# Patient Record
Sex: FEMALE | Race: WHITE | Employment: OTHER | URBAN - METROPOLITAN AREA
[De-identification: names, ages, dates, MRNs, and addresses within clinical notes are randomized per-mention and may not be internally consistent; named-entity substitution may affect disease eponyms.]

---

## 2019-10-28 ENCOUNTER — OFFICE VISIT (OUTPATIENT)
Dept: FAMILY MEDICINE CLINIC | Age: 74
End: 2019-10-28
Payer: MEDICARE

## 2019-10-28 VITALS
BODY MASS INDEX: 34.11 KG/M2 | HEIGHT: 64 IN | WEIGHT: 199.8 LBS | OXYGEN SATURATION: 98 % | DIASTOLIC BLOOD PRESSURE: 100 MMHG | SYSTOLIC BLOOD PRESSURE: 134 MMHG | HEART RATE: 81 BPM

## 2019-10-28 DIAGNOSIS — G89.29 CHRONIC PAIN OF BOTH KNEES: ICD-10-CM

## 2019-10-28 DIAGNOSIS — I10 HYPERTENSION, UNSPECIFIED TYPE: Primary | ICD-10-CM

## 2019-10-28 DIAGNOSIS — N39.46 MIXED STRESS AND URGE URINARY INCONTINENCE: ICD-10-CM

## 2019-10-28 DIAGNOSIS — E78.5 HYPERLIPIDEMIA, UNSPECIFIED HYPERLIPIDEMIA TYPE: ICD-10-CM

## 2019-10-28 DIAGNOSIS — Z11.59 NEED FOR HEPATITIS C SCREENING TEST: ICD-10-CM

## 2019-10-28 DIAGNOSIS — M25.561 CHRONIC PAIN OF BOTH KNEES: ICD-10-CM

## 2019-10-28 DIAGNOSIS — M25.562 CHRONIC PAIN OF BOTH KNEES: ICD-10-CM

## 2019-10-28 DIAGNOSIS — E11.9 TYPE 2 DIABETES MELLITUS WITHOUT COMPLICATION, WITHOUT LONG-TERM CURRENT USE OF INSULIN (HCC): ICD-10-CM

## 2019-10-28 LAB
A/G RATIO: 1.4 (ref 1.1–2.2)
ALBUMIN SERPL-MCNC: 4.3 G/DL (ref 3.4–5)
ALP BLD-CCNC: 123 U/L (ref 40–129)
ALT SERPL-CCNC: 13 U/L (ref 10–40)
ANION GAP SERPL CALCULATED.3IONS-SCNC: 19 MMOL/L (ref 3–16)
AST SERPL-CCNC: 20 U/L (ref 15–37)
BILIRUB SERPL-MCNC: 0.6 MG/DL (ref 0–1)
BUN BLDV-MCNC: 13 MG/DL (ref 7–20)
CALCIUM SERPL-MCNC: 9.9 MG/DL (ref 8.3–10.6)
CHLORIDE BLD-SCNC: 99 MMOL/L (ref 99–110)
CHOLESTEROL, TOTAL: 243 MG/DL (ref 0–199)
CO2: 24 MMOL/L (ref 21–32)
CREAT SERPL-MCNC: 0.8 MG/DL (ref 0.6–1.2)
GFR AFRICAN AMERICAN: >60
GFR NON-AFRICAN AMERICAN: >60
GLOBULIN: 3 G/DL
GLUCOSE BLD-MCNC: 148 MG/DL (ref 70–99)
HCT VFR BLD CALC: 42.4 % (ref 36–48)
HDLC SERPL-MCNC: 81 MG/DL (ref 40–60)
HEMOGLOBIN: 13.9 G/DL (ref 12–16)
HEPATITIS C ANTIBODY INTERPRETATION: NORMAL
LDL CHOLESTEROL CALCULATED: 140 MG/DL
MCH RBC QN AUTO: 31.2 PG (ref 26–34)
MCHC RBC AUTO-ENTMCNC: 32.8 G/DL (ref 31–36)
MCV RBC AUTO: 95.1 FL (ref 80–100)
PDW BLD-RTO: 15.8 % (ref 12.4–15.4)
PLATELET # BLD: 312 K/UL (ref 135–450)
PMV BLD AUTO: 7.8 FL (ref 5–10.5)
POTASSIUM SERPL-SCNC: 4.1 MMOL/L (ref 3.5–5.1)
RBC # BLD: 4.46 M/UL (ref 4–5.2)
SODIUM BLD-SCNC: 142 MMOL/L (ref 136–145)
TOTAL PROTEIN: 7.3 G/DL (ref 6.4–8.2)
TRIGL SERPL-MCNC: 108 MG/DL (ref 0–150)
VLDLC SERPL CALC-MCNC: 22 MG/DL
WBC # BLD: 7.3 K/UL (ref 4–11)

## 2019-10-28 PROCEDURE — 99204 OFFICE O/P NEW MOD 45 MIN: CPT | Performed by: PHYSICIAN ASSISTANT

## 2019-10-28 PROCEDURE — 4040F PNEUMOC VAC/ADMIN/RCVD: CPT | Performed by: PHYSICIAN ASSISTANT

## 2019-10-28 PROCEDURE — 90653 IIV ADJUVANT VACCINE IM: CPT | Performed by: PHYSICIAN ASSISTANT

## 2019-10-28 PROCEDURE — 3017F COLORECTAL CA SCREEN DOC REV: CPT | Performed by: PHYSICIAN ASSISTANT

## 2019-10-28 PROCEDURE — 1036F TOBACCO NON-USER: CPT | Performed by: PHYSICIAN ASSISTANT

## 2019-10-28 PROCEDURE — G8482 FLU IMMUNIZE ORDER/ADMIN: HCPCS | Performed by: PHYSICIAN ASSISTANT

## 2019-10-28 PROCEDURE — G8400 PT W/DXA NO RESULTS DOC: HCPCS | Performed by: PHYSICIAN ASSISTANT

## 2019-10-28 PROCEDURE — G8427 DOCREV CUR MEDS BY ELIG CLIN: HCPCS | Performed by: PHYSICIAN ASSISTANT

## 2019-10-28 PROCEDURE — 1123F ACP DISCUSS/DSCN MKR DOCD: CPT | Performed by: PHYSICIAN ASSISTANT

## 2019-10-28 PROCEDURE — 1090F PRES/ABSN URINE INCON ASSESS: CPT | Performed by: PHYSICIAN ASSISTANT

## 2019-10-28 PROCEDURE — G0008 ADMIN INFLUENZA VIRUS VAC: HCPCS | Performed by: PHYSICIAN ASSISTANT

## 2019-10-28 PROCEDURE — 90471 IMMUNIZATION ADMIN: CPT | Performed by: PHYSICIAN ASSISTANT

## 2019-10-28 PROCEDURE — G0444 DEPRESSION SCREEN ANNUAL: HCPCS | Performed by: PHYSICIAN ASSISTANT

## 2019-10-28 PROCEDURE — G8417 CALC BMI ABV UP PARAM F/U: HCPCS | Performed by: PHYSICIAN ASSISTANT

## 2019-10-28 PROCEDURE — 2022F DILAT RTA XM EVC RTNOPTHY: CPT | Performed by: PHYSICIAN ASSISTANT

## 2019-10-28 PROCEDURE — 0509F URINE INCON PLAN DOCD: CPT | Performed by: PHYSICIAN ASSISTANT

## 2019-10-28 PROCEDURE — 3046F HEMOGLOBIN A1C LEVEL >9.0%: CPT | Performed by: PHYSICIAN ASSISTANT

## 2019-10-28 RX ORDER — ATORVASTATIN CALCIUM 40 MG/1
40 TABLET, FILM COATED ORAL DAILY
Qty: 90 TABLET | Refills: 1 | Status: SHIPPED | OUTPATIENT
Start: 2019-10-28 | End: 2021-08-24

## 2019-10-28 RX ORDER — ATORVASTATIN CALCIUM 40 MG/1
40 TABLET, FILM COATED ORAL
COMMUNITY
Start: 2018-11-20 | End: 2019-10-28 | Stop reason: SDUPTHER

## 2019-10-28 RX ORDER — LOSARTAN POTASSIUM 100 MG/1
100 TABLET ORAL DAILY
Qty: 30 TABLET | Refills: 1 | Status: SHIPPED | OUTPATIENT
Start: 2019-10-28 | End: 2019-11-21 | Stop reason: SDUPTHER

## 2019-10-28 RX ORDER — METOPROLOL TARTRATE 100 MG/1
100 TABLET ORAL
Status: ON HOLD | COMMUNITY
Start: 2014-04-03 | End: 2020-02-21 | Stop reason: HOSPADM

## 2019-10-28 RX ORDER — TOLTERODINE 2 MG/1
2 CAPSULE, EXTENDED RELEASE ORAL DAILY
Qty: 90 CAPSULE | Refills: 1 | Status: SHIPPED | OUTPATIENT
Start: 2019-10-28 | End: 2019-11-05

## 2019-10-28 RX ORDER — GLIPIZIDE 10 MG/1
10 TABLET ORAL
COMMUNITY
Start: 2014-04-17 | End: 2019-10-28 | Stop reason: SDUPTHER

## 2019-10-28 RX ORDER — GLIPIZIDE 10 MG/1
10 TABLET ORAL DAILY
Qty: 90 TABLET | Refills: 0 | Status: SHIPPED | OUTPATIENT
Start: 2019-10-28 | End: 2020-01-28

## 2019-10-28 RX ORDER — OXYBUTYNIN CHLORIDE 5 MG/1
5 TABLET ORAL
COMMUNITY
Start: 2019-03-29 | End: 2019-10-28 | Stop reason: CLARIF

## 2019-10-28 RX ORDER — LOSARTAN POTASSIUM 100 MG/1
100 TABLET ORAL
COMMUNITY
Start: 2018-08-24 | End: 2019-10-28 | Stop reason: SDUPTHER

## 2019-10-28 ASSESSMENT — PATIENT HEALTH QUESTIONNAIRE - PHQ9
SUM OF ALL RESPONSES TO PHQ9 QUESTIONS 1 & 2: 3
8. MOVING OR SPEAKING SO SLOWLY THAT OTHER PEOPLE COULD HAVE NOTICED. OR THE OPPOSITE, BEING SO FIGETY OR RESTLESS THAT YOU HAVE BEEN MOVING AROUND A LOT MORE THAN USUAL: 0
6. FEELING BAD ABOUT YOURSELF - OR THAT YOU ARE A FAILURE OR HAVE LET YOURSELF OR YOUR FAMILY DOWN: 1
1. LITTLE INTEREST OR PLEASURE IN DOING THINGS: 0
SUM OF ALL RESPONSES TO PHQ QUESTIONS 1-9: 7
5. POOR APPETITE OR OVEREATING: 1
9. THOUGHTS THAT YOU WOULD BE BETTER OFF DEAD, OR OF HURTING YOURSELF: 0
3. TROUBLE FALLING OR STAYING ASLEEP: 1
SUM OF ALL RESPONSES TO PHQ QUESTIONS 1-9: 7
7. TROUBLE CONCENTRATING ON THINGS, SUCH AS READING THE NEWSPAPER OR WATCHING TELEVISION: 0
10. IF YOU CHECKED OFF ANY PROBLEMS, HOW DIFFICULT HAVE THESE PROBLEMS MADE IT FOR YOU TO DO YOUR WORK, TAKE CARE OF THINGS AT HOME, OR GET ALONG WITH OTHER PEOPLE: 1
2. FEELING DOWN, DEPRESSED OR HOPELESS: 3
4. FEELING TIRED OR HAVING LITTLE ENERGY: 1

## 2019-10-28 ASSESSMENT — ENCOUNTER SYMPTOMS
RESPIRATORY NEGATIVE: 1
GASTROINTESTINAL NEGATIVE: 1

## 2019-10-29 ENCOUNTER — TELEPHONE (OUTPATIENT)
Dept: FAMILY MEDICINE CLINIC | Age: 74
End: 2019-10-29

## 2019-10-29 LAB
ESTIMATED AVERAGE GLUCOSE: 111.2 MG/DL
HBA1C MFR BLD: 5.5 %

## 2019-10-29 NOTE — TELEPHONE ENCOUNTER
Patient is requesting a disability placard prescription. She said this was discussed during her office visit of 10-28-19.

## 2019-10-29 NOTE — TELEPHONE ENCOUNTER
Let patient know that it is ready at the  if she would like to pick it up or we can mail if she would like

## 2019-11-04 ENCOUNTER — TELEPHONE (OUTPATIENT)
Dept: FAMILY MEDICINE CLINIC | Age: 74
End: 2019-11-04

## 2019-11-05 RX ORDER — OXYBUTYNIN CHLORIDE 5 MG/1
5 TABLET ORAL 2 TIMES DAILY
Qty: 60 TABLET | Refills: 3 | Status: SHIPPED | OUTPATIENT
Start: 2019-11-05 | End: 2021-08-09 | Stop reason: ALTCHOICE

## 2019-11-06 ENCOUNTER — OFFICE VISIT (OUTPATIENT)
Dept: ORTHOPEDIC SURGERY | Age: 74
End: 2019-11-06
Payer: MEDICARE

## 2019-11-06 VITALS — HEIGHT: 63 IN | WEIGHT: 199 LBS | BODY MASS INDEX: 35.26 KG/M2

## 2019-11-06 DIAGNOSIS — M25.561 RIGHT KNEE PAIN, UNSPECIFIED CHRONICITY: Primary | ICD-10-CM

## 2019-11-06 PROCEDURE — 4040F PNEUMOC VAC/ADMIN/RCVD: CPT | Performed by: ORTHOPAEDIC SURGERY

## 2019-11-06 PROCEDURE — G8428 CUR MEDS NOT DOCUMENT: HCPCS | Performed by: ORTHOPAEDIC SURGERY

## 2019-11-06 PROCEDURE — G8482 FLU IMMUNIZE ORDER/ADMIN: HCPCS | Performed by: ORTHOPAEDIC SURGERY

## 2019-11-06 PROCEDURE — G8400 PT W/DXA NO RESULTS DOC: HCPCS | Performed by: ORTHOPAEDIC SURGERY

## 2019-11-06 PROCEDURE — 1036F TOBACCO NON-USER: CPT | Performed by: ORTHOPAEDIC SURGERY

## 2019-11-06 PROCEDURE — 1090F PRES/ABSN URINE INCON ASSESS: CPT | Performed by: ORTHOPAEDIC SURGERY

## 2019-11-06 PROCEDURE — 99203 OFFICE O/P NEW LOW 30 MIN: CPT | Performed by: ORTHOPAEDIC SURGERY

## 2019-11-06 PROCEDURE — 1123F ACP DISCUSS/DSCN MKR DOCD: CPT | Performed by: ORTHOPAEDIC SURGERY

## 2019-11-06 PROCEDURE — 3017F COLORECTAL CA SCREEN DOC REV: CPT | Performed by: ORTHOPAEDIC SURGERY

## 2019-11-06 PROCEDURE — G8417 CALC BMI ABV UP PARAM F/U: HCPCS | Performed by: ORTHOPAEDIC SURGERY

## 2019-11-06 RX ORDER — MELOXICAM 15 MG/1
15 TABLET ORAL DAILY
Qty: 30 TABLET | Refills: 3 | Status: SHIPPED | OUTPATIENT
Start: 2019-11-06 | End: 2020-01-06 | Stop reason: SDUPTHER

## 2019-11-12 ENCOUNTER — HOSPITAL ENCOUNTER (OUTPATIENT)
Dept: PHYSICAL THERAPY | Age: 74
Setting detail: THERAPIES SERIES
Discharge: HOME | End: 2019-11-12
Payer: MEDICARE

## 2019-11-12 PROCEDURE — 97140 MANUAL THERAPY 1/> REGIONS: CPT

## 2019-11-12 PROCEDURE — 97110 THERAPEUTIC EXERCISES: CPT

## 2019-11-12 PROCEDURE — G0283 ELEC STIM OTHER THAN WOUND: HCPCS

## 2019-11-12 PROCEDURE — 97161 PT EVAL LOW COMPLEX 20 MIN: CPT

## 2019-11-15 ENCOUNTER — HOSPITAL ENCOUNTER (OUTPATIENT)
Dept: PHYSICAL THERAPY | Age: 74
Setting detail: THERAPIES SERIES
Discharge: HOME | End: 2019-11-15
Payer: MEDICARE

## 2019-11-19 ENCOUNTER — HOSPITAL ENCOUNTER (OUTPATIENT)
Dept: PHYSICAL THERAPY | Age: 74
Setting detail: THERAPIES SERIES
Discharge: HOME | End: 2019-11-19
Payer: MEDICARE

## 2019-11-19 PROCEDURE — G0283 ELEC STIM OTHER THAN WOUND: HCPCS

## 2019-11-19 PROCEDURE — 97110 THERAPEUTIC EXERCISES: CPT

## 2019-11-19 PROCEDURE — 97140 MANUAL THERAPY 1/> REGIONS: CPT

## 2019-11-19 PROCEDURE — 97112 NEUROMUSCULAR REEDUCATION: CPT

## 2019-11-21 ENCOUNTER — HOSPITAL ENCOUNTER (OUTPATIENT)
Dept: PHYSICAL THERAPY | Age: 74
Setting detail: THERAPIES SERIES
Discharge: HOME | End: 2019-11-21
Payer: MEDICARE

## 2019-11-21 PROCEDURE — 97112 NEUROMUSCULAR REEDUCATION: CPT

## 2019-11-21 PROCEDURE — G0283 ELEC STIM OTHER THAN WOUND: HCPCS

## 2019-11-21 PROCEDURE — 97110 THERAPEUTIC EXERCISES: CPT

## 2019-11-21 PROCEDURE — 97140 MANUAL THERAPY 1/> REGIONS: CPT

## 2019-11-22 ENCOUNTER — APPOINTMENT (OUTPATIENT)
Dept: PHYSICAL THERAPY | Age: 74
End: 2019-11-22
Payer: MEDICARE

## 2019-11-22 RX ORDER — LOSARTAN POTASSIUM 100 MG/1
TABLET ORAL
Qty: 30 TABLET | Refills: 1 | Status: SHIPPED | OUTPATIENT
Start: 2019-11-22 | End: 2019-12-26

## 2019-11-26 ENCOUNTER — HOSPITAL ENCOUNTER (OUTPATIENT)
Dept: PHYSICAL THERAPY | Age: 74
Setting detail: THERAPIES SERIES
Discharge: HOME | End: 2019-11-26
Payer: MEDICARE

## 2019-12-02 ENCOUNTER — OFFICE VISIT (OUTPATIENT)
Dept: FAMILY MEDICINE CLINIC | Age: 74
End: 2019-12-02
Payer: MEDICARE

## 2019-12-02 VITALS
WEIGHT: 208.4 LBS | BODY MASS INDEX: 36.93 KG/M2 | HEIGHT: 63 IN | SYSTOLIC BLOOD PRESSURE: 140 MMHG | DIASTOLIC BLOOD PRESSURE: 92 MMHG | OXYGEN SATURATION: 98 % | HEART RATE: 91 BPM

## 2019-12-02 DIAGNOSIS — Z78.0 POST-MENOPAUSAL: ICD-10-CM

## 2019-12-02 DIAGNOSIS — I10 HYPERTENSION, UNSPECIFIED TYPE: Primary | ICD-10-CM

## 2019-12-02 PROCEDURE — G8400 PT W/DXA NO RESULTS DOC: HCPCS | Performed by: PHYSICIAN ASSISTANT

## 2019-12-02 PROCEDURE — 1036F TOBACCO NON-USER: CPT | Performed by: PHYSICIAN ASSISTANT

## 2019-12-02 PROCEDURE — 99213 OFFICE O/P EST LOW 20 MIN: CPT | Performed by: PHYSICIAN ASSISTANT

## 2019-12-02 PROCEDURE — G8427 DOCREV CUR MEDS BY ELIG CLIN: HCPCS | Performed by: PHYSICIAN ASSISTANT

## 2019-12-02 PROCEDURE — 3017F COLORECTAL CA SCREEN DOC REV: CPT | Performed by: PHYSICIAN ASSISTANT

## 2019-12-02 PROCEDURE — G8482 FLU IMMUNIZE ORDER/ADMIN: HCPCS | Performed by: PHYSICIAN ASSISTANT

## 2019-12-02 PROCEDURE — 1090F PRES/ABSN URINE INCON ASSESS: CPT | Performed by: PHYSICIAN ASSISTANT

## 2019-12-02 PROCEDURE — G8417 CALC BMI ABV UP PARAM F/U: HCPCS | Performed by: PHYSICIAN ASSISTANT

## 2019-12-02 PROCEDURE — 1123F ACP DISCUSS/DSCN MKR DOCD: CPT | Performed by: PHYSICIAN ASSISTANT

## 2019-12-02 PROCEDURE — 4040F PNEUMOC VAC/ADMIN/RCVD: CPT | Performed by: PHYSICIAN ASSISTANT

## 2019-12-02 RX ORDER — AMLODIPINE BESYLATE 2.5 MG/1
2.5 TABLET ORAL DAILY
Qty: 30 TABLET | Refills: 1 | Status: SHIPPED | OUTPATIENT
Start: 2019-12-02 | End: 2020-01-06 | Stop reason: SDUPTHER

## 2019-12-04 ENCOUNTER — TELEPHONE (OUTPATIENT)
Dept: FAMILY MEDICINE CLINIC | Age: 74
End: 2019-12-04

## 2019-12-04 NOTE — TELEPHONE ENCOUNTER
Patient took her prescription for the Disability Placard to the [facility]. They will not accept it as it was prescribed. The script is printed and the date is handwritten. It all must be printed, or all handwritten. She will need a new script please. Call when ready for .

## 2019-12-06 ENCOUNTER — HOSPITAL ENCOUNTER (OUTPATIENT)
Dept: PHYSICAL THERAPY | Age: 74
Setting detail: THERAPIES SERIES
Discharge: HOME | End: 2019-12-06
Payer: MEDICARE

## 2019-12-06 PROCEDURE — G0283 ELEC STIM OTHER THAN WOUND: HCPCS

## 2019-12-06 PROCEDURE — 97110 THERAPEUTIC EXERCISES: CPT

## 2019-12-06 PROCEDURE — 97140 MANUAL THERAPY 1/> REGIONS: CPT

## 2019-12-06 PROCEDURE — 97112 NEUROMUSCULAR REEDUCATION: CPT

## 2019-12-10 ENCOUNTER — HOSPITAL ENCOUNTER (OUTPATIENT)
Dept: PHYSICAL THERAPY | Age: 74
Setting detail: THERAPIES SERIES
Discharge: HOME | End: 2019-12-10
Payer: MEDICARE

## 2019-12-10 PROCEDURE — 97140 MANUAL THERAPY 1/> REGIONS: CPT

## 2019-12-10 PROCEDURE — 97112 NEUROMUSCULAR REEDUCATION: CPT

## 2019-12-10 PROCEDURE — 97110 THERAPEUTIC EXERCISES: CPT

## 2019-12-10 PROCEDURE — G0283 ELEC STIM OTHER THAN WOUND: HCPCS

## 2019-12-13 ENCOUNTER — HOSPITAL ENCOUNTER (OUTPATIENT)
Dept: PHYSICAL THERAPY | Age: 74
Setting detail: THERAPIES SERIES
Discharge: HOME | End: 2019-12-13
Payer: MEDICARE

## 2019-12-13 PROCEDURE — 97110 THERAPEUTIC EXERCISES: CPT

## 2019-12-13 PROCEDURE — 97112 NEUROMUSCULAR REEDUCATION: CPT

## 2019-12-13 PROCEDURE — 97140 MANUAL THERAPY 1/> REGIONS: CPT

## 2019-12-13 PROCEDURE — G0283 ELEC STIM OTHER THAN WOUND: HCPCS

## 2019-12-18 ENCOUNTER — OFFICE VISIT (OUTPATIENT)
Dept: ORTHOPEDIC SURGERY | Age: 74
End: 2019-12-18
Payer: MEDICARE

## 2019-12-18 VITALS — BODY MASS INDEX: 35.26 KG/M2 | WEIGHT: 199 LBS | HEIGHT: 63 IN

## 2019-12-18 DIAGNOSIS — M17.11 ARTHRITIS OF RIGHT KNEE: Primary | ICD-10-CM

## 2019-12-18 PROCEDURE — 3017F COLORECTAL CA SCREEN DOC REV: CPT | Performed by: ORTHOPAEDIC SURGERY

## 2019-12-18 PROCEDURE — 99213 OFFICE O/P EST LOW 20 MIN: CPT | Performed by: ORTHOPAEDIC SURGERY

## 2019-12-18 PROCEDURE — G8417 CALC BMI ABV UP PARAM F/U: HCPCS | Performed by: ORTHOPAEDIC SURGERY

## 2019-12-18 PROCEDURE — 4040F PNEUMOC VAC/ADMIN/RCVD: CPT | Performed by: ORTHOPAEDIC SURGERY

## 2019-12-18 PROCEDURE — 1036F TOBACCO NON-USER: CPT | Performed by: ORTHOPAEDIC SURGERY

## 2019-12-18 PROCEDURE — G8400 PT W/DXA NO RESULTS DOC: HCPCS | Performed by: ORTHOPAEDIC SURGERY

## 2019-12-18 PROCEDURE — G8427 DOCREV CUR MEDS BY ELIG CLIN: HCPCS | Performed by: ORTHOPAEDIC SURGERY

## 2019-12-18 PROCEDURE — 1090F PRES/ABSN URINE INCON ASSESS: CPT | Performed by: ORTHOPAEDIC SURGERY

## 2019-12-18 PROCEDURE — 1123F ACP DISCUSS/DSCN MKR DOCD: CPT | Performed by: ORTHOPAEDIC SURGERY

## 2019-12-18 PROCEDURE — G8482 FLU IMMUNIZE ORDER/ADMIN: HCPCS | Performed by: ORTHOPAEDIC SURGERY

## 2020-01-06 ENCOUNTER — OFFICE VISIT (OUTPATIENT)
Dept: FAMILY MEDICINE CLINIC | Age: 75
End: 2020-01-06
Payer: MEDICARE

## 2020-01-06 VITALS
HEIGHT: 63 IN | OXYGEN SATURATION: 99 % | BODY MASS INDEX: 36.18 KG/M2 | HEART RATE: 91 BPM | WEIGHT: 204.2 LBS | DIASTOLIC BLOOD PRESSURE: 88 MMHG | SYSTOLIC BLOOD PRESSURE: 138 MMHG

## 2020-01-06 PROCEDURE — 1036F TOBACCO NON-USER: CPT | Performed by: PHYSICIAN ASSISTANT

## 2020-01-06 PROCEDURE — G8482 FLU IMMUNIZE ORDER/ADMIN: HCPCS | Performed by: PHYSICIAN ASSISTANT

## 2020-01-06 PROCEDURE — 1123F ACP DISCUSS/DSCN MKR DOCD: CPT | Performed by: PHYSICIAN ASSISTANT

## 2020-01-06 PROCEDURE — G8427 DOCREV CUR MEDS BY ELIG CLIN: HCPCS | Performed by: PHYSICIAN ASSISTANT

## 2020-01-06 PROCEDURE — 1090F PRES/ABSN URINE INCON ASSESS: CPT | Performed by: PHYSICIAN ASSISTANT

## 2020-01-06 PROCEDURE — 4040F PNEUMOC VAC/ADMIN/RCVD: CPT | Performed by: PHYSICIAN ASSISTANT

## 2020-01-06 PROCEDURE — 99213 OFFICE O/P EST LOW 20 MIN: CPT | Performed by: PHYSICIAN ASSISTANT

## 2020-01-06 PROCEDURE — G8400 PT W/DXA NO RESULTS DOC: HCPCS | Performed by: PHYSICIAN ASSISTANT

## 2020-01-06 PROCEDURE — 3017F COLORECTAL CA SCREEN DOC REV: CPT | Performed by: PHYSICIAN ASSISTANT

## 2020-01-06 PROCEDURE — G8417 CALC BMI ABV UP PARAM F/U: HCPCS | Performed by: PHYSICIAN ASSISTANT

## 2020-01-06 RX ORDER — LOSARTAN POTASSIUM 100 MG/1
TABLET ORAL
Qty: 90 TABLET | Refills: 0 | Status: ON HOLD | OUTPATIENT
Start: 2020-01-06 | End: 2020-03-08 | Stop reason: HOSPADM

## 2020-01-06 RX ORDER — MELOXICAM 15 MG/1
15 TABLET ORAL DAILY
Qty: 90 TABLET | Refills: 1 | Status: ON HOLD | OUTPATIENT
Start: 2020-01-06 | End: 2020-02-08 | Stop reason: HOSPADM

## 2020-01-06 RX ORDER — AMLODIPINE BESYLATE 2.5 MG/1
2.5 TABLET ORAL DAILY
Qty: 90 TABLET | Refills: 0 | Status: ON HOLD | OUTPATIENT
Start: 2020-01-06 | End: 2020-02-21 | Stop reason: HOSPADM

## 2020-01-06 ASSESSMENT — PATIENT HEALTH QUESTIONNAIRE - PHQ9
1. LITTLE INTEREST OR PLEASURE IN DOING THINGS: 0
SUM OF ALL RESPONSES TO PHQ9 QUESTIONS 1 & 2: 0
2. FEELING DOWN, DEPRESSED OR HOPELESS: 0
SUM OF ALL RESPONSES TO PHQ QUESTIONS 1-9: 0
SUM OF ALL RESPONSES TO PHQ QUESTIONS 1-9: 0

## 2020-01-06 NOTE — PROGRESS NOTES
Subjective: Mayo Li is a 69 y.o. female with hypertension. Current Outpatient Medications   Medication Sig Dispense Refill    losartan (COZAAR) 100 MG tablet TAKE 1 TABLET BY MOUTH EVERY DAY 90 tablet 0    meloxicam (MOBIC) 15 MG tablet Take 1 tablet by mouth daily 90 tablet 1    amLODIPine (NORVASC) 2.5 MG tablet Take 1 tablet by mouth daily 90 tablet 0    oxybutynin (DITROPAN) 5 MG tablet Take 1 tablet by mouth 2 times daily 60 tablet 3    Handicap Placard MISC by Does not apply route 1 each 0    metoprolol (LOPRESSOR) 100 MG tablet Take 100 mg by mouth       atorvastatin (LIPITOR) 40 MG tablet Take 1 tablet by mouth daily 90 tablet 1    glipiZIDE (GLUCOTROL) 10 MG tablet Take 1 tablet by mouth daily 90 tablet 0    Cholecalciferol 100 MCG (4000 UT) CAPS Take 1 capsule by mouth daily 90 capsule 1    Handicap Placard MISC Expiration 1/2025 (Patient not taking: Reported on 1/6/2020) 1 each 0     No current facility-administered medications for this visit. Hypertension ROS: taking medications as instructed, no medication side effects noted, no TIA's, no chest pain on exertion, no dyspnea on exertion, no swelling of ankles. New concerns: She is considering downsizing, eventually will end up in assisted living. House needs de-cluttered. Live in a ranch, washer and dryer downstairs may have it moved to the garage. Objective:   /88 (Site: Right Upper Arm, Position: Sitting)   Pulse 91   Ht 5' 3\" (1.6 m)   Wt 204 lb 3.2 oz (92.6 kg)   SpO2 99%   BMI 36.17 kg/m²    Appearance alert, well appearing, and in no distress, oriented to person, place, and time and overweight. General exam BP noted to be well controlled today in office, S1, S2 normal, no gallop, no murmur, chest clear, no JVD, no HSM, no edema. Assessment:     Diagnosis Orders   1. Hypertension, unspecified type     2.  Right knee pain, unspecified chronicity  meloxicam (MOBIC) 15 MG tablet         Plan:   Current treatment plan is effective, no change in therapy. Monica Craft

## 2020-01-28 RX ORDER — GLIPIZIDE 10 MG/1
TABLET ORAL
Qty: 90 TABLET | Refills: 0 | Status: SHIPPED | OUTPATIENT
Start: 2020-01-28

## 2020-02-03 ENCOUNTER — TELEPHONE (OUTPATIENT)
Dept: FAMILY MEDICINE CLINIC | Age: 75
End: 2020-02-03

## 2020-02-06 ENCOUNTER — ANESTHESIA EVENT (OUTPATIENT)
Dept: ENDOSCOPY | Age: 75
DRG: 378 | End: 2020-02-06
Payer: MEDICARE

## 2020-02-06 ENCOUNTER — APPOINTMENT (OUTPATIENT)
Dept: CT IMAGING | Age: 75
DRG: 378 | End: 2020-02-06
Payer: MEDICARE

## 2020-02-06 ENCOUNTER — APPOINTMENT (OUTPATIENT)
Dept: ULTRASOUND IMAGING | Age: 75
DRG: 378 | End: 2020-02-06
Payer: MEDICARE

## 2020-02-06 ENCOUNTER — APPOINTMENT (OUTPATIENT)
Dept: GENERAL RADIOLOGY | Age: 75
DRG: 378 | End: 2020-02-06
Payer: MEDICARE

## 2020-02-06 ENCOUNTER — HOSPITAL ENCOUNTER (INPATIENT)
Age: 75
LOS: 2 days | Discharge: HOME HEALTH | DRG: 378 | End: 2020-02-08
Attending: EMERGENCY MEDICINE | Admitting: INTERNAL MEDICINE
Payer: MEDICARE

## 2020-02-06 PROBLEM — K92.2 GI BLEED: Status: ACTIVE | Noted: 2020-02-06

## 2020-02-06 LAB
A/G RATIO: 1.3 (ref 1.1–2.2)
ABO/RH: NORMAL
ALBUMIN SERPL-MCNC: 3.7 G/DL (ref 3.4–5)
ALP BLD-CCNC: 110 U/L (ref 40–129)
ALT SERPL-CCNC: 6 U/L (ref 10–40)
AMMONIA: <10 UMOL/L (ref 11–51)
ANION GAP SERPL CALCULATED.3IONS-SCNC: 18 MMOL/L (ref 3–16)
ANTIBODY SCREEN: NORMAL
AST SERPL-CCNC: 17 U/L (ref 15–37)
BACTERIA: ABNORMAL /HPF
BASOPHILS ABSOLUTE: 0.1 K/UL (ref 0–0.2)
BASOPHILS RELATIVE PERCENT: 0.6 %
BILIRUB SERPL-MCNC: 0.3 MG/DL (ref 0–1)
BILIRUBIN URINE: NEGATIVE
BLOOD, URINE: ABNORMAL
BUN BLDV-MCNC: 42 MG/DL (ref 7–20)
CALCIUM SERPL-MCNC: 8.9 MG/DL (ref 8.3–10.6)
CHLORIDE BLD-SCNC: 104 MMOL/L (ref 99–110)
CLARITY: CLEAR
CO2: 18 MMOL/L (ref 21–32)
COLOR: ABNORMAL
CREAT SERPL-MCNC: 1 MG/DL (ref 0.6–1.2)
EKG ATRIAL RATE: 82 BPM
EKG DIAGNOSIS: NORMAL
EKG P AXIS: 55 DEGREES
EKG P-R INTERVAL: 114 MS
EKG Q-T INTERVAL: 390 MS
EKG QRS DURATION: 72 MS
EKG QTC CALCULATION (BAZETT): 455 MS
EKG R AXIS: -3 DEGREES
EKG T AXIS: 74 DEGREES
EKG VENTRICULAR RATE: 82 BPM
EOSINOPHILS ABSOLUTE: 0.1 K/UL (ref 0–0.6)
EOSINOPHILS RELATIVE PERCENT: 0.5 %
EPITHELIAL CELLS, UA: ABNORMAL /HPF
GFR AFRICAN AMERICAN: >60
GFR NON-AFRICAN AMERICAN: 54
GLOBULIN: 2.8 G/DL
GLUCOSE BLD-MCNC: 158 MG/DL (ref 70–99)
GLUCOSE BLD-MCNC: 183 MG/DL (ref 70–99)
GLUCOSE BLD-MCNC: 245 MG/DL (ref 70–99)
GLUCOSE BLD-MCNC: 250 MG/DL (ref 70–99)
GLUCOSE URINE: NEGATIVE MG/DL
HCT VFR BLD CALC: 22.7 % (ref 36–48)
HCT VFR BLD CALC: 24.9 % (ref 36–48)
HEMOGLOBIN: 7.6 G/DL (ref 12–16)
HEMOGLOBIN: 8.3 G/DL (ref 12–16)
INR BLD: 1.02 (ref 0.86–1.14)
KETONES, URINE: 40 MG/DL
LACTIC ACID: 1.8 MMOL/L (ref 0.4–2)
LACTIC ACID: 2.3 MMOL/L (ref 0.4–2)
LEUKOCYTE ESTERASE, URINE: ABNORMAL
LIPASE: 35 U/L (ref 13–60)
LYMPHOCYTES ABSOLUTE: 1.4 K/UL (ref 1–5.1)
LYMPHOCYTES RELATIVE PERCENT: 12.6 %
MCH RBC QN AUTO: 30.8 PG (ref 26–34)
MCHC RBC AUTO-ENTMCNC: 33.3 G/DL (ref 31–36)
MCV RBC AUTO: 92.5 FL (ref 80–100)
MICROSCOPIC EXAMINATION: YES
MONOCYTES ABSOLUTE: 0.3 K/UL (ref 0–1.3)
MONOCYTES RELATIVE PERCENT: 3 %
NEUTROPHILS ABSOLUTE: 9.2 K/UL (ref 1.7–7.7)
NEUTROPHILS RELATIVE PERCENT: 83.3 %
NITRITE, URINE: NEGATIVE
OCCULT BLOOD SCREENING: ABNORMAL
PDW BLD-RTO: 15.5 % (ref 12.4–15.4)
PERFORMED ON: ABNORMAL
PH UA: 5.5 (ref 5–8)
PLATELET # BLD: 345 K/UL (ref 135–450)
PMV BLD AUTO: 8 FL (ref 5–10.5)
POTASSIUM SERPL-SCNC: 4.4 MMOL/L (ref 3.5–5.1)
PROTEIN UA: NEGATIVE MG/DL
PROTHROMBIN TIME: 11.8 SEC (ref 10–13.2)
RBC # BLD: 2.69 M/UL (ref 4–5.2)
RBC UA: ABNORMAL /HPF (ref 0–2)
REASON FOR REJECTION: NORMAL
REJECTED TEST: NORMAL
SODIUM BLD-SCNC: 140 MMOL/L (ref 136–145)
SPECIFIC GRAVITY UA: 1.01 (ref 1–1.03)
TOTAL PROTEIN: 6.5 G/DL (ref 6.4–8.2)
TROPONIN: <0.01 NG/ML
URINE TYPE: ABNORMAL
UROBILINOGEN, URINE: 0.2 E.U./DL
WBC # BLD: 11.1 K/UL (ref 4–11)
WBC UA: ABNORMAL /HPF (ref 0–5)

## 2020-02-06 PROCEDURE — 83605 ASSAY OF LACTIC ACID: CPT

## 2020-02-06 PROCEDURE — 6360000002 HC RX W HCPCS: Performed by: EMERGENCY MEDICINE

## 2020-02-06 PROCEDURE — 80053 COMPREHEN METABOLIC PANEL: CPT

## 2020-02-06 PROCEDURE — 2580000003 HC RX 258: Performed by: INTERNAL MEDICINE

## 2020-02-06 PROCEDURE — 86900 BLOOD TYPING SEROLOGIC ABO: CPT

## 2020-02-06 PROCEDURE — 84484 ASSAY OF TROPONIN QUANT: CPT

## 2020-02-06 PROCEDURE — 71046 X-RAY EXAM CHEST 2 VIEWS: CPT

## 2020-02-06 PROCEDURE — 85014 HEMATOCRIT: CPT

## 2020-02-06 PROCEDURE — 96375 TX/PRO/DX INJ NEW DRUG ADDON: CPT

## 2020-02-06 PROCEDURE — 6370000000 HC RX 637 (ALT 250 FOR IP): Performed by: INTERNAL MEDICINE

## 2020-02-06 PROCEDURE — 81001 URINALYSIS AUTO W/SCOPE: CPT

## 2020-02-06 PROCEDURE — 86850 RBC ANTIBODY SCREEN: CPT

## 2020-02-06 PROCEDURE — 1200000000 HC SEMI PRIVATE

## 2020-02-06 PROCEDURE — G0328 FECAL BLOOD SCRN IMMUNOASSAY: HCPCS

## 2020-02-06 PROCEDURE — 83690 ASSAY OF LIPASE: CPT

## 2020-02-06 PROCEDURE — 85018 HEMOGLOBIN: CPT

## 2020-02-06 PROCEDURE — 87150 DNA/RNA AMPLIFIED PROBE: CPT

## 2020-02-06 PROCEDURE — 2580000003 HC RX 258: Performed by: EMERGENCY MEDICINE

## 2020-02-06 PROCEDURE — 85025 COMPLETE CBC W/AUTO DIFF WBC: CPT

## 2020-02-06 PROCEDURE — 93005 ELECTROCARDIOGRAM TRACING: CPT | Performed by: EMERGENCY MEDICINE

## 2020-02-06 PROCEDURE — 74177 CT ABD & PELVIS W/CONTRAST: CPT

## 2020-02-06 PROCEDURE — 86901 BLOOD TYPING SEROLOGIC RH(D): CPT

## 2020-02-06 PROCEDURE — 87040 BLOOD CULTURE FOR BACTERIA: CPT

## 2020-02-06 PROCEDURE — 36415 COLL VENOUS BLD VENIPUNCTURE: CPT

## 2020-02-06 PROCEDURE — 85610 PROTHROMBIN TIME: CPT

## 2020-02-06 PROCEDURE — C9113 INJ PANTOPRAZOLE SODIUM, VIA: HCPCS | Performed by: EMERGENCY MEDICINE

## 2020-02-06 PROCEDURE — 99291 CRITICAL CARE FIRST HOUR: CPT

## 2020-02-06 PROCEDURE — 96374 THER/PROPH/DIAG INJ IV PUSH: CPT

## 2020-02-06 PROCEDURE — 83036 HEMOGLOBIN GLYCOSYLATED A1C: CPT

## 2020-02-06 PROCEDURE — C9113 INJ PANTOPRAZOLE SODIUM, VIA: HCPCS | Performed by: INTERNAL MEDICINE

## 2020-02-06 PROCEDURE — 76705 ECHO EXAM OF ABDOMEN: CPT

## 2020-02-06 PROCEDURE — 6360000004 HC RX CONTRAST MEDICATION: Performed by: EMERGENCY MEDICINE

## 2020-02-06 PROCEDURE — 6360000002 HC RX W HCPCS: Performed by: INTERNAL MEDICINE

## 2020-02-06 PROCEDURE — 82140 ASSAY OF AMMONIA: CPT

## 2020-02-06 RX ORDER — ACETAMINOPHEN 325 MG/1
650 TABLET ORAL EVERY 4 HOURS PRN
Status: DISCONTINUED | OUTPATIENT
Start: 2020-02-06 | End: 2020-02-08 | Stop reason: HOSPADM

## 2020-02-06 RX ORDER — MORPHINE SULFATE 4 MG/ML
4 INJECTION, SOLUTION INTRAMUSCULAR; INTRAVENOUS ONCE
Status: COMPLETED | OUTPATIENT
Start: 2020-02-06 | End: 2020-02-06

## 2020-02-06 RX ORDER — OXYBUTYNIN CHLORIDE 5 MG/1
5 TABLET ORAL 2 TIMES DAILY
Status: DISCONTINUED | OUTPATIENT
Start: 2020-02-06 | End: 2020-02-08 | Stop reason: HOSPADM

## 2020-02-06 RX ORDER — SODIUM CHLORIDE 9 MG/ML
INJECTION, SOLUTION INTRAVENOUS CONTINUOUS
Status: DISCONTINUED | OUTPATIENT
Start: 2020-02-06 | End: 2020-02-08

## 2020-02-06 RX ORDER — ATORVASTATIN CALCIUM 40 MG/1
40 TABLET, FILM COATED ORAL DAILY
Status: DISCONTINUED | OUTPATIENT
Start: 2020-02-06 | End: 2020-02-08 | Stop reason: HOSPADM

## 2020-02-06 RX ORDER — ONDANSETRON 2 MG/ML
4 INJECTION INTRAMUSCULAR; INTRAVENOUS EVERY 6 HOURS PRN
Status: DISCONTINUED | OUTPATIENT
Start: 2020-02-06 | End: 2020-02-08 | Stop reason: HOSPADM

## 2020-02-06 RX ORDER — SODIUM CHLORIDE, SODIUM LACTATE, POTASSIUM CHLORIDE, AND CALCIUM CHLORIDE .6; .31; .03; .02 G/100ML; G/100ML; G/100ML; G/100ML
1000 INJECTION, SOLUTION INTRAVENOUS ONCE
Status: COMPLETED | OUTPATIENT
Start: 2020-02-06 | End: 2020-02-06

## 2020-02-06 RX ORDER — SODIUM CHLORIDE 0.9 % (FLUSH) 0.9 %
10 SYRINGE (ML) INJECTION EVERY 12 HOURS SCHEDULED
Status: DISCONTINUED | OUTPATIENT
Start: 2020-02-06 | End: 2020-02-08 | Stop reason: HOSPADM

## 2020-02-06 RX ORDER — DEXTROSE MONOHYDRATE 25 G/50ML
12.5 INJECTION, SOLUTION INTRAVENOUS PRN
Status: DISCONTINUED | OUTPATIENT
Start: 2020-02-06 | End: 2020-02-08 | Stop reason: HOSPADM

## 2020-02-06 RX ORDER — SODIUM CHLORIDE 0.9 % (FLUSH) 0.9 %
10 SYRINGE (ML) INJECTION PRN
Status: DISCONTINUED | OUTPATIENT
Start: 2020-02-06 | End: 2020-02-08 | Stop reason: HOSPADM

## 2020-02-06 RX ORDER — NICOTINE POLACRILEX 4 MG
15 LOZENGE BUCCAL PRN
Status: DISCONTINUED | OUTPATIENT
Start: 2020-02-06 | End: 2020-02-08 | Stop reason: HOSPADM

## 2020-02-06 RX ORDER — PANTOPRAZOLE SODIUM 40 MG/10ML
80 INJECTION, POWDER, LYOPHILIZED, FOR SOLUTION INTRAVENOUS ONCE
Status: COMPLETED | OUTPATIENT
Start: 2020-02-06 | End: 2020-02-06

## 2020-02-06 RX ORDER — DEXTROSE MONOHYDRATE 50 MG/ML
100 INJECTION, SOLUTION INTRAVENOUS PRN
Status: DISCONTINUED | OUTPATIENT
Start: 2020-02-06 | End: 2020-02-08 | Stop reason: HOSPADM

## 2020-02-06 RX ORDER — ONDANSETRON 2 MG/ML
4 INJECTION INTRAMUSCULAR; INTRAVENOUS ONCE
Status: COMPLETED | OUTPATIENT
Start: 2020-02-06 | End: 2020-02-06

## 2020-02-06 RX ADMIN — OXYBUTYNIN CHLORIDE 5 MG: 5 TABLET ORAL at 21:57

## 2020-02-06 RX ADMIN — HYDROMORPHONE HYDROCHLORIDE 0.5 MG: 1 INJECTION, SOLUTION INTRAMUSCULAR; INTRAVENOUS; SUBCUTANEOUS at 09:11

## 2020-02-06 RX ADMIN — Medication 10 ML: at 21:57

## 2020-02-06 RX ADMIN — INSULIN LISPRO 1 UNITS: 100 INJECTION, SOLUTION INTRAVENOUS; SUBCUTANEOUS at 21:54

## 2020-02-06 RX ADMIN — IOPAMIDOL 75 ML: 755 INJECTION, SOLUTION INTRAVENOUS at 09:41

## 2020-02-06 RX ADMIN — PANTOPRAZOLE SODIUM 80 MG: 40 INJECTION, POWDER, FOR SOLUTION INTRAVENOUS at 10:35

## 2020-02-06 RX ADMIN — MORPHINE SULFATE 4 MG: 4 INJECTION, SOLUTION INTRAMUSCULAR; INTRAVENOUS at 07:51

## 2020-02-06 RX ADMIN — SODIUM CHLORIDE: 9 INJECTION, SOLUTION INTRAVENOUS at 14:29

## 2020-02-06 RX ADMIN — SODIUM CHLORIDE 8 MG/HR: 9 INJECTION, SOLUTION INTRAVENOUS at 10:35

## 2020-02-06 RX ADMIN — ATORVASTATIN CALCIUM 40 MG: 40 TABLET, FILM COATED ORAL at 21:54

## 2020-02-06 RX ADMIN — CEFTRIAXONE SODIUM 1 G: 1 INJECTION, POWDER, FOR SOLUTION INTRAMUSCULAR; INTRAVENOUS at 10:35

## 2020-02-06 RX ADMIN — OXYBUTYNIN CHLORIDE 5 MG: 5 TABLET ORAL at 14:31

## 2020-02-06 RX ADMIN — SODIUM CHLORIDE 8 MG/HR: 9 INJECTION, SOLUTION INTRAVENOUS at 23:24

## 2020-02-06 RX ADMIN — SODIUM CHLORIDE, POTASSIUM CHLORIDE, SODIUM LACTATE AND CALCIUM CHLORIDE 1000 ML: 600; 310; 30; 20 INJECTION, SOLUTION INTRAVENOUS at 07:53

## 2020-02-06 RX ADMIN — ONDANSETRON 4 MG: 2 INJECTION INTRAMUSCULAR; INTRAVENOUS at 07:51

## 2020-02-06 ASSESSMENT — ENCOUNTER SYMPTOMS
VOMITING: 0
COLOR CHANGE: 0
BACK PAIN: 0
ABDOMINAL PAIN: 1
BLOOD IN STOOL: 0
CONSTIPATION: 1
COUGH: 0
NAUSEA: 1
DIARRHEA: 0
SORE THROAT: 0
ANAL BLEEDING: 0
CHEST TIGHTNESS: 0
SHORTNESS OF BREATH: 1

## 2020-02-06 ASSESSMENT — PAIN SCALES - GENERAL
PAINLEVEL_OUTOF10: 7
PAINLEVEL_OUTOF10: 0
PAINLEVEL_OUTOF10: 7
PAINLEVEL_OUTOF10: 7
PAINLEVEL_OUTOF10: 0
PAINLEVEL_OUTOF10: 0
PAINLEVEL_OUTOF10: 7
PAINLEVEL_OUTOF10: 0

## 2020-02-06 ASSESSMENT — PAIN DESCRIPTION - LOCATION
LOCATION: GENERALIZED
LOCATION: HEAD

## 2020-02-06 ASSESSMENT — PAIN DESCRIPTION - ONSET: ONSET: ON-GOING

## 2020-02-06 ASSESSMENT — PAIN DESCRIPTION - FREQUENCY: FREQUENCY: CONTINUOUS

## 2020-02-06 ASSESSMENT — PAIN DESCRIPTION - DESCRIPTORS: DESCRIPTORS: HEADACHE

## 2020-02-06 ASSESSMENT — PAIN DESCRIPTION - PAIN TYPE: TYPE: ACUTE PAIN

## 2020-02-06 NOTE — H&P
Full range of motion without deformity. Skin: Skin color, texture, turgor normal.  No rashes or lesions. Neurologic:  Neurovascularly intact without any focal sensory/motor deficits. Cranial nerves: II-XII intact, grossly non-focal.  Psychiatric:  Alert and oriented, thought content appropriate, normal insight  Capillary Refill: Brisk,< 3 seconds   Peripheral Pulses: +2 palpable, equal bilaterally       Labs:     Recent Labs     02/06/20  0723   WBC 11.1*   HGB 8.3*   HCT 24.9*        Recent Labs     02/06/20  0723      K 4.4      CO2 18*   BUN 42*   CREATININE 1.0   CALCIUM 8.9     Recent Labs     02/06/20  0723   AST 17   ALT 6*   BILITOT 0.3   ALKPHOS 110     Recent Labs     02/06/20  0723   INR 1.02     Recent Labs     02/06/20  0723   TROPONINI <0.01       Urinalysis:      Lab Results   Component Value Date    NITRU Negative 02/06/2020    WBCUA 10-20 02/06/2020    BACTERIA 1+ 02/06/2020    RBCUA 3-5 02/06/2020    BLOODU MODERATE 02/06/2020    SPECGRAV 1.015 02/06/2020    GLUCOSEU Negative 02/06/2020       Radiology:     CXR: I have reviewed the CXR with the following interpretation: no acute disease  EKG:  I have reviewed the EKG with the following interpretation: NSR    CT ABDOMEN PELVIS W IV CONTRAST Additional Contrast? None   Final Result   2 mm calculus within the bladder near the orifice of the left UVJ. Additional staghorn calculus within the left renal pelvis. Mild left   hydronephrosis. Mild nonspecific induration of right upper quadrant fat adjacent to the   proximal duodenum and gallbladder. Localized inflammation related to   duodenitis or potentially cholecystitis can not be excluded. No evidence of appendicitis. Popcorn calcification along the left lateral margin of uterus likely reflects   either pedunculated fibroid or partially calcified left adnexal lesion. In   the nonacute setting, pelvic ultrasound could be performed for clarification.       Focal

## 2020-02-06 NOTE — ED PROVIDER NOTES
[address] [facility]  ED  EMERGENCY DEPARTMENT ENCOUNTER        Pt Name: Bob Tucker  MRN: [id number]  [place] 1945  Date of evaluation: 2/6/2020  Provider: Kaylene Shell MD  PCP: Swati Hunter Dr       Chief Complaint   Patient presents with    Diarrhea     started on Saturday, similar episode around [place]. Pt states she became confused and had generlized weakness       HISTORY OFPRESENT ILLNESS   (Location/Symptom, Timing/Onset, Context/Setting, Quality, Duration, Modifying Factors,Severity)  Note limiting factors. Bob Tucker is a 69 y.o. female presenting today due to concern for diarrhea that she experienced 5 days ago the last 2 days but has been constipated for the last 3 days now but still having bowel movements although states it is very dark in color. However, she also feels very confused at this time. She feels lightheaded and states that due to the confusion she has a slight headache. She is nauseated but no vomiting. No chest pain but she does have mild shortness of breath for the last few days. She denies any falls or trauma. She states there is significant clutter in her home that she is embarrassed about. She feels depressed but denies any suicidal thoughts. No fever or chills. She does urinate frequently. Due to concern for generalized weakness along with confusion and generalized abdominal discomfort, she came to the emergency department by EMS for further evaluation. REVIEW OF SYSTEMS    (2-9 systems for level 4, 10 or more for level 5)     Review of Systems   Constitutional: Positive for appetite change (decreased) and fatigue. Negative for activity change, chills, diaphoresis and fever. HENT: Negative for congestion and sore throat. Respiratory: Positive for shortness of breath. Negative for cough and chest tightness. Cardiovascular: Negative for chest pain.    Gastrointestinal: Positive for abdominal pain, constipation and nausea. Negative for anal bleeding, blood in stool, diarrhea (not currently) and vomiting. Genitourinary: Positive for frequency. Negative for dysuria and flank pain. Musculoskeletal: Positive for gait problem (due to lightheadedness). Negative for back pain and neck pain. Skin: Positive for pallor. Negative for color change. Neurological: Positive for weakness (generalized), light-headedness and headaches (mild). Negative for dizziness (no vertigo), syncope and numbness. Hematological: Does not bruise/bleed easily. Psychiatric/Behavioral: Positive for confusion. Positives and Pertinent negatives as per HPI. PASTMEDICAL HISTORY     Past Medical History:   Diagnosis Date    Hyperlipidemia     Hypertension     Obesity     Type 2 diabetes mellitus without complication ([facility] [address]          SURGICAL HISTORY     History reviewed. No pertinent surgical history.       CURRENT MEDICATIONS       Previous Medications    AMLODIPINE (NORVASC) 2.5 MG TABLET    Take 1 tablet by mouth daily    ATORVASTATIN (LIPITOR) 40 MG TABLET    Take 1 tablet by mouth daily    CHOLECALCIFEROL 100 MCG (4000 UT) CAPS    Take 1 capsule by mouth daily    GLIPIZIDE (GLUCOTROL) 10 MG TABLET    TAKE 1 TABLET BY MOUTH EVERY DAY    HANDICAP PLACARD MISC    by Does not apply route    HANDICAP PLACARD MISC    Expiration 1/2025    LOSARTAN (COZAAR) 100 MG TABLET    TAKE 1 TABLET BY MOUTH EVERY DAY    MELOXICAM (MOBIC) 15 MG TABLET    Take 1 tablet by mouth daily    METOPROLOL (LOPRESSOR) 100 MG TABLET    Take 100 mg by mouth     OXYBUTYNIN (DITROPAN) 5 MG TABLET    Take 1 tablet by mouth 2 times daily       ALLERGIES     Metformin and Ramipril    FAMILY HISTORY       Family History   Problem Relation Age of Onset    Diabetes Brother     Obesity Brother     Other Brother         TIA    Diabetes Mother     Other Mother         COPD    Heart Attack Father     Coronary Art Dis Father     Other Father         aortic aneurysm    Diabetes Brother     Cancer Brother           SOCIAL HISTORY       Social History     Socioeconomic History    Marital status: Single     Spouse name: None    Number of children: None    Years of education: None    Highest education level: None   Occupational History    None   Social Needs    Financial resource strain: None    Food insecurity:     Worry: None     Inability: None    Transportation needs:     Medical: None     Non-medical: None   Tobacco Use    Smoking status: Passive Smoke Exposure - Never Smoker    Smokeless tobacco: Never Used   Substance and Sexual Activity    Alcohol use: Yes     Comment: every 2 months     Drug use: Never    Sexual activity: None   Lifestyle    Physical activity:     Days per week: None     Minutes per session: None    Stress: None   Relationships    Social connections:     Talks on phone: None     Gets together: None     Attends Jainism service: None     Active member of club or organization: None     Attends meetings of clubs or organizations: None     Relationship status: None    Intimate partner violence:     Fear of current or ex partner: None     Emotionally abused: None     Physically abused: None     Forced sexual activity: None   Other Topics Concern    None   Social History Narrative    None       SCREENINGS                PHYSICAL EXAM    (up to 7 for level 4, 8 or more for level 5)     ED Triage Vitals [02/06/20 0706]   BP Temp Temp src Pulse Resp SpO2 Height Weight   122/66 -- -- 93 16 100 % 5' 4\" (1.626 m) 204 lb (92.5 kg)       Physical Exam  Vitals signs and nursing note reviewed. Exam conducted with a chaperone present Yusuf Zabala RN student = chaperone for rectal ). Constitutional:       General: She is awake. She is in acute distress (mild). Appearance: Normal appearance. She is well-developed and well-groomed. She is obese. She is not ill-appearing, toxic-appearing or diaphoretic.    HENT:      Head: Normocephalic and HOSPITALIST  IP CONSULT TO GI    EMERGENCY DEPARTMENT COURSE and DIFFERENTIAL DIAGNOSIS/MDM:   Vitals:    Vitals:    02/06/20 0815 02/06/20 0842 02/06/20 0915 02/06/20 1015   BP: 135/61 135/61 116/86 126/63   Pulse: 85 99 85 107   Resp: 24 24 19 19   Temp:  97.6 °F (36.4 °C)     TempSrc:  Oral     SpO2: 99% 100% 100% 100%   Weight:       Height:           Patient was given the following medications:  Medications   pantoprazole (PROTONIX) 80 mg in sodium chloride 0.9 % 100 mL infusion (8 mg/hr Intravenous New Bag 2/6/20 1035)   cefTRIAXone (ROCEPHIN) 1 g IVPB in 50 mL D5W minibag (1 g Intravenous New Bag 2/6/20 1035)   lactated ringers bolus (0 mLs Intravenous Stopped 2/6/20 0949)   ondansetron (ZOFRAN) injection 4 mg (4 mg Intravenous Given 2/6/20 0751)   morphine (PF) injection 4 mg (4 mg Intravenous Given 2/6/20 0751)   iopamidol (ISOVUE-370) 76 % injection 75 mL (75 mLs Intravenous Given 2/6/20 0941)   HYDROmorphone (DILAUDID) injection 0.5 mg (0.5 mg Intravenous Given 2/6/20 0911)   pantoprazole (PROTONIX) injection 80 mg (80 mg Intravenous Given 2/6/20 1035)       Patient was evaluated due to concern for initially having diarrhea a few days ago but now feeling constipated associated with generalized abdominal discomfort and confusion. She does state her stools appear black although denies any actual blood in it. No vomiting or chest pain. She does feel slightly short of breath but is anxious on exam.  Story is not suggestive of acute coronary syndrome or pulmonary embolism. Due to generalized abdominal discomfort and trouble with bowel movements, we will obtain a CT scan to assess for any signs of bowel obstruction or intra-abdominal pathology. US ordered, but can be followed up in hospital.  Hemoccult was positive and stool did appear black concerning for melena. She was started on Protonix drip for this.   She received IV fluids along with ceftriaxone due to concern for staghorn calculus with possible

## 2020-02-07 ENCOUNTER — ANESTHESIA (OUTPATIENT)
Dept: ENDOSCOPY | Age: 75
DRG: 378 | End: 2020-02-07
Payer: MEDICARE

## 2020-02-07 VITALS — DIASTOLIC BLOOD PRESSURE: 59 MMHG | SYSTOLIC BLOOD PRESSURE: 161 MMHG | OXYGEN SATURATION: 99 %

## 2020-02-07 LAB
ANION GAP SERPL CALCULATED.3IONS-SCNC: 9 MMOL/L (ref 3–16)
BUN BLDV-MCNC: 28 MG/DL (ref 7–20)
CALCIUM SERPL-MCNC: 8.7 MG/DL (ref 8.3–10.6)
CHLORIDE BLD-SCNC: 108 MMOL/L (ref 99–110)
CO2: 24 MMOL/L (ref 21–32)
CREAT SERPL-MCNC: 0.8 MG/DL (ref 0.6–1.2)
ESTIMATED AVERAGE GLUCOSE: 114 MG/DL
GFR AFRICAN AMERICAN: >60
GFR NON-AFRICAN AMERICAN: >60
GLUCOSE BLD-MCNC: 128 MG/DL (ref 70–99)
GLUCOSE BLD-MCNC: 133 MG/DL (ref 70–99)
GLUCOSE BLD-MCNC: 138 MG/DL (ref 70–99)
GLUCOSE BLD-MCNC: 176 MG/DL (ref 70–99)
GLUCOSE BLD-MCNC: 189 MG/DL (ref 70–99)
GLUCOSE BLD-MCNC: 190 MG/DL (ref 70–99)
GLUCOSE BLD-MCNC: 194 MG/DL (ref 70–99)
HBA1C MFR BLD: 5.6 %
HCT VFR BLD CALC: 22.1 % (ref 36–48)
HEMOGLOBIN: 7.4 G/DL (ref 12–16)
MCH RBC QN AUTO: 31.2 PG (ref 26–34)
MCHC RBC AUTO-ENTMCNC: 33.2 G/DL (ref 31–36)
MCV RBC AUTO: 93.8 FL (ref 80–100)
PDW BLD-RTO: 15.6 % (ref 12.4–15.4)
PERFORMED ON: ABNORMAL
PLATELET # BLD: 316 K/UL (ref 135–450)
PMV BLD AUTO: 7.4 FL (ref 5–10.5)
POTASSIUM REFLEX MAGNESIUM: 4.3 MMOL/L (ref 3.5–5.1)
RBC # BLD: 2.36 M/UL (ref 4–5.2)
REPORT: NORMAL
SODIUM BLD-SCNC: 141 MMOL/L (ref 136–145)
WBC # BLD: 8.3 K/UL (ref 4–11)

## 2020-02-07 PROCEDURE — 6360000002 HC RX W HCPCS: Performed by: INTERNAL MEDICINE

## 2020-02-07 PROCEDURE — 6360000002 HC RX W HCPCS: Performed by: NURSE ANESTHETIST, CERTIFIED REGISTERED

## 2020-02-07 PROCEDURE — 1200000000 HC SEMI PRIVATE

## 2020-02-07 PROCEDURE — 80048 BASIC METABOLIC PNL TOTAL CA: CPT

## 2020-02-07 PROCEDURE — 7100000011 HC PHASE II RECOVERY - ADDTL 15 MIN: Performed by: INTERNAL MEDICINE

## 2020-02-07 PROCEDURE — 85027 COMPLETE CBC AUTOMATED: CPT

## 2020-02-07 PROCEDURE — 3700000001 HC ADD 15 MINUTES (ANESTHESIA): Performed by: INTERNAL MEDICINE

## 2020-02-07 PROCEDURE — 97110 THERAPEUTIC EXERCISES: CPT

## 2020-02-07 PROCEDURE — 2580000003 HC RX 258: Performed by: INTERNAL MEDICINE

## 2020-02-07 PROCEDURE — 0DB98ZX EXCISION OF DUODENUM, VIA NATURAL OR ARTIFICIAL OPENING ENDOSCOPIC, DIAGNOSTIC: ICD-10-PCS | Performed by: INTERNAL MEDICINE

## 2020-02-07 PROCEDURE — C9113 INJ PANTOPRAZOLE SODIUM, VIA: HCPCS | Performed by: INTERNAL MEDICINE

## 2020-02-07 PROCEDURE — 2709999900 HC NON-CHARGEABLE SUPPLY: Performed by: INTERNAL MEDICINE

## 2020-02-07 PROCEDURE — 97116 GAIT TRAINING THERAPY: CPT

## 2020-02-07 PROCEDURE — 3609012400 HC EGD TRANSORAL BIOPSY SINGLE/MULTIPLE: Performed by: INTERNAL MEDICINE

## 2020-02-07 PROCEDURE — 36415 COLL VENOUS BLD VENIPUNCTURE: CPT

## 2020-02-07 PROCEDURE — 97166 OT EVAL MOD COMPLEX 45 MIN: CPT

## 2020-02-07 PROCEDURE — 7100000010 HC PHASE II RECOVERY - FIRST 15 MIN: Performed by: INTERNAL MEDICINE

## 2020-02-07 PROCEDURE — 97530 THERAPEUTIC ACTIVITIES: CPT

## 2020-02-07 PROCEDURE — 97535 SELF CARE MNGMENT TRAINING: CPT

## 2020-02-07 PROCEDURE — 88305 TISSUE EXAM BY PATHOLOGIST: CPT

## 2020-02-07 PROCEDURE — 6370000000 HC RX 637 (ALT 250 FOR IP): Performed by: INTERNAL MEDICINE

## 2020-02-07 PROCEDURE — 97162 PT EVAL MOD COMPLEX 30 MIN: CPT

## 2020-02-07 PROCEDURE — 2580000003 HC RX 258: Performed by: ANESTHESIOLOGY

## 2020-02-07 PROCEDURE — 3700000000 HC ANESTHESIA ATTENDED CARE: Performed by: INTERNAL MEDICINE

## 2020-02-07 RX ORDER — PROPOFOL 10 MG/ML
INJECTION, EMULSION INTRAVENOUS PRN
Status: DISCONTINUED | OUTPATIENT
Start: 2020-02-07 | End: 2020-02-07 | Stop reason: SDUPTHER

## 2020-02-07 RX ORDER — SODIUM CHLORIDE, SODIUM LACTATE, POTASSIUM CHLORIDE, CALCIUM CHLORIDE 600; 310; 30; 20 MG/100ML; MG/100ML; MG/100ML; MG/100ML
INJECTION, SOLUTION INTRAVENOUS CONTINUOUS
Status: DISCONTINUED | OUTPATIENT
Start: 2020-02-07 | End: 2020-02-07

## 2020-02-07 RX ORDER — SODIUM CHLORIDE, SODIUM LACTATE, POTASSIUM CHLORIDE, CALCIUM CHLORIDE 600; 310; 30; 20 MG/100ML; MG/100ML; MG/100ML; MG/100ML
INJECTION, SOLUTION INTRAVENOUS CONTINUOUS
Status: DISCONTINUED | OUTPATIENT
Start: 2020-02-07 | End: 2020-02-08

## 2020-02-07 RX ORDER — SODIUM CHLORIDE 0.9 % (FLUSH) 0.9 %
10 SYRINGE (ML) INJECTION EVERY 12 HOURS SCHEDULED
Status: DISCONTINUED | OUTPATIENT
Start: 2020-02-07 | End: 2020-02-07 | Stop reason: HOSPADM

## 2020-02-07 RX ORDER — SODIUM CHLORIDE 0.9 % (FLUSH) 0.9 %
10 SYRINGE (ML) INJECTION PRN
Status: DISCONTINUED | OUTPATIENT
Start: 2020-02-07 | End: 2020-02-07 | Stop reason: HOSPADM

## 2020-02-07 RX ADMIN — SODIUM CHLORIDE, POTASSIUM CHLORIDE, SODIUM LACTATE AND CALCIUM CHLORIDE: 600; 310; 30; 20 INJECTION, SOLUTION INTRAVENOUS at 11:20

## 2020-02-07 RX ADMIN — INSULIN LISPRO 1 UNITS: 100 INJECTION, SOLUTION INTRAVENOUS; SUBCUTANEOUS at 18:07

## 2020-02-07 RX ADMIN — OXYBUTYNIN CHLORIDE 5 MG: 5 TABLET ORAL at 20:25

## 2020-02-07 RX ADMIN — SODIUM CHLORIDE 8 MG/HR: 9 INJECTION, SOLUTION INTRAVENOUS at 16:27

## 2020-02-07 RX ADMIN — PROPOFOL 110 MG: 10 INJECTION, EMULSION INTRAVENOUS at 11:33

## 2020-02-07 RX ADMIN — ATORVASTATIN CALCIUM 40 MG: 40 TABLET, FILM COATED ORAL at 20:25

## 2020-02-07 RX ADMIN — INSULIN LISPRO 1 UNITS: 100 INJECTION, SOLUTION INTRAVENOUS; SUBCUTANEOUS at 21:33

## 2020-02-07 RX ADMIN — SODIUM CHLORIDE: 9 INJECTION, SOLUTION INTRAVENOUS at 05:53

## 2020-02-07 RX ADMIN — CEFTRIAXONE SODIUM 1 G: 1 INJECTION, POWDER, FOR SOLUTION INTRAMUSCULAR; INTRAVENOUS at 13:16

## 2020-02-07 RX ADMIN — SODIUM CHLORIDE: 9 INJECTION, SOLUTION INTRAVENOUS at 20:25

## 2020-02-07 ASSESSMENT — PAIN SCALES - GENERAL
PAINLEVEL_OUTOF10: 0

## 2020-02-07 NOTE — OP NOTE
Arjun Carranza MD    D: 02/07/2020 11:59:59       T: 02/07/2020 13:51:21     [email or web address]  Job#: [id number]     Doc#: [id number]    CC:  CHRYSTAL Gallegos MD

## 2020-02-07 NOTE — ANESTHESIA POSTPROCEDURE EVALUATION
02/07/2020 06:12 AM        CL                       108                 02/07/2020 06:12 AM        CO2                      24                  02/07/2020 06:12 AM        BUN                      28 (H)              02/07/2020 06:12 AM        CREATININE               0.8                 02/07/2020 06:12 AM        GLUCOSE                  133 (H)             02/07/2020 06:12 AM   COAGS  Lab Results       Component                Value               Date/Time                  PROTIME                  11.8                02/06/2020 07:23 AM        INR                      1.02                02/06/2020 07:23 AM     Intake & Output:  @32OWOR@    Nausea & Vomiting:  No    Level of Consciousness:  Awake    Pain Assessment:  Adequate analgesia    Anesthesia Complications:  No apparent anesthetic complications    SUMMARY      Vital signs stable  OK to discharge from Stage I post anesthesia care.   Care transferred from Anesthesiology department on discharge from perioperative area

## 2020-02-07 NOTE — PROGRESS NOTES
POCT      Blood Glucose: 190      (Normal Range 70-99)    PT:      (Normal Range 9.8 - 13)    INR:      (Normal Range 0.86-1.14)

## 2020-02-07 NOTE — H&P
History and Physical / Pre-Sedation Assessment    Patient: Mitch Hollis   :   1945     Intended Procedure: EGD     HPI: melena anemia    Nurses notes reviewed and agreed.   Current Facility-Administered Medications   Medication Dose Route Frequency Provider Last Rate Last Dose    lactated ringers infusion   Intravenous Continuous Yolanda Arreaga MD        sodium chloride flush 0.9 % injection 10 mL  10 mL Intravenous 2 times per day Yolanda Arreaga MD        sodium chloride flush 0.9 % injection 10 mL  10 mL Intravenous PRN Yolanda Arreaga MD        lactated ringers infusion   Intravenous Continuous Unique Chery MD        pantoprazole (PROTONIX) 80 mg in sodium chloride 0.9 % 100 mL infusion  8 mg/hr Intravenous Continuous Angel Guo MD 10 mL/hr at 20 2324 8 mg/hr at 20 232    atorvastatin (LIPITOR) tablet 40 mg  40 mg Oral Daily Angel Guo MD   40 mg at 20    oxybutynin (DITROPAN) tablet 5 mg  5 mg Oral BID Angel Guo MD   5 mg at 20    sodium chloride flush 0.9 % injection 10 mL  10 mL Intravenous 2 times per day Angel Guo MD   10 mL at 20 215    sodium chloride flush 0.9 % injection 10 mL  10 mL Intravenous PRN Angel Guo MD        acetaminophen (TYLENOL) tablet 650 mg  650 mg Oral Q4H PRN Angel Guo MD        ondansetron [facility]) injection 4 mg  4 mg Intravenous Q6H PRN Angel uGo MD        0.9 % sodium chloride infusion   Intravenous Continuous Angel Guo MD 75 mL/hr at 20 0553      cefTRIAXone (ROCEPHIN) 1 g IVPB in 50 mL D5W minibag  1 g Intravenous Q24H Angel Guo MD        glucose (GLUTOSE) 40 % oral gel 15 g  15 g Oral PRN Angel Guo MD        dextrose 50 % IV solution  12.5 g Intravenous PRN Angel Guo MD        glucagon (rDNA) injection 1 mg  1 mg Intramuscular PRN Angel Guo MD        dextrose 5 % solution  100 mL/hr Intravenous PRN Angel Guo MD        insulin lispro (HUMALOG) injection vial 0-6 Units  0-6 Units Subcutaneous TID WC Chasity Gallardo MD   Stopped at 02/06/20 1637    insulin lispro (HUMALOG) injection vial 0-3 Units  0-3 Units Subcutaneous Nightly Chasity Gallardo MD   1 Units at 02/06/20 7374     No current facility-administered medications on file prior to encounter. Current Outpatient Medications on File Prior to Encounter   Medication Sig Dispense Refill    glipiZIDE (GLUCOTROL) 10 MG tablet TAKE 1 TABLET BY MOUTH EVERY DAY 90 tablet 0    losartan (COZAAR) 100 MG tablet TAKE 1 TABLET BY MOUTH EVERY DAY 90 tablet 0    meloxicam (MOBIC) 15 MG tablet Take 1 tablet by mouth daily 90 tablet 1    amLODIPine (NORVASC) 2.5 MG tablet Take 1 tablet by mouth daily 90 tablet 0    Handicap Placard MISC Expiration 1/2025 (Patient not taking: Reported on 1/6/2020) 1 each 0    oxybutynin (DITROPAN) 5 MG tablet Take 1 tablet by mouth 2 times daily 60 tablet 3    Handicap Placard MISC by Does not apply route 1 each 0    metoprolol (LOPRESSOR) 100 MG tablet Take 100 mg by mouth       atorvastatin (LIPITOR) 40 MG tablet Take 1 tablet by mouth daily 90 tablet 1    Cholecalciferol 100 MCG (4000 UT) CAPS Take 1 capsule by mouth daily 90 capsule 1     Past Medical History:   Diagnosis Date    Hyperlipidemia     Hypertension     Obesity     Type 2 diabetes mellitus without complication (HCC)      Past Surgical History:   Procedure Laterality Date    COLONOSCOPY         Allergies: Allergies   Allergen Reactions    Metformin      diarrhea    Ramipril      cough  cough         Physical Exam:  Vital Signs: /85   Pulse 88   Temp 98.4 °F (36.9 °C) (Oral)   Resp 16   Ht 5' 4\" (1.626 m)   Wt 204 lb (92.5 kg)   SpO2 95%   BMI 35.02 kg/m²  Body mass index is 35.02 kg/m².   Airway:Normal  Pulmonary:Normal  Cardiac:Normal  Abdomen:Normal    Pre-Procedure Assessment / Plan:  ASA 2 - Patient with mild systemic disease with no functional limitations   Mallampati 2  Level of Sedation Plan: Moderate  sedation  Post Procedure plan: Return to same level of care    I assessed the patient and find that the patient is in satisfactory condition to proceed with the planned procedure and sedation plan. I have explained the risk, benefits, and alternatives to the procedure. The patient understands and agrees to proceed.   Yes    Kenya Abel  11:33 AM 2/7/2020

## 2020-02-07 NOTE — PROGRESS NOTES
Occupational Therapy   Occupational Therapy Initial Assessment/Treatment   Date: 2020   Patient Name: Tomeka Robles  MRN: [id number]     : 1945    Date of Service: 2020    Discharge Recommendations:  Home with assist PRN       Assessment   Performance deficits / Impairments: Decreased functional mobility ; Decreased balance;Decreased ADL status; Decreased endurance;Decreased high-level IADLs    Assessment: Pt 70 yo female functioning with deficits in the areas listed above following weakness and dark stool at home. Pt reports IND PLOF and uses SPC. Pt is currently limited due to decreased endurance and fatigue requiring CGA. Pt would benefit from skilled OT services prior to d/c. Prognosis: Good  Decision Making: Low Complexity  OT Education: OT Role;Plan of Care;Transfer Training;ADL Adaptive Strategies; Energy Conservation  Activity Tolerance  Activity Tolerance: Patient Tolerated treatment well  Safety Devices  Safety Devices in place: Yes  Type of devices: Call light within reach; Chair alarm in place; Left in chair;Gait belt;Nurse notified           Patient Diagnosis(es): The primary encounter diagnosis was Severe sepsis ([facility] [address] Diagnoses of Acute upper GI bleed, Melena, Generalized abdominal pain, Anemia, unspecified type, Altered mental status, unspecified altered mental status type, Dehydration, Lactic acidosis, Staghorn calculus, and Gastritis and duodenitis were also pertinent to this visit. has a past medical history of Hyperlipidemia, Hypertension, Obesity, and Type 2 diabetes mellitus without complication ([facility] [address] has a past surgical history that includes Colonoscopy and Upper gastrointestinal endoscopy (N/A, 2020). Restrictions  Restrictions/Precautions  Restrictions/Precautions: Up as Tolerated, General Precautions, Fall Risk  Position Activity Restriction  Other position/activity restrictions: Up as tolerated.      Subjective   General  Chart Reviewed: Yes  Patient

## 2020-02-07 NOTE — PROGRESS NOTES
Patient Diagnosis(es): The primary encounter diagnosis was Severe sepsis ([facility] [address] Diagnoses of Acute upper GI bleed, Melena, Generalized abdominal pain, Anemia, unspecified type, Altered mental status, unspecified altered mental status type, Dehydration, Lactic acidosis, Staghorn calculus, and Gastritis and duodenitis were also pertinent to this visit. has a past medical history of Hyperlipidemia, Hypertension, Obesity, and Type 2 diabetes mellitus without complication ([facility] [address] has a past surgical history that includes Colonoscopy. Restrictions  Restrictions/Precautions  Restrictions/Precautions: Up as Tolerated, General Precautions, Fall Risk  Position Activity Restriction  Other position/activity restrictions: Up as tolerated. Vision/Hearing  Vision: Impaired  Hearing: Exceptions to [facility]  Hearing Exceptions: Hard of hearing/hearing concerns     Subjective  General  Chart Reviewed: Yes  Patient assessed for rehabilitation services?: Yes  Response To Previous Treatment: Not applicable  Family / Caregiver Present: No  Referring Practitioner: Tania Bangura MD  Referral Date : 02/06/20  Follows Commands: Within Functional Limits  General Comment  Comments: Patient in supine position in the bed upon entry. Subjective  Subjective: Patient agreed to participate.    Pain Screening  Patient Currently in Pain: Denies  Vital Signs  Patient Currently in Pain: Denies  Pre Treatment Pain Screening  Intervention List: Patient able to continue with treatment    Orientation  Orientation  Overall Orientation Status: Within Normal Limits  Social/Functional History  Social/Functional History  Lives With: Alone  Type of Home: House  Home Layout: One level, Laundry in basement  Home Access: Level entry  Bathroom Shower/Tub: Walk-in shower  Bathroom Toilet: Standard  Home Equipment: Cane, Rolling walker  ADL Assistance: Independent  Homemaking Responsibilities: Yes  Meal Prep Responsibility: Primary  Laundry Responsibility: Primary  Shopping Responsibility: Primary  Ambulation Assistance: Independent(quad cane. Patient said that at baseline she only ambulates short household distances. <50 feet)  Transfer Assistance: Independent  Active : Yes  Occupation: Retired  Type of occupation: director of [employer]  Leisure & Hobbies: reading  [address] [facility]  Overall Cognitive Status: WFL    Objective     Observation/Palpation  Posture: Good    PROM RLE (degrees)  RLE PROM: WFL  AROM RLE (degrees)  RLE AROM: WFL  PROM LLE (degrees)  LLE PROM: WFL  AROM LLE (degrees)  LLE AROM : WFL  Strength RLE  Strength RLE: Exception  R Hip Flexion: 4/5  R Hip ABduction: 4/5  R Hip ADduction: 4/5  R Knee Flexion: 4/5  R Knee Extension: 4/5  R Ankle Dorsiflexion: 4/5  R Ankle Plantar flexion: 4/5  Strength LLE  Strength LLE: Exception  L Hip Flexion: 4/5  L Hip ABduction: 4/5  L Hip ADduction: 4/5  L Knee Flexion: 4/5  L Knee Extension: 4/5  L Ankle Dorsiflexion: 4/5  L Ankle Plantar Flexion: 4/5  Motor Control  Gross Motor?: WFL  Coordination  Finger to Nose: Normal  Heel to Shin: Normal  Sensation  Overall Sensation Status: Impaired(occasional numbness in bilateral feet)  Bed mobility  Rolling to Left: Supervision  Supine to Sit: Supervision  Sit to Supine: (patient in chair at end of session)  Scooting: Supervision  Transfers  Sit to Stand: Contact guard assistance  Stand to sit: Contact guard assistance  Bed to Chair: Contact guard assistance(with RW)  Ambulation  Ambulation?: Yes  More Ambulation?: Yes  Ambulation 1  Surface: level tile  Device: Small Zach Xiomy  Assistance: Minimal assistance  Quality of Gait: unsteady gait pattern. narrow base of support. 1 loss of balance. min assist to correct. forward flexed posture (cueing to correct)  Gait Deviations: Slow Susy;Decreased step length;Decreased step height  Distance: 5 feet. (bed to chair)  Comments: No complaints of shortness of breath, chest pain or dizziness.  No loss for Short term goals: 1 week 2/14/20  Short term goal 1: Supine <> sit with modified independence. Short term goal 2: Sit <> stand with modified independence. Short term goal 3: Bed <> chair with modified independence. Short term goal 4: Ambulate 50 feet with LRAD and modified independence. Patient Goals   Patient goals : To feel better.         Therapy Time   Individual Concurrent Group Co-treatment   Time In 0911         Time Out 0950         Minutes 39         Timed Code Treatment Minutes: 29 Minutes(10 minute evaluation)       Rufus Lesch, PT

## 2020-02-07 NOTE — PROGRESS NOTES
Hospitalist Progress Note      PCP: CHRYSTAL Scott    Date of Admission: 2/6/2020    Chief Complaint: weakness    Hospital Course:    69 y.o. female who presented to [facility] with weakness. For the past week, patient has been having increased weakness and some mild mental fogginess. Patient is vague about specifics of the these symptoms. Patient is also complaining of alternating constipation and diarrhea. She does report darker than usual stools but no tarry stools. She has never had symptoms like this before. Subjective: No BMs overnight. Plan for EGD today. No new complaints. Medications:  Reviewed    Infusion Medications    lactated ringers Stopped (02/07/20 1143)    lactated ringers      pantoprozole (PROTONIX) infusion 8 mg/hr (02/06/20 9554)    sodium chloride 75 mL/hr at 02/07/20 0553    dextrose       Scheduled Medications    sodium chloride flush  10 mL Intravenous 2 times per day    atorvastatin  40 mg Oral Daily    oxybutynin  5 mg Oral BID    sodium chloride flush  10 mL Intravenous 2 times per day    cefTRIAXone (ROCEPHIN) IV  1 g Intravenous Q24H    insulin lispro  0-6 Units Subcutaneous TID WC    insulin lispro  0-3 Units Subcutaneous Nightly     PRN Meds: sodium chloride flush, sodium chloride flush, acetaminophen, ondansetron, glucose, dextrose, glucagon (rDNA), dextrose      Intake/Output Summary (Last 24 hours) at 2/7/2020 1149  Last data filed at 2/7/2020 1143  Gross per 24 hour   Intake 560 ml   Output 450 ml   Net 110 ml       Physical Exam Performed:    /85   Pulse 88   Temp 98.4 °F (36.9 °C) (Oral)   Resp 16   Ht 5' 4\" (1.626 m)   Wt 204 lb (92.5 kg)   SpO2 95%   BMI 35.02 kg/m²     General appearance:  No apparent distress, appears stated age and cooperative. HEENT:  Normal cephalic, atraumatic without obvious deformity. Pupils equal, round, and reactive to light. Extra ocular muscles intact. Conjunctivae/corneas clear.   Neck: Supple, with pelvis. Mild left   hydronephrosis. Mild nonspecific induration of right upper quadrant fat adjacent to the   proximal duodenum and gallbladder. Localized inflammation related to   duodenitis or potentially cholecystitis can not be excluded. No evidence of appendicitis. Popcorn calcification along the left lateral margin of uterus likely reflects   either pedunculated fibroid or partially calcified left adnexal lesion. In   the nonacute setting, pelvic ultrasound could be performed for clarification. Focal sclerosis within L1 vertebral body, typically bone island in an   otherwise healthy patient. If patient has risk factors for metastatic   disease, bone scan could be considered for further assessment. XR CHEST STANDARD (2 VW)   Final Result   No acute cardiopulmonary process. Assessment/Plan:    Active Hospital Problems    Diagnosis    GI bleed [K92.2]     Acute blood loss anemia due to GI bleed  - occult stool positive  - Hgb 8.3 on admission. Had been normal in 10/19  - reports NSAID use  - GI consulted  - plan for EGD today  - possible duodenitis seen on CT abd/pel  - RUQ US without evidence of cholecystitis  - started on PPI gtt     UTI with associated staghorn calculus  - staghorn calculus noted on CT abd/pel  - UA consistent with UTI.  Urine culture pending  - started on ceftriaxone     DMII  - well controlled  - holding home oral agents  - started on SSI     HTN  - holding home BP meds due to above  - will restart once able     HLD  - continue statin    DVT Prophylaxis: SCDs  Diet: Diet NPO, After Midnight Exceptions are: Ice Chips  Code Status: Full Code    PT/OT Eval Status: ordered    Dispo - at least 1-2 days    Girish Austin MD

## 2020-02-07 NOTE — ANESTHESIA PRE PROCEDURE
Department of Anesthesiology  Preprocedure Note       Name:  Ramona Hinojosa   Age:  69 y.o.  :  1945                                          MRN:  [id number]         Date:  2020      Surgeon: Belinda Shaver):  Maged Arciniega MD    Procedure: EGD DIAGNOSTIC ONLY (N/A )    Medications prior to admission:   Prior to Admission medications    Medication Sig Start Date End Date Taking?  Authorizing Provider   glipiZIDE (GLUCOTROL) 10 MG tablet TAKE 1 TABLET BY MOUTH EVERY DAY 20   CHRYSTAL Bowman   losartan (COZAAR) 100 MG tablet TAKE 1 TABLET BY MOUTH EVERY DAY 20   CHRYSTAL Bowman   meloxicam GERARDO CHOI [facility]) 15 MG tablet Take 1 tablet by mouth daily 20   CHRYSTAL Bowman   amLODIPine (NORVASC) 2.5 MG tablet Take 1 tablet by mouth daily 20   CHRYSTAL Bowman   Handicap Placard MISC Expiration 2025  Patient not taking: Reported on 2020   CHRYSTAL Bowman   oxybutynin (DITROPAN) 5 MG tablet Take 1 tablet by mouth 2 times daily 19   CHRYSTAL Bowman   Handicap Placard MISC by Does not apply route 10/29/19   CHRYSTAL Bowman   metoprolol (LOPRESSOR) 100 MG tablet Take 100 mg by mouth  4/3/14   Historical Provider, MD   atorvastatin (LIPITOR) 40 MG tablet Take 1 tablet by mouth daily 10/28/19   CHRYSTAL Bowman   Cholecalciferol 100 MCG (4000 UT) CAPS Take 1 capsule by mouth daily 10/28/19   CHRYSTAL Bowman       Current medications:    Current Facility-Administered Medications   Medication Dose Route Frequency Provider Last Rate Last Dose    pantoprazole (PROTONIX) 80 mg in sodium chloride 0.9 % 100 mL infusion  8 mg/hr Intravenous Continuous Girish Austin MD 10 mL/hr at 204 8 mg/hr at 20    atorvastatin (LIPITOR) tablet 40 mg  40 mg Oral Daily Girish Austin MD   40 mg at 20    oxybutynin (DITROPAN) tablet 5 mg  5 mg Oral BID Girish Austin MD   5 mg at 20 1285    sodium chloride flush 0.9 % injection 10 mL  10 mL Intravenous 2 140/60   Pulse: 100 97 76 77   Resp: 20 20 18 18   Temp: 97.4 °F (36.3 °C) 98.2 °F (36.8 °C) 97.7 °F (36.5 °C) 98 °F (36.7 °C)   TempSrc: Oral Oral Oral Oral   SpO2: 100% 100% 97% 98%   Weight:       Height:                                                  BP Readings from Last 3 Encounters:   02/07/20 (!) 140/60   01/06/20 138/88   12/02/19 (!) 140/92       NPO Status:                                                                                 BMI:   Wt Readings from Last 3 Encounters:   02/06/20 204 lb (92.5 kg)   01/06/20 204 lb 3.2 oz (92.6 kg)   12/18/19 199 lb (90.3 kg)     Body mass index is 35.02 kg/m².     CBC:   Lab Results   Component Value Date    WBC 8.3 02/07/2020    RBC 2.36 02/07/2020    HGB 7.4 02/07/2020    HCT 22.1 02/07/2020    MCV 93.8 02/07/2020    RDW 15.6 02/07/2020     02/07/2020       CMP:   Lab Results   Component Value Date     02/07/2020    K 4.3 02/07/2020     02/07/2020    CO2 24 02/07/2020    BUN 28 02/07/2020    CREATININE 0.8 02/07/2020    GFRAA >60 02/07/2020    AGRATIO 1.3 02/06/2020    LABGLOM >60 02/07/2020    GLUCOSE 133 02/07/2020    PROT 6.5 02/06/2020    CALCIUM 8.7 02/07/2020    BILITOT 0.3 02/06/2020    ALKPHOS 110 02/06/2020    AST 17 02/06/2020    ALT 6 02/06/2020       POC Tests:   Recent Labs     02/07/20  0158   POCGLU 128*       Coags:   Lab Results   Component Value Date    PROTIME 11.8 02/06/2020    INR 1.02 02/06/2020       HCG (If Applicable): No results found for: PREGTESTUR, PREGSERUM, HCG, HCGQUANT     ABGs: No results found for: PHART, PO2ART, BNW2GND, HKP1CHA, BEART, Y1ZMIPIN     Type & Screen (If Applicable):  No results found for: LABABO, [address]    Anesthesia Evaluation  Patient summary reviewed and Nursing notes reviewed no history of anesthetic complications:   Airway: Mallampati: I     Neck ROM: full   Dental:          Pulmonary:Negative Pulmonary ROS and normal exam                               Cardiovascular:Negative CV ROS    (+) hypertension:, hyperlipidemia                  Neuro/Psych:   Negative Neuro/Psych ROS              GI/Hepatic/Renal: Neg GI/Hepatic/Renal ROS       (-) hiatal hernia and GERD       Endo/Other: Negative Endo/Other ROS   (+) Diabetes, . Abdominal:           Vascular:                                      Anesthesia Plan      general     ASA 2     (I discussed with the patient the risks and benefits of PIV, general anesthesia, IV Narcotics, PACU. All questions were answered the patient agrees with the plan and wishes to proceed.  )  Induction: intravenous. Pre-Operative Diagnosis: GI bleed [K92.2];GI bleed [K92.2]    69 y.o.   BMI:  Body mass index is 35.02 kg/m².      Vitals:    02/06/20 2330 02/07/20 0330 02/07/20 0730 02/07/20 1048   BP: 100/63 (!) 140/60 122/74 136/85   Pulse: 76 77 78 88   Resp: 18 18 19 16   Temp: 97.7 °F (36.5 °C) 98 °F (36.7 °C) 98.4 °F (36.9 °C)    TempSrc: Oral Oral Oral    SpO2: 97% 98% 97% 95%   Weight:    204 lb (92.5 kg)   Height:    5' 4\" (1.626 m)       Allergies   Allergen Reactions    Metformin      diarrhea    Ramipril      cough  cough         Social History     Tobacco Use    Smoking status: Passive Smoke Exposure - Never Smoker    Smokeless tobacco: Never Used   Substance Use Topics    Alcohol use: Yes     Comment: every 2 months        LABS:    CBC  Lab Results   Component Value Date/Time    WBC 8.3 02/07/2020 06:12 AM    HGB 7.4 (L) 02/07/2020 06:12 AM    HCT 22.1 (L) 02/07/2020 06:12 AM     02/07/2020 06:12 AM     RENAL  Lab Results   Component Value Date/Time     02/07/2020 06:12 AM    K 4.3 02/07/2020 06:12 AM     02/07/2020 06:12 AM    CO2 24 02/07/2020 06:12 AM    BUN 28 (H) 02/07/2020 06:12 AM    CREATININE 0.8 02/07/2020 06:12 AM    GLUCOSE 133 (H) 02/07/2020 06:12 AM     COAGS  Lab Results   Component Value Date/Time    PROTIME 11.8 02/06/2020 07:23 AM    INR 1.02 02/06/2020 07:23 AM       Ramsey Hendricks

## 2020-02-08 VITALS
HEIGHT: 64 IN | BODY MASS INDEX: 34.83 KG/M2 | OXYGEN SATURATION: 97 % | HEART RATE: 65 BPM | RESPIRATION RATE: 16 BRPM | DIASTOLIC BLOOD PRESSURE: 75 MMHG | SYSTOLIC BLOOD PRESSURE: 110 MMHG | WEIGHT: 204 LBS | TEMPERATURE: 98.2 F

## 2020-02-08 LAB
GLUCOSE BLD-MCNC: 130 MG/DL (ref 70–99)
GLUCOSE BLD-MCNC: 201 MG/DL (ref 70–99)
HCT VFR BLD CALC: 22.3 % (ref 36–48)
HEMOGLOBIN: 7.4 G/DL (ref 12–16)
MCH RBC QN AUTO: 31.2 PG (ref 26–34)
MCHC RBC AUTO-ENTMCNC: 33.1 G/DL (ref 31–36)
MCV RBC AUTO: 94.2 FL (ref 80–100)
PDW BLD-RTO: 15.6 % (ref 12.4–15.4)
PERFORMED ON: ABNORMAL
PERFORMED ON: ABNORMAL
PLATELET # BLD: 346 K/UL (ref 135–450)
PMV BLD AUTO: 7.2 FL (ref 5–10.5)
RBC # BLD: 2.37 M/UL (ref 4–5.2)
WBC # BLD: 9.3 K/UL (ref 4–11)

## 2020-02-08 PROCEDURE — 85027 COMPLETE CBC AUTOMATED: CPT

## 2020-02-08 PROCEDURE — 6370000000 HC RX 637 (ALT 250 FOR IP): Performed by: INTERNAL MEDICINE

## 2020-02-08 PROCEDURE — 2580000003 HC RX 258: Performed by: INTERNAL MEDICINE

## 2020-02-08 PROCEDURE — 6360000002 HC RX W HCPCS: Performed by: INTERNAL MEDICINE

## 2020-02-08 PROCEDURE — 36415 COLL VENOUS BLD VENIPUNCTURE: CPT

## 2020-02-08 PROCEDURE — C9113 INJ PANTOPRAZOLE SODIUM, VIA: HCPCS | Performed by: INTERNAL MEDICINE

## 2020-02-08 RX ORDER — FERROUS SULFATE 325(65) MG
325 TABLET ORAL
Status: DISCONTINUED | OUTPATIENT
Start: 2020-02-08 | End: 2020-02-08 | Stop reason: HOSPADM

## 2020-02-08 RX ORDER — PANTOPRAZOLE SODIUM 40 MG/1
40 TABLET, DELAYED RELEASE ORAL
Status: DISCONTINUED | OUTPATIENT
Start: 2020-02-08 | End: 2020-02-08 | Stop reason: HOSPADM

## 2020-02-08 RX ORDER — POLYETHYLENE GLYCOL 3350 17 G/17G
17 POWDER, FOR SOLUTION ORAL DAILY
Status: DISCONTINUED | OUTPATIENT
Start: 2020-02-08 | End: 2020-02-08 | Stop reason: HOSPADM

## 2020-02-08 RX ORDER — PANTOPRAZOLE SODIUM 40 MG/1
40 TABLET, DELAYED RELEASE ORAL
Qty: 60 TABLET | Refills: 1 | Status: SHIPPED | OUTPATIENT
Start: 2020-02-09

## 2020-02-08 RX ORDER — CEFDINIR 300 MG/1
300 CAPSULE ORAL 2 TIMES DAILY
Qty: 10 CAPSULE | Refills: 0 | Status: ON HOLD | OUTPATIENT
Start: 2020-02-08 | End: 2020-02-21 | Stop reason: HOSPADM

## 2020-02-08 RX ORDER — FERROUS SULFATE 325(65) MG
325 TABLET ORAL
Qty: 30 TABLET | Refills: 3 | Status: SHIPPED | OUTPATIENT
Start: 2020-02-09 | End: 2021-08-24

## 2020-02-08 RX ORDER — METOPROLOL TARTRATE 50 MG/1
100 TABLET, FILM COATED ORAL 2 TIMES DAILY
Status: DISCONTINUED | OUTPATIENT
Start: 2020-02-08 | End: 2020-02-08 | Stop reason: HOSPADM

## 2020-02-08 RX ORDER — POLYETHYLENE GLYCOL 3350 17 G/17G
17 POWDER, FOR SOLUTION ORAL DAILY
Qty: 527 G | Refills: 1 | Status: ON HOLD | OUTPATIENT
Start: 2020-02-09 | End: 2020-03-08 | Stop reason: HOSPADM

## 2020-02-08 RX ADMIN — CEFTRIAXONE SODIUM 1 G: 1 INJECTION, POWDER, FOR SOLUTION INTRAMUSCULAR; INTRAVENOUS at 08:40

## 2020-02-08 RX ADMIN — SODIUM CHLORIDE 8 MG/HR: 9 INJECTION, SOLUTION INTRAVENOUS at 04:26

## 2020-02-08 RX ADMIN — METOPROLOL TARTRATE 100 MG: 50 TABLET, FILM COATED ORAL at 09:52

## 2020-02-08 RX ADMIN — INSULIN LISPRO 2 UNITS: 100 INJECTION, SOLUTION INTRAVENOUS; SUBCUTANEOUS at 12:43

## 2020-02-08 RX ADMIN — PANTOPRAZOLE SODIUM 40 MG: 40 TABLET, DELAYED RELEASE ORAL at 15:43

## 2020-02-08 RX ADMIN — POLYETHYLENE GLYCOL 3350 17 G: 17 POWDER, FOR SOLUTION ORAL at 09:51

## 2020-02-08 RX ADMIN — OXYBUTYNIN CHLORIDE 5 MG: 5 TABLET ORAL at 08:40

## 2020-02-08 RX ADMIN — FERROUS SULFATE TAB 325 MG (65 MG ELEMENTAL FE) 325 MG: 325 (65 FE) TAB at 09:52

## 2020-02-08 RX ADMIN — Medication 10 ML: at 08:40

## 2020-02-08 ASSESSMENT — PAIN SCALES - GENERAL
PAINLEVEL_OUTOF10: 0
PAINLEVEL_OUTOF10: 0

## 2020-02-08 NOTE — PROGRESS NOTES
PROGRESS NOTE  S:74 yrs Patient  admitted on 2/6/2020 with GI bleed [K92.2]  GI bleed [K92.2] . Today she feels ok. No bleeding or BMs since endoscopy. hgb is stable      Exam:   Vitals:    02/08/20 1205   BP: 116/74   Pulse: 76   Resp:    Temp: 97.3 °F (36.3 °C)   SpO2:       General appearance: alert, appears stated age and cooperative  HEENT: Oropharynx clear, no lesions  Neck: no adenopathy and supple, symmetrical, trachea midline  Lungs: clear to auscultation bilaterally  Heart: regular rate and rhythm, S1, S2 normal, no murmur, click, rub or gallop  Abdomen: soft, non-tender; bowel sounds normal; no masses,  no organomegaly  Extremities: extremities normal, atraumatic, no cyanosis or edema     Medications: Reviewed    Labs:  CBC:   Recent Labs     02/06/20  0723 02/06/20  1818 02/07/20  0612 02/08/20  1041   WBC 11.1*  --  8.3 9.3   HGB 8.3* 7.6* 7.4* 7.4*   HCT 24.9* 22.7* 22.1* 22.3*   MCV 92.5  --  93.8 94.2     --  316 346     BMP:   Recent Labs     02/06/20  0723 02/07/20  0612    141   K 4.4 4.3    108   CO2 18* 24   BUN 42* 28*   CREATININE 1.0 0.8     LIVER PROFILE:   Recent Labs     02/06/20  0723   AST 17   ALT 6*   LIPASE 35.0   PROT 6.5   BILITOT 0.3   ALKPHOS 110     PT/INR:   Recent Labs     02/06/20  0723   INR 1.02     Impression:69year old female with HTN, HLD, DM and obesity admitted with GI bleed. EGD showed duodenal ulcer    Recommendation:  1. Continue supportive care  2. PPI BID X 8wks  3. Advance diet  4. Repeat CBC in 4wks  5. OK to d/c from GI standpoint  6.  Follow up with Dr. Beverley Silver upon discharge      Se Urrutia MD  1:47 PM 2/8/2020

## 2020-02-08 NOTE — PROGRESS NOTES
auscultation, bilaterally without Rales/Wheezes/Rhonchi. Cardiovascular:  Regular rate and rhythm with normal S1/S2 without murmurs, rubs or gallops. Abdomen: Soft, non-tender, non-distended with normal bowel sounds. Musculoskeletal:  No clubbing, cyanosis or edema bilaterally. Full range of motion without deformity. Skin: Skin color, texture, turgor normal.  No rashes or lesions. Neurologic:  Neurovascularly intact without any focal sensory/motor deficits. Cranial nerves: II-XII intact, grossly non-focal.  Psychiatric:  Alert and oriented, thought content appropriate, normal insight  Capillary Refill: Brisk,< 3 seconds   Peripheral Pulses: +2 palpable, equal bilaterally     Labs:   Recent Labs     02/06/20  0723 02/06/20  1818 02/07/20  0612   WBC 11.1*  --  8.3   HGB 8.3* 7.6* 7.4*   HCT 24.9* 22.7* 22.1*     --  316     Recent Labs     02/06/20  0723 02/07/20  0612    141   K 4.4 4.3    108   CO2 18* 24   BUN 42* 28*   CREATININE 1.0 0.8   CALCIUM 8.9 8.7     Recent Labs     02/06/20  0723   AST 17   ALT 6*   BILITOT 0.3   ALKPHOS 110     Recent Labs     02/06/20  0723   INR 1.02     Recent Labs     02/06/20  0723   TROPONINI <0.01       Urinalysis:      Lab Results   Component Value Date    NITRU Negative 02/06/2020    WBCUA 10-20 02/06/2020    BACTERIA 1+ 02/06/2020    RBCUA 3-5 02/06/2020    BLOODU MODERATE 02/06/2020    SPECGRAV 1.015 02/06/2020    GLUCOSEU Negative 02/06/2020       Radiology:  US GALLBLADDER RUQ   Final Result   Nodular focus is seen in the gallbladder which appears non dependent raising   the question of small polyps. No evidence of cholecystitis         CT ABDOMEN PELVIS W IV CONTRAST Additional Contrast? None   Final Result   2 mm calculus within the bladder near the orifice of the left UVJ. Additional staghorn calculus within the left renal pelvis. Mild left   hydronephrosis.       Mild nonspecific induration of right upper quadrant fat adjacent to the proximal duodenum and gallbladder. Localized inflammation related to   duodenitis or potentially cholecystitis can not be excluded. No evidence of appendicitis. Popcorn calcification along the left lateral margin of uterus likely reflects   either pedunculated fibroid or partially calcified left adnexal lesion. In   the nonacute setting, pelvic ultrasound could be performed for clarification. Focal sclerosis within L1 vertebral body, typically bone island in an   otherwise healthy patient. If patient has risk factors for metastatic   disease, bone scan could be considered for further assessment. XR CHEST STANDARD (2 VW)   Final Result   No acute cardiopulmonary process. Assessment/Plan:    Active Hospital Problems    Diagnosis    GI bleed [K92.2]     Acute blood loss anemia due to GI bleed  - occult stool positive  - Hgb 8.3 on admission. Had been normal in 10/19  - reports NSAID use. Advised to discontinue on discharge  - GI consulted  - possible duodenitis seen on CT abd/pel  - RUQ US without evidence of cholecystitis  - s/p EGD with large necrotic duodenal ulcer. Biopsies pending  - transitioned to PO PPI BID     UTI with associated staghorn calculus  - staghorn calculus noted on CT abd/pel  - UA consistent with UTI  - started on ceftriaxone  - sepsis ruled out.  Tachycardia and mild Hgb elevation related to GI bleed     DMII  - well controlled  - holding home oral agents  - started on SSI     HTN  - holding home BP meds due to above  - will restart once able     HLD  - continue statin    DVT Prophylaxis: SCDs  Diet: DIET CARB CONTROL;  Code Status: Full Code    PT/OT Eval Status: ordered    Dispo - today vs tomorrow pending Trini Griggs MD

## 2020-02-08 NOTE — PROGRESS NOTES
Lab called and informed RN that One of the blood culture bottle is growing Coag. negative staph. Paged NP, No new orders received.

## 2020-02-08 NOTE — PLAN OF CARE
Problem: Falls - Risk of:  Goal: Will remain free from falls  Description  Pt remained free of falls. Non skid socks in place. Bed in lowest position and wheels locked. Call light and bedside table within reach. Calls out appropriately.    Outcome: Met This Shift

## 2020-02-08 NOTE — DISCHARGE INSTR - COC
Continuity of Care Form    Patient Name: Mark Courtney   :    MRN:  [id number]    Admit date:  2020  Discharge date:  20    Code Status Order: Full Code   Advance Directives:   [address] [facility] Documentation     Date/Time Healthcare Directive Type of Healthcare Directive Copy in [address] [facility] [address] Agent's Name Healthcare Agent's Phone Number    20 1856  No, patient does not have an advance directive for healthcare treatment -- -- -- -- --          Admitting Physician:  Junior Pascal MD  PCP: CHRYSTAL Burkett    Discharging Nurse: [facility] Unit/Room#: 9706/3178-62  Discharging Unit Phone Number: [phone]    Emergency Contact:   Extended Emergency Contact Information  Primary Emergency Contact: Sander Austinhan Phone: [phone]  Relation: Brother/Sister    Past Surgical History:  Past Surgical History:   Procedure Laterality Date    COLONOSCOPY      UPPER GASTROINTESTINAL ENDOSCOPY N/A 2020    EGD BIOPSY performed by Destiny Lees MD at [address] [place] [address] ENDOSCOPY       Immunization History:   Immunization History   Administered Date(s) Administered    Influenza, Triv, inactivated, subunit, adjuvanted, IM (Fluad 65 yrs and older) 10/28/2019    Pneumococcal Polysaccharide (Ldzxetgio80) 2012, 2015    Td (Adult), 2 Lf Tetanus Toxoid, Pf (Td, Absorbed) 2000, 2012    Zoster Live (Zostavax) 2012       Active Problems:  Patient Active Problem List   Diagnosis Code    GI bleed K92.2       Isolation/Infection:   Isolation          No Isolation        Patient Infection Status     None to display          Nurse Assessment:  Last Vital Signs: /75   Pulse 65   Temp 98.2 °F (36.8 °C) (Oral)   Resp 16   Ht 5' 4\" (1.626 m)   Wt 204 lb (92.5 kg)   SpO2 97%   BMI 35.02 kg/m²     Last documented pain score (0-10 scale): Pain Level: 0  Last Weight:   Wt Readings from Last 1 Encounters:

## 2020-02-08 NOTE — PROGRESS NOTES
Discharge instructions given pt aware RX sent to her pharmacy. Pt verbalized understanding. IV removed no complications. Tele box removed returned. Pt belongings gathered. Lockbox emptied. Cab voucher provided.  ETA [id number]

## 2020-02-08 NOTE — PLAN OF CARE
Problem: Falls - Risk of:  Goal: Will remain free from falls  Description  Will remain free from falls  Outcome: Ongoing   Fall precautions in place, bed alarm on, nonskid foot wear applied, bed in lowest position, and call light within reach. Will continue to monitor. Problem: Bowel Function - Altered:  Goal: Bowel elimination is within specified parameters  Description  Bowel elimination is within specified parameters  Outcome: Ongoing   Protonix drip running, Pt had EGD yesterday, No S/S of bleeding. Call light in reach.

## 2020-02-09 LAB
CULTURE, BLOOD 2: ABNORMAL
CULTURE, BLOOD 2: ABNORMAL
ORGANISM: ABNORMAL
ORGANISM: ABNORMAL

## 2020-02-10 ENCOUNTER — TELEPHONE (OUTPATIENT)
Dept: FAMILY MEDICINE CLINIC | Age: 75
End: 2020-02-10

## 2020-02-10 LAB — BLOOD CULTURE, ROUTINE: NORMAL

## 2020-02-10 NOTE — TELEPHONE ENCOUNTER
Spoke with Elizabeth Tomlinson will go out tomorrow to try and get placement. She told patient to go back to ER if not feeling safe at home. Dog is being kenneled. Patient was given the number of biohazard trash removal to assist with home by the nurse. They will up date tomorrow after seen by KIEL.

## 2020-02-10 NOTE — TELEPHONE ENCOUNTER
[facility]    Outreach made within 2 business days of discharge: Yes    Patient: Paola Huber Patient : 1945   MRN: <[id number]>  Reason for Admission: There are no discharge diagnoses documented for the most recent discharge. Discharge Date: 20       Spoke with: patient has had contact with Kenyon Juanjo from [employer] today.       Discharge department/facility: [facility]    Scheduled appointment with PCP within 7-14 days    Follow Up  Future Appointments   Date Time Provider Osmin Handy   3/18/2020  3:10 PM [place] Chryl Stager, MD [facility]   2020  9:45 AM CHRYSTAL Gomez [place]

## 2020-02-11 ENCOUNTER — HOSPITAL ENCOUNTER (EMERGENCY)
Age: 75
Discharge: ANOTHER HOSPITAL | DRG: 948 | End: 2020-02-11
Payer: MEDICARE

## 2020-02-11 ENCOUNTER — HOSPITAL ENCOUNTER (INPATIENT)
Age: 75
LOS: 12 days | Discharge: HOME | DRG: 948 | End: 2020-02-23
Attending: PHYSICAL MEDICINE & REHABILITATION | Admitting: PHYSICAL MEDICINE & REHABILITATION
Payer: MEDICARE

## 2020-02-11 VITALS
SYSTOLIC BLOOD PRESSURE: 129 MMHG | BODY MASS INDEX: 34.15 KG/M2 | RESPIRATION RATE: 18 BRPM | TEMPERATURE: 98.7 F | DIASTOLIC BLOOD PRESSURE: 70 MMHG | HEIGHT: 64 IN | OXYGEN SATURATION: 100 % | HEART RATE: 71 BPM | WEIGHT: 200 LBS

## 2020-02-11 PROBLEM — R53.81 DEBILITY: Status: ACTIVE | Noted: 2020-02-11

## 2020-02-11 LAB
A/G RATIO: 1.3 (ref 1.1–2.2)
ALBUMIN SERPL-MCNC: 3.6 G/DL (ref 3.4–5)
ALP BLD-CCNC: 114 U/L (ref 40–129)
ALT SERPL-CCNC: 8 U/L (ref 10–40)
AMORPHOUS: ABNORMAL /HPF
ANION GAP SERPL CALCULATED.3IONS-SCNC: 15 MMOL/L (ref 3–16)
AST SERPL-CCNC: 19 U/L (ref 15–37)
BACTERIA: ABNORMAL /HPF
BASOPHILS ABSOLUTE: 0.1 K/UL (ref 0–0.2)
BASOPHILS RELATIVE PERCENT: 0.8 %
BILIRUB SERPL-MCNC: 0.4 MG/DL (ref 0–1)
BILIRUBIN URINE: NEGATIVE
BLOOD, URINE: ABNORMAL
BUN BLDV-MCNC: 12 MG/DL (ref 7–20)
CALCIUM SERPL-MCNC: 8.9 MG/DL (ref 8.3–10.6)
CHLORIDE BLD-SCNC: 102 MMOL/L (ref 99–110)
CLARITY: CLEAR
CO2: 22 MMOL/L (ref 21–32)
COLOR: YELLOW
CREAT SERPL-MCNC: 1 MG/DL (ref 0.6–1.2)
EOSINOPHILS ABSOLUTE: 0.2 K/UL (ref 0–0.6)
EOSINOPHILS RELATIVE PERCENT: 1.9 %
GFR AFRICAN AMERICAN: >60
GFR NON-AFRICAN AMERICAN: 54
GLOBULIN: 2.8 G/DL
GLUCOSE BLD-MCNC: 39 MG/DL (ref 70–99)
GLUCOSE BLD-MCNC: 47 MG/DL (ref 70–99)
GLUCOSE BLD-MCNC: 49 MG/DL (ref 70–99)
GLUCOSE BLD-MCNC: 53 MG/DL (ref 70–99)
GLUCOSE BLD-MCNC: 58 MG/DL (ref 70–99)
GLUCOSE BLD-MCNC: 78 MG/DL (ref 70–99)
GLUCOSE BLD-MCNC: 84 MG/DL (ref 70–99)
GLUCOSE URINE: NEGATIVE MG/DL
HCT VFR BLD CALC: 25.5 % (ref 36–48)
HEMOGLOBIN: 8.5 G/DL (ref 12–16)
KETONES, URINE: 15 MG/DL
LACTIC ACID: 2.4 MMOL/L (ref 0.4–2)
LEUKOCYTE ESTERASE, URINE: ABNORMAL
LIPASE: 35 U/L (ref 13–60)
LYMPHOCYTES ABSOLUTE: 0.8 K/UL (ref 1–5.1)
LYMPHOCYTES RELATIVE PERCENT: 10.1 %
MCH RBC QN AUTO: 31.2 PG (ref 26–34)
MCHC RBC AUTO-ENTMCNC: 33.4 G/DL (ref 31–36)
MCV RBC AUTO: 93.5 FL (ref 80–100)
MICROSCOPIC EXAMINATION: YES
MONOCYTES ABSOLUTE: 0.3 K/UL (ref 0–1.3)
MONOCYTES RELATIVE PERCENT: 3.7 %
MUCUS: ABNORMAL /LPF
NEUTROPHILS ABSOLUTE: 7 K/UL (ref 1.7–7.7)
NEUTROPHILS RELATIVE PERCENT: 83.5 %
NITRITE, URINE: NEGATIVE
PDW BLD-RTO: 16 % (ref 12.4–15.4)
PERFORMED ON: ABNORMAL
PERFORMED ON: NORMAL
PH UA: 5.5 (ref 5–8)
PLATELET # BLD: 463 K/UL (ref 135–450)
PMV BLD AUTO: 6.8 FL (ref 5–10.5)
POTASSIUM SERPL-SCNC: 3.7 MMOL/L (ref 3.5–5.1)
PROTEIN UA: NEGATIVE MG/DL
RBC # BLD: 2.73 M/UL (ref 4–5.2)
RBC UA: ABNORMAL /HPF (ref 0–2)
SODIUM BLD-SCNC: 139 MMOL/L (ref 136–145)
SPECIFIC GRAVITY UA: 1.02 (ref 1–1.03)
TOTAL PROTEIN: 6.4 G/DL (ref 6.4–8.2)
URINE TYPE: ABNORMAL
UROBILINOGEN, URINE: 0.2 E.U./DL
WBC # BLD: 8.4 K/UL (ref 4–11)
WBC UA: ABNORMAL /HPF (ref 0–5)

## 2020-02-11 PROCEDURE — 80053 COMPREHEN METABOLIC PANEL: CPT

## 2020-02-11 PROCEDURE — 96365 THER/PROPH/DIAG IV INF INIT: CPT

## 2020-02-11 PROCEDURE — 1280000000 HC REHAB R&B

## 2020-02-11 PROCEDURE — 97530 THERAPEUTIC ACTIVITIES: CPT

## 2020-02-11 PROCEDURE — 96361 HYDRATE IV INFUSION ADD-ON: CPT

## 2020-02-11 PROCEDURE — 6370000000 HC RX 637 (ALT 250 FOR IP): Performed by: PHYSICAL MEDICINE & REHABILITATION

## 2020-02-11 PROCEDURE — 87086 URINE CULTURE/COLONY COUNT: CPT

## 2020-02-11 PROCEDURE — 97165 OT EVAL LOW COMPLEX 30 MIN: CPT

## 2020-02-11 PROCEDURE — 97110 THERAPEUTIC EXERCISES: CPT

## 2020-02-11 PROCEDURE — 99284 EMERGENCY DEPT VISIT MOD MDM: CPT

## 2020-02-11 PROCEDURE — 83690 ASSAY OF LIPASE: CPT

## 2020-02-11 PROCEDURE — 97116 GAIT TRAINING THERAPY: CPT

## 2020-02-11 PROCEDURE — 6360000002 HC RX W HCPCS: Performed by: NURSE PRACTITIONER

## 2020-02-11 PROCEDURE — 97162 PT EVAL MOD COMPLEX 30 MIN: CPT

## 2020-02-11 PROCEDURE — 2580000003 HC RX 258: Performed by: NURSE PRACTITIONER

## 2020-02-11 PROCEDURE — 83605 ASSAY OF LACTIC ACID: CPT

## 2020-02-11 PROCEDURE — 85025 COMPLETE CBC W/AUTO DIFF WBC: CPT

## 2020-02-11 PROCEDURE — 81001 URINALYSIS AUTO W/SCOPE: CPT

## 2020-02-11 RX ORDER — GLIPIZIDE 5 MG/1
10 TABLET ORAL
Status: DISCONTINUED | OUTPATIENT
Start: 2020-02-12 | End: 2020-02-12

## 2020-02-11 RX ORDER — TRAZODONE HYDROCHLORIDE 50 MG/1
50 TABLET ORAL NIGHTLY PRN
Status: DISCONTINUED | OUTPATIENT
Start: 2020-02-11 | End: 2020-02-23 | Stop reason: HOSPADM

## 2020-02-11 RX ORDER — POLYETHYLENE GLYCOL 3350 17 G/17G
17 POWDER, FOR SOLUTION ORAL DAILY
Status: DISCONTINUED | OUTPATIENT
Start: 2020-02-11 | End: 2020-02-23 | Stop reason: HOSPADM

## 2020-02-11 RX ORDER — VITAMIN B COMPLEX
1000 TABLET ORAL DAILY
Status: DISCONTINUED | OUTPATIENT
Start: 2020-02-11 | End: 2020-02-23 | Stop reason: HOSPADM

## 2020-02-11 RX ORDER — NICOTINE POLACRILEX 4 MG
15 LOZENGE BUCCAL PRN
Status: DISCONTINUED | OUTPATIENT
Start: 2020-02-11 | End: 2020-02-23 | Stop reason: HOSPADM

## 2020-02-11 RX ORDER — ACETAMINOPHEN 325 MG/1
650 TABLET ORAL EVERY 4 HOURS PRN
Status: DISCONTINUED | OUTPATIENT
Start: 2020-02-11 | End: 2020-02-23 | Stop reason: HOSPADM

## 2020-02-11 RX ORDER — ONDANSETRON 8 MG/1
8 TABLET, ORALLY DISINTEGRATING ORAL EVERY 8 HOURS PRN
Status: DISCONTINUED | OUTPATIENT
Start: 2020-02-11 | End: 2020-02-23 | Stop reason: HOSPADM

## 2020-02-11 RX ORDER — CIPROFLOXACIN 500 MG/1
500 TABLET, FILM COATED ORAL EVERY 12 HOURS SCHEDULED
Status: DISCONTINUED | OUTPATIENT
Start: 2020-02-11 | End: 2020-02-14

## 2020-02-11 RX ORDER — PANTOPRAZOLE SODIUM 40 MG/1
40 TABLET, DELAYED RELEASE ORAL
Status: DISCONTINUED | OUTPATIENT
Start: 2020-02-12 | End: 2020-02-23 | Stop reason: HOSPADM

## 2020-02-11 RX ORDER — FERROUS SULFATE 325(65) MG
325 TABLET ORAL 2 TIMES DAILY WITH MEALS
Status: DISCONTINUED | OUTPATIENT
Start: 2020-02-11 | End: 2020-02-23 | Stop reason: HOSPADM

## 2020-02-11 RX ORDER — LOSARTAN POTASSIUM 100 MG/1
100 TABLET ORAL DAILY
Status: DISCONTINUED | OUTPATIENT
Start: 2020-02-11 | End: 2020-02-23 | Stop reason: HOSPADM

## 2020-02-11 RX ORDER — AMLODIPINE BESYLATE 2.5 MG/1
2.5 TABLET ORAL DAILY
Status: DISCONTINUED | OUTPATIENT
Start: 2020-02-11 | End: 2020-02-23 | Stop reason: HOSPADM

## 2020-02-11 RX ORDER — OXYBUTYNIN CHLORIDE 5 MG/1
5 TABLET ORAL 3 TIMES DAILY
Status: DISCONTINUED | OUTPATIENT
Start: 2020-02-11 | End: 2020-02-23 | Stop reason: HOSPADM

## 2020-02-11 RX ORDER — ATORVASTATIN CALCIUM 40 MG/1
40 TABLET, FILM COATED ORAL NIGHTLY
Status: DISCONTINUED | OUTPATIENT
Start: 2020-02-11 | End: 2020-02-23 | Stop reason: HOSPADM

## 2020-02-11 RX ORDER — DEXTROSE MONOHYDRATE 25 G/50ML
12.5 INJECTION, SOLUTION INTRAVENOUS PRN
Status: DISCONTINUED | OUTPATIENT
Start: 2020-02-11 | End: 2020-02-23 | Stop reason: HOSPADM

## 2020-02-11 RX ORDER — 0.9 % SODIUM CHLORIDE 0.9 %
1000 INTRAVENOUS SOLUTION INTRAVENOUS ONCE
Status: COMPLETED | OUTPATIENT
Start: 2020-02-11 | End: 2020-02-11

## 2020-02-11 RX ORDER — DEXTROSE MONOHYDRATE 50 MG/ML
100 INJECTION, SOLUTION INTRAVENOUS PRN
Status: DISCONTINUED | OUTPATIENT
Start: 2020-02-11 | End: 2020-02-23 | Stop reason: HOSPADM

## 2020-02-11 RX ORDER — HYDRALAZINE HYDROCHLORIDE 25 MG/1
50 TABLET, FILM COATED ORAL EVERY 6 HOURS PRN
Status: DISCONTINUED | OUTPATIENT
Start: 2020-02-11 | End: 2020-02-23 | Stop reason: HOSPADM

## 2020-02-11 RX ADMIN — CIPROFLOXACIN HYDROCHLORIDE 500 MG: 500 TABLET, FILM COATED ORAL at 21:13

## 2020-02-11 RX ADMIN — ATORVASTATIN CALCIUM 40 MG: 40 TABLET, FILM COATED ORAL at 21:14

## 2020-02-11 RX ADMIN — CEFTRIAXONE SODIUM 1 G: 1 INJECTION, POWDER, FOR SOLUTION INTRAMUSCULAR; INTRAVENOUS at 15:05

## 2020-02-11 RX ADMIN — SODIUM CHLORIDE 1000 ML: 9 INJECTION, SOLUTION INTRAVENOUS at 11:00

## 2020-02-11 RX ADMIN — OXYBUTYNIN CHLORIDE 5 MG: 5 TABLET ORAL at 21:13

## 2020-02-11 ASSESSMENT — PAIN DESCRIPTION - LOCATION: LOCATION: ABDOMEN

## 2020-02-11 ASSESSMENT — PAIN SCALES - GENERAL
PAINLEVEL_OUTOF10: 4
PAINLEVEL_OUTOF10: 5
PAINLEVEL_OUTOF10: 0

## 2020-02-11 ASSESSMENT — PAIN - FUNCTIONAL ASSESSMENT: PAIN_FUNCTIONAL_ASSESSMENT: PREVENTS OR INTERFERES SOME ACTIVE ACTIVITIES AND ADLS

## 2020-02-11 NOTE — ED PROVIDER NOTES
[facility] Emergency Department    CHIEF COMPLAINT  No chief complaint on file. HISTORY OF PRESENT ILLNESS  Jackelyn Barrera is a 69 y.o. female who presents to the ED complaining of dizziness, lightheadedness, onset yesterday with lower abdominal pain on Sunday. Patient was admitted Thursday through Saturday where she was diagnosed with a ulcer. Patient reports that she was feeling well upon discharge until yesterday. Patient denies any head injury or loss conscious. Patient reports feeling well prior to discharge. No chest pain or shortness of breath. Patient reports lower abdominal pain denies any nausea, vomiting, or diarrhea. Patient reports that she has not had a bowel movement since having EGD performed. Patient reports that she did call GI and spoke with them and reports \"they were not concerned because I have not been eating and drinking other than clear liquids while I was hospitalized. \"  Patient was given MiraLAX upon discharge as well as other medications reports that she has not taken them. Patient reports that she \"was worried that the MiraLAX would work and she was too weak to get up. \"  Patient reports that she is not safe in her home environment and when the home health nurse came to visit her she recommended having social work come out and see her. Patient reports that social work was scheduled for today. No other complaints, modifying factors or associated symptoms. Nursing notes reviewed.    Past Medical History:   Diagnosis Date    Hyperlipidemia     Hypertension     Obesity     Type 2 diabetes mellitus without complication [facility])      Past Surgical History:   Procedure Laterality Date    COLONOSCOPY      UPPER GASTROINTESTINAL ENDOSCOPY N/A 2/7/2020    EGD BIOPSY performed by Rebecca Virk MD at [address] [place] [address]     Family History   Problem Relation Age of Onset    Diabetes Brother     Obesity Brother     Other Brother         TIA    Diabetes Mother     Other Mother         COPD    Heart Attack Father     Coronary Art Dis Father     Other Father         aortic aneurysm    Diabetes Brother     Cancer Brother      Social History     Socioeconomic History    Marital status: Single     Spouse name: Not on file    Number of children: Not on file    Years of education: Not on file    Highest education level: Not on file   Occupational History    Not on file   Social Needs    Financial resource strain: Not on file    Food insecurity:     Worry: Not on file     Inability: Not on file    Transportation needs:     Medical: Not on file     Non-medical: Not on file   Tobacco Use    Smoking status: Passive Smoke Exposure - Never Smoker    Smokeless tobacco: Never Used   Substance and Sexual Activity    Alcohol use: Yes     Comment: every 2 months     Drug use: Never    Sexual activity: Not on file   Lifestyle    Physical activity:     Days per week: Not on file     Minutes per session: Not on file    Stress: Not on file   Relationships    Social connections:     Talks on phone: Not on file     Gets together: Not on file     Attends Judaism service: Not on file     Active member of club or organization: Not on file     Attends meetings of clubs or organizations: Not on file     Relationship status: Not on file    Intimate partner violence:     Fear of current or ex partner: Not on file     Emotionally abused: Not on file     Physically abused: Not on file     Forced sexual activity: Not on file   Other Topics Concern    Not on file   Social History Narrative    Not on file     No current facility-administered medications for this encounter.       Current Outpatient Medications   Medication Sig Dispense Refill    ferrous sulfate 325 (65 Fe) MG tablet Take 1 tablet by mouth daily (with breakfast) 30 tablet 3    polyethylene glycol (GLYCOLAX) packet Take 17 g by mouth daily 527 g 1    cefdinir (OMNICEF) 300 MG capsule Take 1 capsule by mouth 2 times daily for 5 days 10 capsule 0    pantoprazole (PROTONIX) 40 MG tablet Take 1 tablet by mouth 2 times daily (before meals) 60 tablet 1    glipiZIDE (GLUCOTROL) 10 MG tablet TAKE 1 TABLET BY MOUTH EVERY DAY 90 tablet 0    losartan (COZAAR) 100 MG tablet TAKE 1 TABLET BY MOUTH EVERY DAY 90 tablet 0    amLODIPine (NORVASC) 2.5 MG tablet Take 1 tablet by mouth daily 90 tablet 0    Handicap Placard MISC Expiration 1/2025 (Patient not taking: Reported on 1/6/2020) 1 each 0    oxybutynin (DITROPAN) 5 MG tablet Take 1 tablet by mouth 2 times daily 60 tablet 3    Handicap Placard MISC by Does not apply route 1 each 0    metoprolol (LOPRESSOR) 100 MG tablet Take 100 mg by mouth       atorvastatin (LIPITOR) 40 MG tablet Take 1 tablet by mouth daily 90 tablet 1    Cholecalciferol 100 MCG (4000 UT) CAPS Take 1 capsule by mouth daily 90 capsule 1     Allergies   Allergen Reactions    Metformin      diarrhea    Ramipril      cough  cough         REVIEW OF SYSTEMS  10 systems reviewed, pertinent positives per HPI otherwise noted to be negative    PHYSICAL EXAM  There were no vitals taken for this visit. GENERAL APPEARANCE: Awake and alert. Cooperative. No  Acute distress. HEAD: Normocephalic. Atraumatic. EYES: PERRL.   ENT: Mucous membranes are pink and moist.  Nares unobstructed. NECK: Supple. HEART: No chest wall tenderness. LUNGS: Respirations unlabored. CTAB. Good air exchange. Speaking comfortably in full sentences. No wheezes, rhonchi, rales or crackles. ABDOMEN: Soft. Non-distended. Non-tender. No guarding or rebound. Negative rigidity. EXTREMITIES: No peripheral edema. Moves all extremities equally. All extremities neurovascularly intact. Brisk Cap refill. No unilateral weakness. Equal grasp bilaterally. SKIN: Warm and dry. No acute rashes. NEUROLOGICAL: Alert and oriented. No focal/motor deficits. No gross facial drooping. Strength 5/5, sensation intact.    PSYCHIATRIC: Normal - 5.20 M/uL    Hemoglobin 8.5 (L) 12.0 - 16.0 g/dL    Hematocrit 25.5 (L) 36.0 - 48.0 %    MCV 93.5 80.0 - 100.0 fL    MCH 31.2 26.0 - 34.0 pg    MCHC 33.4 31.0 - 36.0 g/dL    RDW 16.0 (H) 12.4 - 15.4 %    Platelets 934 (H) 031 - 450 K/uL    MPV 6.8 5.0 - 10.5 fL    Neutrophils % 83.5 %    Lymphocytes % 10.1 %    Monocytes % 3.7 %    Eosinophils % 1.9 %    Basophils % 0.8 %    Neutrophils Absolute 7.0 1.7 - 7.7 K/uL    Lymphocytes Absolute 0.8 (L) 1.0 - 5.1 K/uL    Monocytes Absolute 0.3 0.0 - 1.3 K/uL    Eosinophils Absolute 0.2 0.0 - 0.6 K/uL    Basophils Absolute 0.1 0.0 - 0.2 K/uL   Comprehensive metabolic panel   Result Value Ref Range    Sodium 139 136 - 145 mmol/L    Potassium 3.7 3.5 - 5.1 mmol/L    Chloride 102 99 - 110 mmol/L    CO2 22 21 - 32 mmol/L    Anion Gap 15 3 - 16    Glucose 78 70 - 99 mg/dL    BUN 12 7 - 20 mg/dL    CREATININE 1.0 0.6 - 1.2 mg/dL    GFR Non-African American 54 (A) >60    GFR African American >60 >60    Calcium 8.9 8.3 - 10.6 mg/dL    Total Protein 6.4 6.4 - 8.2 g/dL    Alb 3.6 3.4 - 5.0 g/dL    Albumin/Globulin Ratio 1.3 1.1 - 2.2    Total Bilirubin 0.4 0.0 - 1.0 mg/dL    Alkaline Phosphatase 114 40 - 129 U/L    ALT 8 (L) 10 - 40 U/L    AST 19 15 - 37 U/L    Globulin 2.8 g/dL   Lipase   Result Value Ref Range    Lipase 35.0 13.0 - 60.0 U/L   Urinalysis, reflex to microscopic   Result Value Ref Range    Color, UA Yellow Straw/Yellow    Clarity, UA Clear Clear    Glucose, Ur Negative Negative mg/dL    Bilirubin Urine Negative Negative    Ketones, Urine 15 (A) Negative mg/dL    Specific Gravity, UA 1.020 1.005 - 1.030    Blood, Urine MODERATE (A) Negative    pH, UA 5.5 5.0 - 8.0    Protein, UA Negative Negative mg/dL    Urobilinogen, Urine 0.2 <2.0 E.U./dL    Nitrite, Urine Negative Negative    Leukocyte Esterase, Urine SMALL (A) Negative    Microscopic Examination YES     Urine Type NotGiven    Lactic Acid, Plasma   Result Value Ref Range    Lactic Acid 2.4 (H) 0.4 - 2.0 mmol/L Microscopic Urinalysis   Result Value Ref Range    Mucus, UA Rare (A) /LPF    WBC, UA 10-20 (A) 0 - 5 /HPF    RBC, UA 10-20 (A) 0 - 2 /HPF    Bacteria, UA Rare (A) /HPF    Amorphous, UA Rare /HPF         I estimate there is LOW risk for ACUTE APPENDICITIS, BOWEL OBSTRUCTION, CHOLECYSTITIS, DIVERTICULITIS, INCARCERATED HERNIA, PANCREATITIS, or PERFORATED BOWEL or ULCER, thus I consider the discharge disposition reasonable. Also, there is no evidence or peritonitis, sepsis, or toxicity. Torie Montero and I have discussed the diagnosis and risks, and we agree with discharging home to follow-up with their primary doctor. We also discussed returning to the Emergency Department immediately if new or worsening symptoms occur. We have discussed the symptoms which are most concerning (e.g., bloody stool, fever, changing or worsening pain, vomiting) that necessitate immediate return. FINAL Impression    1. Urinary tract infection with hematuria, site unspecified    2. Orthostatic hypotension    3. Dehydration    4. General weakness        Blood pressure 129/70, pulse 71, temperature 98.7 °F (37.1 °C), temperature source Oral, resp. rate 18, height 5' 4\" (1.626 m), weight 200 lb (90.7 kg), SpO2 100 %. DISPOSITION  Patient was discharged to home in good condition to acute rehab unit.           ANGIE Saleh - CNP  02/11/20 1921

## 2020-02-11 NOTE — CARE COORDINATION
[facility]    Active w [address] BSN CTN  [facility] [phone]

## 2020-02-11 NOTE — ED NOTES
Pt complained that the room was spinning while standing during orthostatics.       Candy Queen  02/11/20 1050

## 2020-02-11 NOTE — ED NOTES
Bed: 21  Expected date: 2/11/20  Expected time: 9:58 AM  Means of arrival: [facility]  Comments:  69yo female abdominal pain   Hx ulcer.       Dena Begum RN  02/11/20 1007

## 2020-02-11 NOTE — CARE COORDINATION
Care Coordination, Acute Rehab     After review, this patient is felt to be:      [x]  Appropriate for Acute Inpatient Rehab    []  Appropriate for Acute Inpatient Rehab Pending Insurance Authorization    []  Not appropriate for Acute Inpatient Rehab    []  Not appropriate at this time, however evaluation ongoing        Will plan to admit at 1800.  Thank you for the referral. Electronically signed by Mary Koehler RN on 2/11/2020 at 5:20 PM

## 2020-02-11 NOTE — CARE COORDINATION
Writer received call early this morning (0830 am)  from [facility] regarding patient. Their population health nurse sent patient to ED due to no water, heat, food, or medications. Writer called about 1050am patient had arrived to ED. Practitioner had seen patient and stated that patient had stated that she had not been upfront in voicing her concerns during previous admission with returning home. Writer went to bedside to speak to patient. CASE MANAGEMENT INITIAL ASSESSMENT      Reviewed chart and met with patient today, re: 69year old female. Explained Case Management role/services. Family present: none  Primary contact information: Lynn Wen [phone]    Admit date/status: 2/11/20 ED  Diagnosis:   Is this a Readmission?:  Yes, pt did not have support needed to return home. Insurance: Medicare primary and anthem secondary   Precert required for SNF - no  3 night stay required -yes    Living arrangements, Adls, care needs, prior to admission: patient lives alone in a single story house with a basement. Normally is independent in ADL's however is increasingly weaker. Transportation: private    [facility] at home: Walker__Cane_X_RTS__ BSC__Shower Chair__  02__ HHN__ CPAP__  BiPap__  Hospital Bed__ W/C___ Other__________    Services in the home and/or outpatient, prior to admission: active with [facility]     PT/OT recs: pending    Hospital Exemption Notification (HEN): not initiated    Barriers to discharge:  none    Plan/comments:   Met with patient regarding the above concerns. Patient states that her furnace is broken and she can not get it repaired due to the current state of the home with hoarding and dog feces in the home. She has since had to turn off the water to her home as she has a fear of her pipes freezing and busting.   Writer asked about food and she does have the ability to get food but has been feeling so weak she has not. Events reported to writer as such:  Patient states that she had planned to clean up before the [place] [address] RN came but was feeling so bad she could not. States on Saturday when RN came they discussed if she could stay in the living situation and decided she could but a SW would see her Sunday or Monday. Calls were made to the patient that made her feel attacked with why aren't you asking for help from family or friends. Ultimately SW was unable to see her so it was suggested that she come here. Patient told writer she has 3 brothers all with various health concerns. Her brother Ramez Otoole that she is closest with has lung CA and just finished chemo/radiation and is not able to physically help her but is great emotional support. Her next brother lives in [address] and has some cognitive impairment and memory issues. Her youngest brother recently had a fall but stated that Fatou Abel is the last person I would want to know how bad the house is. \" she went on to voice concern as her sister in law would post it all over facebook and she doesn't want that. She also has 2 cousins here in [address] [facility]. One lives in a two story house but that would not be conducive to her situation. Her other cousin Amanda Galindo would likely be willing to help her out for a short time. She also stated that she has a good friend that recently moved to [place] that is coming to help get her house in order. Patient then informed writer that she has recently taken out a new home equity loan to do needed repairs on her home. Writer discussed rehab for strengthening and then where she would go while repairs are being done. Plan at this time is for friend to oversee cleaning and repairs while patient in rehab. Patient would either discharge to cousins home, or to an apartment here with friend or alone while repairs continued.   Patient plainly stated that she could pay for alternate living arrangements while these things

## 2020-02-11 NOTE — CARE COORDINATION
Several calls made checking on status of referral.  Still awaiting Dr Bri Baires. States he is reviewing now and needs a few more minutes.   Sandeep Gao RN

## 2020-02-11 NOTE — ED NOTES
Obtained urine sample via sterile straight cath. Pt tolerated well. Urine sent to darrin.       Racquel Moment  02/11/20 1692

## 2020-02-11 NOTE — CARE COORDINATION
Deirdre Ashley with ARU reviewing information. Awaiting determination from Dr Fabiola Lindsey. Tucker Jaramillo RN

## 2020-02-11 NOTE — PROGRESS NOTES
Physical Therapy    Facility/Department: [facility]  ED  Initial Assessment and Treatment    NAME: Muna Owusu  : [id number]  MRN: [id number]    Date of Service: 2020    Discharge Recommendations:  Subacute/Skilled Nursing Facility   PT Equipment Recommendations  Equipment Needed: No  Other: defer to patient's facility. Barriers to home discharge:   [] Steps to access home entry or bed/bath:   [] No rail with steps to access home entry or bed/bath   [x] Reported available assist at home upon discharge limited   [x] Patient or family requests DC to other than home    [x] Patient reports expectation of post acute Discharge disposition to other than home   [] Other:    Assessment   Body structures, Functions, Activity limitations: Decreased functional mobility ; Decreased safe awareness;Decreased endurance;Decreased balance;Decreased high-level IADLs; Increased pain;Decreased ADL status; Decreased strength;Decreased posture  Assessment: Patient is a 69year old female who presented to [facility] ED with generalized weakness and inability to safely complete her ADLs at home. Patient's mobility was limited by the therapy deficits listed above. In particular patient's mobility was limited by patient's dizziness with standing (RN made aware), poor standing balance and decreased strength. PT recommends that this patient receive skilled PT in the SNF setting, when medically stable, in order to address these deficits and to help her maximize her safety and independence with all functioanl mobility. PT to continue to follow. Treatment Diagnosis: decreased independence with functional mobility.    Specific instructions for Next Treatment: progress mobility as tolerated  Prognosis: Good  Decision Making: Medium Complexity  PT Education: Goals;PT Role;Plan of Care;Precautions;Transfer Training  Barriers to Learning: none  REQUIRES PT FOLLOW UP: Yes  Activity Tolerance  Activity Tolerance: Treatment limited secondary to medical complications (free text); Patient limited by fatigue;Patient limited by endurance; Patient limited by pain; Other  Activity Tolerance: Vitals: Supine: 152/88 97 BPM 94%  Seated: 129/81 Standing 106/82 Post activity: 122/73 Patient's RN aware of patient's orthostatic vitals and symptoms. Patient Diagnosis(es): There were no encounter diagnoses. has a past medical history of Hyperlipidemia, Hypertension, Obesity, and Type 2 diabetes mellitus without complication ([facility] [address] has a past surgical history that includes Colonoscopy and Upper gastrointestinal endoscopy (N/A, 2/7/2020). Restrictions  Restrictions/Precautions  Restrictions/Precautions: General Precautions, Fall Risk  Vision/Hearing  Vision: Impaired  Vision Exceptions: Wears glasses for reading  Hearing: Exceptions to [facility] Exceptions: Hard of hearing/hearing concerns     Subjective  General  Chart Reviewed: Yes  Patient assessed for rehabilitation services?: Yes  Response To Previous Treatment: Not applicable  Family / Caregiver Present: No  Referring Practitioner: ANGIE Mendoza CNP  Referral Date : 02/11/20  Diagnosis: lightheadedness, weakness  Follows Commands: Within Functional Limits  General Comment  Comments: Patient in supine position in stretcher upon entry of therapy. Subjective  Subjective: Patient agreed to participate. Pain Screening  Patient Currently in Pain: Yes  Pain Assessment  Pain Assessment: 0-10  Pain Level: 4  Pain Location: Abdomen  Functional Pain Assessment: Prevents or interferes some active activities and ADLs  Non-Pharmaceutical Pain Intervention(s): Repositioned;Rest;Ambulation/Increased Activity; Therapeutic presence  Vital Signs  Patient Currently in Pain: Yes       Orientation  Orientation  Overall Orientation Status: Within Normal Limits  Social/Functional History  Social/Functional History  Lives With: Alone  Type of Home: House  Home Layout: One level, Laundry in

## 2020-02-11 NOTE — ED NOTES
Nurse to room, patient repositioned in bed, explained to patient about process of being admitted to ARU and approximate time patient will be admitted to the unit, patient verbalizes understanding. No distress noted, respirations even and unlabored.      Qi Bell RN  02/11/20 2698

## 2020-02-12 ENCOUNTER — APPOINTMENT (OUTPATIENT)
Dept: GENERAL RADIOLOGY | Age: 75
DRG: 948 | End: 2020-02-12
Attending: PHYSICAL MEDICINE & REHABILITATION
Payer: MEDICARE

## 2020-02-12 LAB
A/G RATIO: 1.3 (ref 1.1–2.2)
ALBUMIN SERPL-MCNC: 3.1 G/DL (ref 3.4–5)
ALP BLD-CCNC: 96 U/L (ref 40–129)
ALT SERPL-CCNC: 8 U/L (ref 10–40)
ANION GAP SERPL CALCULATED.3IONS-SCNC: 13 MMOL/L (ref 3–16)
AST SERPL-CCNC: 15 U/L (ref 15–37)
BILIRUB SERPL-MCNC: <0.2 MG/DL (ref 0–1)
BUN BLDV-MCNC: 13 MG/DL (ref 7–20)
CALCIUM SERPL-MCNC: 8.3 MG/DL (ref 8.3–10.6)
CHLORIDE BLD-SCNC: 106 MMOL/L (ref 99–110)
CO2: 23 MMOL/L (ref 21–32)
CREAT SERPL-MCNC: 1.3 MG/DL (ref 0.6–1.2)
GFR AFRICAN AMERICAN: 49
GFR NON-AFRICAN AMERICAN: 40
GLOBULIN: 2.4 G/DL
GLUCOSE BLD-MCNC: 106 MG/DL (ref 70–99)
GLUCOSE BLD-MCNC: 107 MG/DL (ref 70–99)
GLUCOSE BLD-MCNC: 67 MG/DL (ref 70–99)
GLUCOSE BLD-MCNC: 78 MG/DL (ref 70–99)
GLUCOSE BLD-MCNC: 89 MG/DL (ref 70–99)
GLUCOSE BLD-MCNC: 92 MG/DL (ref 70–99)
HCT VFR BLD CALC: 22.1 % (ref 36–48)
HEMOGLOBIN: 7.4 G/DL (ref 12–16)
MCH RBC QN AUTO: 31.3 PG (ref 26–34)
MCHC RBC AUTO-ENTMCNC: 33.6 G/DL (ref 31–36)
MCV RBC AUTO: 93.1 FL (ref 80–100)
PDW BLD-RTO: 16.1 % (ref 12.4–15.4)
PERFORMED ON: ABNORMAL
PERFORMED ON: ABNORMAL
PERFORMED ON: NORMAL
PLATELET # BLD: 397 K/UL (ref 135–450)
PMV BLD AUTO: 7 FL (ref 5–10.5)
POTASSIUM REFLEX MAGNESIUM: 3.7 MMOL/L (ref 3.5–5.1)
RBC # BLD: 2.38 M/UL (ref 4–5.2)
SODIUM BLD-SCNC: 142 MMOL/L (ref 136–145)
TOTAL PROTEIN: 5.5 G/DL (ref 6.4–8.2)
URINE CULTURE, ROUTINE: NORMAL
WBC # BLD: 11 K/UL (ref 4–11)

## 2020-02-12 PROCEDURE — 85027 COMPLETE CBC AUTOMATED: CPT

## 2020-02-12 PROCEDURE — 80053 COMPREHEN METABOLIC PANEL: CPT

## 2020-02-12 PROCEDURE — 36415 COLL VENOUS BLD VENIPUNCTURE: CPT

## 2020-02-12 PROCEDURE — 97530 THERAPEUTIC ACTIVITIES: CPT

## 2020-02-12 PROCEDURE — 97116 GAIT TRAINING THERAPY: CPT

## 2020-02-12 PROCEDURE — 97166 OT EVAL MOD COMPLEX 45 MIN: CPT

## 2020-02-12 PROCEDURE — 6370000000 HC RX 637 (ALT 250 FOR IP)

## 2020-02-12 PROCEDURE — 97162 PT EVAL MOD COMPLEX 30 MIN: CPT

## 2020-02-12 PROCEDURE — 74018 RADEX ABDOMEN 1 VIEW: CPT

## 2020-02-12 PROCEDURE — 1280000000 HC REHAB R&B

## 2020-02-12 PROCEDURE — 6370000000 HC RX 637 (ALT 250 FOR IP): Performed by: PHYSICAL MEDICINE & REHABILITATION

## 2020-02-12 PROCEDURE — 97535 SELF CARE MNGMENT TRAINING: CPT

## 2020-02-12 PROCEDURE — 97110 THERAPEUTIC EXERCISES: CPT

## 2020-02-12 RX ORDER — LACTULOSE 10 G/15ML
20 SOLUTION ORAL 3 TIMES DAILY
Status: DISCONTINUED | OUTPATIENT
Start: 2020-02-12 | End: 2020-02-13

## 2020-02-12 RX ADMIN — LOSARTAN POTASSIUM 100 MG: 100 TABLET, FILM COATED ORAL at 00:19

## 2020-02-12 RX ADMIN — ACETAMINOPHEN 650 MG: 325 TABLET ORAL at 04:43

## 2020-02-12 RX ADMIN — OXYBUTYNIN CHLORIDE 5 MG: 5 TABLET ORAL at 20:37

## 2020-02-12 RX ADMIN — PANTOPRAZOLE SODIUM 40 MG: 40 TABLET, DELAYED RELEASE ORAL at 06:32

## 2020-02-12 RX ADMIN — ACETAMINOPHEN 650 MG: 325 TABLET ORAL at 20:37

## 2020-02-12 RX ADMIN — LACTULOSE 20 G: 20 SOLUTION ORAL at 20:36

## 2020-02-12 RX ADMIN — TRAZODONE HYDROCHLORIDE 50 MG: 50 TABLET ORAL at 20:37

## 2020-02-12 RX ADMIN — FERROUS SULFATE TAB 325 MG (65 MG ELEMENTAL FE) 325 MG: 325 (65 FE) TAB at 16:22

## 2020-02-12 RX ADMIN — POLYETHYLENE GLYCOL 3350 17 G: 17 POWDER, FOR SOLUTION ORAL at 08:29

## 2020-02-12 RX ADMIN — CIPROFLOXACIN HYDROCHLORIDE 500 MG: 500 TABLET, FILM COATED ORAL at 08:29

## 2020-02-12 RX ADMIN — PANTOPRAZOLE SODIUM 40 MG: 40 TABLET, DELAYED RELEASE ORAL at 16:22

## 2020-02-12 RX ADMIN — OXYBUTYNIN CHLORIDE 5 MG: 5 TABLET ORAL at 13:33

## 2020-02-12 RX ADMIN — FERROUS SULFATE TAB 325 MG (65 MG ELEMENTAL FE) 325 MG: 325 (65 FE) TAB at 08:34

## 2020-02-12 RX ADMIN — MAGNESIUM HYDROXIDE 30 ML: 2400 SUSPENSION ORAL at 08:46

## 2020-02-12 RX ADMIN — CIPROFLOXACIN HYDROCHLORIDE 500 MG: 500 TABLET, FILM COATED ORAL at 20:37

## 2020-02-12 RX ADMIN — VITAMIN D, TAB 1000IU (100/BT) 1000 UNITS: 25 TAB at 08:29

## 2020-02-12 RX ADMIN — ATORVASTATIN CALCIUM 40 MG: 40 TABLET, FILM COATED ORAL at 20:36

## 2020-02-12 RX ADMIN — LACTULOSE 20 G: 20 SOLUTION ORAL at 13:33

## 2020-02-12 RX ADMIN — Medication: at 13:33

## 2020-02-12 RX ADMIN — OXYBUTYNIN CHLORIDE 5 MG: 5 TABLET ORAL at 08:29

## 2020-02-12 ASSESSMENT — PAIN SCALES - GENERAL
PAINLEVEL_OUTOF10: 8
PAINLEVEL_OUTOF10: 7
PAINLEVEL_OUTOF10: 4
PAINLEVEL_OUTOF10: 8

## 2020-02-12 ASSESSMENT — PAIN DESCRIPTION - ORIENTATION: ORIENTATION: LOWER;MID

## 2020-02-12 ASSESSMENT — PAIN DESCRIPTION - LOCATION
LOCATION: ABDOMEN

## 2020-02-12 ASSESSMENT — PAIN DESCRIPTION - PAIN TYPE: TYPE: ACUTE PAIN

## 2020-02-12 ASSESSMENT — PAIN DESCRIPTION - ONSET: ONSET: ON-GOING

## 2020-02-12 ASSESSMENT — PAIN - FUNCTIONAL ASSESSMENT
PAIN_FUNCTIONAL_ASSESSMENT: PREVENTS OR INTERFERES SOME ACTIVE ACTIVITIES AND ADLS
PAIN_FUNCTIONAL_ASSESSMENT: ACTIVITIES ARE NOT PREVENTED

## 2020-02-12 ASSESSMENT — PAIN DESCRIPTION - DESCRIPTORS
DESCRIPTORS: ACHING
DESCRIPTORS: ACHING
DESCRIPTORS: ACHING;THROBBING

## 2020-02-12 ASSESSMENT — PAIN DESCRIPTION - FREQUENCY: FREQUENCY: CONTINUOUS

## 2020-02-12 NOTE — PROGRESS NOTES
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 Pt does not want to return home or to a family member's house at d/c. Pt would prefer respite care in a facility. Pt considering transferring to independent living. Pt. eats out and sends out laundry, used to cook, but went through life changes and depression and stopped cooking. Friend  is in town assisting and has located an [address] living site in [facility].    Cognition   Cognition  Overall Cognitive Status: WFL    Objective     Observation/Palpation  Posture: Fair  Observation: increased kyphosis    PROM RLE (degrees)  RLE PROM: WFL  AROM RLE (degrees)  RLE AROM: WFL  PROM LLE (degrees)  LLE PROM: WFL  AROM LLE (degrees)  LLE AROM : WFL  Strength RLE  Strength RLE: Exception  Comment: grossly 4/5 hip flexion/abduction/adduction, knee flexion/extension, ankle dorsiflexion/plantarflexion  Strength LLE  Strength LLE: Exception  Comment: grossly 4/5 hip flexion/abduction/adduction, knee flexion/extension, ankle dorsiflexion/plantarflexion  Tone RLE  RLE Tone: Normotonic  Tone LLE  LLE Tone: Normotonic  Sensation  Overall Sensation Status: Impaired(Minimal N/T in fingers within the past few weeks.)  Bed mobility  Rolling to Left: Stand by assistance(with bed rails)  Rolling to Right: Stand by assistance(with bed rails)  Supine to Sit: Minimal assistance(for trunk control)  Sit to Supine: Minimal assistance(Decreased eccentric control)  Scooting: Supervision(to the EOB or chair)  Transfers  Sit to Stand: Contact guard assistance  Stand to sit: Contact guard

## 2020-02-12 NOTE — PATIENT CARE CONFERENCE
[facility]  Inpatient Rehabilitation  Weekly Team Conference Note    Date: 2020  Patient Name: Maryanna Gaucher        MRN: [id number]    : 1945  (69 y.o.)  Gender: female   Referring Practitioner: Maximus Lux MD  Diagnosis: lightheadedness, weakness      Interventions to be utilized toward barriers to discharge, per discipline:  [address] observed barriers to dc: Upper extremity weakness and Lower extremity weakness  Nursing interventions: monitor blood glucose  Family Education: No  Fall Risk:  Yes      Physical therapy observed barriers to dc:    Baseline: IND with mobility with and without a SBQC   Current level: CGA<>Min A for all mobility with RW or SBQC   Barriers to DC: Lives alone, home environment, endurance, activity tolerance, dizziness   Needs in order to achieve dc home/next level of care: Independent with all mobility, home evaluation for safety      Physical therapy interventions:   Current Treatment Recommendations: Strengthening, ADL/Self-care Training, Gait Training, Patient/Caregiver Education & Training, Stair training, Balance Training, Pain Management, Endurance Training, Safety Education & Training, Transfer Training, Functional Mobility Training, Equipment Evaluation, Education, & procurement, Home Exercise Program      PHYSICAL THERAPY    PT Equipment Recommendations  Equipment Needed: Yes  Mobility Devices: Ava Casi: Rolling    Assessment: Pt is a 70 y/o female presenting with debility and generalized weakness. Pt states she lives alone, but has not been able to care for herself or her home. Pt has multiple problems/concerns with her home environment and does not want to return at d/c. Pt currently requires Min A for transfers and bed mobility, and CGA for ambulation with the RW or SBQC 150 feet. Pt only able to complete 8 steps d/t symtpoms of dizziness.  Pt requires multiple rest breaks d/t fatigue, dizziness, functional weakness, and decondiditioning. Pt will benefit from skilled PT to address deficits noted above. Recommend d/c home (possibly family's house) with 24hr assist and OP PT. Anticipate pt will need RW at d/c. Occupational therapy observed barriers to dc:    Baseline: [place] w/ ADls, used services for IADls for food and laundry   Current level: Dimitrios w/o a device for functional transfers and Dimitrios w/ dressing tasks, fear of falling throughout session, decreased activity tolerance   Barriers to DC: D/C location, APS   Needs in order to achieve dc home/next level of care: Kevon    Occupational Therapy interventions:  Current Treatment Recommendations: ROM, Balance Training, Functional Mobility Training, Endurance Training, Safety Education & Training, Self-Care / ADL      OCCUPATIONAL THERAPY      Assessment: OT orders were recieved, chart was reviewed and pt. was evaluated. Pt. was agreeable to therapy this date. Pt. required increased time for tasks and demo'd decreased ability to perform LB dressing tasks and requiring chair for bathing activities. Pt. limited d/t occasional dizziness and decreased strength and ROM this date. Pt. verbalized fear of falling throughout session and  was educated on safety awareness and use of a/e to maintain safety w/ functional mobility. Pt. would cont. to benefit from OT. Recommend pt. d/c home w/ assist for IADLS and home health therapy services. Cont. POC. SPEECH THERAPY  Speech therapy not on caseload. NUTRITION  Weight: 205 lb (93 kg) / Body mass index is 35.19 kg/m². Diet Order: DIET GENERAL;  Dietary Nutrition Supplements: Standard High Calorie Oral Supplement  PO Meals Eaten (%): 51 - 75%  Education: Pt not yet seen      CASE MANAGEMENT  Assessment: pt is newly admitted to ARU Pending SW assessment. CR indicates that the Pt came to [facility] [place] due to lack of: food, water, heat, and Medications. Pt has a HX of hoarding.  Pt friend is helping to assist with Cleaning and repairs of the pt home. Pt Plan is to live with friends or get a place until repairs and cleaning can be completed for a safe environment for the pt. Possible APS referral.      Interdisciplinary Goals:   1.) Pt will remain free from falls  2.) Pt will ambulate to the restroom with [facility] and supervision. 3.)Pt. will perform toileting w/ S.  4.)  5.)    Discharge Plan   Estimated discharge date: TBD pending evaluation  Destination: TBD pending evaluation   Pass:No  Services at Discharge: TBD pending evaluation  Equipment at Discharge: TBD pending evaluation    Team Members Present at Conference:  : Clau Rosales LSW    Occupational Therapist: Aleksey Beltran OTR/L  Physical Therapist: Brown Narvaez DPT  Speech Therapist: N/A  Nurse: Chidi Zimmer RN  Dietician: Jimmie Matson RDN, LD  Psychiatry: N/A    Family members present at conference: No      I led this team conference and I approve the established interdisciplinary plan of care as documented within the medical record of [facility]. MD: Danny Gomez.  Jackie Lutz MD 2/12/2020, 3:46 PM

## 2020-02-12 NOTE — PROGRESS NOTES
Patient presents with hypoglycemia  Glucose of 39 , states she has not eaten in over 24 hours, fed juice ,2 sodas, 2 puddings, peanut butter and reji crackers 1/2 turkey sandwich , see labs for  Increased glucose results.

## 2020-02-12 NOTE — PLAN OF CARE
[address] [facility] [place]  [address]   [phone]    Estephania Markochaz    : 1945  Acct #: [de-identified]  MRN: [id number]   PHYSICIAN:  Ming Otero MD  Primary Problem    Patient Active Problem List   Diagnosis    GI bleed    Debility       Rehabilitation Diagnosis:     Debility [R53.81]       ADMIT DATE:2020    Patient Goals: to return home independantly    Admitting Impairments:Pt is a 70 y/o female presenting with debility and generalized weakness and  metabolic issues resulting in Decreased functional mobility ; Decreased safe awareness;Decreased endurance;Decreased balance;Decreased high-level IADLs; Increased pain;Decreased ADL status; Decreased strength;Decreased posture  Barriers: weak,fall risk  Participation: Good limited 2* fatigue     CARE PLAN     NURSING:  Jeni Dancer while on this unit will:     [x] Be continent of bowel and bladder     [x] Have an adequate number of bowel movements  [] Urinate with no urinary retention >300ml in bladder  [] Complete bladder protocol with dennis removal  [] Maintain O2 SATs at ___%  [] Have pain managed while on ARU       [x] Be pain free by discharge   [x] Have no skin breakdown while on ARU  [] Have improved skin integrity via wound measurements  [] Have no signs/symptoms of infection at the wound site  [x] Be free from injury during hospitalization   [x] Complete education with patient/family with understanding demonstrated for:  [] Adjustment   [] Other:   Nursing interventions may include bowel/bladder training, education for medical assistive devices, medication education, O2 saturation management, energy conservation, stress management techniques, fall prevention, alarms protocol, seating and positioning, skin/wound care, pressure relief instruction,dressing changes,  infection protection, DVT prophylaxis, and/or assistance with in room safety with transfers to bed, toilet, wheelchair, shower as well as bathroom activities and hygiene. Patient/caregiver education for:   [x] Disease/sustained injury/management      [x] Medication Use   [] Surgical intervention   [x] Safety   [] Body mechanics and or joint protection   [x] Health maintenance         PHYSICAL THERAPY:  Goals:                  Short term goals  Time Frame for Short term goals: 5 days, 2/17/20  Short term goal 1: Pt will perform supine<>Sit with independence. Short term goal 2: Pt will perform sit<>Stand with supervision with LRAD. Short term goal 3: Pt will ambulate with the SBQC and  feet. Short term goal 4: Pt will participate in 12-15 reps of BLE ther ex for strength and balance. Short term goal 5: Pt will ascend/descend 12 steps with SBA. Long term goals  Time Frame for Long term goals : 9 days, 2/20/20  Long term goal 1: Pt will perform sit<>Stand without an AD with independence. Long term goal 2: Pt will ambulate with no AD and independence 150 feet. Long term goal 3: Pt will ascend/descend 12 steps with one handrail and independence. Long term goal 4: Pt will perform all transfers with independence. These goals were reviewed with this patient at the time of assessment and Bob Tucker is in agreement. Plan of Care: Pt to be seen 5 out of 7 days per week, 90   mins (exact) per day for 9 days (exact)                   Current Treatment Recommendations: Strengthening, ADL/Self-care Training, Gait Training, Patient/Caregiver Education & Training, Stair training, Balance Training, Pain Management, Endurance Training, Safety Education & Training, Transfer Training, Functional Mobility Training, Equipment Evaluation, Education, & procurement, Home Exercise Program      OCCUPATIONAL THERAPY:  Goals:             Short term goals  Time Frame for Short term goals: 5 days (2/16)  Short term goal 1: Pt. will perform functional transfers w/ a/e and SBA.   Short term goal 2: Pt. will perform

## 2020-02-13 LAB
ANION GAP SERPL CALCULATED.3IONS-SCNC: 13 MMOL/L (ref 3–16)
BASOPHILS ABSOLUTE: 0.1 K/UL (ref 0–0.2)
BASOPHILS RELATIVE PERCENT: 0.7 %
BUN BLDV-MCNC: 14 MG/DL (ref 7–20)
CALCIUM SERPL-MCNC: 8.7 MG/DL (ref 8.3–10.6)
CHLORIDE BLD-SCNC: 105 MMOL/L (ref 99–110)
CO2: 22 MMOL/L (ref 21–32)
CREAT SERPL-MCNC: 1.3 MG/DL (ref 0.6–1.2)
EOSINOPHILS ABSOLUTE: 0.1 K/UL (ref 0–0.6)
EOSINOPHILS RELATIVE PERCENT: 1.2 %
GFR AFRICAN AMERICAN: 49
GFR NON-AFRICAN AMERICAN: 40
GLUCOSE BLD-MCNC: 103 MG/DL (ref 70–99)
GLUCOSE BLD-MCNC: 112 MG/DL (ref 70–99)
GLUCOSE BLD-MCNC: 152 MG/DL (ref 70–99)
GLUCOSE BLD-MCNC: 164 MG/DL (ref 70–99)
GLUCOSE BLD-MCNC: 97 MG/DL (ref 70–99)
HCT VFR BLD CALC: 22.6 % (ref 36–48)
HEMOGLOBIN: 7.7 G/DL (ref 12–16)
LYMPHOCYTES ABSOLUTE: 1.2 K/UL (ref 1–5.1)
LYMPHOCYTES RELATIVE PERCENT: 13.4 %
MCH RBC QN AUTO: 31.7 PG (ref 26–34)
MCHC RBC AUTO-ENTMCNC: 33.9 G/DL (ref 31–36)
MCV RBC AUTO: 93.4 FL (ref 80–100)
MONOCYTES ABSOLUTE: 0.6 K/UL (ref 0–1.3)
MONOCYTES RELATIVE PERCENT: 6.1 %
NEUTROPHILS ABSOLUTE: 7.3 K/UL (ref 1.7–7.7)
NEUTROPHILS RELATIVE PERCENT: 78.6 %
PDW BLD-RTO: 16.2 % (ref 12.4–15.4)
PERFORMED ON: ABNORMAL
PLATELET # BLD: 389 K/UL (ref 135–450)
PMV BLD AUTO: 7.1 FL (ref 5–10.5)
POTASSIUM REFLEX MAGNESIUM: 4.3 MMOL/L (ref 3.5–5.1)
RBC # BLD: 2.42 M/UL (ref 4–5.2)
SODIUM BLD-SCNC: 140 MMOL/L (ref 136–145)
WBC # BLD: 9.3 K/UL (ref 4–11)

## 2020-02-13 PROCEDURE — 97116 GAIT TRAINING THERAPY: CPT

## 2020-02-13 PROCEDURE — 80048 BASIC METABOLIC PNL TOTAL CA: CPT

## 2020-02-13 PROCEDURE — 97530 THERAPEUTIC ACTIVITIES: CPT

## 2020-02-13 PROCEDURE — 1280000000 HC REHAB R&B

## 2020-02-13 PROCEDURE — 97535 SELF CARE MNGMENT TRAINING: CPT

## 2020-02-13 PROCEDURE — 85025 COMPLETE CBC W/AUTO DIFF WBC: CPT

## 2020-02-13 PROCEDURE — 97110 THERAPEUTIC EXERCISES: CPT

## 2020-02-13 PROCEDURE — 36415 COLL VENOUS BLD VENIPUNCTURE: CPT

## 2020-02-13 PROCEDURE — 6370000000 HC RX 637 (ALT 250 FOR IP): Performed by: PHYSICAL MEDICINE & REHABILITATION

## 2020-02-13 RX ADMIN — FERROUS SULFATE TAB 325 MG (65 MG ELEMENTAL FE) 325 MG: 325 (65 FE) TAB at 17:13

## 2020-02-13 RX ADMIN — OXYBUTYNIN CHLORIDE 5 MG: 5 TABLET ORAL at 09:23

## 2020-02-13 RX ADMIN — PANTOPRAZOLE SODIUM 40 MG: 40 TABLET, DELAYED RELEASE ORAL at 06:16

## 2020-02-13 RX ADMIN — CIPROFLOXACIN HYDROCHLORIDE 500 MG: 500 TABLET, FILM COATED ORAL at 21:40

## 2020-02-13 RX ADMIN — OXYBUTYNIN CHLORIDE 5 MG: 5 TABLET ORAL at 21:40

## 2020-02-13 RX ADMIN — POLYETHYLENE GLYCOL 3350 17 G: 17 POWDER, FOR SOLUTION ORAL at 09:25

## 2020-02-13 RX ADMIN — LACTULOSE 20 G: 20 SOLUTION ORAL at 09:21

## 2020-02-13 RX ADMIN — PANTOPRAZOLE SODIUM 40 MG: 40 TABLET, DELAYED RELEASE ORAL at 17:13

## 2020-02-13 RX ADMIN — AMLODIPINE BESYLATE 2.5 MG: 2.5 TABLET ORAL at 09:22

## 2020-02-13 RX ADMIN — ATORVASTATIN CALCIUM 40 MG: 40 TABLET, FILM COATED ORAL at 21:40

## 2020-02-13 RX ADMIN — CIPROFLOXACIN HYDROCHLORIDE 500 MG: 500 TABLET, FILM COATED ORAL at 09:23

## 2020-02-13 RX ADMIN — FERROUS SULFATE TAB 325 MG (65 MG ELEMENTAL FE) 325 MG: 325 (65 FE) TAB at 09:22

## 2020-02-13 RX ADMIN — OXYBUTYNIN CHLORIDE 5 MG: 5 TABLET ORAL at 14:31

## 2020-02-13 RX ADMIN — VITAMIN D, TAB 1000IU (100/BT) 1000 UNITS: 25 TAB at 09:22

## 2020-02-13 RX ADMIN — LOSARTAN POTASSIUM 100 MG: 100 TABLET, FILM COATED ORAL at 09:21

## 2020-02-13 ASSESSMENT — PAIN DESCRIPTION - ORIENTATION
ORIENTATION: MID
ORIENTATION: MID;LOWER
ORIENTATION: MID

## 2020-02-13 ASSESSMENT — PAIN DESCRIPTION - PAIN TYPE
TYPE: ACUTE PAIN

## 2020-02-13 ASSESSMENT — PAIN SCALES - GENERAL
PAINLEVEL_OUTOF10: 2
PAINLEVEL_OUTOF10: 2
PAINLEVEL_OUTOF10: 0
PAINLEVEL_OUTOF10: 2
PAINLEVEL_OUTOF10: 3

## 2020-02-13 ASSESSMENT — PAIN DESCRIPTION - DESCRIPTORS
DESCRIPTORS: CRAMPING;DISCOMFORT
DESCRIPTORS: DULL

## 2020-02-13 ASSESSMENT — PAIN DESCRIPTION - FREQUENCY
FREQUENCY: INTERMITTENT
FREQUENCY: INTERMITTENT

## 2020-02-13 ASSESSMENT — PAIN DESCRIPTION - LOCATION
LOCATION: ABDOMEN

## 2020-02-13 ASSESSMENT — PAIN DESCRIPTION - ONSET: ONSET: ON-GOING

## 2020-02-13 ASSESSMENT — PAIN DESCRIPTION - PROGRESSION: CLINICAL_PROGRESSION: RAPIDLY IMPROVING

## 2020-02-13 NOTE — H&P
Patient: Asif Bermudez  [id number]  Date: 2/13/2020      Chief Complaint: weakness    History of Present Illness/Hospital Course:  Ms Eyad Chinchilla is a 69year old female who was admitted via the ED after presenting with dizziness and abdominal pain in the context of recent diagnosis of an ulcer with recent hospital discharge. She endorses a feeling of lightheadedness but not vertigo. She also notes abdominal pain associated with constipation and a feeling of bloating in her abdomen. On presentation she was noted to have a urinary tract infection. Prior Level of Function:  Independent    Current Level of Function:  Mamta     has a past medical history of Hyperlipidemia, Hypertension, Obesity, and Type 2 diabetes mellitus without complication ([facility] [address] has a past surgical history that includes Colonoscopy and Upper gastrointestinal endoscopy (N/A, 2/7/2020). reports that she is a non-smoker but has been exposed to tobacco smoke. She has been exposed to 0.25 packs per day. She has never used smokeless tobacco. She reports current alcohol use. She reports that she does not use drugs. family history includes Cancer in her brother; Coronary Art Dis in her father; Diabetes in her brother, brother, and mother; Heart Attack in her father; Obesity in her brother; Other in her brother, father, and mother.     Current Facility-Administered Medications   Medication Dose Route Frequency Provider Last Rate Last Dose    lactulose (CHRONULAC) 10 GM/15ML solution 20 g  20 g Oral TID Perry Quintanilla MD   20 g at 02/12/20 2036    acetaminophen (TYLENOL) tablet 650 mg  650 mg Oral Q4H PRN Perry Quintanilla MD   650 mg at 02/12/20 2037    magnesium hydroxide (MILK OF MAGNESIA) 400 MG/5ML suspension 30 mL  30 mL Oral Daily PRN Perry Quintanilla MD   30 mL at 02/12/20 0846    amLODIPine (NORVASC) tablet 2.5 mg  2.5 mg Oral Daily Perry Quintanilla MD   Stopped at 02/12/20 0831    atorvastatin (LIPITOR) tablet 40 mg  40 mg Daily PRN Leann Chew MD   30 mL at 02/12/20 0846    amLODIPine (NORVASC) tablet 2.5 mg  2.5 mg Oral Daily Leann Chew MD   Stopped at 02/12/20 0831    atorvastatin (LIPITOR) tablet 40 mg  40 mg Oral Nightly Leann Chew MD   40 mg at 02/12/20 2036    ciprofloxacin (CIPRO) tablet 500 mg  500 mg Oral 2 times per day Leann Chew MD   500 mg at 02/12/20 2037    dextrose 5 % solution  100 mL/hr Intravenous PRN Leann Chew MD        dextrose 50 % IV solution  12.5 g Intravenous PRN Leann Chew MD        ferrous sulfate tablet 325 mg  325 mg Oral BID WC Leann Chew MD   325 mg at 02/12/20 1622    glucagon (rDNA) injection 1 mg  1 mg Intramuscular PRN Leann Chew MD        glucose (GLUTOSE) 40 % oral gel 15 g  15 g Oral PRN Leann Chew MD        hydrALAZINE (APRESOLINE) tablet 50 mg  50 mg Oral Q6H PRN Leann Chew MD        losartan (COZAAR) tablet 100 mg  100 mg Oral Daily Leann Chew MD   Stopped at 02/12/20 0830    ondansetron (ZOFRAN-ODT) disintegrating tablet 8 mg  8 mg Oral Q8H PRN Leann Chew MD        oxybutynin (DITROPAN) tablet 5 mg  5 mg Oral TID Leann Chew MD   5 mg at 02/12/20 2037    pantoprazole (PROTONIX) tablet 40 mg  40 mg Oral BID AC Leann Chew MD   40 mg at 02/12/20 1622    polyethylene glycol (GLYCOLAX) packet 17 g  17 g Oral Daily Leann Chew MD   17 g at 02/12/20 0829    traZODone (DESYREL) tablet 50 mg  50 mg Oral Nightly PRN Leann Chew MD   50 mg at 02/12/20 2037    Vitamin D (CHOLECALCIFEROL) tablet 1,000 Units  1,000 Units Oral Daily Leann Chew MD   1,000 Units at 02/12/20 7852         REVIEW OF SYSTEMS:   CONSTITUTIONAL: negative for fevers, chills, diaphoresis, activity change, appetite change, fatigue, night sweats and unexpected weight change. EYES: negative for blurred vision, eye discharge, visual disturbance and icterus.    HEENT: negative for hearing loss, tinnitus, ear drainage, sinus pressure, nasal congestion, epistaxis and snoring. RESPIRATORY: Negative for hemoptysis, cough, sputum production. CARDIOVASCULAR: negative for chest pain, palpitations, exertional chest pressure/discomfort, edema, syncope. GASTROINTESTINAL: negative for nausea, vomiting, diarrhea, constipation, blood in stool and abdominal pain. GENITOURINARY: negative for frequency, dysuria, urinary incontinence, decreased urine volume, and hematuria. HEMATOLOGIC/LYMPHATIC: negative for easy bruising, bleeding and lymphadenopathy. ALLERGIC/IMMUNOLOGIC: negative for recurrent infections, angioedema, anaphylaxis and drug reactions. ENDOCRINE: negative for weight changes and diabetic symptoms including polyuria, polydipsia and polyphagia. MUSCULOSKELETAL: negative for pain, joint swelling, decreased range of motion and muscle weakness. NEUROLOGICAL: negative for headaches, slurred speech, unilateral weakness. PSYCHIATRIC/BEHAVIORAL: negative for hallucinations, behavioral problems, confusion and agitation. All pertinent positives are noted in the HPI. Physical Examination:  Vitals:   Patient Vitals for the past 24 hrs:   BP Temp Temp src Pulse Resp SpO2 Weight   02/13/20 0045 -- -- -- -- -- -- 198 lb 6.4 oz (90 kg)   02/12/20 2037 127/67 99 °F (37.2 °C) Oral 82 18 99 % --   02/12/20 0838 101/70 -- -- -- -- -- --   02/12/20 0745 121/78 98.1 °F (36.7 °C) Oral 78 18 97 % --     Psych: Stable mood, normal judgement, normal affect   Const: No distress  Eyes: Conjunctiva noninjected, no icterus noted; pupils equal, round, and reactive to light. HENT: Atraumatic, normocephalic; Oral mucosa moist  Neck: Trachea midline, neck supple. No thyromegaly noted. CV: Regular rate and rhythm, no murmur rub or gallop noted  Resp: Lungs clear to auscultation bilaterally, no rales wheezes or ronchi, no retractions. Respirations unlabored. GI: Soft, nontender,mildly distended. Decreased bowel sounds.  No patients medical and functional status at the time of the  preadmission screening and the same on this date, and there are no significant changes. By signing this document, I acknowledge that I have personally performed a  full physical examination on this patient within 24 hours of admission to  this inpatient rehabilitation facility and have determined the patient to be  able to tolerate the above course of treatment at an intensive level for a  reasonable period of time. I will be completing a detailed individualized  Plan of Care for this patient by day four of the patients stay based upon the  Preadmission Screen, this Post-Admission Evaluation, and the therapy  evaluations. Rehabilitation Diagnosis:  16.0, Debility (non-cardiac, non-pulmonary    Assessment and Plan:  Abdominal pain. Nonobstructive bowel gas pattern; advance bowel regimen. UTI. Awaiting culture data; continue cipro. Dizziness. PT/OT. Suspect poor intake contributing. HTN. Norvasc, cozaar    Bowels: Schedule colace + senna. Follow bowel movements. Enema or suppository if needed. Bladder: Check PVR x 3. [address] if PVR > 200ml or if any volume is > 500 ml. Sleep: Trazodone provided prn. Note: The patient was seen and examined at 0830 on the morning of 2/12/2020. Documentation of the history and physical examination that I performed was placed in pended status awaiting the results of imaging that I ordered after seeing the patient and noting on examination that her abdomen was distended. Once imaging results were obtained the H&P was re-opened and the imaging results were entered. At that time, the H&P was inadvertently deleted rather than signed. This was not realized until today. This note serves as repeat documentation of the history and physical that was performed 2/12/2020 at 0830 but was inadvertently deleted. Jabari Laguna MD 2/13/2020, 5:25 AM

## 2020-02-13 NOTE — PROGRESS NOTES
(N/A, 2/7/2020). Restrictions  Restrictions/Precautions  Restrictions/Precautions: General Precautions, Fall Risk  Subjective   General  Chart Reviewed: Yes  Response To Previous Treatment: Patient with no complaints from previous session. Family / Caregiver Present: No  Referring Practitioner: Tate Gomez MD  Subjective  Subjective: Pt agreeable to therapy. General Comment  Comments: Pt states she feels much better today. Pain Screening  Patient Currently in Pain: Yes  Pain Assessment  Pain Assessment: 0-10  Pain Level: 2  Pain Type: Acute pain  Pain Location: Abdomen  Pain Orientation: Mid;Lower  Non-Pharmaceutical Pain Intervention(s): Emotional support; Ambulation/Increased Activity;Repositioned  Vital Signs  Patient Currently in Pain: Yes       Orientation  Orientation  Overall Orientation Status: Within Normal Limits  Cognition   Cognition  Overall Cognitive Status: WFL  Objective   Bed mobility  Scooting: Supervision  Comment: Bed mobility not tested, pt up in chair at beginning and end of session each time. Transfers  Sit to Stand: Stand by assistance;Contact guard assistance(without AD)  Stand to sit: Stand by assistance;Contact guard assistance(without AD)  Bed to Chair: Contact guard assistance(without AD)  Ambulation  Ambulation?: Yes  More Ambulation?: Yes  Ambulation 1  Surface: level tile;carpet  Device: Rolling Walker  Assistance: Stand by assistance;Minimal assistance  Quality of Gait: Decraesed step length, decreased louis, forward trunk flexion, wide MARVIN, dependence on BUE for balance. Pt tripped over carpet and unable to recover without Min A. Pt fatigued after each bout of ambulation. Minimal reports of dizziness today.   Gait Deviations: Slow Louis;Decreased step length;Decreased step height;Decreased arm swing;Decreased head and trunk rotation  Distance: 92 feet + 150 feet (10 feet of carpet) + 92 feet  Ambulation 2  Surface - 2: level tile  Device 2: [facility] Plan  Times per week: 5 out of 7  Times per day: Daily  Plan weeks: 9 days, 2/20/20  Current Treatment Recommendations: Strengthening, ADL/Self-care Training, Gait Training, Patient/Caregiver Education & Training, Stair training, Balance Training, Pain Management, Endurance Training, Safety Education & Training, Transfer Training, Functional Mobility Training, Equipment Evaluation, Education, & procurement, Home Exercise Program  Safety Devices  Type of devices:  All fall risk precautions in place, Nurse notified, Patient at risk for falls, Call light within reach, Gait belt, Chair alarm in place, Left in chair     Therapy Time   Individual Concurrent Group Co-treatment   Time In 0800         Time Out 0900         Minutes 60         Timed Code Treatment Minutes: Rudy 61 Time:   [facility]   Time In 0930         Time Out 1000         Minutes 30         Timed Code Treatment Minutes:  30 Minutes    Total Treatment Minutes:  [address] [facility],[address], PT

## 2020-02-13 NOTE — PLAN OF CARE
Fall precautions in place, bed alarm on, non-skid footwear applied, bed in lowest position  And call light in reach. Will continue  to monitor.

## 2020-02-13 NOTE — PROGRESS NOTES
text)  Activity Tolerance: Pt mildly dizzy after standing. BP checked in stance with BP 76/47 in stance and 100/66 seated. RN made aware. Safety Devices  Safety Devices in place: Yes  Type of devices: Call light within reach;Nurse notified; Chair alarm in place; Left in chair;Gait belt         Patient Diagnosis(es): There were no encounter diagnoses. has a past medical history of Hyperlipidemia, Hypertension, Obesity, and Type 2 diabetes mellitus without complication ([facility] [address] has a past surgical history that includes Colonoscopy and Upper gastrointestinal endoscopy (N/A, 2/7/2020). Restrictions  Restrictions/Precautions  Restrictions/Precautions: General Precautions, Fall Risk  Subjective   General  Chart Reviewed: Yes  Patient assessed for rehabilitation services?: Yes  Family / Caregiver Present: No  Referring Practitioner: Carmen Martinez MD  Diagnosis: debility  Subjective  Subjective: Pt in recliner, pleasant, agreeable to OT session. Pain Assessment  Pain Assessment: 0-10  Pain Level: 3  Pain Type: Acute pain  Pain Location: Abdomen  Pain Orientation: Mid  Pain Descriptors: Cramping;Discomfort  Non-Pharmaceutical Pain Intervention(s): Ambulation/Increased Activity;Repositioned; Emotional support  Response to Pain Intervention: Patient Satisfied  Vital Signs  Pulse: 97  BP: 100/66  BP Location: Left upper arm  Patient Position: Sitting  Patient Currently in Pain: Yes   Orientation  Orientation  Overall Orientation Status: Within Functional Limits  Objective    ADL  Toileting: Supervision        Balance  Sitting Balance: Independent  Standing Balance: Supervision  Standing Balance  Time: 1-2 minutes, 4 minutes, 3 minutes  Activity: functional mobility to/from gym, collection of cones around gym  Comment: with RW  Functional Mobility  Functional - Mobility Device: Rolling Walker  Activity: Retrieve items; To/From therapy gym  Assist Level: Supervision  Toilet Transfers  Toilet - Technique: Ambulating  Equipment Used: Standard toilet  Toilet Transfer: Supervision  Bed mobility  Supine to Sit: Supervision  Transfers  Stand Step Transfers: Supervision(with RW)  Sit to stand: Supervision  Stand to sit: Supervision        Coordination  Gross Motor: good coordination for grasping cones              Cognition  Overall Cognitive Status: Exceptions  Arousal/Alertness: Appropriate responses to stimuli  Following Commands: Follows all commands without difficulty  Attention Span: Appears intact  Memory: Decreased recall of recent events;Decreased short term memory  Safety Judgement: Good awareness of safety precautions  Problem Solving: Assistance required to generate solutions  Insights: Fully aware of deficits  Initiation: Does not require cues  Sequencing: Requires cues for some  Cognition Comment: 26/30 on MOCA with deficits in visuospatial/executive and memory                    Type of ROM/Therapeutic Exercise  Type of ROM/Therapeutic Exercise: Free weights  Comment: 2#  Exercises  Shoulder Flexion: x15  Shoulder Extension: x15  Elbow Flexion: x15  Elbow Extension: x15  Supination: x15  Pronation: x15  Wrist Flexion: x15  Wrist Extension: x15                    Plan   Plan  Times per week: 5-7 days per week  Plan weeks: 9 days  Current Treatment Recommendations: ROM, Balance Training, Functional Mobility Training, Endurance Training, Safety Education & Training, Self-Care / ADL, Strengthening, Equipment Evaluation, Education, & procurement, Patient/Caregiver Education & Training, Home Management Training    Goals  Short term goals  Time Frame for Short term goals: 5 days (2/16)  Short term goal 1: Pt. will perform functional transfers w/ a/e and SBA. Short term goal 2: Pt. will perform toileting tasks w/ SBA. GOAL MET 2/13/20 Pt performed toileting with supervision. Short term goal 3: Pt. will perform LB dressing tasks w/ SBA and a/e as needed.   Long term goals  Time Frame for Long term goals : 9 days 2/20  Long term goal 1: Pt. will perform functional transfers as Radha  Long term goal 2: Pt. will perform  toileting tasks as Radha. Long term goal 3: Pt. will perform dressing tasks as Radha w/ a/e as needed. Long term goal 4: Pt. will perform dynamic standing task for 5 min as radha. Patient Goals   Patient goals :  To return to a safer home environment       Therapy Time   Individual Concurrent Group Co-treatment   Time In 1100         Time Out 1130         Minutes 30         Timed Code Treatment Minutes: 30 Minutes    Second Session Therapy Time:   Individual Concurrent Group Co-treatment   Time In 1330         Time Out 1430         Minutes 60           Timed Code Treatment Minutes:  60 Minutes    Total Treatment Minutes:  90 minutes      Felicita Aguillon OT

## 2020-02-14 LAB
A/G RATIO: 1.5 (ref 1.1–2.2)
ALBUMIN SERPL-MCNC: 3.7 G/DL (ref 3.4–5)
ALP BLD-CCNC: 105 U/L (ref 40–129)
ALT SERPL-CCNC: 9 U/L (ref 10–40)
ANION GAP SERPL CALCULATED.3IONS-SCNC: 12 MMOL/L (ref 3–16)
AST SERPL-CCNC: 15 U/L (ref 15–37)
BASOPHILS ABSOLUTE: 0.1 K/UL (ref 0–0.2)
BASOPHILS RELATIVE PERCENT: 0.8 %
BILIRUB SERPL-MCNC: 0.4 MG/DL (ref 0–1)
BUN BLDV-MCNC: 19 MG/DL (ref 7–20)
CALCIUM SERPL-MCNC: 9 MG/DL (ref 8.3–10.6)
CHLORIDE BLD-SCNC: 102 MMOL/L (ref 99–110)
CO2: 25 MMOL/L (ref 21–32)
CREAT SERPL-MCNC: 1.4 MG/DL (ref 0.6–1.2)
EOSINOPHILS ABSOLUTE: 0.2 K/UL (ref 0–0.6)
EOSINOPHILS RELATIVE PERCENT: 2.2 %
GFR AFRICAN AMERICAN: 45
GFR NON-AFRICAN AMERICAN: 37
GLOBULIN: 2.4 G/DL
GLUCOSE BLD-MCNC: 127 MG/DL (ref 70–99)
GLUCOSE BLD-MCNC: 131 MG/DL (ref 70–99)
GLUCOSE BLD-MCNC: 156 MG/DL (ref 70–99)
GLUCOSE BLD-MCNC: 182 MG/DL (ref 70–99)
GLUCOSE BLD-MCNC: 97 MG/DL (ref 70–99)
HCT VFR BLD CALC: 22.7 % (ref 36–48)
HEMOGLOBIN: 7.6 G/DL (ref 12–16)
LYMPHOCYTES ABSOLUTE: 1.2 K/UL (ref 1–5.1)
LYMPHOCYTES RELATIVE PERCENT: 14.2 %
MCH RBC QN AUTO: 31.1 PG (ref 26–34)
MCHC RBC AUTO-ENTMCNC: 33.3 G/DL (ref 31–36)
MCV RBC AUTO: 93.4 FL (ref 80–100)
MONOCYTES ABSOLUTE: 0.5 K/UL (ref 0–1.3)
MONOCYTES RELATIVE PERCENT: 5.7 %
NEUTROPHILS ABSOLUTE: 6.7 K/UL (ref 1.7–7.7)
NEUTROPHILS RELATIVE PERCENT: 77.1 %
PDW BLD-RTO: 16.6 % (ref 12.4–15.4)
PERFORMED ON: ABNORMAL
PERFORMED ON: NORMAL
PLATELET # BLD: 371 K/UL (ref 135–450)
PMV BLD AUTO: 7.1 FL (ref 5–10.5)
POTASSIUM REFLEX MAGNESIUM: 4.5 MMOL/L (ref 3.5–5.1)
RBC # BLD: 2.43 M/UL (ref 4–5.2)
SODIUM BLD-SCNC: 139 MMOL/L (ref 136–145)
TOTAL PROTEIN: 6.1 G/DL (ref 6.4–8.2)
WBC # BLD: 8.7 K/UL (ref 4–11)

## 2020-02-14 PROCEDURE — 97116 GAIT TRAINING THERAPY: CPT

## 2020-02-14 PROCEDURE — 97110 THERAPEUTIC EXERCISES: CPT

## 2020-02-14 PROCEDURE — 97530 THERAPEUTIC ACTIVITIES: CPT

## 2020-02-14 PROCEDURE — 1280000000 HC REHAB R&B

## 2020-02-14 PROCEDURE — 6370000000 HC RX 637 (ALT 250 FOR IP): Performed by: PHYSICAL MEDICINE & REHABILITATION

## 2020-02-14 PROCEDURE — 80053 COMPREHEN METABOLIC PANEL: CPT

## 2020-02-14 PROCEDURE — 97535 SELF CARE MNGMENT TRAINING: CPT

## 2020-02-14 PROCEDURE — 85025 COMPLETE CBC W/AUTO DIFF WBC: CPT

## 2020-02-14 PROCEDURE — 36415 COLL VENOUS BLD VENIPUNCTURE: CPT

## 2020-02-14 RX ADMIN — OXYBUTYNIN CHLORIDE 5 MG: 5 TABLET ORAL at 13:35

## 2020-02-14 RX ADMIN — OXYBUTYNIN CHLORIDE 5 MG: 5 TABLET ORAL at 10:25

## 2020-02-14 RX ADMIN — VITAMIN D, TAB 1000IU (100/BT) 1000 UNITS: 25 TAB at 10:26

## 2020-02-14 RX ADMIN — PANTOPRAZOLE SODIUM 40 MG: 40 TABLET, DELAYED RELEASE ORAL at 06:11

## 2020-02-14 RX ADMIN — PANTOPRAZOLE SODIUM 40 MG: 40 TABLET, DELAYED RELEASE ORAL at 15:59

## 2020-02-14 RX ADMIN — ATORVASTATIN CALCIUM 40 MG: 40 TABLET, FILM COATED ORAL at 20:10

## 2020-02-14 RX ADMIN — FERROUS SULFATE TAB 325 MG (65 MG ELEMENTAL FE) 325 MG: 325 (65 FE) TAB at 15:59

## 2020-02-14 RX ADMIN — FERROUS SULFATE TAB 325 MG (65 MG ELEMENTAL FE) 325 MG: 325 (65 FE) TAB at 10:26

## 2020-02-14 RX ADMIN — CIPROFLOXACIN HYDROCHLORIDE 500 MG: 500 TABLET, FILM COATED ORAL at 10:25

## 2020-02-14 RX ADMIN — OXYBUTYNIN CHLORIDE 5 MG: 5 TABLET ORAL at 20:10

## 2020-02-14 ASSESSMENT — PAIN DESCRIPTION - PAIN TYPE
TYPE: ACUTE PAIN
TYPE: ACUTE PAIN

## 2020-02-14 ASSESSMENT — PAIN DESCRIPTION - LOCATION
LOCATION: ABDOMEN
LOCATION: ABDOMEN

## 2020-02-14 ASSESSMENT — PAIN SCALES - GENERAL
PAINLEVEL_OUTOF10: 0
PAINLEVEL_OUTOF10: 4
PAINLEVEL_OUTOF10: 3

## 2020-02-14 ASSESSMENT — PAIN DESCRIPTION - PROGRESSION
CLINICAL_PROGRESSION: RAPIDLY IMPROVING

## 2020-02-14 ASSESSMENT — PAIN - FUNCTIONAL ASSESSMENT: PAIN_FUNCTIONAL_ASSESSMENT: PREVENTS OR INTERFERES SOME ACTIVE ACTIVITIES AND ADLS

## 2020-02-14 ASSESSMENT — PAIN DESCRIPTION - ORIENTATION: ORIENTATION: MID

## 2020-02-14 ASSESSMENT — PAIN DESCRIPTION - FREQUENCY: FREQUENCY: CONTINUOUS

## 2020-02-14 NOTE — PROGRESS NOTES
she feels much better today. Pain Screening  Patient Currently in Pain: Yes  Pain Assessment  Pain Assessment: 0-10  Pain Level: 3  Pain Type: Acute pain  Pain Location: Abdomen  Pain Frequency: Continuous  Functional Pain Assessment: Prevents or interferes some active activities and ADLs(patient notes abdominal discomfort )  Multiple Pain Sites: No  Vital Signs  Pulse: 79  Heart Rate Source: Monitor  BP: 112/68  BP Location: Right upper arm  BP Upper/Lower: Upper  Patient Position: Semi fowlers  Blood Pressure Lyin/68(no dizziness)  Blood Pressure Sittin/70(manual RUE  patient notes she starts to feel dizzy when standing )  Blood Pressure Standin/50(pt c/o dizziness mild to moderate and requesteed to sit to supine )  Patient Currently in Pain: Yes  Oxygen Therapy  SpO2: 99 %  O2 Device: None (Room air)   RUE BP manual   Supine /68 no dizziness  Sitting /70   Standing 85/50 with mod to mild dizziness with patient reporting  Need to sit/supine with sx relieved in supine. Sitting BP /95  HR 96bpm        Orientation WFL      Cognition follow 2 step instruction well      Objective    Pt needed brief 30 second to 1 minute rest break due to c/o dizziness with sx not relieved by knee high TY hose but relieved with brief sitting or supine rest.    Moderate deficit both toes for proprioception   Noted trunk weakness with sup to sit and rolling (indep with  Max effort noted)   LE  and core therex   Needed assist to position appropriately for sidelying and cues for exercises angles for all   sidelying leg lift 2x15 BLE   Bridges 2x15 partial range  SLR 1x15 BLE   Unsupported seated rows orange x30  Unsupported Seated shoulder extension with elbows extended BUE orange band x30  Unsupported Seated shoulder horizontal abduction with elbows extended BUE orange band x30  Standing with hand hold assist side step to left and right 15x each 3 sets.   Walking around bed x30 feet with min HHa and weeks: 9 days, 2/20/20  Current Treatment Recommendations: Strengthening, ADL/Self-care Training, Gait Training, Patient/Caregiver Education & Training, Stair training, Balance Training, Pain Management, Endurance Training, Safety Education & Training, Transfer Training, Functional Mobility Training, Equipment Evaluation, Education, & procurement, Home Exercise Program  Safety Devices  Type of devices:  All fall risk precautions in place, Nurse notified, Patient at risk for falls, Call light within reach, Gait belt, Chair alarm in place, Left in chair     Therapy Time   Individual Concurrent Group Co-treatment   Time In 0800         Time Out 0930         Minutes 90         Timed Code Treatment Minutes: 90 Minutes       Mynor Yap, PT

## 2020-02-14 NOTE — PROGRESS NOTES
[facility]  [facility]  [id number]    Chief Complaint: Debility    Subjective:   Feeling poorly this afternoon; no localizing symptoms. ROS: No n/v, cp, sob, f/c  Objective:  Patient Vitals for the past 24 hrs:   BP Temp Temp src Pulse Resp SpO2   02/14/20 1015 100/63 98.2 °F (36.8 °C) Oral 82 18 97 %   02/14/20 0811 112/68 -- -- 79 -- 99 %   02/13/20 2130 118/60 97.4 °F (36.3 °C) Oral 72 20 99 %     Gen: No distress, pleasant. HEENT: Normocephalic, atraumatic. CV: Regular rate and rhythm. Resp: No respiratory distress. Abd: Soft, nontender   Ext: No edema. Neuro: Alert, oriented, appropriately interactive. Wt Readings from Last 3 Encounters:   02/13/20 198 lb 6.4 oz (90 kg)   02/11/20 200 lb (90.7 kg)   02/07/20 204 lb (92.5 kg)       Laboratory data:   Lab Results   Component Value Date    WBC 9.3 02/13/2020    HGB 7.7 (L) 02/13/2020    HCT 22.6 (L) 02/13/2020    MCV 93.4 02/13/2020     02/13/2020       Lab Results   Component Value Date     02/13/2020    K 4.3 02/13/2020     02/13/2020    CO2 22 02/13/2020    BUN 14 02/13/2020    CREATININE 1.3 02/13/2020    GLUCOSE 97 02/13/2020    CALCIUM 8.7 02/13/2020        Therapy progress:  PT     Objective     Sit to Stand: Stand by assistance, Contact guard assistance(without AD)  Stand to sit: Stand by assistance, Contact guard assistance(without AD)  Bed to Chair: Contact guard assistance(without AD)  Device: Rolling Walker  Assistance: Stand by assistance, Minimal assistance  Distance: 92 feet + 150 feet (10 feet of carpet) + 92 feet  OT  PT Equipment Recommendations  Equipment Needed: Yes  Mobility Devices: Anders Saini: Rolling  Toilet - Technique: Ambulating  Equipment Used: Standard toilet  Assessment        SLP                Body mass index is 34.06 kg/m². Rehabilitation Diagnosis:  16.0, Debility (non-cardiac, non-pulmonary     Assessment and Plan:  Abdominal pain.  Nonobstructive bowel gas pattern; advance bowel regimen.      UTI. Culture ngtd; d/c'd cipro.      Dizziness. PT/OT. Suspect poor intake contributing.        HTN. Norvasc, cozaar     Bowels: now moving; kub neg    Malaise. Check labs     Bladder: Check PVR x 3. [address] if PVR > 200ml or if any volume is > 500 ml.      Sleep: Trazodone provided prn. Jabari Hernandez MD 2/14/2020, 2:44 PM

## 2020-02-14 NOTE — PLAN OF CARE
Problem: Falls - Risk of:  Goal: Will remain free from falls  Description  Will remain free from falls  Outcome: Ongoing  Goal: Absence of physical injury  Description  Absence of physical injury  Outcome: Ongoing     Problem: Falls - Risk of:  Goal: Absence of physical injury  Description  Absence of physical injury  Outcome: Ongoing     Problem: Nutrition  Goal: Optimal nutrition therapy  Outcome: Ongoing

## 2020-02-15 LAB
GLUCOSE BLD-MCNC: 119 MG/DL (ref 70–99)
GLUCOSE BLD-MCNC: 141 MG/DL (ref 70–99)
GLUCOSE BLD-MCNC: 145 MG/DL (ref 70–99)
GLUCOSE BLD-MCNC: 164 MG/DL (ref 70–99)
PERFORMED ON: ABNORMAL

## 2020-02-15 PROCEDURE — 92523 SPEECH SOUND LANG COMPREHEN: CPT

## 2020-02-15 PROCEDURE — 1280000000 HC REHAB R&B

## 2020-02-15 PROCEDURE — 97535 SELF CARE MNGMENT TRAINING: CPT

## 2020-02-15 PROCEDURE — 6370000000 HC RX 637 (ALT 250 FOR IP): Performed by: PHYSICAL MEDICINE & REHABILITATION

## 2020-02-15 PROCEDURE — 97116 GAIT TRAINING THERAPY: CPT

## 2020-02-15 PROCEDURE — 97110 THERAPEUTIC EXERCISES: CPT

## 2020-02-15 RX ADMIN — VITAMIN D, TAB 1000IU (100/BT) 1000 UNITS: 25 TAB at 08:28

## 2020-02-15 RX ADMIN — FERROUS SULFATE TAB 325 MG (65 MG ELEMENTAL FE) 325 MG: 325 (65 FE) TAB at 17:42

## 2020-02-15 RX ADMIN — OXYBUTYNIN CHLORIDE 5 MG: 5 TABLET ORAL at 08:28

## 2020-02-15 RX ADMIN — ATORVASTATIN CALCIUM 40 MG: 40 TABLET, FILM COATED ORAL at 21:53

## 2020-02-15 RX ADMIN — OXYBUTYNIN CHLORIDE 5 MG: 5 TABLET ORAL at 21:53

## 2020-02-15 RX ADMIN — POLYETHYLENE GLYCOL 3350 17 G: 17 POWDER, FOR SOLUTION ORAL at 08:28

## 2020-02-15 RX ADMIN — OXYBUTYNIN CHLORIDE 5 MG: 5 TABLET ORAL at 15:21

## 2020-02-15 RX ADMIN — FERROUS SULFATE TAB 325 MG (65 MG ELEMENTAL FE) 325 MG: 325 (65 FE) TAB at 08:28

## 2020-02-15 RX ADMIN — PANTOPRAZOLE SODIUM 40 MG: 40 TABLET, DELAYED RELEASE ORAL at 15:21

## 2020-02-15 RX ADMIN — PANTOPRAZOLE SODIUM 40 MG: 40 TABLET, DELAYED RELEASE ORAL at 06:44

## 2020-02-15 ASSESSMENT — PAIN DESCRIPTION - LOCATION
LOCATION: ABDOMEN

## 2020-02-15 ASSESSMENT — PAIN DESCRIPTION - PAIN TYPE
TYPE: ACUTE PAIN

## 2020-02-15 ASSESSMENT — PAIN DESCRIPTION - PROGRESSION: CLINICAL_PROGRESSION: NOT CHANGED

## 2020-02-15 ASSESSMENT — PAIN DESCRIPTION - ORIENTATION
ORIENTATION: MID

## 2020-02-15 ASSESSMENT — PAIN DESCRIPTION - ONSET: ONSET: ON-GOING

## 2020-02-15 ASSESSMENT — PAIN - FUNCTIONAL ASSESSMENT: PAIN_FUNCTIONAL_ASSESSMENT: PREVENTS OR INTERFERES SOME ACTIVE ACTIVITIES AND ADLS

## 2020-02-15 ASSESSMENT — PAIN DESCRIPTION - DESCRIPTORS
DESCRIPTORS: DISCOMFORT
DESCRIPTORS: DULL

## 2020-02-15 ASSESSMENT — PAIN DESCRIPTION - FREQUENCY: FREQUENCY: CONTINUOUS

## 2020-02-15 ASSESSMENT — PAIN SCALES - WONG BAKER: WONGBAKER_NUMERICALRESPONSE: 2

## 2020-02-15 ASSESSMENT — PAIN SCALES - GENERAL
PAINLEVEL_OUTOF10: 0
PAINLEVEL_OUTOF10: 7
PAINLEVEL_OUTOF10: 4

## 2020-02-15 NOTE — PROGRESS NOTES
Speech Language Pathology  Facility/Department: [facility] ARU  Initial Speech/Language/Cognitive Assessment    NAME: Allyson Pantoja  :    MRN: [id number]  ADMISSION DATE: 2020  ADMITTING DIAGNOSIS: has GI bleed and Debility on their problem list.  DATE ONSET: 2020    Date of Eval: 2/15/2020   Evaluating Therapist: CELENA Mayo    RECENT RESULTS  CT OF HEAD/MRI: Per chart review, no recent CT of head/MRI. Primary Complaint: Per MD H&P: \"Ms Osmani Castano is a 69year old female who was admitted via the ED after presenting with dizziness and abdominal pain in the context of recent diagnosis of an ulcer with recent hospital discharge. She endorses a feeling of lightheadedness but not vertigo. She also notes abdominal pain associated with constipation and a feeling of bloating in her abdomen. On presentation she was noted to have a urinary tract infection. \"    Pt reported that during her recent hospital course, she had experienced confusion and was disoriented. Pt's primary complaint is that she is unable to return home to live independently. Pt goals: \"working on where I will be next\" and \"to be able to function without fear\"     Pain: None reported    Assessment:  Cognitive Diagnosis: Mild cognitive-linguistic deficits   Diagnosis: Pt seen for evaluation of cognitive-linguistic abilities. Pt had previously lived at home alone, and she does not have family that live close. Pt would eat out often, but was independent with medication management and managing finances. Pt reported difficulty cleaning her home. Pt did report recent confusion and disorientation, but did report that \"it has been doing better. \" Pt is a retired  and is an active . OT reported that pt had decreased attention and needed increased cues for sequencing during session. SLP administered the [facility] Cognitive Assessment (MoCA) for cognitive-linguistic assessment this date.  Pt received  possible points ([facility] >26), which indicates mild cognitive-linguistic deficits when considering prior level of independence and high level of function. Pt demonstrated strengths in the following areas: orientation, abstraction: language. Pt demonstrated difficulty in the following areas: problem solving, executive function, attention, and recall. Results as follows: [place] Cognitive Assessment (MoCA)    Section Subtest Score Comments   Visuospatial/ Executive [place] making 0/1 Mild difficulty    Copy Cube 0/1 Mild difficulty    Clock 3/3 intact   Naming Picture naming 2/3  Hippo for rhino   Attention Number repetition 2/2     Selective attention 0/1 Difficulty with attention    Serial 7s 2/3  Mild difficulty   Language Repetition 2/2     Fluency 1/1 Named 11 words in 1 minute    Abstraction Comparisons 2/2 intact   Delayed Recall Recall 5 novel words 4/5 +1 with category cue   Orientation Spatial and Temporal 6/6  Intact     Total:  24/30     Pt will benefit from continued speech therapy to promote improvement in these areas and achieve safe and independent d/c at [facility] if safe and able. Pt d/c plans are TBD due to patient reported that it is not safe for her to return home independently. SLP discussed areas of deficit, goals, POC for ST with pt who is in agreement for participation in therapy. Dysphagia Screen: SLP discussed swallowing with pt. Pt reported no hx of dysphagia, but did report she has a dry mouth. Pt reported tolerating regular solids and thin liquids. Per chart review, pt does appear to be tolerating current diet, meds whole. Pt with no hx of reflux or recurring PNA. Full dysphagia evaluation is not warranted. Please alert should difficulty swallowing arise. SLP to follow for cognitive-linguistic tx only. Recommendations:  Requires SLP Intervention: Yes  Duration/Frequency of Treatment: 5x/week for 30 minutes  D/C Recommendations:  To be determined       Plan: limits    Cognition:      Orientation  Overall Orientation Status: Impaired  Orientation Level: Oriented X4  Attention  Attention: Exceptions to [facility]  Alternating Attention: Mild  Divided Attention: Mild  Selective Attention: Mild  Sustained Attention: Mild  Memory  Memory: Exceptions to [facility]  Short-term Memory: Mild  Working Memory: Mild  Problem Solving  Problem Solving: Exceptions to [facility]  Managing Finances: To be assessed in therapy  Managing Medications: To be assessed in therapy  Simple Functional Tasks: Mild  Verbal Reasoning Skills: To be assessed in therapy  Numeric Reasoning  Numeric Reasoning: Exceptions to [facility]   Abstract Reasoning  Abstract Reasoning: Exceptions to [facility]    Prognosis:  Speech Therapy Prognosis  Prognosis: Excellent  Individuals consulted  Consulted and agree with results and recommendations: Patient    Education:  Patient Education: Pt edu re: role of SLP, rationale of cognitive assessment, results of cognitive assessment, POC, goals. Patient Education Response: Verbalizes understanding  Safety Devices in place: Yes  Type of devices: All fall risk precautions in place; Left in chair    Therapy Time:   Individual   Time In 1230   Time Out 1300   Minutes Nicholas Vasquez [address] [place] [address]  Speech Language Pathologist

## 2020-02-15 NOTE — PROGRESS NOTES
Physical Therapy  Facility/Department: [facility]  Daily Treatment Note  NAME: Oz Steiner  : 7600  MRN: [id number]    Date of Service: 2/15/2020    Discharge Recommendations:  24 hour supervision or assist, Outpatient PT   PT Equipment Recommendations  Equipment Needed: Yes  Mobility Devices: Martene Bunting: Rolling    Assessment   Body structures, Functions, Activity limitations: Decreased functional mobility ; Decreased safe awareness;Decreased endurance;Decreased balance;Decreased high-level IADLs; Increased pain;Decreased ADL status; Decreased strength;Decreased posture  Assessment: Pt seen this AM for gait training and LE strengthening. Pt up in chair, agreeable to therapy. Pt's BP and symptoms monitored throughout session d/t previously low BP with dizziness. Pt reports feeling lightheaded this session following activity that improves with rest, BP stable throughout session. Pt ambulated up to 140 ft with RW and SBA and shorter distances without AD with HHA without LOB and cues for upright posture. Pt reports ambulation distance is limited by symptoms of lightheadedness and LE fatigue. Pt tolerated short bouts of exercises with frequent rest breaks. Pt will continue to benefit from skilled PT in ARU. Treatment Diagnosis: decreased independence with functional mobility. Specific instructions for Next Treatment: progress mobility as tolerated  Prognosis: Good  Decision Making: Medium Complexity  PT Education: Goals;PT Role;Plan of Care;Transfer Training;Equipment;Gait Training;Functional Mobility Training;General Safety;Precautions  Patient Education: Pt verbalized understanding  Barriers to Learning: none  Activity Tolerance  Activity Tolerance: Patient limited by fatigue;Patient limited by endurance; Other(lightheadedness)  Activity Tolerance: Pt reports lightheadedness following activities, BP stable     Patient Diagnosis(es): There were no encounter diagnoses.      has a past medical history of limited by dizziness and fatigue        Exercises  Hip Flexion: seated marches 1x 30 BLE  Knee Long Arc Quad: 1x 20 BLE with cues for technique  Other exercises  Other exercises?: Yes  Other exercises 1: Sci[employer] Level 1.5, 3 minutes  Other exercises 2: 1x 60 ft walking marches with BUE support hovering at // bars with SBA for safety  Other exercises 3: 1x 20 standing heel raises with BUE support at // bars and SBA for safety  Other exercises 4: 1x 10 forward step ups onto 6\" step with cues for pattern, BUE support at // bars, and CGA for safety  Other exercises 5: 2x 10 sit<>stands no AD SBA for safety                         Addendum Second Session:  Pt up in chair at beginning and end of session. Pt reports a little shoulder and knee pain with some exercises this session. Pt reported improved lightheadedness this session. Transfers: sit<>stand SBA without AD    Gait: Pt ambulated 75 ft without AD and intermittent HHA LUE with mild unsteadiness. Pt reported she feels she will need to ambulate with an AD after d/c and requests to practice with quad cane in future. Exercises:  -sharlene lateral trunk twists in sitting with 6# med ball with cues for upright posture and speed  -diagonal trunk rotations with red weighted ball with cues for speed  -sit ups in sitting at [facility] with bolster bolster and pillow behind pt 2x 10 with cues to keep feet planted on ground  -lateral leans onto bolster x10 toward R with cues for elbow placement, x3 toward L but discontinued d/t report of shoulder pain    Goals  Short term goals  Time Frame for Short term goals: 5 days, 2/17/20  Short term goal 1: Pt will perform supine<>Sit with independence. 2/14/2020 GOAL MEt pt demo indep in bed mobility. Short term goal 2: Pt will perform sit<>Stand with supervision with LRAD. Short term goal 3: Pt will ambulate with the SBQC and  feet. Short term goal 4: Pt will participate in 12-15 reps of BLE ther ex for strength and balance. 2/14/2020 GOAL MEt patient performed 15 reps or greater BLE therex in supine/sidelying this date. Short term goal 5: Pt will ascend/descend 12 steps with SBA. Long term goals  Time Frame for Long term goals : 9 days, 2/20/20  Long term goal 1: Pt will perform sit<>Stand without an AD with independence. Long term goal 2: Pt will ambulate with no AD and independence 150 feet. Long term goal 3: Pt will ascend/descend 12 steps with one handrail and independence. Long term goal 4: Pt will perform all transfers with independence. Patient Goals   Patient goals : To feel better. Plan    Plan  Times per week: 5 out of 7  Times per day: Daily  Plan weeks: 9 days, 2/20/20  Specific instructions for Next Treatment: progress mobility as tolerated  Current Treatment Recommendations: Strengthening, ADL/Self-care Training, Gait Training, Patient/Caregiver Education & Training, Stair training, Balance Training, Pain Management, Endurance Training, Safety Education & Training, Transfer Training, Functional Mobility Training, Equipment Evaluation, Education, & procurement, Home Exercise Program  Safety Devices  Type of devices:  All fall risk precautions in place, Nurse notified, Patient at risk for falls, Call light within reach, Gait belt, Chair alarm in place, Left in chair  Restraints  Initially in place: No     Therapy Time   Individual Concurrent Group Co-treatment   Time In 1000         Time Out 1100         Minutes 60         Timed Code Treatment Minutes: [address]   Time In 1300         Time Out 1330         Minutes 30           Total Treatment Time: 90 minutes    Shane Lew, PT

## 2020-02-15 NOTE — PROGRESS NOTES
VSS - afebrile. Pt is alert and oriented x 4 with no history of falls. Assessment completed as charted. Bed is in lowest position with 2/4 bed rails raised, wheels locked and call light within reach - patient wearing non-skid socks and verbalizes understanding to call out for assistance. No further requests at this time. Will continue to monitor.    Vitals:    02/14/20 2000   BP: 137/73   Pulse: 72   Resp: 20   Temp: 98.2 °F (36.8 °C)   SpO2: 99%

## 2020-02-15 NOTE — PROGRESS NOTES
Occupational Therapy  Facility/Department: [address] [place] [address]  Daily Treatment Note  NAME: Mark Courtney  : [birth year]  MRN: [id number]    Date of Service: 2/15/2020    Discharge Recommendations:  Home with assist PRN, Home with Home health OT  OT Equipment Recommendations  ADL Assistive Devices: Shower Chair with back  Other: shower chair    Assessment   Performance deficits / Impairments: Decreased endurance;Decreased balance;Decreased functional mobility ; Decreased high-level IADLs;Decreased coordination;Decreased safe awareness;Decreased ADL status; Decreased strength  Assessment: Pt with c/o feeling \"woozy\" during AM session but declined feeling \"woozy\" during PM session. Pt participated in BADL tasks and in room functional mobility during both sessions. Cont to require SBA/Supv for functional mobility with RW as well as cues for hand placement with sit<>stand. Pt required cues for initiating and sequencing grooming tasks. Cont per OT poc. Prognosis: Good  REQUIRES OT FOLLOW UP: Yes  Activity Tolerance  Activity Tolerance: Patient Tolerated treatment well  Activity Tolerance: Pt cont to c/o feeling \"woozy\" during AM session. BP Supine before OOB: 114/69, HR 73. BP after functional mobility to bathroom: 130/62, HR 96. Safety Devices  Type of devices: Left in chair;Chair alarm in place;Call light within reach         Patient Diagnosis(es): There were no encounter diagnoses. has a past medical history of Hyperlipidemia, Hypertension, Obesity, and Type 2 diabetes mellitus without complication ([facility] [address] has a past surgical history that includes Colonoscopy and Upper gastrointestinal endoscopy (N/A, 2020).     Restrictions  Restrictions/Precautions  Restrictions/Precautions: General Precautions, Fall Risk  Subjective   General  Chart Reviewed: Yes  Patient assessed for rehabilitation services?: Yes  Response to previous treatment: Patient with no complaints from previous session  Family / Caregiver Present: No  Referring Practitioner: Billy Gimenez MD  Diagnosis: debility  Subjective  Subjective: Pt supine in bed, pleasant and agreeable to OT. Pt agreeable to BADL for shower during our second session later this date. Vital Signs  Patient Currently in Pain: Denies        Objective    ADL  Grooming: Supervision;Verbal cueing(Pt drying and combing hair while seated at sink. )  UE Bathing: Supervision(seated in shower)  LE Bathing: Contact guard assistance(sitting/standing in shower)  UE Dressing: Minimal assistance(Assist to fasten bra, otherwise, set-up)  LE Dressing: Stand by assistance(pull up brief, pants and gripper socks)  Toileting: Supervision(cues to perform jany hygiene)  Additional Comments: AM session: Pt performed toileting tasks with SBA and cues. Pt the performed hand hygiene and oral care in stance at sink with SBA and cues for task organization. PM session: pt required 1-2 cues for initiating drying hair. Standing Balance  Time: 3 min  Activity: in room functional mobility and grooming tasks in stance at sink. Comment: with RW  Functional Mobility  Functional - Mobility Device: Rolling Walker  Activity: To/from bathroom  Assist Level: Supervision  Functional Mobility Comments: cues to keep walker closer to her during mobility  Toilet Transfers  Toilet - Technique: Ambulating  Equipment Used: Standard toilet  Toilet Transfer: Supervision  Toilet Transfers Comments: cues to line up with toilet and use grab bar before sitting. Shower Transfers  Shower - Transfer Type: To and From  Shower - Transfer To: Transfer tub bench  Shower Transfers: Stand by assistance  Bed mobility  Supine to Sit: Supervision(HOB elevated and use of bedrails)  Transfers  Sit to stand: Supervision  Stand to sit: Supervision  Transfer Comments: cues for hand placement with sit<>stand     Cognition  Arousal/Alertness: Appropriate responses to stimuli  Following Commands:  Follows all commands without difficulty  Memory:

## 2020-02-15 NOTE — PLAN OF CARE
Problem: Neurological  Intervention: Speech Evaluation/treatment  Note:   SLP completed evaluation. Please refer to notes in EMR.      Madie Hinojosa MA [address] [facility]  Speech Language Pathologist

## 2020-02-16 LAB
GLUCOSE BLD-MCNC: 124 MG/DL (ref 70–99)
GLUCOSE BLD-MCNC: 134 MG/DL (ref 70–99)
GLUCOSE BLD-MCNC: 139 MG/DL (ref 70–99)
GLUCOSE BLD-MCNC: 179 MG/DL (ref 70–99)
PERFORMED ON: ABNORMAL

## 2020-02-16 PROCEDURE — 1280000000 HC REHAB R&B

## 2020-02-16 PROCEDURE — 6370000000 HC RX 637 (ALT 250 FOR IP): Performed by: PHYSICAL MEDICINE & REHABILITATION

## 2020-02-16 RX ADMIN — PANTOPRAZOLE SODIUM 40 MG: 40 TABLET, DELAYED RELEASE ORAL at 06:10

## 2020-02-16 RX ADMIN — MAGNESIUM HYDROXIDE 30 ML: 2400 SUSPENSION ORAL at 23:40

## 2020-02-16 RX ADMIN — OXYBUTYNIN CHLORIDE 5 MG: 5 TABLET ORAL at 08:02

## 2020-02-16 RX ADMIN — PANTOPRAZOLE SODIUM 40 MG: 40 TABLET, DELAYED RELEASE ORAL at 14:59

## 2020-02-16 RX ADMIN — VITAMIN D, TAB 1000IU (100/BT) 1000 UNITS: 25 TAB at 08:02

## 2020-02-16 RX ADMIN — ACETAMINOPHEN 650 MG: 325 TABLET ORAL at 23:40

## 2020-02-16 RX ADMIN — OXYBUTYNIN CHLORIDE 5 MG: 5 TABLET ORAL at 20:03

## 2020-02-16 RX ADMIN — ATORVASTATIN CALCIUM 40 MG: 40 TABLET, FILM COATED ORAL at 20:03

## 2020-02-16 RX ADMIN — FERROUS SULFATE TAB 325 MG (65 MG ELEMENTAL FE) 325 MG: 325 (65 FE) TAB at 08:02

## 2020-02-16 RX ADMIN — POLYETHYLENE GLYCOL 3350 17 G: 17 POWDER, FOR SOLUTION ORAL at 08:02

## 2020-02-16 RX ADMIN — FERROUS SULFATE TAB 325 MG (65 MG ELEMENTAL FE) 325 MG: 325 (65 FE) TAB at 16:58

## 2020-02-16 RX ADMIN — OXYBUTYNIN CHLORIDE 5 MG: 5 TABLET ORAL at 14:59

## 2020-02-16 ASSESSMENT — PAIN DESCRIPTION - DESCRIPTORS: DESCRIPTORS: DISCOMFORT

## 2020-02-16 ASSESSMENT — PAIN SCALES - GENERAL
PAINLEVEL_OUTOF10: 0
PAINLEVEL_OUTOF10: 0
PAINLEVEL_OUTOF10: 9

## 2020-02-16 ASSESSMENT — PAIN DESCRIPTION - LOCATION: LOCATION: ABDOMEN

## 2020-02-16 ASSESSMENT — PAIN DESCRIPTION - PAIN TYPE: TYPE: ACUTE PAIN

## 2020-02-16 ASSESSMENT — PAIN DESCRIPTION - ORIENTATION: ORIENTATION: MID

## 2020-02-17 LAB
ANION GAP SERPL CALCULATED.3IONS-SCNC: 11 MMOL/L (ref 3–16)
BASOPHILS ABSOLUTE: 0.1 K/UL (ref 0–0.2)
BASOPHILS RELATIVE PERCENT: 1.2 %
BUN BLDV-MCNC: 17 MG/DL (ref 7–20)
CALCIUM SERPL-MCNC: 8.5 MG/DL (ref 8.3–10.6)
CHLORIDE BLD-SCNC: 104 MMOL/L (ref 99–110)
CO2: 23 MMOL/L (ref 21–32)
CREAT SERPL-MCNC: 1.2 MG/DL (ref 0.6–1.2)
EOSINOPHILS ABSOLUTE: 0.4 K/UL (ref 0–0.6)
EOSINOPHILS RELATIVE PERCENT: 5 %
GFR AFRICAN AMERICAN: 53
GFR NON-AFRICAN AMERICAN: 44
GLUCOSE BLD-MCNC: 120 MG/DL (ref 70–99)
GLUCOSE BLD-MCNC: 130 MG/DL (ref 70–99)
GLUCOSE BLD-MCNC: 151 MG/DL (ref 70–99)
HCT VFR BLD CALC: 23.5 % (ref 36–48)
HEMOGLOBIN: 7.9 G/DL (ref 12–16)
LYMPHOCYTES ABSOLUTE: 1.4 K/UL (ref 1–5.1)
LYMPHOCYTES RELATIVE PERCENT: 18.4 %
MCH RBC QN AUTO: 31.4 PG (ref 26–34)
MCHC RBC AUTO-ENTMCNC: 33.5 G/DL (ref 31–36)
MCV RBC AUTO: 93.7 FL (ref 80–100)
MONOCYTES ABSOLUTE: 0.4 K/UL (ref 0–1.3)
MONOCYTES RELATIVE PERCENT: 5.5 %
NEUTROPHILS ABSOLUTE: 5.2 K/UL (ref 1.7–7.7)
NEUTROPHILS RELATIVE PERCENT: 69.9 %
PDW BLD-RTO: 16.4 % (ref 12.4–15.4)
PERFORMED ON: ABNORMAL
PERFORMED ON: ABNORMAL
PLATELET # BLD: 367 K/UL (ref 135–450)
PMV BLD AUTO: 7.9 FL (ref 5–10.5)
POTASSIUM REFLEX MAGNESIUM: 4.6 MMOL/L (ref 3.5–5.1)
RBC # BLD: 2.51 M/UL (ref 4–5.2)
SODIUM BLD-SCNC: 138 MMOL/L (ref 136–145)
WBC # BLD: 7.4 K/UL (ref 4–11)

## 2020-02-17 PROCEDURE — 80048 BASIC METABOLIC PNL TOTAL CA: CPT

## 2020-02-17 PROCEDURE — 97535 SELF CARE MNGMENT TRAINING: CPT

## 2020-02-17 PROCEDURE — 1280000000 HC REHAB R&B

## 2020-02-17 PROCEDURE — 97130 THER IVNTJ EA ADDL 15 MIN: CPT

## 2020-02-17 PROCEDURE — 85025 COMPLETE CBC W/AUTO DIFF WBC: CPT

## 2020-02-17 PROCEDURE — 97129 THER IVNTJ 1ST 15 MIN: CPT

## 2020-02-17 PROCEDURE — 36415 COLL VENOUS BLD VENIPUNCTURE: CPT

## 2020-02-17 PROCEDURE — 97530 THERAPEUTIC ACTIVITIES: CPT

## 2020-02-17 PROCEDURE — 6370000000 HC RX 637 (ALT 250 FOR IP): Performed by: PHYSICAL MEDICINE & REHABILITATION

## 2020-02-17 PROCEDURE — 97110 THERAPEUTIC EXERCISES: CPT

## 2020-02-17 PROCEDURE — 97116 GAIT TRAINING THERAPY: CPT

## 2020-02-17 RX ADMIN — ATORVASTATIN CALCIUM 40 MG: 40 TABLET, FILM COATED ORAL at 22:04

## 2020-02-17 RX ADMIN — FERROUS SULFATE TAB 325 MG (65 MG ELEMENTAL FE) 325 MG: 325 (65 FE) TAB at 08:11

## 2020-02-17 RX ADMIN — VITAMIN D, TAB 1000IU (100/BT) 1000 UNITS: 25 TAB at 08:11

## 2020-02-17 RX ADMIN — PANTOPRAZOLE SODIUM 40 MG: 40 TABLET, DELAYED RELEASE ORAL at 06:27

## 2020-02-17 RX ADMIN — PANTOPRAZOLE SODIUM 40 MG: 40 TABLET, DELAYED RELEASE ORAL at 14:58

## 2020-02-17 RX ADMIN — FERROUS SULFATE TAB 325 MG (65 MG ELEMENTAL FE) 325 MG: 325 (65 FE) TAB at 16:38

## 2020-02-17 RX ADMIN — OXYBUTYNIN CHLORIDE 5 MG: 5 TABLET ORAL at 14:58

## 2020-02-17 RX ADMIN — POLYETHYLENE GLYCOL 3350 17 G: 17 POWDER, FOR SOLUTION ORAL at 08:11

## 2020-02-17 RX ADMIN — OXYBUTYNIN CHLORIDE 5 MG: 5 TABLET ORAL at 22:04

## 2020-02-17 RX ADMIN — OXYBUTYNIN CHLORIDE 5 MG: 5 TABLET ORAL at 08:11

## 2020-02-17 ASSESSMENT — PAIN DESCRIPTION - ONSET: ONSET: ON-GOING

## 2020-02-17 ASSESSMENT — PAIN SCALES - GENERAL
PAINLEVEL_OUTOF10: 6

## 2020-02-17 ASSESSMENT — PAIN DESCRIPTION - PROGRESSION: CLINICAL_PROGRESSION: NOT CHANGED

## 2020-02-17 ASSESSMENT — PAIN DESCRIPTION - ORIENTATION: ORIENTATION: MID

## 2020-02-17 ASSESSMENT — PAIN - FUNCTIONAL ASSESSMENT: PAIN_FUNCTIONAL_ASSESSMENT: PREVENTS OR INTERFERES SOME ACTIVE ACTIVITIES AND ADLS

## 2020-02-17 ASSESSMENT — PAIN DESCRIPTION - PAIN TYPE
TYPE: ACUTE PAIN
TYPE: ACUTE PAIN

## 2020-02-17 ASSESSMENT — PAIN DESCRIPTION - LOCATION
LOCATION: ABDOMEN

## 2020-02-17 ASSESSMENT — PAIN DESCRIPTION - DESCRIPTORS: DESCRIPTORS: DISCOMFORT

## 2020-02-17 ASSESSMENT — PAIN DESCRIPTION - FREQUENCY: FREQUENCY: CONTINUOUS

## 2020-02-17 NOTE — PROGRESS NOTES
Occupational Therapy  Facility/Department: [facility]  Daily Treatment Note  NAME: Alejandra Mclaughlin  :   MRN: [id number]    Date of Service: 2020    Discharge Recommendations:  Home with assist PRN, Home with Home health OT  OT Equipment Recommendations  Equipment Needed: Yes  Mobility Devices: ADL Assistive Devices  ADL Assistive Devices: Shower Chair with back    Assessment   Performance deficits / Impairments: Decreased endurance;Decreased balance;Decreased functional mobility ; Decreased high-level IADLs;Decreased coordination;Decreased safe awareness;Decreased ADL status; Decreased strength  Assessment:    Session 1: Pt agreeable to therapy. Pt completed toileting with SBA and standing ADL with SBA. Pt fatigued quickly this session requiring seated rest breaks throughout. Pt completed standing dynamic balance activity x3 minutes at a time before requiring a rest break. Pt required CGA/SBA for all functional mobility using RW this date. Session 2: Pt requesting shower on OT arrival. Pt required Min A for UB and LB bathing, and Min A for UB and LB dressing. Pt required setup for all tasks and increased time. Pt completed additional standing ADLs at sink x3 min. Pt completed BUE AROM ther ex with 2# weights and rest breaks between reps. Cont OT POC. Prognosis: Good  Decision Making: Medium Complexity  OT Education: OT Role;Plan of Care;IADL Safety; Energy Conservation;Orientation;Precautions; ADL Adaptive Strategies;Transfer Training;Equipment;Home Exercise Program  Patient Education: Pt verbalized understanding. REQUIRES OT FOLLOW UP: Yes  Activity Tolerance  Activity Tolerance: Patient Tolerated treatment well  Activity Tolerance: Pt c/o feeling \"woozy\" during AM session, but no in PM session. Vitals stable. Safety Devices  Safety Devices in place: Yes  Type of devices: Left in chair;Call light within reach; Chair alarm in place;Nurse notified;Gait belt       Patient Diagnosis(es): There were no encounter diagnoses. has a past medical history of Hyperlipidemia, Hypertension, Obesity, and Type 2 diabetes mellitus without complication ([facility] [address] has a past surgical history that includes Colonoscopy and Upper gastrointestinal endoscopy (N/A, 2/7/2020). Restrictions  Restrictions/Precautions  Restrictions/Precautions: General Precautions, Fall Risk  Subjective   General  Chart Reviewed: Yes  Patient assessed for rehabilitation services?: Yes  Response to previous treatment: Patient with no complaints from previous session  Family / Caregiver Present: No  Referring Practitioner: Chon Rocha MD  Diagnosis: debility  Subjective  Subjective: Pt supine in bed, pleasant and agreeable to OT. Pt agreeable to BADL for shower during our second session later this date. General Comment  Comments: Per RN ok for therapy  Pain Assessment  Pain Assessment: 0-10  Pain Level: 6  Pain Location: Abdomen  Non-Pharmaceutical Pain Intervention(s): Therapeutic presence;Relaxation techniques; Ambulation/Increased Activity;Repositioned; Emotional support  Response to Pain Intervention: Patient Satisfied  Vital Signs  Patient Currently in Pain: Yes   Orientation  Orientation  Overall Orientation Status: Within Functional Limits  Objective    ADL  Grooming: Supervision;Verbal cueing(stance at sink for oral care)  UE Bathing: Supervision(seated in shower chair)  LE Bathing: Contact guard assistance(seated/standing)  UE Dressing: Minimal assistance(min A to fasten bra; setup for rest of task)  LE Dressing: Minimal assistance(assist for threading RLE through brief and donning L sock)  Toileting: Supervision(cues for jany hygiene)     Balance  Sitting Balance: Independent  Standing Balance: Supervision  Standing Balance  Time: first session: 3 min x3  Activity: in room functional mobility and grooming tasks in stance at sink; standing balance activity  Comment: with RW  Functional Mobility  Functional - Mobility Device: Rolling Walker  Activity: To/from bathroom; To/From therapy gym  Assist Level: Supervision  Functional Mobility Comments: cues to keep walker closer to her during mobility  Toilet Transfers  Toilet - Technique: Ambulating  Equipment Used: Standard toilet  Toilet Transfer: Supervision  Shower Transfers  Shower - Transfer From: Vel Ward - Transfer Type: To and From  Shower - Transfer To: Transfer tub bench  Shower Transfers: Stand by assistance  Bed mobility  Supine to Sit: Supervision(HOB slightly elevated)  Sit to Supine: Supervision  Scooting: Supervision  Transfers  Sit to stand: Supervision  Stand to sit: Supervision  Transfer Comments: cues for hand placement with sit<>stand      Cognition  Overall Cognitive Status: Exceptions  Arousal/Alertness: Appropriate responses to stimuli  Following Commands:  Follows all commands without difficulty  Attention Span: Appears intact  Memory: Decreased recall of recent events;Decreased short term memory  Safety Judgement: Good awareness of safety precautions  Problem Solving: Assistance required to generate solutions  Insights: Fully aware of deficits  Initiation: Requires cues for some  Sequencing: Requires cues for some  Cognition Comment: pt appears to have poor insight and poor STM, makes excuses for decreased judgement at times      Type of ROM/Therapeutic Exercise  Type of ROM/Therapeutic Exercise: AROM  Comment: BUE AROM ther ex 2#  Exercises  Shoulder Flexion: x15  Shoulder Extension: x15  Elbow Flexion: x15  Elbow Extension: x15  Supination: x15  Pronation: x15  Wrist Flexion: x15  Wrist Extension: x15  Grasp/Release: x15      Plan   Plan  Times per week: 5-7 days per week  Plan weeks: 9 days  Current Treatment Recommendations: ROM, Balance Training, Functional Mobility Training, Endurance Training, Safety Education & Training, Self-Care / ADL, Strengthening, Equipment Evaluation, Education, & procurement, Patient/Caregiver Education & Training, Home

## 2020-02-17 NOTE — PROGRESS NOTES
Speech Language Pathology  [facility]: ACUTE REHAB UNIT  SPEECH-LANGUAGE PATHOLOGY      [x] Daily  [] Weekly Care Conference Note  [] Discharge    Patient: Neris Adams      :1945  XVR:[id number]  Rehab Dx/Hx: Dino Mooney [R53.81]    Precautions: Falls  Home situation: Lives Independently  ST Dx: [] Aphasia  [] Dysarthria  [] Apraxia   [] Oropharyngeal dysphagia [x] Cognitive Impairment  [] Other:   Date of Admit: 2020  Room #: 0941/9957-61    Current functional status (updated daily):         Pt being seen for : [] Speech/Language Treatment  [] Dysphagia Treatment [x] Cognitive Treatment  [] Other:  Communication: [x]WFL  [] Aphasia  [] Dysarthria  [] Apraxia  [] Pragmatic Impairment [] Non-verbal  [] Hearing Loss  [] Other:   Cognition: [] WFL  [x] Mild  [] Moderate  [] Severe [] Unable to Assess  [] Other:  Memory: [] WFL  [x] Mild  [] Moderate  [] Severe [] Unable to Assess  [] Other:  Behavior: [x] Alert  [x] Cooperative  [x]  Pleasant  [] Confused  [] Agitated  [] Uncooperative  [] Distractible [] Motivated  [] Self-Limiting [] Anxious  [] Other:  Endurance:  [x] Adequate for participation in SLP sessions  [] Reduced overall  [] Lethargic  [] Other:  Safety: [x] No concerns at this time  [] Reduced insight into deficits  []  Reduced safety awareness [] Not following call light procedures  [] Unable to Assess  [] Other:    Current Diet Order:DIET GENERAL;  Dietary Nutrition Supplements: Standard High Calorie Oral Supplement  Swallowing Precautions: None        Date: 2020      Tx session 1  Ronald,  Clinician  Tx session 2  All needs met during tx session 1   Total Timed Code Min 30    Total Treatment Minutes 30    Individual Treatment Minutes 30    Group Treatment Minutes 0 0   Co-Treat Minutes 0 0   Variance/Reason:      Pain Pt reported \"uncomfortable\"    Pain Intervention [] RN notified  [] Repositioned  [x] Intervention offered and patient declined  [] N/A  [] Other: [] RN notified  [] Repositioned  [] Intervention offered and patient declined  [] N/A  [] Other:   Subjective     Pt alert and agreeable for tx, seen bedside. Objective:  Goals     Short-term Goals  Timeframe for Short-term Goals: 5 days (2/20/2020)  Goal 1: Pt will complete memory/recall tasks with 90% accuracy given cueing for compensatory strategy use. Indirectly targeted during money calculation and deduction tasks. - Pt instructed to cross out the clues already used and list the items within a category as a strategy to organize thoughts and assist in recall. Goal 2: Pt will complete functional and complex problem solving tasks (meds, money, math, time) with 90% accuracy given min cueing. Money Calculations  - Pt instructed to listen and solve money calculations. - Independently 100%  - Pt independently self corrected 1x. Goal 3: Pt will complete graded attention tasks with 90% accuracy given min cueing. Deduction Task  - Pt instructed to use given clues to solve deductive reasoning task with information provided. - Pt required min-mod cues to assist with completing the task. Timeframe for Short-term Goals: 5 days (2/20/2020)         Other areas targeted: NA    Education:   Pt educated on role of SLP, rationale of task, and strategies for organization and recall. Safety Devices: [x] Call light within reach  [] Chair alarm activated  [x] Bed alarm activated  [] Other: [] Call light within reach  [] Chair alarm activated  [] Bed alarm activated  [] Other:    Assessment: Session 1: Pt alert and agreeable for tx, seen upright bedside. Pt engaged in money calculation task, independently with 100% accuracy and a deduction task. Pt required min-mod cues to solve for information utilizing the clues in the deduction task to solve the problem. Pt observed to have some deficits in thought organization during conversation. Continue stated goals and POC.        Plan:

## 2020-02-17 NOTE — PLAN OF CARE
Problem: Falls - Risk of:  Goal: Will remain free from falls  Description  Will remain free from falls  2/17/2020 1019 by Cornelia Webster RN  Outcome: Ongoing  2/16/2020 2247 by Madhuri Juan RN  Outcome: Ongoing  Goal: Absence of physical injury  Description  Absence of physical injury  Outcome: Ongoing     Problem: Pain:  Goal: Pain level will decrease  Description  Pain level will decrease  Outcome: Ongoing  Goal: Control of acute pain  Description  Control of acute pain  Outcome: Ongoing     Problem: Nutrition  Goal: Optimal nutrition therapy  Outcome: Ongoing

## 2020-02-17 NOTE — PROGRESS NOTES
Conservation  Patient Education: Pt verbalized understanding  Barriers to Learning: none  REQUIRES PT FOLLOW UP: Yes  Activity Tolerance  Activity Tolerance: Patient Tolerated treatment well;Patient limited by fatigue;Patient limited by endurance  Activity Tolerance: Supine /63 HR 63; sitting EOB /64 HR 65; sitting following mobility /68 . Pt c/o dizziness with gait activities this date     Patient Diagnosis(es): There were no encounter diagnoses. has a past medical history of Hyperlipidemia, Hypertension, Obesity, and Type 2 diabetes mellitus without complication ([facility] [address] has a past surgical history that includes Colonoscopy and Upper gastrointestinal endoscopy (N/A, 2/7/2020). Restrictions  Restrictions/Precautions  Restrictions/Precautions: General Precautions, Fall Risk  Subjective   General  Chart Reviewed: Yes  Response To Previous Treatment: Patient with no complaints from previous session. Family / Caregiver Present: No  Referring Practitioner: Sid Pichardo MD  Subjective  Subjective: Pt supine in bed on approach, pleasant and agreeable to PT tx but report abdominal pain/discomfort secondary to not having BM  Pain Screening  Patient Currently in Pain: Yes  Pain Assessment  Pain Assessment: 0-10  Pain Level: 6  Pain Type: Acute pain  Pain Location: Abdomen  Non-Pharmaceutical Pain Intervention(s): Ambulation/Increased Activity;Repositioned; Therapeutic presence  Vital Signs  Patient Currently in Pain: Yes       Orientation  Orientation  Overall Orientation Status: Within Normal Limits  Cognition      Objective   Bed mobility  Supine to Sit: Supervision(HOB slightly elevated)  Sit to Supine: Unable to assess(Pt seated in chair at end of session)     Transfers  Sit to Stand: Stand by assistance  Stand to sit: Stand by assistance  Comment: EOB with RW SBA, EOB to SBQC SBA, Standard chair to RW SBA completed x 6 trials, intermittent cues for positioning prior to sitting. Mobility Training, Equipment Evaluation, Education, & procurement, Home Exercise Program  Safety Devices  Type of devices:  All fall risk precautions in place, Nurse notified, Patient at risk for falls, Call light within reach, Gait belt, Chair alarm in place, Left in chair  Restraints  Initially in place: No     Therapy Time   Individual Concurrent Group Co-treatment   Time In 0800         Time Out 0930         Minutes 90         Timed Code Treatment Minutes: Bart Dooley 173, PT, DPT
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
## 2020-02-17 NOTE — PROGRESS NOTES
Jason Norton  2/17/2020  [id number]    Chief Complaint: Debility    Subjective:   Feeling better today than Friday. No issues overnight. ROS: No n/v, cp, sob, f/c  Objective:  Patient Vitals for the past 24 hrs:   BP Temp Temp src Pulse Resp SpO2 Weight   02/17/20 0817 114/67 97.9 °F (36.6 °C) Oral 66 18 94 % --   02/17/20 0630 -- -- -- -- -- -- 200 lb (90.7 kg)   02/16/20 2330 127/84 97.2 °F (36.2 °C) Oral 68 17 94 % --   02/16/20 2000 (!) 142/90 97.8 °F (36.6 °C) Oral 70 17 98 % --     Gen: No distress, pleasant. HEENT: Normocephalic, atraumatic. CV: Regular rate and rhythm. Resp: No respiratory distress. Abd: Soft, nontender   Ext: No edema. Neuro: Alert, oriented, appropriately interactive. Wt Readings from Last 3 Encounters:   02/17/20 200 lb (90.7 kg)   02/11/20 200 lb (90.7 kg)   02/07/20 204 lb (92.5 kg)       Laboratory data:   Lab Results   Component Value Date    WBC 7.4 02/17/2020    HGB 7.9 (L) 02/17/2020    HCT 23.5 (L) 02/17/2020    MCV 93.7 02/17/2020     02/17/2020       Lab Results   Component Value Date     02/17/2020    K 4.6 02/17/2020     02/17/2020    CO2 23 02/17/2020    BUN 17 02/17/2020    CREATININE 1.2 02/17/2020    GLUCOSE 130 02/17/2020    CALCIUM 8.5 02/17/2020        Therapy progress:  PT     Objective     Sit to Stand: Stand by assistance  Stand to sit: Stand by assistance  Bed to Chair: Contact guard assistance(without AD)  Device: Rolling Walker, No Device  Assistance: Stand by assistance, Contact guard assistance(SBA with RW, CGA no AD)  Distance: 140 ft with RW, 30 ft no AD, 100 ft RW  OT  PT Equipment Recommendations  Equipment Needed: Yes  Mobility Devices: Delphia Denny: Rolling  Toilet - Technique: Ambulating  Equipment Used: Standard toilet  Toilet Transfers Comments: cues to line up with toilet and use grab bar before sitting. Assessment        SLP                Body mass index is 34.33 kg/m².     Rehabilitation Diagnosis:  16.0,

## 2020-02-18 LAB
GLUCOSE BLD-MCNC: 118 MG/DL (ref 70–99)
GLUCOSE BLD-MCNC: 152 MG/DL (ref 70–99)
GLUCOSE BLD-MCNC: 153 MG/DL (ref 70–99)
GLUCOSE BLD-MCNC: 189 MG/DL (ref 70–99)
PERFORMED ON: ABNORMAL

## 2020-02-18 PROCEDURE — 6370000000 HC RX 637 (ALT 250 FOR IP): Performed by: PHYSICAL MEDICINE & REHABILITATION

## 2020-02-18 PROCEDURE — 97129 THER IVNTJ 1ST 15 MIN: CPT

## 2020-02-18 PROCEDURE — 97130 THER IVNTJ EA ADDL 15 MIN: CPT

## 2020-02-18 PROCEDURE — 97535 SELF CARE MNGMENT TRAINING: CPT

## 2020-02-18 PROCEDURE — 97110 THERAPEUTIC EXERCISES: CPT

## 2020-02-18 PROCEDURE — 1280000000 HC REHAB R&B

## 2020-02-18 PROCEDURE — 97116 GAIT TRAINING THERAPY: CPT

## 2020-02-18 PROCEDURE — 97530 THERAPEUTIC ACTIVITIES: CPT

## 2020-02-18 RX ORDER — LACTULOSE 10 G/15ML
20 SOLUTION ORAL PRN
Status: DISCONTINUED | OUTPATIENT
Start: 2020-02-18 | End: 2020-02-23 | Stop reason: HOSPADM

## 2020-02-18 RX ADMIN — ATORVASTATIN CALCIUM 40 MG: 40 TABLET, FILM COATED ORAL at 21:05

## 2020-02-18 RX ADMIN — PANTOPRAZOLE SODIUM 40 MG: 40 TABLET, DELAYED RELEASE ORAL at 14:19

## 2020-02-18 RX ADMIN — OXYBUTYNIN CHLORIDE 5 MG: 5 TABLET ORAL at 21:05

## 2020-02-18 RX ADMIN — FERROUS SULFATE TAB 325 MG (65 MG ELEMENTAL FE) 325 MG: 325 (65 FE) TAB at 07:52

## 2020-02-18 RX ADMIN — POLYETHYLENE GLYCOL 3350 17 G: 17 POWDER, FOR SOLUTION ORAL at 07:52

## 2020-02-18 RX ADMIN — OXYBUTYNIN CHLORIDE 5 MG: 5 TABLET ORAL at 07:51

## 2020-02-18 RX ADMIN — VITAMIN D, TAB 1000IU (100/BT) 1000 UNITS: 25 TAB at 07:52

## 2020-02-18 RX ADMIN — OXYBUTYNIN CHLORIDE 5 MG: 5 TABLET ORAL at 14:17

## 2020-02-18 RX ADMIN — FERROUS SULFATE TAB 325 MG (65 MG ELEMENTAL FE) 325 MG: 325 (65 FE) TAB at 18:11

## 2020-02-18 RX ADMIN — PANTOPRAZOLE SODIUM 40 MG: 40 TABLET, DELAYED RELEASE ORAL at 06:09

## 2020-02-18 ASSESSMENT — PAIN DESCRIPTION - PROGRESSION
CLINICAL_PROGRESSION: NOT CHANGED

## 2020-02-18 ASSESSMENT — PAIN - FUNCTIONAL ASSESSMENT: PAIN_FUNCTIONAL_ASSESSMENT: PREVENTS OR INTERFERES SOME ACTIVE ACTIVITIES AND ADLS

## 2020-02-18 ASSESSMENT — PAIN DESCRIPTION - FREQUENCY: FREQUENCY: CONTINUOUS

## 2020-02-18 ASSESSMENT — PAIN DESCRIPTION - PAIN TYPE
TYPE: ACUTE PAIN

## 2020-02-18 ASSESSMENT — PAIN DESCRIPTION - ONSET: ONSET: ON-GOING

## 2020-02-18 ASSESSMENT — PAIN DESCRIPTION - LOCATION
LOCATION: ABDOMEN

## 2020-02-18 ASSESSMENT — PAIN DESCRIPTION - ORIENTATION
ORIENTATION: MID
ORIENTATION: MID

## 2020-02-18 ASSESSMENT — PAIN DESCRIPTION - DESCRIPTORS: DESCRIPTORS: DISCOMFORT

## 2020-02-18 ASSESSMENT — PAIN SCALES - GENERAL
PAINLEVEL_OUTOF10: 5
PAINLEVEL_OUTOF10: 5
PAINLEVEL_OUTOF10: 6

## 2020-02-18 NOTE — PROGRESS NOTES
2  Surface - 2: level tile  Device 2: Small Base Quad Care  Assistance 2: Contact guard assistance;Stand by assistance  Quality of Gait 2: Decreased louis, wide MARVIN, B decreased step length/height, B decreased toe clearance, forwward flexed trunk, minimal arm swing. Pt states she feels unsteady with SBA<>CGA overall with no overt LOB. Pt concerned about sequencing, but able to complete appropriately. Gait Deviations: Slow Louis;Decreased step length; Increased MARVIN; Decreased step height;Shuffles;Decreased arm swing  Distance: 45 feet  Comments: Limited by fatigue and dizziness. Stairs/Curb  Stairs?: Yes  Stairs  # Steps : 4  Stairs Height: 6\"  Rails: Bilateral  Device: No Device  Assistance: Contact guard assistance  Comment: Pt very slow to complete. Step to pattern with dependence on BUE. Given cues for sequencing d/t increased right knee pain. Continues to show increased difficulty. Balance  Posture: Fair  Sitting - Static: Good  Sitting - Dynamic: Good;-  Standing - Static: Fair;+  Standing - Dynamic: Fair  Comments: Pt tolerated 5 minutes of unsupported standing with a card game, then only able to complete 1:30 minutes, 2 minutes, and 1 minute repetitions after each seated rest break. Pt is limited by fatigue. Pt complains of \"wobbliness\" though not observed. Able to tolerate only 20-30 seconds 2x on the air-ex blue foam pad with one UE support. Pt had seated rest breaks inbetween. Pt appears somewhat self-limiting. Exercises  Gluteal Sets: x10 BLE  Hip Flexion: x20 seated 2# BLE  Hip Abduction: x20 seated 2# BLE  Knee Long Arc Quad: x20 seated 2# BLE  Ankle Pumps: x20 seated 2# BLE  Other exercises  Other exercises 1: SciFit Level 2.0, 5 minutes  Other exercises 2: 1x 45 ft walking with 2# on BLE and RW  Other exercises 7: Hip ADD x20 seated 2# BLE      Goals  Short term goals  Time Frame for Short term goals: 5 days, 2/17/20  Short term goal 1: Pt will perform supine<>Sit with independence.  2/14/2020 GOAL MEt pt demo indep in bed mobility. Short term goal 2: Pt will perform sit<>Stand with supervision with LRAD. - GOAL NOT MET, pt progressing, completes with SBA with and without AD  Short term goal 3: Pt will ambulate with the SBQC and  feet. - GOAL NOT MET, pt progressing, completes 150' wiht SBQC and CGA  Short term goal 4: Pt will participate in 12-15 reps of BLE ther ex for strength and balance. 2/14/2020 GOAL MEt patient performed 15 reps or greater BLE therex in supine/sidelying this date. Short term goal 5: Pt will ascend/descend 12 steps with SBA. - GOAL NOT MET  Long term goals  Time Frame for Long term goals : 9 days, 2/20/20  Long term goal 1: Pt will perform sit<>Stand without an AD with independence. Long term goal 2: Pt will ambulate with no AD and independence 150 feet. Long term goal 3: Pt will ascend/descend 12 steps with one handrail and independence. - 8 steps CGA  Long term goal 4: Pt will perform all transfers with independence. Patient Goals   Patient goals : To feel better. Plan    Plan  Times per week: 5 out of 7  Times per day: Daily  Plan weeks: 9 days, 2/20/20  Specific instructions for Next Treatment: progress mobility as tolerated  Current Treatment Recommendations: Strengthening, ADL/Self-care Training, Gait Training, Patient/Caregiver Education & Training, Stair training, Balance Training, Pain Management, Endurance Training, Safety Education & Training, Transfer Training, Functional Mobility Training, Equipment Evaluation, Education, & procurement, Home Exercise Program  Safety Devices  Type of devices:  All fall risk precautions in place, Nurse notified, Patient at risk for falls, Call light within reach, Gait belt, Left in bed, Bed alarm in place  Restraints  Initially in place: No     Therapy Time   Individual Concurrent Group Co-treatment   Time In 0830         Time Out 1000         Minutes 90         Timed Code Treatment Minutes: [address], PT

## 2020-02-18 NOTE — PROGRESS NOTES
Nutrition Assessment    Type and Reason for Visit: Reassess    Nutrition Recommendations:   1. Continue general diet  2. D/c Ensure ONS - per pt request  3. Encourage po intakes  4. Monitor po intakes, nutrition adequacy, weights, pertinent labs, BMs    Nutrition Assessment: Follow up: Pt stable from a nutritional standpoint AEB pt report of continued decreased appetite. Po intakes % per EMR generally. Pt reports that she has been ordering small meals (ex: soup with crackers and pudding for lunch). Pt refusing Ensure ONS, will d/c. Encouraged po intakes. Will continue general diet. Malnutrition Assessment:  · Malnutrition Status: At risk for malnutrition  · Context: Acute illness or injury  · Findings of the 6 clinical characteristics of malnutrition (Minimum of 2 out of 6 clinical characteristics is required to make the diagnosis of moderate or severe Protein Calorie Malnutrition based on AND/ASPEN Guidelines):  1. Energy Intake-Less than or equal to 75% of estimated energy requirement, Greater than or equal to 7 days    2. Weight Loss-No significant weight loss,    3. Fat Loss-No significant subcutaneous fat loss,    4. Muscle Loss-No significant muscle mass loss,    5. Fluid Accumulation-No significant fluid accumulation,    6.  Strength-Not measured    Nutrition Risk Level: Moderate    Nutrient Needs:  · Estimated Daily Total Kcal: 8164-3957(66-39 kcal/kg)  · Estimated Daily Protein (g): 54-65(1.0-1.2 g/kg)  · Estimated Daily Total Fluid (ml/day): 1 ml/kcal    Nutrition Diagnosis:   · Problem: Inadequate oral intake  · Etiology: related to Insufficient energy/nutrient consumption     Signs and symptoms:  as evidenced by Intake 50-75%    Objective Information:  · Nutrition-Focused Physical Findings: No edema. Skin intact.    · Wound Type: None  · Current Nutrition Therapies:  · Oral Diet Orders: General   · Oral Diet intake: %  · Oral Nutrition Supplement (ONS) Orders: Standard High

## 2020-02-18 NOTE — PROGRESS NOTES
Victor Manuel Munoz [facility]  2/18/2020  [id number]    Chief Complaint: Debility    Subjective:   No issues overnight. Resting in bed. No c/o. ROS: No n/v, cp, sob, f/c  Objective:  Patient Vitals for the past 24 hrs:   BP Temp Temp src Pulse Resp SpO2   02/18/20 0746 138/81 98.1 °F (36.7 °C) Oral 77 17 99 %     Gen: No distress, pleasant. HEENT: Normocephalic, atraumatic. CV: Regular rate and rhythm. Resp: No respiratory distress. Abd: Soft, nontender   Ext: No edema. Neuro: Alert, oriented, appropriately interactive. Wt Readings from Last 3 Encounters:   02/17/20 200 lb (90.7 kg)   02/11/20 200 lb (90.7 kg)   02/07/20 204 lb (92.5 kg)       Laboratory data:   Lab Results   Component Value Date    WBC 7.4 02/17/2020    HGB 7.9 (L) 02/17/2020    HCT 23.5 (L) 02/17/2020    MCV 93.7 02/17/2020     02/17/2020       Lab Results   Component Value Date     02/17/2020    K 4.6 02/17/2020     02/17/2020    CO2 23 02/17/2020    BUN 17 02/17/2020    CREATININE 1.2 02/17/2020    GLUCOSE 130 02/17/2020    CALCIUM 8.5 02/17/2020        Therapy progress:  PT     Objective     Sit to Stand: Stand by assistance  Stand to sit: Stand by assistance  Bed to Chair: Stand by assistance(with  or [facility].)  Device: [employer]  Assistance: Stand by assistance  Distance: 150' + 150' + 39' + 60'  OT  PT Equipment Recommendations  Equipment Needed: Yes  Mobility Devices: Meera Hola: Rolling  Toilet - Technique: Ambulating  Equipment Used: Standard toilet  Toilet Transfers Comments: cues to line up with toilet and use grab bar before sitting. Assessment        SLP                Body mass index is 34.33 kg/m². Rehabilitation Diagnosis:  16.0, Debility (non-cardiac, non-pulmonary     Assessment and Plan:  Abdominal pain. Nonobstructive bowel gas pattern; advance bowel regimen.      UTI. Culture ngtd; d/c'd cipro.      Dizziness. PT/OT. Suspect poor intake contributing.        HTN.  Norvasc, cozaar     Bowels: now moving; kub neg     Bladder: Check PVR x 3. [address] if PVR > 200ml or if any volume is > 500 ml.      Sleep: Trazodone provided prn. Jabari Sexton MD 2/18/2020, 6:43 PM

## 2020-02-18 NOTE — CONSULTS
Podiatric Surgery Consult note  Marielle Petersen  Subjective :   Patient seen and examined. Patient has chief complaint of long painful nails that are hard to cut. She does have DM. She is unable to trim them herself. No other complaints at this time.       Past Medical History:    Past Medical History:   Diagnosis Date    Hyperlipidemia     Hypertension     Obesity     Type 2 diabetes mellitus without complication (HCC)      Past Surgical History:    Past Surgical History:   Procedure Laterality Date    COLONOSCOPY      UPPER GASTROINTESTINAL ENDOSCOPY N/A 2/7/2020    EGD BIOPSY performed by Shaneka Marrero MD at [facility] ENDOSCOPY       Medications: Scheduled Meds:   amLODIPine  2.5 mg Oral Daily    atorvastatin  40 mg Oral Nightly    ferrous sulfate  325 mg Oral BID WC    losartan  100 mg Oral Daily    oxybutynin  5 mg Oral TID    pantoprazole  40 mg Oral BID AC    polyethylene glycol  17 g Oral Daily    Vitamin D  1,000 Units Oral Daily     Continuous Infusions:   dextrose       PRN Meds:.acetaminophen, magnesium hydroxide, dextrose, dextrose, glucagon (rDNA), glucose, hydrALAZINE, ondansetron, traZODone    Allergies:  Metformin and Ramipril    Social History:   Social History     Socioeconomic History    Marital status: Single     Spouse name: Not on file    Number of children: Not on file    Years of education: Not on file    Highest education level: Not on file   Occupational History    Not on file   Social Needs    Financial resource strain: Not on file    Food insecurity:     Worry: Not on file     Inability: Not on file    Transportation needs:     Medical: Not on file     Non-medical: Not on file   Tobacco Use    Smoking status: Passive Smoke Exposure - Never Smoker    Smokeless tobacco: Never Used   Substance and Sexual Activity    Alcohol use: Yes     Comment: every 2 months     Drug use: Never    Sexual activity: Not on file   Lifestyle    Physical activity:     Days per week: Not on file     Minutes per session: Not on file    Stress: Not on file   Relationships    Social connections:     Talks on phone: Not on file     Gets together: Not on file     Attends Quaker service: Not on file     Active member of club or organization: Not on file     Attends meetings of clubs or organizations: Not on file     Relationship status: Not on file    Intimate partner violence:     Fear of current or ex partner: Not on file     Emotionally abused: Not on file     Physically abused: Not on file     Forced sexual activity: Not on file   Other Topics Concern    Not on file   Social History Narrative    Not on file     Family History:   Family History   Problem Relation Age of Onset    Diabetes Brother     Obesity Brother     Other Brother         TIA    Diabetes Mother     Other Mother         COPD    Heart Attack Father     Coronary Art Dis Father     Other Father         aortic aneurysm    Diabetes Brother     Cancer Brother      REVIEW OF SYSTEMS:  Negative other than mentioned in the HPI           Objective     /81   Pulse 77   Temp 98.1 °F (36.7 °C) (Oral)   Resp 17   Ht 5' 4\" (1.626 m)   Wt 200 lb (90.7 kg)   SpO2 99%   BMI 34.33 kg/m²      I/O:    Intake/Output Summary (Last 24 hours) at 2/18/2020 1046  Last data filed at 2/18/2020 0756  Gross per 24 hour   Intake 480 ml   Output --   Net 480 ml              Wt Readings from Last 3 Encounters:   02/17/20 200 lb (90.7 kg)   02/11/20 200 lb (90.7 kg)   02/07/20 204 lb (92.5 kg)       LABS:    Recent Labs     02/17/20  0727   WBC 7.4   HGB 7.9*   HCT 23.5*           Recent Labs     02/17/20  0727      K 4.6      CO2 23   BUN 17   CREATININE 1.2      No results for input(s): PROT, INR, APTT in the last 72 hours. No results for input(s): CKTOTAL, CKMB, CKMBINDEX, TROPONINI in the last 72 hours. Exam:     DERMATOLOGIC:   Nails 1-5 bilaterally are thickened with dystrophic changes.

## 2020-02-18 NOTE — PROGRESS NOTES
Speech Language Pathology  [facility]: [facility]  SPEECH-LANGUAGE PATHOLOGY      [x] Daily  [] Weekly Care Conference Note  [] Discharge    Patient: Ramona Hinojosa      :1945  NFB:[id number]  Rehab Dx/Hx: Vickie Rojas [R53.81]    Precautions: Falls  Home situation: Lives Independently  ST Dx: [] Aphasia  [] Dysarthria  [] Apraxia   [] Oropharyngeal dysphagia [x] Cognitive Impairment  [] Other:   Date of Admit: 2020  Room #: 0272/6514-22    Current functional status (updated daily):         Pt being seen for : [] Speech/Language Treatment  [] Dysphagia Treatment [x] Cognitive Treatment  [] Other:  Communication: [x]WFL  [] Aphasia  [] Dysarthria  [] Apraxia  [] Pragmatic Impairment [] Non-verbal  [] Hearing Loss  [] Other:   Cognition: [] WFL  [x] Mild  [] Moderate  [] Severe [] Unable to Assess  [] Other:  Memory: [] WFL  [x] Mild  [] Moderate  [] Severe [] Unable to Assess  [] Other:  Behavior: [x] Alert  [x] Cooperative  [x]  Pleasant  [] Confused  [] Agitated  [] Uncooperative  [] Distractible [] Motivated  [] Self-Limiting [] Anxious  [] Other:  Endurance:  [x] Adequate for participation in SLP sessions  [] Reduced overall  [] Lethargic  [] Other:  Safety: [x] No concerns at this time  [] Reduced insight into deficits  []  Reduced safety awareness [] Not following call light procedures  [] Unable to Assess  [] Other:    Current Diet Order:DIET GENERAL;  Dietary Nutrition Supplements: Standard High Calorie Oral Supplement  Swallowing Precautions: None        Date: 2020      Tx session 1  [id number] Tx session 2  All needs met during tx session 1   Total Timed Code Min 30    Total Treatment Minutes 30    Individual Treatment Minutes 30    Group Treatment Minutes 0 0   Co-Treat Minutes 0 0   Variance/Reason:      Pain None reported     Pain Intervention [] RN notified  [] Repositioned  [] Intervention offered and patient declined  [x] N/A  [] Other: [] RN notified  [] Repositioned  [] Intervention offered and patient declined  [] N/A  [] Other:   Subjective         Objective:  Goals     Short-term Goals  Timeframe for Short-term Goals: 5 days (2/20/2020)  Goal 1: Pt will complete memory/recall tasks with 90% accuracy given cueing for compensatory strategy use. Goal addressed. SLP discussed external and internal memory strategies. Pt reported use of multiple external strategies in home environment. Pt reported occasional difficulty with remembering to take medications, specifically before bed. Pt demonstrated understanding of use of chunking strategy. Pt was able to implement strategy without cues. Pt immediately recalled 12/12 items without cues. SLP presented short paragraph and pt asked questions re: details of paragraph. Pt able to accurately answer 100% of questions without cues. Each paragraph was read x2. Goal 2: Pt will complete functional and complex problem solving tasks (meds, money, math, time) with 90% accuracy given min cueing. Goal addressed. Pt completed math calculation task with 100% (15/15) accuracy without cues. Goal 3: Pt will complete graded attention tasks with 90% accuracy given min cueing. Goal not directly addressed. Adequate sustained attention during all tasks this date. Timeframe for Short-term Goals: 5 days (2/20/2020)         Other areas targeted: NA    Education:   Pt educated on role of SLP, rationale of task, and strategies for organization and recall. Safety Devices: [x] Call light within reach  [] Chair alarm activated  [x] Bed alarm activated  [x] Other: Belongings within reach  [] Call light within reach  [] Chair alarm activated  [] Bed alarm activated  [] Other:    Assessment: Pt seen this date for cognitive communication tx session. Pt alert, pleasant, and agreeable to all tasks. Session focused on internal and external memory strategy use.  Pt able to use chunking strategy to recall list of items with 100%

## 2020-02-19 LAB
GLUCOSE BLD-MCNC: 134 MG/DL (ref 70–99)
GLUCOSE BLD-MCNC: 140 MG/DL (ref 70–99)
GLUCOSE BLD-MCNC: 154 MG/DL (ref 70–99)
GLUCOSE BLD-MCNC: 165 MG/DL (ref 70–99)
PERFORMED ON: ABNORMAL

## 2020-02-19 PROCEDURE — 1280000000 HC REHAB R&B

## 2020-02-19 PROCEDURE — 97110 THERAPEUTIC EXERCISES: CPT

## 2020-02-19 PROCEDURE — 97116 GAIT TRAINING THERAPY: CPT

## 2020-02-19 PROCEDURE — 97530 THERAPEUTIC ACTIVITIES: CPT

## 2020-02-19 PROCEDURE — 6370000000 HC RX 637 (ALT 250 FOR IP): Performed by: PHYSICAL MEDICINE & REHABILITATION

## 2020-02-19 PROCEDURE — 97535 SELF CARE MNGMENT TRAINING: CPT

## 2020-02-19 PROCEDURE — 97130 THER IVNTJ EA ADDL 15 MIN: CPT

## 2020-02-19 PROCEDURE — 97129 THER IVNTJ 1ST 15 MIN: CPT

## 2020-02-19 RX ORDER — FLUDROCORTISONE ACETATE 0.1 MG/1
0.1 TABLET ORAL DAILY
Status: DISCONTINUED | OUTPATIENT
Start: 2020-02-19 | End: 2020-02-23 | Stop reason: HOSPADM

## 2020-02-19 RX ADMIN — OXYBUTYNIN CHLORIDE 5 MG: 5 TABLET ORAL at 20:31

## 2020-02-19 RX ADMIN — OXYBUTYNIN CHLORIDE 5 MG: 5 TABLET ORAL at 08:53

## 2020-02-19 RX ADMIN — ATORVASTATIN CALCIUM 40 MG: 40 TABLET, FILM COATED ORAL at 20:31

## 2020-02-19 RX ADMIN — FLUDROCORTISONE ACETATE 0.1 MG: 0.1 TABLET ORAL at 11:00

## 2020-02-19 RX ADMIN — PANTOPRAZOLE SODIUM 40 MG: 40 TABLET, DELAYED RELEASE ORAL at 17:43

## 2020-02-19 RX ADMIN — OXYBUTYNIN CHLORIDE 5 MG: 5 TABLET ORAL at 14:00

## 2020-02-19 RX ADMIN — FERROUS SULFATE TAB 325 MG (65 MG ELEMENTAL FE) 325 MG: 325 (65 FE) TAB at 17:43

## 2020-02-19 RX ADMIN — PANTOPRAZOLE SODIUM 40 MG: 40 TABLET, DELAYED RELEASE ORAL at 05:35

## 2020-02-19 RX ADMIN — FERROUS SULFATE TAB 325 MG (65 MG ELEMENTAL FE) 325 MG: 325 (65 FE) TAB at 08:53

## 2020-02-19 RX ADMIN — VITAMIN D, TAB 1000IU (100/BT) 1000 UNITS: 25 TAB at 08:53

## 2020-02-19 ASSESSMENT — PAIN DESCRIPTION - PROGRESSION
CLINICAL_PROGRESSION: NOT CHANGED

## 2020-02-19 ASSESSMENT — PAIN SCALES - GENERAL: PAINLEVEL_OUTOF10: 5

## 2020-02-19 ASSESSMENT — PAIN DESCRIPTION - ORIENTATION: ORIENTATION: MID

## 2020-02-19 ASSESSMENT — PAIN DESCRIPTION - LOCATION: LOCATION: ABDOMEN

## 2020-02-19 NOTE — PROGRESS NOTES
Occupational Therapy  Facility/Department: [facility]  Daily Treatment Note  NAME: Allyson Pantoja  :   MRN: [id number]    Date of Service: 2020    Discharge Recommendations:  Home with assist PRN, Home with Home health OT       Assessment   Performance deficits / Impairments: Decreased endurance;Decreased balance;Decreased functional mobility ; Decreased high-level IADLs;Decreased coordination;Decreased safe awareness;Decreased ADL status; Decreased strength  Assessment: First session: Pt SPV for transfers and mobility with RW to/from restroom and to/from gym without complaint. Pt tolerates multiple stands for participating in large tabletop puzzle with very poor problem solving noted and pt reports feeling \"disoriented\" when she is having difficulty with problem solving. Pt with decreased problem solving, even with visual cues from box, still needs VC's for placing items and problem solving. Second session: Transfers and functional mobility to/from restroom with SPV with RW, cues to dry hands after washing. Pt cont to demo poor problem solving and need cues for sequencing for basic tasks. Discussed d/c plans, pt cont to be evasive to questions, does not appear to understand different levels of care. Pt asks \"what do you think I need? \" educated pt that she is not currently needing physical assist. Pt states \"But I have lost my balance with the other one\", upon further questioning, pt reports was during a balance activity. Educated pt that she would not likely be completing high level balance challenging activities in her daily routine. Cont POC. OT Education: OT Role;Plan of Care;IADL Safety; Energy Conservation;Orientation;Precautions; ADL Adaptive Strategies;Transfer Training;Equipment;Home Exercise Program  Patient Education: Pt verbalized understanding.   Activity Tolerance  Activity Tolerance: Patient Tolerated treatment well;Patient limited by fatigue  Activity Tolerance: pt reports feeling \"disoriented\" when she gets \"stuck\" during task d/t poor problem solving   Safety Devices  Safety Devices in place: Yes  Type of devices: Left in chair;Call light within reach; Chair alarm in place;Nurse notified;Gait belt         Patient Diagnosis(es): There were no encounter diagnoses. has a past medical history of Hyperlipidemia, Hypertension, Obesity, and Type 2 diabetes mellitus without complication ([facility] [address] has a past surgical history that includes Colonoscopy and Upper gastrointestinal endoscopy (N/A, 2/7/2020). Restrictions  Restrictions/Precautions  Restrictions/Precautions: General Precautions, Fall Risk  Subjective   General  Chart Reviewed: Yes  Patient assessed for rehabilitation services?: Yes  Response to previous treatment: Patient with no complaints from previous session  Family / Caregiver Present: No  Referring Practitioner: Tonya Perkins MD  Diagnosis: debility  Subjective  Subjective: pt supine, agreeable  General Comment  Comments: Per RN ok for therapy  Pre Treatment Pain Screening  Intervention List: Patient able to continue with treatment  Vital Signs  Patient Currently in Pain: Denies   Orientation  Orientation  Overall Orientation Status: Within Functional Limits  Objective    ADL  Grooming: Supervision;Verbal cueing(pt needs cues to dry hands after washing)  LE Dressing: Supervision(doff/shazia socks )  Toileting: Supervision(cues for safety and thoroughness )        Balance  Sitting Balance: Independent  Standing Balance: Supervision  Standing Balance  Time: 4 min, 5 min, 8 min, 3-4 min x multiple attempts  Activity: toileting/grooming, functional mobility, stnading abalnce actvity  Comment: with RW  Functional Mobility  Functional - Mobility Device: Rolling Walker  Activity: To/from bathroom; To/From therapy gym  Assist Level: Supervision(RW)  Toilet Transfers  Toilet - Technique: Ambulating  Equipment Used: Standard toilet  Toilet Transfer: Supervision  Toilet Transfers Comments:

## 2020-02-19 NOTE — PROGRESS NOTES
Leonora Glass [facility]  2/19/2020  [id number]    Chief Complaint: Debility    Subjective:   No issues overnight. BP still dropping intermittently. ROS: No n/v, cp, sob, f/c  Objective:  Patient Vitals for the past 24 hrs:   BP Temp Temp src Pulse Resp SpO2 Weight   02/19/20 0739 137/80 98 °F (36.7 °C) Oral 79 16 97 % --   02/19/20 0416 -- -- -- -- -- -- 199 lb 11.2 oz (90.6 kg)   02/18/20 2045 119/66 98.1 °F (36.7 °C) Oral 68 18 97 % --     Gen: No distress, pleasant. HEENT: Normocephalic, atraumatic. CV: Regular rate and rhythm. Resp: No respiratory distress. Abd: Soft, nontender   Ext: No edema. Neuro: Alert, oriented, appropriately interactive. Wt Readings from Last 3 Encounters:   02/19/20 199 lb 11.2 oz (90.6 kg)   02/11/20 200 lb (90.7 kg)   02/07/20 204 lb (92.5 kg)       Laboratory data:   Lab Results   Component Value Date    WBC 7.4 02/17/2020    HGB 7.9 (L) 02/17/2020    HCT 23.5 (L) 02/17/2020    MCV 93.7 02/17/2020     02/17/2020       Lab Results   Component Value Date     02/17/2020    K 4.6 02/17/2020     02/17/2020    CO2 23 02/17/2020    BUN 17 02/17/2020    CREATININE 1.2 02/17/2020    GLUCOSE 130 02/17/2020    CALCIUM 8.5 02/17/2020        Therapy progress:  PT     Objective     Sit to Stand: Modified independent  Stand to sit: Modified independent  Bed to Chair: Modified independent  Device: Hi - lo table  Assistance: Stand by assistance  Distance: 150' + 150' + 39' + 60'  OT  PT Equipment Recommendations  Equipment Needed: Yes  Mobility Devices: Deny Nicolas: Rolling  Toilet - Technique: Ambulating  Equipment Used: Standard toilet  Toilet Transfers Comments: cues to line up with toilet and use grab bar before sitting. Assessment        SLP                Body mass index is 34.28 kg/m². Rehabilitation Diagnosis:  16.0, Debility (non-cardiac, non-pulmonary     Assessment and Plan:  Abdominal pain. Nonobstructive bowel gas pattern; advance bowel regimen.

## 2020-02-19 NOTE — PROGRESS NOTES
Speech Language Pathology  [facility]: ACUTE REHAB UNIT  SPEECH-LANGUAGE PATHOLOGY      [x] Daily  [] Weekly Care Conference Note  [] Discharge    Patient: Betty Castañeda      :1945  YQD:[id number]  Rehab Dx/Hx: Luisa Oneill [R53.81]    Precautions: Falls  Home situation: Lives Independently  ST Dx: [] Aphasia  [] Dysarthria  [] Apraxia   [] Oropharyngeal dysphagia [x] Cognitive Impairment  [] Other:   Date of Admit: 2020  Room #: 2210/0210-99    Current functional status (updated daily):         Pt being seen for : [] Speech/Language Treatment  [] Dysphagia Treatment [x] Cognitive Treatment  [] Other:  Communication: [x]WFL  [] Aphasia  [] Dysarthria  [] Apraxia  [] Pragmatic Impairment [] Non-verbal  [] Hearing Loss  [] Other:   Cognition: [] WFL  [x] Mild  [] Moderate  [] Severe [] Unable to Assess  [] Other:  Memory: [] WFL  [x] Mild  [] Moderate  [] Severe [] Unable to Assess  [] Other:  Behavior: [x] Alert  [x] Cooperative  [x]  Pleasant  [] Confused  [] Agitated  [] Uncooperative  [] Distractible [] Motivated  [] Self-Limiting [] Anxious  [] Other:  Endurance:  [x] Adequate for participation in SLP sessions  [] Reduced overall  [] Lethargic  [] Other:  Safety: [x] No concerns at this time  [] Reduced insight into deficits  []  Reduced safety awareness [] Not following call light procedures  [] Unable to Assess  [] Other:    Current Diet Order:DIET GENERAL;  Swallowing Precautions: None        Date: 2020      Tx session 1  [id number]  [facility], Graduate Clinician    Carlton Ordaz MA, CCC-SLP Tx session 2  All needs met during tx session 1   Total Timed Code Min 30    Total Treatment Minutes 30    Individual Treatment Minutes 30    Group Treatment Minutes 0 0   Co-Treat Minutes 0 0   Variance/Reason:      Pain None reported     Pain Intervention [] RN notified  [] Repositioned  [] Intervention offered and patient declined  [x] N/A  [] Other: [] RN notified  [] Repositioned  [] Intervention upright in chair in pt's room. Pt participated in medication management, independently with 100%. Pt participated in functional naming task alternating between 2-3 categories. Pt required min cues to name item within the categories. Pt and SLP reviewed strategies to assist with remembering to take medication. Pt continues to make progress, continue stated goals and POC. Plan: Continue as per plan of care. Additional Information: N/A    Barriers toward progress: Mild Cognitive Impairment   Discharge recommendations:  [] Home independently  [] Home with assistance []  24 hour supervision  [] ECF [x] Other: PT/OT recs. Continued Tx Upon Discharge: ? [] Yes [] No [x] TBD based on progress while on ARU [] Vital Stim indicated [] Other:   Estimated discharge date: TBD    Interventions used this date:  [] Speech/Language Treatment  [] Instruction in HEP [] Group [] Dysphagia Treatment [x] Cognitive Treatment   [] Other:       Total Time Breakdown / Charges    Time in Time out Total Time / units   Cognitive Tx 0900 0930 30 min/ 2 units   Speech Tx      Dysphagia Tx          Electronically Signed by     Ronald,  Clinician     Elvie Louis, [address] [facility]  Speech-Language Pathologist

## 2020-02-19 NOTE — PROGRESS NOTES
Physical Therapy  Facility/Department: [facility]  Daily Treatment Note  NAME: Betty Castañeda  : 1945  MRN: [id number]    Date of Service: 2020    Discharge Recommendations:  24 hour supervision or assist, Outpatient PT        Assessment  Patient seen for 30 minute session LE stregthen and core strengthen, endurance with transfers and short distance gait with monitoring of vitals for postural hypotension  After 60 seconds of standing activity with salient drop in BP total poinst systolic and diastolic drop 79 with increase in HR 33bpm as noted with pt c/o dizziness and need to sit. Request thigh high TY hose from physician and noted to physician pt's ongoing symptomatic hypotension. Pt with recovery from dizziness with seated rest.   Patient noted to have improved steadiness with transfers bed<>chair and sit<>stand with FWW and from toilet transfers with FWW. Needed SBA for walking 10 feet x2 with FWW with pt c/o increased dizziness after standing at sink to wash hands. Pt noted with rhythmic tongue protrusion udring am activities. See updated goals. Patient Diagnosis(es): There were no encounter diagnoses. has a past medical history of Hyperlipidemia, Hypertension, Obesity, and Type 2 diabetes mellitus without complication ([facility] [address] has a past surgical history that includes Colonoscopy and Upper gastrointestinal endoscopy (N/A, 2020).     Restrictions  Restrictions/Precautions  Restrictions/Precautions: General Precautions, Fall Risk  Subjective   General  Chart Reviewed: Yes  Subjective  Subjective: Patient reported pt has 5/10 stomach pain and notes she is hving difficulty having bowel movements  Pain Assessment  Pain Assessment: 0-10  Pain Level: 5  Pain Location: Abdomen  Pain Orientation: Mid  Vital Signs  Blood Pressure Sittin/76(RUE )  Pulse Sittin PER MINUTE  Blood Pressure Standin/62(after 1 minute of standing marching in walker )  Pulse Standin PER MINUTE  Pt c/o dizziness after walking        Orientation  Orientation  Overall Orientation Status: Within Normal Limits  Cognition      Objective   Bed mobility  Rolling to Left: Independent  Rolling to Right: Independent  Supine to Sit: Independent  Sit to Supine: Independent  Scooting: Independent  Transfers  Sit to Stand: Modified independent  Stand to sit: Modified independent  Bed to Chair: Modified independent  Comment: FWW pt uses for bed<>chair and sit <>stand with safe technique demonstrated. Ambulation  Ambulation?: Yes  More Ambulation?: Yes  Ambulation 1  Surface: level tile  FWW  Assistance: Stand by assistance  Needed SBA for walking 10 feet x2 with FWW with pt c/o increased dizziness after standing at sink to wash hands. Exercises  Bridging: (x10 )  Straight Leg Raise: 1 x 10  Hip Abduction: 1x10 bLE in sidelying          Comment: toilet transfer with FWW ANTHONY                    Goals  Short term goals  Time Frame for Short term goals: 5 days, 2/17/20  Short term goal 1: Pt will perform supine<>Sit with independence. 2/14/2020 GOAL MEt pt demo indep in bed mobility. Short term goal 2: Pt will perform sit<>Stand with supervision with LRAD. - GOAL NOT MET, pt progressing, completes with SBA with and without AD  GOAL MET 2/19 sit<>Stand FWW ANTHONY  Short term goal 3: Pt will ambulate with the SBQC and  feet. - GOAL NOT MET, pt progressing, completes 150' wiht SBQC and CGA  Short term goal 4: Pt will participate in 12-15 reps of BLE ther ex for strength and balance. 2/14/2020 GOAL MEt patient performed 15 reps or greater BLE therex in supine/sidelying this date. Short term goal 5: Pt will ascend/descend 12 steps with SBA. - GOAL NOT MET  Long term goals  Time Frame for Long term goals : 9 days, 2/20/20  Long term goal 1: Pt will perform sit<>Stand without an AD with independence. Long term goal 2: Pt will ambulate with no AD and independence 150 feet.   Long term goal 3: Pt will

## 2020-02-20 LAB
ANION GAP SERPL CALCULATED.3IONS-SCNC: 15 MMOL/L (ref 3–16)
BASOPHILS ABSOLUTE: 0.1 K/UL (ref 0–0.2)
BASOPHILS RELATIVE PERCENT: 1.3 %
BUN BLDV-MCNC: 13 MG/DL (ref 7–20)
CALCIUM SERPL-MCNC: 9 MG/DL (ref 8.3–10.6)
CHLORIDE BLD-SCNC: 104 MMOL/L (ref 99–110)
CO2: 23 MMOL/L (ref 21–32)
CREAT SERPL-MCNC: 1 MG/DL (ref 0.6–1.2)
EOSINOPHILS ABSOLUTE: 0.3 K/UL (ref 0–0.6)
EOSINOPHILS RELATIVE PERCENT: 4.2 %
GFR AFRICAN AMERICAN: >60
GFR NON-AFRICAN AMERICAN: 54
GLUCOSE BLD-MCNC: 132 MG/DL (ref 70–99)
GLUCOSE BLD-MCNC: 135 MG/DL (ref 70–99)
GLUCOSE BLD-MCNC: 135 MG/DL (ref 70–99)
GLUCOSE BLD-MCNC: 143 MG/DL (ref 70–99)
GLUCOSE BLD-MCNC: 147 MG/DL (ref 70–99)
HCT VFR BLD CALC: 28.5 % (ref 36–48)
HEMOGLOBIN: 9.2 G/DL (ref 12–16)
LYMPHOCYTES ABSOLUTE: 1.4 K/UL (ref 1–5.1)
LYMPHOCYTES RELATIVE PERCENT: 18.7 %
MCH RBC QN AUTO: 30.6 PG (ref 26–34)
MCHC RBC AUTO-ENTMCNC: 32.4 G/DL (ref 31–36)
MCV RBC AUTO: 94.2 FL (ref 80–100)
MONOCYTES ABSOLUTE: 0.4 K/UL (ref 0–1.3)
MONOCYTES RELATIVE PERCENT: 4.8 %
NEUTROPHILS ABSOLUTE: 5.4 K/UL (ref 1.7–7.7)
NEUTROPHILS RELATIVE PERCENT: 71 %
PDW BLD-RTO: 16.9 % (ref 12.4–15.4)
PERFORMED ON: ABNORMAL
PLATELET # BLD: 440 K/UL (ref 135–450)
PMV BLD AUTO: 7.7 FL (ref 5–10.5)
POTASSIUM REFLEX MAGNESIUM: 4 MMOL/L (ref 3.5–5.1)
RBC # BLD: 3.02 M/UL (ref 4–5.2)
SODIUM BLD-SCNC: 142 MMOL/L (ref 136–145)
WBC # BLD: 7.6 K/UL (ref 4–11)

## 2020-02-20 PROCEDURE — 6370000000 HC RX 637 (ALT 250 FOR IP): Performed by: PHYSICAL MEDICINE & REHABILITATION

## 2020-02-20 PROCEDURE — 97130 THER IVNTJ EA ADDL 15 MIN: CPT

## 2020-02-20 PROCEDURE — 97535 SELF CARE MNGMENT TRAINING: CPT

## 2020-02-20 PROCEDURE — 36415 COLL VENOUS BLD VENIPUNCTURE: CPT

## 2020-02-20 PROCEDURE — 97116 GAIT TRAINING THERAPY: CPT

## 2020-02-20 PROCEDURE — 97530 THERAPEUTIC ACTIVITIES: CPT

## 2020-02-20 PROCEDURE — 97110 THERAPEUTIC EXERCISES: CPT

## 2020-02-20 PROCEDURE — 97129 THER IVNTJ 1ST 15 MIN: CPT

## 2020-02-20 PROCEDURE — 85025 COMPLETE CBC W/AUTO DIFF WBC: CPT

## 2020-02-20 PROCEDURE — 80048 BASIC METABOLIC PNL TOTAL CA: CPT

## 2020-02-20 PROCEDURE — 1280000000 HC REHAB R&B

## 2020-02-20 RX ADMIN — VITAMIN D, TAB 1000IU (100/BT) 1000 UNITS: 25 TAB at 08:50

## 2020-02-20 RX ADMIN — FLUDROCORTISONE ACETATE 0.1 MG: 0.1 TABLET ORAL at 08:51

## 2020-02-20 RX ADMIN — ATORVASTATIN CALCIUM 40 MG: 40 TABLET, FILM COATED ORAL at 21:04

## 2020-02-20 RX ADMIN — OXYBUTYNIN CHLORIDE 5 MG: 5 TABLET ORAL at 21:04

## 2020-02-20 RX ADMIN — PANTOPRAZOLE SODIUM 40 MG: 40 TABLET, DELAYED RELEASE ORAL at 17:00

## 2020-02-20 RX ADMIN — FERROUS SULFATE TAB 325 MG (65 MG ELEMENTAL FE) 325 MG: 325 (65 FE) TAB at 17:00

## 2020-02-20 RX ADMIN — PANTOPRAZOLE SODIUM 40 MG: 40 TABLET, DELAYED RELEASE ORAL at 08:51

## 2020-02-20 RX ADMIN — FERROUS SULFATE TAB 325 MG (65 MG ELEMENTAL FE) 325 MG: 325 (65 FE) TAB at 08:50

## 2020-02-20 RX ADMIN — OXYBUTYNIN CHLORIDE 5 MG: 5 TABLET ORAL at 08:50

## 2020-02-20 ASSESSMENT — PAIN DESCRIPTION - PROGRESSION
CLINICAL_PROGRESSION: NOT CHANGED

## 2020-02-20 ASSESSMENT — PAIN SCALES - WONG BAKER
WONGBAKER_NUMERICALRESPONSE: 0
WONGBAKER_NUMERICALRESPONSE: 0

## 2020-02-20 ASSESSMENT — PAIN SCALES - GENERAL
PAINLEVEL_OUTOF10: 0
PAINLEVEL_OUTOF10: 0
PAINLEVEL_OUTOF10: 3
PAINLEVEL_OUTOF10: 0

## 2020-02-20 ASSESSMENT — PAIN DESCRIPTION - PAIN TYPE: TYPE: ACUTE PAIN

## 2020-02-20 ASSESSMENT — PAIN DESCRIPTION - LOCATION: LOCATION: ABDOMEN

## 2020-02-20 NOTE — PROGRESS NOTES
Physical Therapy  Facility/Department: [facility]  Daily Treatment Note  NAME: Jason Norton  : [birth year]  MRN: [id number]    Date of Service: 2020    Discharge Recommendations:  24 hour supervision or assist, Outpatient PT   PT Equipment Recommendations  Walker: Rolling  Other: defer to patient's facility. Assessment   Assessment: PT seen for individual session with patiet noted to have improved BP response with thigh high TY hose donned. Nursing ordered next larger size up to try for increased comfort  after lunch as curren ones uncomfortable per patient. Pt progressed to 4 steps, up to 250 feet with gait  and  SBA to CGA with transfers as noted with increased steadiness and no report of dizziness. BLE and turnk weakness affect balance and gait stabilty. Continue to progress towards goals. PT given cues with  transfers for pushing up from and reaching back to transfer surface intermittently  with and wihtout use of AD. Patient given gait training with  FWW and for wide imelda compensatory gait strategy without AD. Cotninue to progress towqards goals. Treatment Diagnosis: decreased independence with functional mobility. Prognosis: Good  Decision Making: Medium Complexity        Patient Diagnosis(es): There were no encounter diagnoses. has a past medical history of Hyperlipidemia, Hypertension, Obesity, and Type 2 diabetes mellitus without complication ([facility] [address] has a past surgical history that includes Colonoscopy and Upper gastrointestinal endoscopy (N/A, 2020). Restrictions  Restrictions/Precautions  Restrictions/Precautions: General Precautions, Fall Risk  Subjective      Vital Signs  Orthostatic B/P and Pulse?: (thigh high TY hose donned during session.   total assist from PT to don. )  Blood Pressure Lyin/64  Pulse Lyin PER MINUTE(98% SpO2  on RA)  Blood Pressure Sittin/82  Pulse Sittin PER MINUTE(SpO2 99% on RA )  Blood Pressure Standin/69  Pulse Standin PER MINUTE(pt notes the compression garments  hurt her legs. )  Orthostatic B/P and Pulse?: (thigh high TY hose donned during session. total assist from PT to don. )       Orientation     Cognition      Objective      Transfers  Sit to Stand: Stand by assistance;Contact guard assistance  Stand to sit: Stand by assistance;Contact guard assistance  Bed to Chair: Stand by assistance  Car Transfer: Contact guard assistance(FWW)  Comment: 4 trials CGA  to SBA bed<>chair with no AD with FWW SBA bed<>chair and sit<>stand to/from chair with FWW x3  Ambulation  Ambulation?: Yes  WB Status: FWB thigh high TY hose   More Ambulation?: Yes  Ambulation 1  Surface: level tile  Device: Rolling Walker  Assistance: Supervision  Quality of Gait: wide imelda leaning heavily on walker   Gait Deviations: Slow Susy;Decreased step length;Decreased step height; Increased IMELDA;Shuffles  Distance: 250 feet, 150 feet, 100 feet over level,  up to 10 feet of carpet with left right turns  Comments: Post 250 feet of walking  /70  bpm.   Ambulation 2  Surface - 2: level tile  Device 2: No device  Assistance 2: Stand by assistance;Contact guard assistance  Quality of Gait 2: wide imelda with increased medial lateral weight shift with increased trunk sway noted  Gait Deviations: Slow Susy;Decreased step length; Increased IMELDA; Decreased step height;Shuffles;Decreased arm swing  Distance: 5 feet x10  Stairs/Curb  Stairs?: Yes  Stairs  # Steps : 4  Stairs Height: 6\"  Rails: Bilateral  Curbs: 6\"(CGA with cues up with RLE and down with LLE leading )  Device: No Device  Assistance: Contact guard assistance  Comment: Pt very slow to complete. Step to pattern with dependence on BUE. Given cues for sequencing for up with RLE and down with LLE as patient notes to have Left knee weakness with steps per patient.                                     Goals  Short term goals  Time Frame for Short term goals: 5 days, 20  Short term goal 1: Pt will perform supine<>Sit with independence. 2/14/2020 GOAL MEt pt demo indep in bed mobility. Short term goal 2: Pt will perform sit<>Stand with supervision with LRAD. - GOAL MET 2/19  Short term goal 3: Pt will ambulate with the [facility] and  feet. - GOAL NOT MET, pt progressing, completes 150' wiht SBQC and CGA  Short term goal 4: Pt will participate in 12-15 reps of BLE ther ex for strength and balance. 2/14/2020 GOAL MEt patient performed 15 reps or greater BLE therex in supine/sidelying this date. Short term goal 5: Pt will ascend/descend 12 steps with SBA. - GOAL NOT MET  Long term goals  Time Frame for Long term goals : 9 days, 2/20/20  Long term goal 1: Pt will perform sit<>Stand without an AD with independence. Long term goal 2: Pt will ambulate with no AD and independence 150 feet. Long term goal 3: Pt will ascend/descend 12 steps with one handrail and independence. - 8 steps CGA  Long term goal 4: Pt will perform all transfers with independence. Patient Goals   Patient goals : To feel better. Plan    Plan  Times per week: 5 out of 7  Times per day: Daily  Plan weeks: 9 days, 2/20/20  Specific instructions for Next Treatment: progress mobility as tolerated  Current Treatment Recommendations: Strengthening, ADL/Self-care Training, Gait Training, Patient/Caregiver Education & Training, Stair training, Balance Training, Pain Management, Endurance Training, Safety Education & Training, Transfer Training, Functional Mobility Training, Equipment Evaluation, Education, & procurement, Home Exercise Program  Safety Devices  Type of devices:  All fall risk precautions in place, Call light within reach, Chair alarm in place, Gait belt, Patient at risk for falls, Nurse notified, Left in chair  Restraints  Initially in place: No     Therapy Time   Individual Concurrent Group Co-treatment   Time In 1035         Time Out 1135         Minutes 60         Timed Code Treatment Minutes: 60 Minutes

## 2020-02-20 NOTE — PROGRESS NOTES
Physical Therapyp  Facility/Department: [facility]  Daily Treatment Note  NAME: Amee Munroe  :   MRN: [id number]    Date of Service: 2020    Discharge Recommendations:  24 hour supervision or assist, Outpatient PT   PT Equipment Recommendations  Equipment Needed: Yes  Mobility Devices: Mehnaz Sjogren: Rolling    Assessment   Body structures, Functions, Activity limitations: Decreased functional mobility ; Decreased safe awareness;Decreased endurance;Decreased balance;Decreased high-level IADLs; Increased pain;Decreased ADL status; Decreased strength;Decreased posture  Assessment: Pt tolerated first therapy session well this morning. States she feels as though her endurance over past year as been declining. Reports feeling disoriented and fatigued following 160 ft of gait training versus dizzy. BP stable. Supervision for gait training with RW. Tolerated standing exercises well but limited by fatigue, requiring frequent rest breaks. Will continue to progress per POC. Treatment Diagnosis: decreased independence with functional mobility. Specific instructions for Next Treatment: progress mobility as tolerated  Prognosis: Good  Decision Making: Medium Complexity  PT Education: Goals;PT Role;Plan of Care;Transfer Training;Equipment;Gait Training;Functional Mobility Training;General Safety;Precautions; Energy Conservation  Patient Education: Pt verbalized understanding  Barriers to Learning: none  REQUIRES PT FOLLOW UP: Yes  Activity Tolerance  Activity Tolerance: Patient Tolerated treatment well;Patient limited by endurance  Activity Tolerance: Seated /81; Standin/82     Patient Diagnosis(es): There were no encounter diagnoses. has a past medical history of Hyperlipidemia, Hypertension, Obesity, and Type 2 diabetes mellitus without complication ([facility] [address]    has a past surgical history that includes Colonoscopy and Upper gastrointestinal endoscopy (N/A, 2/7/2020). Restrictions  Restrictions/Precautions  Restrictions/Precautions: General Precautions, Fall Risk  Subjective   General  Chart Reviewed: Yes  Response To Previous Treatment: Patient with no complaints from previous session. Family / Caregiver Present: No  Referring Practitioner: Ramez Domingo MD  Subjective  Subjective: Pt resting in bed on approach; agreeable to therapy. States did not sleep well last night d/t dry mouth and difficulty swallowing, RN aware  Pain Screening  Patient Currently in Pain: Yes  Pain Assessment  Pain Assessment: 0-10  Pain Level: 3  Pain Type: Acute pain  Pain Location: Abdomen  Non-Pharmaceutical Pain Intervention(s): Ambulation/Increased Activity;Repositioned  Response to Pain Intervention: Patient Satisfied       Objective   Bed mobility  Supine to Sit: Independent     Transfers  Sit to Stand: Modified independent  Stand to sit: Modified independent  Bed to Chair: Modified independent(with RW)     Ambulation  Ambulation?: Yes  Ambulation 1  Surface: level tile  Device: Rolling Walker  Assistance: Supervision  Quality of Gait: Cues to relax shoulder and upper traps with improvement noted  Gait Deviations: Slow Susy;Decreased step length;Decreased step height; Increased MARVIN;Shuffles  Distance: 160 ft  Comments: Post gait training /83, HR 88; SPO2 98% -- states not dizzy but feels disoriented        Exercises  Hip Flexion: Standing alternating marches 2 x 15 BLE with RW   Standing heel raises 2 x 15 BLE with RW  SBA from therapist for safety/balance. Cues for technique. Seated reps between sets. Goals  Short term goals  Time Frame for Short term goals: 5 days, 2/17/20  Short term goal 1: Pt will perform supine<>Sit with independence. 2/14/2020 GOAL MEt pt demo indep in bed mobility. Short term goal 2: Pt will perform sit<>Stand with supervision with LRAD. - GOAL MET 2/19  Short term goal 3: Pt will ambulate with the [facility] and  feet.  - GOAL NOT MET, pt progressing, completes 150' wiht SBQC and CGA  Short term goal 4: Pt will participate in 12-15 reps of BLE ther ex for strength and balance. 2/14/2020 GOAL MEt patient performed 15 reps or greater BLE therex in supine/sidelying this date. Short term goal 5: Pt will ascend/descend 12 steps with SBA. - GOAL NOT MET  Long term goals  Time Frame for Long term goals : 9 days, 2/20/20  Long term goal 1: Pt will perform sit<>Stand without an AD with independence. Long term goal 2: Pt will ambulate with no AD and independence 150 feet. Long term goal 3: Pt will ascend/descend 12 steps with one handrail and independence. - 8 steps CGA  Long term goal 4: Pt will perform all transfers with independence. Patient Goals   Patient goals : To feel better. Plan    Plan  Times per week: 5 out of 7  Times per day: Daily  Plan weeks: 9 days, 2/20/20  Specific instructions for Next Treatment: progress mobility as tolerated  Current Treatment Recommendations: Strengthening, ADL/Self-care Training, Gait Training, Patient/Caregiver Education & Training, Stair training, Balance Training, Pain Management, Endurance Training, Safety Education & Training, Transfer Training, Functional Mobility Training, Equipment Evaluation, Education, & procurement, Home Exercise Program  Safety Devices  Type of devices:  All fall risk precautions in place, Call light within reach, Chair alarm in place, Gait belt, Patient at risk for falls, Nurse notified, Left in chair  Restraints  Initially in place: No     Therapy Time   Individual Concurrent Group Co-treatment   Time In 0900         Time Out 0930         Minutes 30         Timed Code Treatment Minutes: 10 Sea Matias, PT, DPT

## 2020-02-20 NOTE — PROGRESS NOTES
Occupational Therapy  Facility/Department: [facility] ARU  Daily Treatment Note  NAME: Jeni Dancer  :   MRN: [id number]    Date of Service: 2020    Discharge Recommendations:  Home with assist PRN, Home with Home health OT  OT Equipment Recommendations  ADL Assistive Devices: Shower Chair with back    Assessment   Performance deficits / Impairments: Decreased endurance;Decreased balance;Decreased functional mobility ; Decreased high-level IADLs;Decreased coordination;Decreased safe awareness;Decreased ADL status; Decreased strength  Assessment: pt sitting in chair, agreeable. Pt asks \"What am I supposed to do when I leave here? Someone is always helping me do things\". Educated pt that therapy is not physically assisting but are only there for cues and in case. Pt still reports unknown discharge destination. Transfers SPV, pt cont to requires cues for basic tasks at times. Pt tolerates standing for longer periods to perform large wall crossword puzzle, but reports LEs fatigue and needing to sit. Pt then performs B UE ex with 2# weights, tolerates walking back to her room, but requests to return to supine d.t fatigue. COnt POC. OT Education: OT Role;Plan of Care;IADL Safety; Energy Conservation;Orientation;Precautions; ADL Adaptive Strategies;Transfer Training;Equipment;Home Exercise Program  Patient Education: Pt verbalized understanding. Activity Tolerance  Activity Tolerance: Patient Tolerated treatment well;Patient limited by fatigue  Activity Tolerance: pt reports feeling \"disoriented\" when she gets \"stuck\" during task d/t poor problem solving   Safety Devices  Safety Devices in place: Yes  Type of devices: Call light within reach;Nurse notified;Gait belt;Left in bed;Bed alarm in place         Patient Diagnosis(es): There were no encounter diagnoses. has a past medical history of Hyperlipidemia, Hypertension, Obesity, and Type 2 diabetes mellitus without complication ([facility] [address]    has a past Judgement: Good awareness of safety precautions  Problem Solving: Assistance required to generate solutions  Insights: Fully aware of deficits  Initiation: Requires cues for some  Sequencing: Requires cues for some  Cognition Comment: pt appears to have poor insight and poor STM, makes excuses for decreased judgement at times. Pt attempts to rush through tasks, then reports she is \"disoriented\" and needs to sit. Appears to be related to decreased problem solving                    Type of ROM/Therapeutic Exercise  Type of ROM/Therapeutic Exercise: AROM  Comment: BUE AROM ther ex 2#  Exercises  Scapular Protraction: x15  Scapular Retraction: x15  Shoulder Flexion: x15  Shoulder Extension: x15  Horizontal ABduction: x15  Horizontal ADduction: x15  Elbow Flexion: x15  Elbow Extension: x15  Supination: x15  Pronation: x15  Wrist Flexion: x15  Wrist Extension: x15                    Plan   Plan  Times per week: 5-7 days per week  Plan weeks: 9 days  Current Treatment Recommendations: ROM, Balance Training, Functional Mobility Training, Endurance Training, Safety Education & Training, Self-Care / ADL, Strengthening, Equipment Evaluation, Education, & procurement, Patient/Caregiver Education & Training, Home Management Training    Goals  Short term goals  Time Frame for Short term goals: 5 days (2/16)  Short term goal 1: Pt. will perform functional transfers w/ a/e and SBA. -met 2/18  Short term goal 2: Pt. will perform toileting tasks w/ SBA. GOAL MET 2/13/20 Pt performed toileting with supervision. Short term goal 3: Pt. will perform LB dressing tasks w/ SBA and a/e as needed-MET 2/19  Long term goals  Time Frame for Long term goals : 9 days 2/20  Long term goal 1: Pt. will perform functional transfers as Kaylen  Long term goal 2: Pt. will perform  toileting tasks as Kaylen. Long term goal 3: Pt. will perform dressing tasks as Kaylen w/ a/e as needed.   Long term goal 4: Pt. will perform dynamic standing task for 5 min as

## 2020-02-20 NOTE — PROGRESS NOTES
Repositioned  [] Intervention offered and patient declined  [] N/A  [] Other:   Subjective     Pt alert and agreeable for tx, pt seen bedside. Objective:  Goals     Short-term Goals  Timeframe for Short-term Goals: 5 days (2/20/2020)  Goal 1: Pt will complete memory/recall tasks with 90% accuracy given cueing for compensatory strategy use. Functional Recall  - Given a cookie recipe, pt instructed to read directions and ingredients and recall information after recipe was withheld. - Independently: 83% accuracy, increasing to 100% with min cues. - Pt encouraged to use visualization strategies to assist with recall. Goal 2: Pt will complete functional and complex problem solving tasks (meds, money, math, time) with 90% accuracy given min cueing. Time Math Problems  -Pt solved time math problems when given verbally by SLP  - Independently: 80% accuracy, increasing to 100% with min cues. - Pt benefited from pen/paper and repetitions. Goal 3: Pt will complete graded attention tasks with 90% accuracy given min cueing. Divided/Alernating Attention  - Pt instructed to sort through a deck of cards and determining if the card was higher or lower than 7.   - Pt disrupted during task, however, was able to independently begin the task again.   - Pt instructed to sort through cards to determine if the card that was placed is higher or lower than previous card. - 100% accuracy with all tasks, able to self correct independently on one mistake   - Pt's overall attention was [facility] throughout session. Timeframe for Short-term Goals: 5 days (2/20/2020)         Other areas targeted: NA    Education:   Pt educated on recall/attention strategies, role of SLP, and rationale for task.        Safety Devices: [x] Call light within reach  [] Chair alarm activated  [x] Bed alarm activated  [x] Other: Belongings within reach  [] Call light within reach  [] Chair alarm activated  [] Bed alarm activated  []

## 2020-02-21 LAB
EKG ATRIAL RATE: 74 BPM
EKG DIAGNOSIS: NORMAL
EKG P AXIS: 47 DEGREES
EKG P-R INTERVAL: 134 MS
EKG Q-T INTERVAL: 394 MS
EKG QRS DURATION: 74 MS
EKG QTC CALCULATION (BAZETT): 437 MS
EKG R AXIS: 101 DEGREES
EKG T AXIS: 20 DEGREES
EKG VENTRICULAR RATE: 74 BPM
GLUCOSE BLD-MCNC: 119 MG/DL (ref 70–99)
GLUCOSE BLD-MCNC: 136 MG/DL (ref 70–99)
GLUCOSE BLD-MCNC: 160 MG/DL (ref 70–99)
GLUCOSE BLD-MCNC: 173 MG/DL (ref 70–99)
PERFORMED ON: ABNORMAL

## 2020-02-21 PROCEDURE — 93005 ELECTROCARDIOGRAM TRACING: CPT | Performed by: PHYSICAL MEDICINE & REHABILITATION

## 2020-02-21 PROCEDURE — 97129 THER IVNTJ 1ST 15 MIN: CPT

## 2020-02-21 PROCEDURE — 97110 THERAPEUTIC EXERCISES: CPT

## 2020-02-21 PROCEDURE — 97535 SELF CARE MNGMENT TRAINING: CPT

## 2020-02-21 PROCEDURE — 97530 THERAPEUTIC ACTIVITIES: CPT

## 2020-02-21 PROCEDURE — 6370000000 HC RX 637 (ALT 250 FOR IP): Performed by: PHYSICAL MEDICINE & REHABILITATION

## 2020-02-21 PROCEDURE — 1280000000 HC REHAB R&B

## 2020-02-21 PROCEDURE — 97130 THER IVNTJ EA ADDL 15 MIN: CPT

## 2020-02-21 PROCEDURE — 97116 GAIT TRAINING THERAPY: CPT

## 2020-02-21 RX ORDER — FLUDROCORTISONE ACETATE 0.1 MG/1
0.1 TABLET ORAL DAILY
Qty: 30 TABLET | Refills: 3 | Status: SHIPPED | OUTPATIENT
Start: 2020-02-22 | End: 2020-03-23

## 2020-02-21 RX ADMIN — OXYBUTYNIN CHLORIDE 5 MG: 5 TABLET ORAL at 20:53

## 2020-02-21 RX ADMIN — VITAMIN D, TAB 1000IU (100/BT) 1000 UNITS: 25 TAB at 08:36

## 2020-02-21 RX ADMIN — FERROUS SULFATE TAB 325 MG (65 MG ELEMENTAL FE) 325 MG: 325 (65 FE) TAB at 08:36

## 2020-02-21 RX ADMIN — PANTOPRAZOLE SODIUM 40 MG: 40 TABLET, DELAYED RELEASE ORAL at 05:53

## 2020-02-21 RX ADMIN — FERROUS SULFATE TAB 325 MG (65 MG ELEMENTAL FE) 325 MG: 325 (65 FE) TAB at 17:09

## 2020-02-21 RX ADMIN — LOSARTAN POTASSIUM 100 MG: 100 TABLET, FILM COATED ORAL at 08:36

## 2020-02-21 RX ADMIN — PANTOPRAZOLE SODIUM 40 MG: 40 TABLET, DELAYED RELEASE ORAL at 15:33

## 2020-02-21 RX ADMIN — OXYBUTYNIN CHLORIDE 5 MG: 5 TABLET ORAL at 08:36

## 2020-02-21 RX ADMIN — AMLODIPINE BESYLATE 2.5 MG: 2.5 TABLET ORAL at 08:36

## 2020-02-21 RX ADMIN — ATORVASTATIN CALCIUM 40 MG: 40 TABLET, FILM COATED ORAL at 20:53

## 2020-02-21 RX ADMIN — OXYBUTYNIN CHLORIDE 5 MG: 5 TABLET ORAL at 15:33

## 2020-02-21 RX ADMIN — FLUDROCORTISONE ACETATE 0.1 MG: 0.1 TABLET ORAL at 08:36

## 2020-02-21 ASSESSMENT — PAIN SCALES - GENERAL
PAINLEVEL_OUTOF10: 0

## 2020-02-21 NOTE — PROGRESS NOTES
chair at beginning and end of session    Functional Transfers: Toilet Transfers  Toilet - Technique: Ambulating  Equipment Used: Standard toilet  Toilet Transfer: Supervision  Toilet Transfers Comments: cues to line up with toilet and use grab bar before sitting. Shower Transfers  Shower - Transfer From: Marcy Marker - Transfer Type: To and From  Shower - Transfer To: Transfer tub bench  Shower Transfers: Supervision           Functional Mobility  Functional - Mobility Device: Rolling Walker  Activity: To/from bathroom  Assist Level: Supervision(RW)  Functional Mobility Comments: cues to keep walker closer to her during mobility      UE Function: WFL  Hand Dominance  Hand Dominance: Right    Assessment:   Assessment: Pt pleasant and cooperative, but cont to demo poor problem solving for familiar basic tasks. Pt will ask for assist to wash feet, but able to do for self when cued to cross legs as she does for LB dressing. Decreased problem noted often with familiar tasks. Pt able to complete toileting, bathing and dressing with SPV, cues for thoroughness and sequencing at times. Pt scheduled for discharge this weekend, recommned 24 hr SPV d/t cognitive deficits.    Prognosis: Good     REQUIRES OT FOLLOW UP: Yes  Discharge Recommendations: Home with assist PRN, Home with Home health OT    Equipment Recommendations:  Shower chair with back if going home, defer to next LOC      Electronically signed by Debby Andrea OT on 2/21/2020 at 1:55 PM

## 2020-02-21 NOTE — PROGRESS NOTES
balancing a checkbook and placing the information in the appropriate column. Pt has demonstrated an improvement in her cognitive linguistic skills. Continue stated goals and POC. Discharge Summary : Patient has met all goals and returned to [facility] administered 2/21. Patient is able to independently manage medications and able to manage finances with independence-min verbal cues across multiple sessions. Patient is able to use compensatory strategies with independence to min verbal cues to increase recall to near 100% accuracy . Plan: Continue as per plan of care. Additional Information: N/A    Barriers toward progress: Mild Cognitive Impairment   Discharge recommendations:  [] Home independently  [] Home with assistance []  24 hour supervision  [] ECF [x] Other: PT/OT recs. Continued Tx Upon Discharge: ? [] Yes [] No [x] TBD based on progress while on ARU [] Vital Stim indicated [] Other:   Estimated discharge date: TBD    Interventions used this date:  [] Speech/Language Treatment  [] Instruction in HEP [] Group [] Dysphagia Treatment [x] Cognitive Treatment   [] Other: Total Time Breakdown / Charges    Time in Time out Total Time / units   Cognitive Tx 0930 1000 30 min/ 2 units   Speech Tx      Dysphagia Tx          Electronically Signed by     Carin Sorensen,  Clinician     Angel Jones. A.  [id number] [facility]  Speech-Language Pathologist [phone]

## 2020-02-21 NOTE — PROGRESS NOTES
Physical Therapy  Facility/Department: [facility]  Daily Treatment Note  NAME: Alejandra Mclaughlin  : 1945  MRN: [id number]    Date of Service: 2020    Discharge Recommendations:  24 hour supervision or assist, Outpatient PT        Assessment   Assessment: Patient seen for review of wide imelda for compensatory gait and transfer strategy with pt able to demo ANTHONY with FWW and wide imelda for distances 150 feet or greater, ANTHONY bed<>chair, car with FWW, able to demo with grabber retrieve object (tissue) from ground in standing ANTHONY, demo up and down curb step ANTHONY and up and down 4 steps with bilateral rails non alt pattern up with RLE and down with LLE with pt noting antalgic Left knee 5/10 pain with steps with impaired WB tolerance for steps due to pain and weakness. Patient continues to require use of AD for steady gait and transfers due to ataxia without use of AD for gait and transfers. Recommend further PT to address core and compensatory gait/transfer strategies as well as uptrain hip and knee righting /balance reaction sin wide imelda. See updated goals. Recommend use of FWW for gait and transfers upon DC. PT progressed to home level indep with FWW for curb step, carpet, level and transfers with FWW. PT will need further therapy to progress to indep with mobility without AD. PT Education: PT Role;Goals;Gait Training;Home Exercise Program;Transfer Training  Patient Education: Reviewed with pt need for wide imelda for stability with turns at present and to pace self to avoid being sob. .  ADvised pt should be able to walk at a conversational walk speed. Patient given LE bed therex with patient given written hep with pt demo indep in LE SLR, bridges, sidelying leg lifts and hooklying marches 10 reps each . Patient Diagnosis(es): There were no encounter diagnoses. has a past medical history of Hyperlipidemia, Hypertension, Obesity, and Type 2 diabetes mellitus without complication ([facility] [place] [address]    has a past surgical history that includes Colonoscopy and Upper gastrointestinal endoscopy (N/A, 2020). Restrictions  Restrictions/Precautions  Restrictions/Precautions: General Precautions, Fall Risk  Subjective      Pain Assessment  Pain Assessment: 0-10  Pain Level: 0  Vital Signs  Temp: 98.3 °F (36.8 °C)  Temp Source: Oral  Pulse: 76  Heart Rate Source: Monitor  Resp: 20  BP: 135/77  BP Location: Left upper arm  BP Upper/Lower: Upper  Patient Position: Supine  Orthostatic B/P and Pulse?: (thigh high TY hose donned during session max assist to don from therapist )  Blood Pressure Lyin/77  Pulse Lyin PER MINUTE  Blood Pressure Sittin/64  Pulse Sittin PER MINUTE  Blood Pressure Standin/62(patient reports fatigue when first getting up now but not dizziness)  Pulse Standin PER MINUTE  Level of Consciousness: Alert  MEWS Score: 1  Orthostatic B/P and Pulse?: (thigh high TY hose donned during session max assist to don from therapist )  Oxygen Therapy  SpO2: 96 %       Orientation  Orientation  Overall Orientation Status: Within Normal Limits  Cognition Patient able to follow instructions and demo indep in LE hep on return demo.       Objective      Bed mobility  Rolling to Left: Independent  Rolling to Right: Independent  Supine to Sit: Independent  Sit to Supine: Independent  Scooting: Independent  Transfers  Sit to Stand: Modified independent  Stand to sit: Modified independent  Bed to Chair: Modified independent  Car Transfer: Modified independent  Comment: FWW for transfers ANTHONY  Sit<>stand from bed/chair and bed<>chair without AD is SBA due to mild to moderate ataxia with wide imelda strategy    Ambulation  Ambulation?: Yes  WB Status: FWB thigh high TY hose   More Ambulation?: Yes  Ambulation 1  Surface: level tile;carpet(left/right turns on carpet)  Device: Rolling Walker  Assistance: Modified Independent  Quality of Gait: wide imelda leaning heavily on walker   Distance: 187 feet, 150 feet  Comments: post walking needed to sit and rest with HR noted to be 125 bpm after 187 feet, needed seated rest after 150 feet due to fatigue. Ambulation 2  Surface - 2: level tile  Device 2: Rolling Walker  Assistance 2: Modified Independent  Quality of Gait 2: wide MARVIN leans heavily on walker able to navigate carpet, doorways and left/right turns indep no lob  Gait Deviations: Slow Susy; Increased MARVIN  Distance: 250 feet  Comments: Limited by fatigue  with  immediate post walking with recovery to 88 bpm in 2 minutes and seated BP 2 minutes post walking 147/75   stoop to recover with grabber tissue from floor indep with fWW     Other exercises  Other exercises?: Yes  Other exercises 8: Patient given LE bed therex  with pt demo indep in LE SLR, bridges, sidelying leg lifts and hooklying marches 10 reps each . AROM RLE (degrees)  RLE AROM: WNL  AROM LLE (degrees)  LLE AROM : WNL  Strength RLE  Strength RLE: Exception  R Hip Flexion: 4/5  R Hip ABduction: 4-/5  R Knee Flexion: 4/5  R Knee Extension: 4+/5  R Ankle Dorsiflexion: 4+/5  R Ankle Plantar flexion: 4+/5  Strength LLE  Strength LLE: Exception  L Hip Flexion: 4/5  L Hip ABduction: 4-/5  L Knee Flexion: 4/5  L Knee Extension: 4+/5  L Ankle Dorsiflexion: 4+/5  L Ankle Plantar Flexion: 4+/5  Strength RUE  Strength RUE: Exception             Goals  Short term goals  Time Frame for Short term goals: 5 days, 2/17/20  Short term goal 1: Pt will perform supine<>Sit with independence. 2/14/2020 GOAL MEt pt demo indep in bed mobility. Short term goal 2: Pt will perform sit<>Stand with supervision with LRAD. - GOAL MET 2/19  Short term goal 3: Pt will ambulate with the [facility] and  feet. - GOAL NOT MET, pt progressing, completes 150' wiht SBQC and CGA GOAL MET with  feet with ANTHONY left/right turns and carpet  Short term goal 4: Pt will participate in 12-15 reps of BLE ther ex for strength and balance.  2/14/2020 GOAL MEt patient performed 15 reps or greater BLE therex in supine/sidelying this date. Short term goal 5: Pt will ascend/descend 12 steps with SBA. - GOAL NOT MET  Long term goals  Time Frame for Long term goals : 9 days, 2/20/20  Long term goal 1: Pt will perform sit<>Stand without an AD with independence. GOAL partially met 2/21/2020 SBA for sit<>stand without AD, ANTHONY with FWW. Long term goal 2: Pt will ambulate with no AD and independence 150 feet. GOAL partially met 2/21/2020  pt demo 150 feet with FWW left/right turns on 10 feet of carpet ANTHONY. Long term goal 3: Pt will ascend/descend 12 steps with one handrail and independence. - 8 steps CGA  2/21/2020 goal no  met but pt able to perform 4 steps with bilateral Rails CGA endurance/sob limit steps to 4. Long term goal 4: Pt will perform all transfers with independence. GOAL partially met 2/21/2020 pt demo indep all transfers ANTHONY with FWW. Patient Goals   Patient goals : To feel better. Plan    Plan  Times per week: 5 out of 7  Times per day: Daily  Plan weeks: 9 days, 2/20/20  Specific instructions for Next Treatment: progress mobility as tolerated  Current Treatment Recommendations: Strengthening, ADL/Self-care Training, Gait Training, Patient/Caregiver Education & Training, Stair training, Balance Training, Pain Management, Endurance Training, Safety Education & Training, Transfer Training, Functional Mobility Training, Equipment Evaluation, Education, & procurement, Home Exercise Program  Safety Devices  Type of devices:  All fall risk precautions in place, Call light within reach, Chair alarm in place, Gait belt, Patient at risk for falls, Nurse notified, Left in chair  Restraints  Initially in place: No     Therapy Time   Individual Concurrent Group Co-treatment   Time In 0740         Time Out 0840         Minutes 60         Timed Code Treatment Minutes: 60 Minutes  Second Session Therapy Time:   Individual Concurrent Group Co-treatment   Time In 0910         Time Out 0930         Minutes 20           Timed Code Treatment Minutes:  20  Second Session Therapy Time:   Individual Concurrent Group Co-treatment   Time In 1130         Time Out 1140         Minutes 10           Timed Code Treatment Minutes:  10    Total Treatment Minutes:  90           Millicent Luna PT

## 2020-02-21 NOTE — CARE COORDINATION
[facility]    DC order noted, all docs needed have been faxed to [facility] for home care services.     Gunjan Goldsmith RN, BSN CTN  [facility] [phone]
SW met with Pt at bedside. Pt continues to be reluctant to allow SW to call her friend that is helping her get\"respite\" care, Isaias Hurtado. Pt expressed frustration that APS was called. Pt said she is concerned that she can not have her place of residence in order with in 30 days on her own. SW explained that they are merely involved to assure she has a safe place to go and they can help her if this is not attainable. SW and Pt discussed that SW job is to help assure a safe place for her to go to at time of DC. Pt said she wanted to let her friend have a few days to put things in order and she will be here on Monday from [place]. SW explained that SW is able to set up DC and is aware of the resources in this are. Pt is aware that SW will need to know a plan and that DC will approach quickly. Pt talked about being embarrassed and expressed fear that her sister in law will post about her plight on facebook. Pt presents to deflect conversation by saying her friend is helping her, but she is not allowing SW to make contact with her to confirm the Plan of action. SW received a call from Vincenzo Moulton at [phone] if the pt stays in this area they would like to help her with resources. [phone].  Roopa Bangura is APS investigator [address]
SW spoke with Pt in great length about her plans for DC. She reported that she DOESN'T want HHS. She reported that she was very upset with her current HHC. She said that she felt that they had left her in the deplorable situation with out helping her when she asked for it. Pt was explained that she could change companies but she is adamantly refusing [facility] when she DC.   [facility] [employer]
home alone or with family: independent     Discharge plan: Team conference discussion was about pt needs. No LOS has been determined yet. Pt is CGA at this time. Summary:  Sw will continue to follow for DCP needs. [facility] is involved and friend Faith Sanford continues to work with the Pt to get appropriate housing for DC. [facility]          Electronic continuity of care form is on the chart. Case Management (CM) will continue to follow for recommendations from the treatment team. Please notify Case Management if needs or concerns arise.

## 2020-02-21 NOTE — PROGRESS NOTES
Elliot [facility]  2/21/2020  [id number]    Chief Complaint: Debility    Subjective:   No issues overnight. BP more controlled today but having episodes of tachycardia with therapy. ROS: No n/v, cp, sob, f/c  Objective:  Patient Vitals for the past 24 hrs:   BP Temp Temp src Pulse Resp SpO2   02/21/20 0749 135/77 98.3 °F (36.8 °C) Oral 76 20 96 %   02/20/20 2015 (!) 145/87 97.5 °F (36.4 °C) Oral 79 18 94 %     Gen: No distress, pleasant. HEENT: Normocephalic, atraumatic. CV: Regular rate and rhythm. Resp: No respiratory distress. Abd: Soft, nontender   Ext: No edema. Neuro: Alert, oriented, appropriately interactive. Wt Readings from Last 3 Encounters:   02/19/20 199 lb 11.2 oz (90.6 kg)   02/11/20 200 lb (90.7 kg)   02/07/20 204 lb (92.5 kg)       Laboratory data:   Lab Results   Component Value Date    WBC 7.6 02/20/2020    HGB 9.2 (L) 02/20/2020    HCT 28.5 (L) 02/20/2020    MCV 94.2 02/20/2020     02/20/2020       Lab Results   Component Value Date     02/20/2020    K 4.0 02/20/2020     02/20/2020    CO2 23 02/20/2020    BUN 13 02/20/2020    CREATININE 1.0 02/20/2020    GLUCOSE 135 02/20/2020    CALCIUM 9.0 02/20/2020        Therapy progress:  PT     Objective     Sit to Stand: Modified independent  Stand to sit: Modified independent  Bed to Chair: Modified independent  Device: [address]: Modified Independent  Distance: 187 feet  OT  PT Equipment Recommendations  Equipment Needed: Yes  Mobility Devices: Jade Sor: Rolling  Other: defer to patient's facility. Toilet - Technique: Ambulating  Equipment Used: Standard toilet  Toilet Transfers Comments: cues to line up with toilet and use grab bar before sitting. Assessment        SLP                Body mass index is 34.28 kg/m². Rehabilitation Diagnosis:  16.0, Debility (non-cardiac, non-pulmonary     Assessment and Plan:  Abdominal pain. Nonobstructive bowel gas pattern; advance bowel regimen.      UTI.

## 2020-02-21 NOTE — PROGRESS NOTES
2/7/2020). Restrictions  Restrictions/Precautions  Restrictions/Precautions: General Precautions, Fall Risk  Subjective   General  Chart Reviewed: Yes  Patient assessed for rehabilitation services?: Yes  Response to previous treatment: Patient with no complaints from previous session  Family / Caregiver Present: No  Referring Practitioner: Bambi Birmingham MD  Diagnosis: debility  Subjective  Subjective: Pt in chair, agreeable  General Comment  Comments: Per RN ok for therapy  Pre Treatment Pain Screening  Intervention List: Patient able to continue with treatment  Vital Signs  Patient Currently in Pain: Denies   Orientation  Orientation  Overall Orientation Status: Within Functional Limits  Objective    First session:  Pt in bed on arrival, pleasant and agreeable to treatment, denied pain. Pt completed supine <> sit with Mod I (HOB elevated), functional transfers with Supervision, and mobility in room/bathroom with RW Supervision. Pt performed toileting and hand hygiene at sink with supervision, no cues required for sequencing or safety. Pt with fair tolerance of seated exercises, left up in chair with alarm activated and needs in reach. Exercises: 4kg, 2kg medicine balls  Scapular retraction/protraction: x15 no resistance  Shoulder elevation/depression: x20 no resistance  Elbow flexion/ext: x20 4kg  Horizontal abduction/adduction: x10 2kg  Shoulder flexion: to ~90* with 2kg  Partial sit ups: x20 4kg  Trunk rotation: x20 4kg  Chest press: x15      ADL  Feeding: Independent  Grooming: Supervision;Verbal cueing  UE Bathing: Supervision(seated on shower chair)  LE Bathing: Supervision;Verbal cueing(seated on shower chair)  UE Dressing: Supervision  LE Dressing: Supervision  Toileting: Supervision  Additional Comments: pt needs cues for sequencing and thoroughness at times, able to perfrom ADL tasks w/o physical assist, but poor problem solving noted.  Pt will ask for assist to wash feet, but with cues to cross Strengthening, Equipment Evaluation, Education, & procurement, Patient/Caregiver Education & Training, Home Management Training    Goals  Short term goals  Time Frame for Short term goals: 5 days (2/16)  Short term goal 1: Pt. will perform functional transfers w/ a/e and SBA. -met 2/18  Short term goal 2: Pt. will perform toileting tasks w/ SBA. GOAL MET 2/13/20 Pt performed toileting with supervision. Short term goal 3: Pt. will perform LB dressing tasks w/ SBA and a/e as needed-MET 2/19  Long term goals  Time Frame for Long term goals : 9 days 2/20  Long term goal 1: Pt. will perform functional transfers as Radha-not met d/t cog, needs cues at times  Long term goal 2: Pt. will perform  toileting tasks as Radha. -not met d/t cog, needs cues at times  Long term goal 3: Pt. will perform dressing tasks as Radha w/ a/e as needed. -not met d/t cog, needs cues at times  Long term goal 4: Pt. will perform dynamic standing task for 5 min as radha. -MET 2/20  Patient Goals   Patient goals :  To return to a safer home environment       First Session Therapy Time:   Individual Concurrent Group Co-treatment   Time In 1100         Time Out 1130         Minutes 30           Timed Code Treatment Minutes:  30    Second Session Therapy Time   Individual Concurrent Group Co-treatment   Time In 1300         Time Out 1400         Minutes 60         Timed Code Treatment Minutes: 60 Minutes      Total Treatment Minutes:  90    First session: Melonie Carrera, OTR/L  Meenakshi Presley, OT

## 2020-02-22 LAB
GLUCOSE BLD-MCNC: 128 MG/DL (ref 70–99)
GLUCOSE BLD-MCNC: 139 MG/DL (ref 70–99)
GLUCOSE BLD-MCNC: 187 MG/DL (ref 70–99)
GLUCOSE BLD-MCNC: 218 MG/DL (ref 70–99)
PERFORMED ON: ABNORMAL

## 2020-02-22 PROCEDURE — 1280000000 HC REHAB R&B

## 2020-02-22 PROCEDURE — 6370000000 HC RX 637 (ALT 250 FOR IP): Performed by: PHYSICAL MEDICINE & REHABILITATION

## 2020-02-22 RX ADMIN — OXYBUTYNIN CHLORIDE 5 MG: 5 TABLET ORAL at 08:43

## 2020-02-22 RX ADMIN — PANTOPRAZOLE SODIUM 40 MG: 40 TABLET, DELAYED RELEASE ORAL at 06:46

## 2020-02-22 RX ADMIN — LOSARTAN POTASSIUM 100 MG: 100 TABLET, FILM COATED ORAL at 08:43

## 2020-02-22 RX ADMIN — PANTOPRAZOLE SODIUM 40 MG: 40 TABLET, DELAYED RELEASE ORAL at 15:35

## 2020-02-22 RX ADMIN — FLUDROCORTISONE ACETATE 0.1 MG: 0.1 TABLET ORAL at 08:43

## 2020-02-22 RX ADMIN — VITAMIN D, TAB 1000IU (100/BT) 1000 UNITS: 25 TAB at 08:43

## 2020-02-22 RX ADMIN — OXYBUTYNIN CHLORIDE 5 MG: 5 TABLET ORAL at 15:32

## 2020-02-22 RX ADMIN — OXYBUTYNIN CHLORIDE 5 MG: 5 TABLET ORAL at 20:11

## 2020-02-22 RX ADMIN — ATORVASTATIN CALCIUM 40 MG: 40 TABLET, FILM COATED ORAL at 20:11

## 2020-02-22 ASSESSMENT — PAIN SCALES - GENERAL
PAINLEVEL_OUTOF10: 5
PAINLEVEL_OUTOF10: 5
PAINLEVEL_OUTOF10: 3

## 2020-02-22 ASSESSMENT — PAIN DESCRIPTION - LOCATION
LOCATION: ABDOMEN;THROAT
LOCATION: ABDOMEN

## 2020-02-22 ASSESSMENT — PAIN DESCRIPTION - PAIN TYPE
TYPE: ACUTE PAIN;CHRONIC PAIN
TYPE: ACUTE PAIN

## 2020-02-22 ASSESSMENT — PAIN DESCRIPTION - DESCRIPTORS
DESCRIPTORS: ACHING;CRAMPING
DESCRIPTORS: CRAMPING

## 2020-02-22 NOTE — PLAN OF CARE
Pt free from falls so far this shift. Bed locked in lowest position. Call light in place. Bed alarm on.

## 2020-02-23 VITALS
RESPIRATION RATE: 18 BRPM | WEIGHT: 199.7 LBS | HEIGHT: 64 IN | DIASTOLIC BLOOD PRESSURE: 76 MMHG | HEART RATE: 66 BPM | TEMPERATURE: 98.3 F | OXYGEN SATURATION: 99 % | BODY MASS INDEX: 34.09 KG/M2 | SYSTOLIC BLOOD PRESSURE: 128 MMHG

## 2020-02-23 LAB
GLUCOSE BLD-MCNC: 107 MG/DL (ref 70–99)
GLUCOSE BLD-MCNC: 139 MG/DL (ref 70–99)
PERFORMED ON: ABNORMAL
PERFORMED ON: ABNORMAL

## 2020-02-23 PROCEDURE — 6370000000 HC RX 637 (ALT 250 FOR IP): Performed by: PHYSICAL MEDICINE & REHABILITATION

## 2020-02-23 RX ADMIN — VITAMIN D, TAB 1000IU (100/BT) 1000 UNITS: 25 TAB at 08:24

## 2020-02-23 RX ADMIN — LOSARTAN POTASSIUM 100 MG: 100 TABLET, FILM COATED ORAL at 08:23

## 2020-02-23 RX ADMIN — FLUDROCORTISONE ACETATE 0.1 MG: 0.1 TABLET ORAL at 08:24

## 2020-02-23 RX ADMIN — AMLODIPINE BESYLATE 2.5 MG: 2.5 TABLET ORAL at 08:24

## 2020-02-23 RX ADMIN — OXYBUTYNIN CHLORIDE 5 MG: 5 TABLET ORAL at 08:23

## 2020-02-23 RX ADMIN — PANTOPRAZOLE SODIUM 40 MG: 40 TABLET, DELAYED RELEASE ORAL at 05:55

## 2020-02-23 ASSESSMENT — PAIN DESCRIPTION - DESCRIPTORS: DESCRIPTORS: CRAMPING

## 2020-02-23 ASSESSMENT — PAIN DESCRIPTION - LOCATION: LOCATION: ABDOMEN

## 2020-02-23 ASSESSMENT — PAIN SCALES - GENERAL: PAINLEVEL_OUTOF10: 3

## 2020-02-23 NOTE — PROGRESS NOTES
Discharge instructions reviewed with patient , all home meds reviewed, follow up appointments reviewed. Patient discharged to [facility] respite stay, her friend is here to pick her up.  Patient off the unit at 12:40 02/23/20

## 2020-02-25 NOTE — DISCHARGE SUMMARY
Physical Medicine & Rehabilitation  Discharge Summary     Patient Identification:  Marielle Petersen  : [id number]  Admit date: 2020  Discharge date: 2020  Attending provider: No att. providers found        Primary care provider: Remi Lefort, PA     Discharge Diagnoses:   Patient Active Problem List   Diagnosis    GI bleed    Debility       Discharge Functional Status:    Physical therapy:  Bed Mobility: Scooting: Independent  Transfers: Sit to Stand: Modified independent  Stand to sit: Modified independent  Bed to Chair: Modified independent  Comment: toilet transfer with FWW ANTHONY, WB Status: FWB thigh high TY hose   Ambulation 1  Surface: level tile, carpet(left/right urns on carpet)  Device: Rolling Walker  Assistance: Modified Independent  Quality of Gait: wide marvin leaning heavily on walker   Gait Deviations: Slow Susy, Decreased step length, Decreased step height, Increased MARVIN, Shuffles  Distance: 187 feet  Comments: post walking needed to sit and rest with HR noted to be 125 bpm. , Stairs  # Steps : 4  Stairs Height: 6\"  Rails: Bilateral  Curbs: 6\"(CGA with cues up with RLE and down with LLE leading )  Device: No Device  Assistance: Contact guard assistance  Comment: Pt very slow to complete. Step to pattern with dependence on BUE. Given cues for sequencing for up with RLE and down with LLE as patient notes to have Left knee weakness with steps per patient. Mobility:  , PT Equipment Recommendations  Equipment Needed: Yes  Mobility Devices: Adalgisa Scarce: Rolling  Other: defer to patient's facility.  , Assessment: Patient seen for review of wide marvin for compensatory gait and transfer strategy with pt able to demo ANTHONY with FWW and wide marvin for distances 150 feet or greater, ANTHONY bed<>chair, car with FWW, able to demo with grabber retrieve object (tissue) from ground in standing ANTHONY, demo up and down curb step ANTHONY and up and down 4 steps with bilateral rails non alt pattern up with RLE and down with LLE with pt noting antalgic Left knee 5/10 pain with steps with impaired WB tolerance for steps due to pain and weakness. Occupational therapy:  ,  , Assessment: Pt pleasant and cooperative, but cont to demo poor problem solving for familiar basic tasks. Pt will ask for assist to wash feet, but able to do for self when cued to cross legs as she does for LB dressing. Decreased problem noted often with familiar tasks. Pt able to complete toileting, bathing and dressing with SPV, cues for thoroughness and sequencing at times. Pt scheduled for discharge this weekend, recommned 24 hr SPV d/t cognitive deficits. Speech therapy:       Inpatient Rehabilitation Course: Ramona Hinojosa is a 69 y.o. female admitted to inpatient rehabilitation on 2/11/2020 s/p Debility. The patient participated in an aggressive multidisciplinary inpatient rehabilitation program involving 3 hours of therapy per day, at least 5 days per week. She initially presented with complaints of weakness and abdominal pain. Her abdominal pain seemed to improve with treatment of her constipation.      Discharge Exam:  Constitutional: Pleasant, no distress  Head: Normocephalic  Eyes: Conjunctiva noninjected  Pulm: CTA bilat  CV: No murmurs noted  Abd: Soft, nontender  Ext: No edema  Neuro: Alert, fully oriented, appropriate   MSK: No joint abnormalities noted     Significant Diagnostics:   Lab Results   Component Value Date    CREATININE 1.0 02/20/2020    BUN 13 02/20/2020     02/20/2020    K 4.0 02/20/2020     02/20/2020    CO2 23 02/20/2020       Lab Results   Component Value Date    WBC 7.6 02/20/2020    HGB 9.2 (L) 02/20/2020    HCT 28.5 (L) 02/20/2020    MCV 94.2 02/20/2020     02/20/2020       Disposition:  Home in stable condition    Follow-up:  See after visit summary from hospitalization    Discharge Medications:     Medication List      START taking these medications    fludrocortisone 0.1 MG

## 2020-02-28 ENCOUNTER — APPOINTMENT (OUTPATIENT)
Dept: GENERAL RADIOLOGY | Age: 75
DRG: 871 | End: 2020-02-28
Payer: MEDICARE

## 2020-02-28 ENCOUNTER — HOSPITAL ENCOUNTER (INPATIENT)
Age: 75
LOS: 9 days | Discharge: NURSING, REHAB OR LONG-TERM CARE | DRG: 871 | End: 2020-03-08
Attending: EMERGENCY MEDICINE | Admitting: INTERNAL MEDICINE
Payer: MEDICARE

## 2020-02-28 PROBLEM — E16.2 HYPOGLYCEMIA: Status: ACTIVE | Noted: 2020-02-28

## 2020-02-28 PROBLEM — A41.9 SEPSIS (HCC): Status: ACTIVE | Noted: 2020-02-28

## 2020-02-28 PROBLEM — N39.0 UTI (URINARY TRACT INFECTION): Status: ACTIVE | Noted: 2020-02-28

## 2020-02-28 LAB
A/G RATIO: 1.1 (ref 1.1–2.2)
ALBUMIN SERPL-MCNC: 3.1 G/DL (ref 3.4–5)
ALP BLD-CCNC: 110 U/L (ref 40–129)
ALT SERPL-CCNC: 11 U/L (ref 10–40)
ANION GAP SERPL CALCULATED.3IONS-SCNC: 13 MMOL/L (ref 3–16)
AST SERPL-CCNC: 18 U/L (ref 15–37)
BACTERIA: ABNORMAL /HPF
BASOPHILS ABSOLUTE: 0 K/UL (ref 0–0.2)
BASOPHILS RELATIVE PERCENT: 0.1 %
BILIRUB SERPL-MCNC: 0.3 MG/DL (ref 0–1)
BILIRUBIN URINE: NEGATIVE
BLOOD, URINE: ABNORMAL
BUN BLDV-MCNC: 27 MG/DL (ref 7–20)
CALCIUM SERPL-MCNC: 8.7 MG/DL (ref 8.3–10.6)
CHLORIDE BLD-SCNC: 109 MMOL/L (ref 99–110)
CLARITY: CLEAR
CO2: 23 MMOL/L (ref 21–32)
COLOR: YELLOW
CREAT SERPL-MCNC: 1.2 MG/DL (ref 0.6–1.2)
EKG ATRIAL RATE: 82 BPM
EKG DIAGNOSIS: NORMAL
EKG P AXIS: 58 DEGREES
EKG P-R INTERVAL: 108 MS
EKG Q-T INTERVAL: 388 MS
EKG QRS DURATION: 78 MS
EKG QTC CALCULATION (BAZETT): 453 MS
EKG R AXIS: 35 DEGREES
EKG T AXIS: 75 DEGREES
EKG VENTRICULAR RATE: 82 BPM
EOSINOPHILS ABSOLUTE: 0 K/UL (ref 0–0.6)
EOSINOPHILS RELATIVE PERCENT: 0 %
EPITHELIAL CELLS, UA: ABNORMAL /HPF (ref 0–5)
GFR AFRICAN AMERICAN: 53
GFR NON-AFRICAN AMERICAN: 44
GLOBULIN: 2.9 G/DL
GLUCOSE BLD-MCNC: 103 MG/DL (ref 70–99)
GLUCOSE BLD-MCNC: 109 MG/DL (ref 70–99)
GLUCOSE BLD-MCNC: 137 MG/DL (ref 70–99)
GLUCOSE BLD-MCNC: 158 MG/DL (ref 70–99)
GLUCOSE BLD-MCNC: 62 MG/DL (ref 70–99)
GLUCOSE BLD-MCNC: 71 MG/DL (ref 70–99)
GLUCOSE BLD-MCNC: 91 MG/DL (ref 70–99)
GLUCOSE BLD-MCNC: 95 MG/DL (ref 70–99)
GLUCOSE BLD-MCNC: 99 MG/DL (ref 70–99)
GLUCOSE URINE: NEGATIVE MG/DL
HCT VFR BLD CALC: 31.6 % (ref 36–48)
HEMOGLOBIN: 10.3 G/DL (ref 12–16)
INR BLD: 1.09 (ref 0.86–1.14)
KETONES, URINE: NEGATIVE MG/DL
LACTIC ACID, SEPSIS: 3.3 MMOL/L (ref 0.4–1.9)
LACTIC ACID, SEPSIS: 3.5 MMOL/L (ref 0.4–1.9)
LACTIC ACID, SEPSIS: <0.2 MMOL/L (ref 0.4–1.9)
LEUKOCYTE ESTERASE, URINE: ABNORMAL
LYMPHOCYTES ABSOLUTE: 0.8 K/UL (ref 1–5.1)
LYMPHOCYTES RELATIVE PERCENT: 4.5 %
MCH RBC QN AUTO: 30.3 PG (ref 26–34)
MCHC RBC AUTO-ENTMCNC: 32.5 G/DL (ref 31–36)
MCV RBC AUTO: 93.2 FL (ref 80–100)
MICROSCOPIC EXAMINATION: YES
MONOCYTES ABSOLUTE: 0.9 K/UL (ref 0–1.3)
MONOCYTES RELATIVE PERCENT: 4.9 %
NEUTROPHILS ABSOLUTE: 16.2 K/UL (ref 1.7–7.7)
NEUTROPHILS RELATIVE PERCENT: 90.5 %
NITRITE, URINE: NEGATIVE
PDW BLD-RTO: 15.9 % (ref 12.4–15.4)
PERFORMED ON: ABNORMAL
PERFORMED ON: NORMAL
PH UA: 5 (ref 5–8)
PLATELET # BLD: 439 K/UL (ref 135–450)
PMV BLD AUTO: 7.7 FL (ref 5–10.5)
POTASSIUM SERPL-SCNC: 3.8 MMOL/L (ref 3.5–5.1)
PROTEIN UA: NEGATIVE MG/DL
PROTHROMBIN TIME: 12.7 SEC (ref 10–13.2)
RBC # BLD: 3.39 M/UL (ref 4–5.2)
RBC UA: ABNORMAL /HPF (ref 0–4)
RENAL EPITHELIAL, UA: ABNORMAL /HPF (ref 0–1)
SODIUM BLD-SCNC: 145 MMOL/L (ref 136–145)
SPECIFIC GRAVITY UA: 1.02 (ref 1–1.03)
SPECIMEN STATUS: NORMAL
TOTAL PROTEIN: 6 G/DL (ref 6.4–8.2)
TROPONIN: <0.01 NG/ML
URINE REFLEX TO CULTURE: YES
URINE TYPE: ABNORMAL
UROBILINOGEN, URINE: 0.2 E.U./DL
WBC # BLD: 17.9 K/UL (ref 4–11)
WBC UA: ABNORMAL /HPF (ref 0–5)

## 2020-02-28 PROCEDURE — 2060000000 HC ICU INTERMEDIATE R&B

## 2020-02-28 PROCEDURE — 99291 CRITICAL CARE FIRST HOUR: CPT

## 2020-02-28 PROCEDURE — 6360000002 HC RX W HCPCS: Performed by: EMERGENCY MEDICINE

## 2020-02-28 PROCEDURE — 2580000003 HC RX 258: Performed by: EMERGENCY MEDICINE

## 2020-02-28 PROCEDURE — 87040 BLOOD CULTURE FOR BACTERIA: CPT

## 2020-02-28 PROCEDURE — 85610 PROTHROMBIN TIME: CPT

## 2020-02-28 PROCEDURE — 6370000000 HC RX 637 (ALT 250 FOR IP): Performed by: INTERNAL MEDICINE

## 2020-02-28 PROCEDURE — 71045 X-RAY EXAM CHEST 1 VIEW: CPT

## 2020-02-28 PROCEDURE — 83605 ASSAY OF LACTIC ACID: CPT

## 2020-02-28 PROCEDURE — 36415 COLL VENOUS BLD VENIPUNCTURE: CPT

## 2020-02-28 PROCEDURE — 80053 COMPREHEN METABOLIC PANEL: CPT

## 2020-02-28 PROCEDURE — 87186 SC STD MICRODIL/AGAR DIL: CPT

## 2020-02-28 PROCEDURE — 87077 CULTURE AEROBIC IDENTIFY: CPT

## 2020-02-28 PROCEDURE — 81001 URINALYSIS AUTO W/SCOPE: CPT

## 2020-02-28 PROCEDURE — 93005 ELECTROCARDIOGRAM TRACING: CPT | Performed by: EMERGENCY MEDICINE

## 2020-02-28 PROCEDURE — 85025 COMPLETE CBC W/AUTO DIFF WBC: CPT

## 2020-02-28 PROCEDURE — 2580000003 HC RX 258: Performed by: INTERNAL MEDICINE

## 2020-02-28 PROCEDURE — 6360000002 HC RX W HCPCS: Performed by: INTERNAL MEDICINE

## 2020-02-28 PROCEDURE — 94761 N-INVAS EAR/PLS OXIMETRY MLT: CPT

## 2020-02-28 PROCEDURE — 84484 ASSAY OF TROPONIN QUANT: CPT

## 2020-02-28 PROCEDURE — 83036 HEMOGLOBIN GLYCOSYLATED A1C: CPT

## 2020-02-28 PROCEDURE — 87086 URINE CULTURE/COLONY COUNT: CPT

## 2020-02-28 RX ORDER — NICOTINE POLACRILEX 4 MG
15 LOZENGE BUCCAL PRN
Status: DISCONTINUED | OUTPATIENT
Start: 2020-02-28 | End: 2020-03-08 | Stop reason: HOSPADM

## 2020-02-28 RX ORDER — ACETAMINOPHEN 650 MG/1
650 SUPPOSITORY RECTAL EVERY 6 HOURS PRN
Status: DISCONTINUED | OUTPATIENT
Start: 2020-02-28 | End: 2020-03-08 | Stop reason: HOSPADM

## 2020-02-28 RX ORDER — SODIUM CHLORIDE 9 MG/ML
INJECTION, SOLUTION INTRAVENOUS CONTINUOUS
Status: DISCONTINUED | OUTPATIENT
Start: 2020-02-28 | End: 2020-03-05

## 2020-02-28 RX ORDER — POLYETHYLENE GLYCOL 3350 17 G/17G
17 POWDER, FOR SOLUTION ORAL DAILY
Status: DISCONTINUED | OUTPATIENT
Start: 2020-02-28 | End: 2020-03-03

## 2020-02-28 RX ORDER — OXYBUTYNIN CHLORIDE 5 MG/1
5 TABLET ORAL 2 TIMES DAILY
Status: DISCONTINUED | OUTPATIENT
Start: 2020-02-28 | End: 2020-03-08 | Stop reason: HOSPADM

## 2020-02-28 RX ORDER — DEXTROSE MONOHYDRATE 25 G/50ML
12.5 INJECTION, SOLUTION INTRAVENOUS PRN
Status: DISCONTINUED | OUTPATIENT
Start: 2020-02-28 | End: 2020-03-08 | Stop reason: HOSPADM

## 2020-02-28 RX ORDER — PANTOPRAZOLE SODIUM 40 MG/1
40 TABLET, DELAYED RELEASE ORAL
Status: DISCONTINUED | OUTPATIENT
Start: 2020-02-28 | End: 2020-03-08 | Stop reason: HOSPADM

## 2020-02-28 RX ORDER — FLUDROCORTISONE ACETATE 0.1 MG/1
0.1 TABLET ORAL DAILY
Status: DISCONTINUED | OUTPATIENT
Start: 2020-02-28 | End: 2020-03-08 | Stop reason: HOSPADM

## 2020-02-28 RX ORDER — 0.9 % SODIUM CHLORIDE 0.9 %
30 INTRAVENOUS SOLUTION INTRAVENOUS ONCE
Status: COMPLETED | OUTPATIENT
Start: 2020-02-28 | End: 2020-02-28

## 2020-02-28 RX ORDER — SODIUM CHLORIDE 0.9 % (FLUSH) 0.9 %
10 SYRINGE (ML) INJECTION PRN
Status: DISCONTINUED | OUTPATIENT
Start: 2020-02-28 | End: 2020-03-08 | Stop reason: HOSPADM

## 2020-02-28 RX ORDER — ATORVASTATIN CALCIUM 40 MG/1
40 TABLET, FILM COATED ORAL DAILY
Status: DISCONTINUED | OUTPATIENT
Start: 2020-02-28 | End: 2020-03-08 | Stop reason: HOSPADM

## 2020-02-28 RX ORDER — FERROUS SULFATE 325(65) MG
325 TABLET ORAL
Status: DISCONTINUED | OUTPATIENT
Start: 2020-02-29 | End: 2020-03-01

## 2020-02-28 RX ORDER — ACETAMINOPHEN 325 MG/1
650 TABLET ORAL EVERY 6 HOURS PRN
Status: DISCONTINUED | OUTPATIENT
Start: 2020-02-28 | End: 2020-03-08 | Stop reason: HOSPADM

## 2020-02-28 RX ORDER — SODIUM CHLORIDE 0.9 % (FLUSH) 0.9 %
10 SYRINGE (ML) INJECTION EVERY 12 HOURS SCHEDULED
Status: DISCONTINUED | OUTPATIENT
Start: 2020-02-28 | End: 2020-03-08 | Stop reason: HOSPADM

## 2020-02-28 RX ORDER — VITAMIN B COMPLEX
4 TABLET ORAL DAILY
Status: DISCONTINUED | OUTPATIENT
Start: 2020-02-28 | End: 2020-03-08 | Stop reason: HOSPADM

## 2020-02-28 RX ORDER — DEXTROSE MONOHYDRATE 50 MG/ML
100 INJECTION, SOLUTION INTRAVENOUS PRN
Status: DISCONTINUED | OUTPATIENT
Start: 2020-02-28 | End: 2020-03-08 | Stop reason: HOSPADM

## 2020-02-28 RX ADMIN — ENOXAPARIN SODIUM 40 MG: 40 INJECTION SUBCUTANEOUS at 16:27

## 2020-02-28 RX ADMIN — ATORVASTATIN CALCIUM 40 MG: 40 TABLET, FILM COATED ORAL at 16:28

## 2020-02-28 RX ADMIN — PANTOPRAZOLE SODIUM 40 MG: 40 TABLET, DELAYED RELEASE ORAL at 16:28

## 2020-02-28 RX ADMIN — SODIUM CHLORIDE: 9 INJECTION, SOLUTION INTRAVENOUS at 22:48

## 2020-02-28 RX ADMIN — SODIUM CHLORIDE: 9 INJECTION, SOLUTION INTRAVENOUS at 16:27

## 2020-02-28 RX ADMIN — CEFTRIAXONE SODIUM 1 G: 1 INJECTION, POWDER, FOR SOLUTION INTRAMUSCULAR; INTRAVENOUS at 13:24

## 2020-02-28 RX ADMIN — OXYBUTYNIN CHLORIDE 5 MG: 5 TABLET ORAL at 22:15

## 2020-02-28 RX ADMIN — ACETAMINOPHEN 650 MG: 325 TABLET ORAL at 20:26

## 2020-02-28 RX ADMIN — Medication 10 ML: at 22:15

## 2020-02-28 RX ADMIN — VITAMIN D, TAB 1000IU (100/BT) 4000 UNITS: 25 TAB at 16:28

## 2020-02-28 RX ADMIN — SODIUM CHLORIDE 1641 ML: 9 INJECTION, SOLUTION INTRAVENOUS at 13:24

## 2020-02-28 RX ADMIN — FLUDROCORTISONE ACETATE 0.1 MG: 0.1 TABLET ORAL at 16:28

## 2020-02-28 ASSESSMENT — PAIN SCALES - GENERAL
PAINLEVEL_OUTOF10: 0
PAINLEVEL_OUTOF10: 2

## 2020-02-28 NOTE — H&P
clear.  Neck: Supple, with full range of motion. No jugular venous distention. Trachea midline. Respiratory:  Normal respiratory effort. Clear to auscultation, bilaterally without Rales/Wheezes/Rhonchi. Cardiovascular:  Regular rate and rhythm with normal S1/S2 without murmurs, rubs or gallops. Abdomen: Soft, non-tender, non-distended with normal bowel sounds. Musculoskeletal:  No clubbing, cyanosis or edema bilaterally. Full range of motion without deformity. Skin: Skin color, texture, turgor normal.  No rashes or lesions. Neurologic:  Neurovascularly intact without any focal sensory/motor deficits. Cranial nerves: II-XII intact, grossly non-focal.  Psychiatric:  Alert and oriented, thought content appropriate, normal insight  Capillary Refill: Brisk,< 3 seconds   Peripheral Pulses: +2 palpable, equal bilaterally       Labs:     Recent Labs     02/28/20  1110   WBC 17.9*   HGB 10.3*   HCT 31.6*        Recent Labs     02/28/20  1110      K 3.8      CO2 23   BUN 27*   CREATININE 1.2   CALCIUM 8.7     Recent Labs     02/28/20  1110   AST 18   ALT 11   BILITOT 0.3   ALKPHOS 110     Recent Labs     02/28/20  1110   INR 1.09     Recent Labs     02/28/20  1110   TROPONINI <0.01       Urinalysis:      Lab Results   Component Value Date    NITRU Negative 02/28/2020    WBCUA 3-5 02/28/2020    BACTERIA Rare 02/28/2020    RBCUA 21-50 02/28/2020    BLOODU MODERATE 02/28/2020    SPECGRAV 1.025 02/28/2020    GLUCOSEU Negative 02/28/2020       Radiology:     CXR: I have reviewed the CXR with the following interpretation:   No acute pathology noted. EKG:  I have reviewed the EKG with the following interpretation:   NSR, no ischemic changes noted. XR CHEST PORTABLE   Final Result   No acute cardiopulmonary disease.              ASSESSMENT:    Active Hospital Problems    Diagnosis Date Noted    Sepsis ([facility] [address] [A41.9] 02/28/2020     Priority: High    UTI (urinary tract infection) [N39.0] 02/28/2020   

## 2020-02-28 NOTE — ED NOTES
Patient sitting in bed, talking with staff and friend who is at the bedside. Tolerating PO well.       Tomas Jerry RN  02/28/20 [id number]

## 2020-02-28 NOTE — ED PROVIDER NOTES
MG TABLET    Take 1 tablet by mouth daily (with breakfast)    FLUDROCORTISONE (FLORINEF) 0.1 MG TABLET    Take 1 tablet by mouth daily    GLIPIZIDE (GLUCOTROL) 10 MG TABLET    TAKE 1 TABLET BY MOUTH EVERY DAY    HANDICAP PLACARD MISC    by Does not apply route    HANDICAP PLACARD MISC    Expiration 1/2025    LOSARTAN (COZAAR) 100 MG TABLET    TAKE 1 TABLET BY MOUTH EVERY DAY    OXYBUTYNIN (DITROPAN) 5 MG TABLET    Take 1 tablet by mouth 2 times daily    PANTOPRAZOLE (PROTONIX) 40 MG TABLET    Take 1 tablet by mouth 2 times daily (before meals)    POLYETHYLENE GLYCOL (GLYCOLAX) PACKET    Take 17 g by mouth daily       ALLERGIES     Metformin and Ramipril    FAMILY HISTORY       Family History   Problem Relation Age of Onset    Diabetes Brother     Obesity Brother     Other Brother         TIA    Diabetes Mother     Other Mother         COPD    Heart Attack Father     Coronary Art Dis Father     Other Father         aortic aneurysm    Diabetes Brother     Cancer Brother           SOCIAL HISTORY       Social History     Socioeconomic History    Marital status: Single     Spouse name: None    Number of children: None    Years of education: None    Highest education level: None   Occupational History    None   Social Needs    Financial resource strain: None    Food insecurity:     Worry: None     Inability: None    Transportation needs:     Medical: None     Non-medical: None   Tobacco Use    Smoking status: Passive Smoke Exposure - Never Smoker    Smokeless tobacco: Never Used   Substance and Sexual Activity    Alcohol use: Yes     Comment: every 2 months     Drug use: Never    Sexual activity: None   Lifestyle    Physical activity:     Days per week: None     Minutes per session: None    Stress: None   Relationships    Social connections:     Talks on phone: None     Gets together: None     Attends Buddhism service: None     Active member of club or organization: None     Attends meetings of

## 2020-02-29 LAB
BASE EXCESS ARTERIAL: -3.8 MMOL/L (ref -3–3)
BASOPHILS ABSOLUTE: 0.1 K/UL (ref 0–0.2)
BASOPHILS RELATIVE PERCENT: 0.3 %
CARBOXYHEMOGLOBIN ARTERIAL: 0.3 % (ref 0–1.5)
EOSINOPHILS ABSOLUTE: 0 K/UL (ref 0–0.6)
EOSINOPHILS RELATIVE PERCENT: 0.2 %
ESTIMATED AVERAGE GLUCOSE: 82.5 MG/DL
GLUCOSE BLD-MCNC: 192 MG/DL (ref 70–99)
GLUCOSE BLD-MCNC: 198 MG/DL (ref 70–99)
GLUCOSE BLD-MCNC: 201 MG/DL (ref 70–99)
GLUCOSE BLD-MCNC: 219 MG/DL (ref 70–99)
GLUCOSE BLD-MCNC: 225 MG/DL (ref 70–99)
GLUCOSE BLD-MCNC: 227 MG/DL (ref 70–99)
HBA1C MFR BLD: 4.5 %
HCO3 ARTERIAL: 19.2 MMOL/L (ref 21–29)
HCT VFR BLD CALC: 28.7 % (ref 36–48)
HEMOGLOBIN, ART, EXTENDED: 10.1 G/DL (ref 12–16)
HEMOGLOBIN: 9.2 G/DL (ref 12–16)
LYMPHOCYTES ABSOLUTE: 1 K/UL (ref 1–5.1)
LYMPHOCYTES RELATIVE PERCENT: 4.7 %
MCH RBC QN AUTO: 29.8 PG (ref 26–34)
MCHC RBC AUTO-ENTMCNC: 32.1 G/DL (ref 31–36)
MCV RBC AUTO: 92.8 FL (ref 80–100)
METHEMOGLOBIN ARTERIAL: 0.6 %
MONOCYTES ABSOLUTE: 1.1 K/UL (ref 0–1.3)
MONOCYTES RELATIVE PERCENT: 5.6 %
NEUTROPHILS ABSOLUTE: 18.2 K/UL (ref 1.7–7.7)
NEUTROPHILS RELATIVE PERCENT: 89.2 %
O2 CONTENT ARTERIAL: 14 ML/DL
O2 SAT, ARTERIAL: 95.9 %
O2 THERAPY: ABNORMAL
PCO2 ARTERIAL: 28.3 MMHG (ref 35–45)
PDW BLD-RTO: 16.1 % (ref 12.4–15.4)
PERFORMED ON: ABNORMAL
PH ARTERIAL: 7.45 (ref 7.35–7.45)
PLATELET # BLD: 346 K/UL (ref 135–450)
PMV BLD AUTO: 7.9 FL (ref 5–10.5)
PO2 ARTERIAL: 81.4 MMHG (ref 75–108)
RBC # BLD: 3.09 M/UL (ref 4–5.2)
TCO2 ARTERIAL: 20.1 MMOL/L
WBC # BLD: 20.4 K/UL (ref 4–11)

## 2020-02-29 PROCEDURE — 97166 OT EVAL MOD COMPLEX 45 MIN: CPT

## 2020-02-29 PROCEDURE — 6370000000 HC RX 637 (ALT 250 FOR IP): Performed by: INTERNAL MEDICINE

## 2020-02-29 PROCEDURE — 36415 COLL VENOUS BLD VENIPUNCTURE: CPT

## 2020-02-29 PROCEDURE — 6360000002 HC RX W HCPCS: Performed by: INTERNAL MEDICINE

## 2020-02-29 PROCEDURE — 6370000000 HC RX 637 (ALT 250 FOR IP): Performed by: NURSE PRACTITIONER

## 2020-02-29 PROCEDURE — 36600 WITHDRAWAL OF ARTERIAL BLOOD: CPT

## 2020-02-29 PROCEDURE — 85025 COMPLETE CBC W/AUTO DIFF WBC: CPT

## 2020-02-29 PROCEDURE — 2580000003 HC RX 258: Performed by: NURSE PRACTITIONER

## 2020-02-29 PROCEDURE — 2580000003 HC RX 258: Performed by: INTERNAL MEDICINE

## 2020-02-29 PROCEDURE — 2060000000 HC ICU INTERMEDIATE R&B

## 2020-02-29 PROCEDURE — 82803 BLOOD GASES ANY COMBINATION: CPT

## 2020-02-29 PROCEDURE — 97530 THERAPEUTIC ACTIVITIES: CPT

## 2020-02-29 RX ORDER — SIMETHICONE 80 MG
80 TABLET,CHEWABLE ORAL EVERY 6 HOURS PRN
Status: DISCONTINUED | OUTPATIENT
Start: 2020-02-29 | End: 2020-03-08 | Stop reason: HOSPADM

## 2020-02-29 RX ORDER — CALCIUM CARBONATE 200(500)MG
500 TABLET,CHEWABLE ORAL 3 TIMES DAILY PRN
Status: DISCONTINUED | OUTPATIENT
Start: 2020-02-29 | End: 2020-03-08 | Stop reason: HOSPADM

## 2020-02-29 RX ADMIN — VITAMIN D, TAB 1000IU (100/BT) 4000 UNITS: 25 TAB at 10:18

## 2020-02-29 RX ADMIN — ACETAMINOPHEN 650 MG: 325 TABLET ORAL at 15:21

## 2020-02-29 RX ADMIN — CEFTRIAXONE SODIUM 1 G: 1 INJECTION, POWDER, FOR SOLUTION INTRAMUSCULAR; INTRAVENOUS at 12:46

## 2020-02-29 RX ADMIN — ATORVASTATIN CALCIUM 40 MG: 40 TABLET, FILM COATED ORAL at 10:21

## 2020-02-29 RX ADMIN — Medication 10 ML: at 21:14

## 2020-02-29 RX ADMIN — PANTOPRAZOLE SODIUM 40 MG: 40 TABLET, DELAYED RELEASE ORAL at 18:35

## 2020-02-29 RX ADMIN — INSULIN LISPRO 1 UNITS: 100 INJECTION, SOLUTION INTRAVENOUS; SUBCUTANEOUS at 21:14

## 2020-02-29 RX ADMIN — ANTACID TABLETS 500 MG: 500 TABLET, CHEWABLE ORAL at 19:04

## 2020-02-29 RX ADMIN — Medication 80 MG: at 21:14

## 2020-02-29 RX ADMIN — FLUDROCORTISONE ACETATE 0.1 MG: 0.1 TABLET ORAL at 10:18

## 2020-02-29 RX ADMIN — INSULIN LISPRO 2 UNITS: 100 INJECTION, SOLUTION INTRAVENOUS; SUBCUTANEOUS at 19:04

## 2020-02-29 RX ADMIN — SODIUM CHLORIDE: 9 INJECTION, SOLUTION INTRAVENOUS at 12:47

## 2020-02-29 RX ADMIN — PANTOPRAZOLE SODIUM 40 MG: 40 TABLET, DELAYED RELEASE ORAL at 06:32

## 2020-02-29 RX ADMIN — OXYBUTYNIN CHLORIDE 5 MG: 5 TABLET ORAL at 10:21

## 2020-02-29 RX ADMIN — SODIUM CHLORIDE: 9 INJECTION, SOLUTION INTRAVENOUS at 23:14

## 2020-02-29 RX ADMIN — SODIUM CHLORIDE: 9 INJECTION, SOLUTION INTRAVENOUS at 06:32

## 2020-02-29 RX ADMIN — Medication 10 ML: at 10:24

## 2020-02-29 RX ADMIN — OXYBUTYNIN CHLORIDE 5 MG: 5 TABLET ORAL at 21:14

## 2020-02-29 ASSESSMENT — PAIN DESCRIPTION - PAIN TYPE
TYPE: ACUTE PAIN
TYPE: ACUTE PAIN

## 2020-02-29 ASSESSMENT — PAIN DESCRIPTION - LOCATION
LOCATION: ABDOMEN

## 2020-02-29 ASSESSMENT — PAIN SCALES - GENERAL
PAINLEVEL_OUTOF10: 5
PAINLEVEL_OUTOF10: 6
PAINLEVEL_OUTOF10: 8
PAINLEVEL_OUTOF10: 6
PAINLEVEL_OUTOF10: 5

## 2020-02-29 ASSESSMENT — PAIN DESCRIPTION - DESCRIPTORS
DESCRIPTORS: DISCOMFORT
DESCRIPTORS: DISCOMFORT
DESCRIPTORS: ACHING;CONSTANT;DULL

## 2020-02-29 ASSESSMENT — PAIN DESCRIPTION - ORIENTATION: ORIENTATION: MID

## 2020-02-29 NOTE — PROGRESS NOTES
cyanosis or edema bilaterally. Full range of motion without deformity. Skin: Skin color, texture, turgor normal.  No rashes or lesions. Neurologic:  Neurovascularly intact without any focal sensory/motor deficits. Cranial nerves: II-XII intact, grossly non-focal.  Psychiatric: Alert and oriented, thought content appropriate, normal insight  Capillary Refill: Brisk,< 3 seconds   Peripheral Pulses: +2 palpable, equal bilaterally       Labs:   Recent Labs     02/28/20  1110 02/29/20  0549   WBC 17.9* 20.4*   HGB 10.3* 9.2*   HCT 31.6* 28.7*    346     Recent Labs     02/28/20  1110      K 3.8      CO2 23   BUN 27*   CREATININE 1.2   CALCIUM 8.7     Recent Labs     02/28/20  1110   AST 18   ALT 11   BILITOT 0.3   ALKPHOS 110     Recent Labs     02/28/20  1110   INR 1.09     Recent Labs     02/28/20  1110   TROPONINI <0.01       Urinalysis:      Lab Results   Component Value Date    NITRU Negative 02/28/2020    WBCUA 3-5 02/28/2020    BACTERIA Rare 02/28/2020    RBCUA 21-50 02/28/2020    BLOODU MODERATE 02/28/2020    SPECGRAV 1.025 02/28/2020    GLUCOSEU Negative 02/28/2020       Radiology:  XR CHEST PORTABLE   Final Result   No acute cardiopulmonary disease. Assessment/Plan:    Active Hospital Problems    Diagnosis    Sepsis ([facility] [address] [A41.9]     Priority: High    UTI (urinary tract infection) [N39.0]    Hypoglycemia [E16.2]     Sepsis, likely due to UTI, meets criteria with hypotension, tachycardia, leukocytosis and elevated lactic acid level, admit to telemetry, sepsis protocol initiated in emergency room, blood culture x2 and urine culture pending, patient was given IV fluid as per protocol, continue normal saline at 100 cc/h, patient started on ceftriaxone pending cultures. Clinically improving gradually.     UTI, pending urine culture patient started on ceftriaxone, monitor.     Hypoglycemia, will be holding sulfonylurea for now, PRN D50, monitor.   Clinically

## 2020-02-29 NOTE — PROGRESS NOTES
Urinary tract infection in female and Sepsis without acute organ dysfunction, due to unspecified organism [facility]) were also pertinent to this visit. has a past medical history of Hyperlipidemia, Hypertension, Obesity, and Type 2 diabetes mellitus without complication ([address] has a past surgical history that includes Colonoscopy and Upper gastrointestinal endoscopy (N/A, 2/7/2020). Treatment Diagnosis: Weakness       Restrictions       Subjective   General  Chart Reviewed: Yes  Patient assessed for rehabilitation services?: Yes  Family / Caregiver Present: No  Referring Practitioner: Lucas Busby   Patient Currently in Pain: Yes  Pain Assessment  Pain Assessment: 0-10  Pain Level: 5  Pain Type: Acute pain  Pain Location: Abdomen  Pain Descriptors: Discomfort  Vital Signs  Patient Currently in Pain: Yes  Social/Functional History  Social/Functional History  Lives With: Alone  Type of Home: House([address])  Home Layout: One level  Home Access: Elevator  Bathroom Shower/Tub: Walk-in shower  Bathroom Toilet: Handicap height  Bathroom Equipment: Shower chair, Toilet raiser, Hand-held shower, Grab bars in shower, Grab bars around toilet  Home Equipment: 4 wheeled walker, Rolling walker  ADL Assistance: Independent  Homemaking Assistance: Independent  Ambulation Assistance: Independent(pt walks with RW, purchased 4WW several days ago )  Transfer Assistance: Independent  Additional Comments: Pt recently was admitted to [facility] and just completed brief stent in ARU.         Objective   Vision: Impaired  Vision Exceptions: Wears glasses for reading  Hearing: Within functional limits    Orientation  Overall Orientation Status: Within Functional Limits     Balance  Sitting Balance: Stand by assistance  Standing Balance: Unable to assess(comment)  ADL  LE Dressing: Maximum assistance(donning brief )  Tone RUE  RUE Tone: Normotonic  Tone LUE  LUE Tone: Normotonic  Coordination  Movements Are Fluid And Coordinated: Yes     Bed G-Code Modifier : CK (02/29/20 1230)    Goals  Short term goals  Time Frame for Short term goals: 7 days  Short term goal 1: Pt will sit at EOB for greater than 5 minutes in order to participate in ADLs   Short term goal 2: Pt will complete ADLs while seated at EOB with SBA   Short term goal 3: Pt will complete BUE HEP while seated EOB with SBA by 3/2  Patient Goals   Patient goals :  To be able to get my strength to do for myself        Therapy Time   Individual Concurrent Group Co-treatment   Time In 1127         Time Out 1206         Minutes 39         Timed Code Treatment Minutes: 29 Minutes(10 minutes for eval )       Tu Christian, OT

## 2020-02-29 NOTE — PROGRESS NOTES
Patient has had complaints of stomach cramps. Tylenol was given earlier but didn't really help. Is there something else that she can have? Also, patient admitted with low BS, but sugar is now 225. Can she get insulin orders please? Thank you!  Sent mis [employer] cover

## 2020-03-01 LAB
ACANTHOCYTES: ABNORMAL
ANION GAP SERPL CALCULATED.3IONS-SCNC: 13 MMOL/L (ref 3–16)
ANISOCYTOSIS: ABNORMAL
ATYPICAL LYMPHOCYTE RELATIVE PERCENT: 1 % (ref 0–6)
BANDED NEUTROPHILS RELATIVE PERCENT: 27 % (ref 0–7)
BASOPHILS ABSOLUTE: 0 K/UL (ref 0–0.2)
BASOPHILS RELATIVE PERCENT: 0 %
BUN BLDV-MCNC: 31 MG/DL (ref 7–20)
C DIFF TOXIN/ANTIGEN: ABNORMAL
CALCIUM SERPL-MCNC: 7.9 MG/DL (ref 8.3–10.6)
CHLORIDE BLD-SCNC: 107 MMOL/L (ref 99–110)
CO2: 17 MMOL/L (ref 21–32)
CREAT SERPL-MCNC: 1.2 MG/DL (ref 0.6–1.2)
DOHLE BODIES: PRESENT
EOSINOPHILS ABSOLUTE: 0.2 K/UL (ref 0–0.6)
EOSINOPHILS RELATIVE PERCENT: 1 %
GFR AFRICAN AMERICAN: 53
GFR NON-AFRICAN AMERICAN: 44
GLUCOSE BLD-MCNC: 136 MG/DL (ref 70–99)
GLUCOSE BLD-MCNC: 160 MG/DL (ref 70–99)
GLUCOSE BLD-MCNC: 169 MG/DL (ref 70–99)
GLUCOSE BLD-MCNC: 170 MG/DL (ref 70–99)
GLUCOSE BLD-MCNC: 181 MG/DL (ref 70–99)
HCT VFR BLD CALC: 27 % (ref 36–48)
HEMOGLOBIN: 8.8 G/DL (ref 12–16)
LYMPHOCYTES ABSOLUTE: 1.2 K/UL (ref 1–5.1)
LYMPHOCYTES RELATIVE PERCENT: 4 %
MCH RBC QN AUTO: 29.8 PG (ref 26–34)
MCHC RBC AUTO-ENTMCNC: 32.4 G/DL (ref 31–36)
MCV RBC AUTO: 92.1 FL (ref 80–100)
MICROCYTES: ABNORMAL
MONOCYTES ABSOLUTE: 0.7 K/UL (ref 0–1.3)
MONOCYTES RELATIVE PERCENT: 3 %
MYELOCYTE PERCENT: 2 %
NEUTROPHILS ABSOLUTE: 22.1 K/UL (ref 1.7–7.7)
NEUTROPHILS RELATIVE PERCENT: 62 %
ORGANISM: ABNORMAL
OVALOCYTES: ABNORMAL
PDW BLD-RTO: 16.1 % (ref 12.4–15.4)
PERFORMED ON: ABNORMAL
PLATELET # BLD: 354 K/UL (ref 135–450)
PMV BLD AUTO: 7.8 FL (ref 5–10.5)
POIKILOCYTES: ABNORMAL
POTASSIUM SERPL-SCNC: 3.7 MMOL/L (ref 3.5–5.1)
RBC # BLD: 2.94 M/UL (ref 4–5.2)
SCHISTOCYTES: ABNORMAL
SODIUM BLD-SCNC: 137 MMOL/L (ref 136–145)
TOXIC GRANULATION: PRESENT
URINE CULTURE, ROUTINE: ABNORMAL
WBC # BLD: 24.3 K/UL (ref 4–11)

## 2020-03-01 PROCEDURE — 2580000003 HC RX 258: Performed by: NURSE PRACTITIONER

## 2020-03-01 PROCEDURE — 97530 THERAPEUTIC ACTIVITIES: CPT

## 2020-03-01 PROCEDURE — 6370000000 HC RX 637 (ALT 250 FOR IP): Performed by: INTERNAL MEDICINE

## 2020-03-01 PROCEDURE — 6370000000 HC RX 637 (ALT 250 FOR IP): Performed by: NURSE PRACTITIONER

## 2020-03-01 PROCEDURE — 6360000002 HC RX W HCPCS: Performed by: INTERNAL MEDICINE

## 2020-03-01 PROCEDURE — 80048 BASIC METABOLIC PNL TOTAL CA: CPT

## 2020-03-01 PROCEDURE — 83540 ASSAY OF IRON: CPT

## 2020-03-01 PROCEDURE — 87324 CLOSTRIDIUM AG IA: CPT

## 2020-03-01 PROCEDURE — 85025 COMPLETE CBC W/AUTO DIFF WBC: CPT

## 2020-03-01 PROCEDURE — 83550 IRON BINDING TEST: CPT

## 2020-03-01 PROCEDURE — 2060000000 HC ICU INTERMEDIATE R&B

## 2020-03-01 PROCEDURE — 36415 COLL VENOUS BLD VENIPUNCTURE: CPT

## 2020-03-01 PROCEDURE — 97162 PT EVAL MOD COMPLEX 30 MIN: CPT

## 2020-03-01 PROCEDURE — 87449 NOS EACH ORGANISM AG IA: CPT

## 2020-03-01 RX ORDER — CIPROFLOXACIN 2 MG/ML
400 INJECTION, SOLUTION INTRAVENOUS EVERY 12 HOURS
Status: DISCONTINUED | OUTPATIENT
Start: 2020-03-01 | End: 2020-03-03

## 2020-03-01 RX ADMIN — PANTOPRAZOLE SODIUM 40 MG: 40 TABLET, DELAYED RELEASE ORAL at 17:32

## 2020-03-01 RX ADMIN — VITAMIN D, TAB 1000IU (100/BT) 4000 UNITS: 25 TAB at 08:13

## 2020-03-01 RX ADMIN — Medication 125 MG: at 14:51

## 2020-03-01 RX ADMIN — SODIUM CHLORIDE: 9 INJECTION, SOLUTION INTRAVENOUS at 10:11

## 2020-03-01 RX ADMIN — INSULIN LISPRO 1 UNITS: 100 INJECTION, SOLUTION INTRAVENOUS; SUBCUTANEOUS at 20:39

## 2020-03-01 RX ADMIN — OXYBUTYNIN CHLORIDE 5 MG: 5 TABLET ORAL at 20:39

## 2020-03-01 RX ADMIN — FLUDROCORTISONE ACETATE 0.1 MG: 0.1 TABLET ORAL at 08:13

## 2020-03-01 RX ADMIN — CIPROFLOXACIN 400 MG: 2 INJECTION, SOLUTION INTRAVENOUS at 12:23

## 2020-03-01 RX ADMIN — Medication 80 MG: at 08:13

## 2020-03-01 RX ADMIN — INSULIN LISPRO 1 UNITS: 100 INJECTION, SOLUTION INTRAVENOUS; SUBCUTANEOUS at 17:32

## 2020-03-01 RX ADMIN — Medication 125 MG: at 20:40

## 2020-03-01 RX ADMIN — ATORVASTATIN CALCIUM 40 MG: 40 TABLET, FILM COATED ORAL at 08:13

## 2020-03-01 RX ADMIN — PANTOPRAZOLE SODIUM 40 MG: 40 TABLET, DELAYED RELEASE ORAL at 05:18

## 2020-03-01 RX ADMIN — OXYBUTYNIN CHLORIDE 5 MG: 5 TABLET ORAL at 08:13

## 2020-03-01 ASSESSMENT — PAIN SCALES - GENERAL
PAINLEVEL_OUTOF10: 5
PAINLEVEL_OUTOF10: 4

## 2020-03-01 ASSESSMENT — PAIN DESCRIPTION - LOCATION
LOCATION: ABDOMEN
LOCATION: ABDOMEN

## 2020-03-01 ASSESSMENT — PAIN DESCRIPTION - PAIN TYPE
TYPE: ACUTE PAIN
TYPE: ACUTE PAIN

## 2020-03-01 ASSESSMENT — PAIN DESCRIPTION - DESCRIPTORS: DESCRIPTORS: ACHING

## 2020-03-01 NOTE — PROGRESS NOTES
Physical Therapy    Facility/Department: [facility] C4 U  Initial Assessment    NAME: Neris Adams  : 1945  MRN: [id number]    Date of Service: 3/1/2020    Discharge Recommendations:  Continue to assess pending progress   PT Equipment Recommendations  Equipment Needed: (TBD)    Assessment    Body structures, Functions, Activity limitations: Decreased functional mobility ; Decreased endurance;Decreased strength;Decreased posture  Assessment: Patient seen for transfer training. Patient cleared by RN for therapy participation this date. Patient agreeable to therapy. Patient admitted for fatigue, confusion, and hypoglycemia. Pt recently d/c from ARU to [address] [place] [address] independent living without therapy. Pt reports that she plans to stay at [address] [place] [address] for 1 month and then return to home. Pt reports ambulating with RW mod I at [address] [place] [address] and that she was mod I with SPC at home alone prior to previous admission. Patient completed supine->sit with min A, sat EOB ~2 mins with SBA, and reported dizziness that did not improve, BP stable. RN aware. Pt requested to return to supine. Pt encouraged to complete ankle pumps in bed. PT will continue to follow throughout LOS. CTA for d/c recommendation d/t pt limited by dizziness this session. Treatment Diagnosis: decreased independence with mobility  Specific instructions for Next Treatment: progress transfers and trial ambulation as tolerated  Prognosis: Good  Decision Making: Medium Complexity  PT Education: Goals;Precautions;Orientation; Functional Mobility Training;PT Role;Transfer Training;Family Education;Plan of Care;General Safety  Patient Education: pt verbalized understanding  Barriers to Learning: none  Activity Tolerance  Activity Tolerance: Patient limited by endurance; Patient limited by fatigue; Other(dizziness)       Patient Diagnosis(es): The primary encounter diagnosis was Hypoglycemia.  Diagnoses of Urinary tract infection in female and Sepsis without acute organ dysfunction, due to unspecified organism [facility]) were also pertinent to this visit. has a past medical history of Hyperlipidemia, Hypertension, Obesity, and Type 2 diabetes mellitus without complication ([address] has a past surgical history that includes Colonoscopy and Upper gastrointestinal endoscopy (N/A, 2/7/2020).     Restrictions  Restrictions/Precautions  Restrictions/Precautions: Fall Risk, Contact Precautions  Position Activity Restriction  Other position/activity restrictions: telemetry, C. diff rule out  Vision/Hearing  Vision: Impaired  Vision Exceptions: Wears glasses for reading  Hearing: Within functional limits     Subjective  General  Chart Reviewed: Yes  Patient assessed for rehabilitation services?: Yes  Response To Previous Treatment: Not applicable  Family / Caregiver Present: No  Referring Practitioner: Betsy  Referral Date : 02/29/20  Diagnosis: hypoglycemia, fatigue, confusion, metabolic encephalopathy  Follows Commands: Within Functional Limits  Subjective  Subjective: pt in bed, agreeable to therapy  Pain Screening  Patient Currently in Pain: Yes  Pain Assessment  Pain Assessment: 0-10  Pain Level: 5  Pain Type: Acute pain  Pain Location: Abdomen  Pain Descriptors: Aching  Non-Pharmaceutical Pain Intervention(s): Ambulation/Increased Activity;Repositioned  Vital Signs  Patient Currently in Pain: Yes       Orientation  Orientation  Overall Orientation Status: Within Normal Limits  Social/Functional History  Social/Functional History  Lives With: Alone  Type of Home: House(was recently d/c from [facility] to [address] [place] [address] for respite care)  Home Layout: One level  Home Access: Elevator  Bathroom Shower/Tub: Tub/Shower unit  Bathroom Toilet: Standard  Bathroom Equipment: Shower chair, Toilet raiser, Hand-held shower, Grab bars in shower, Grab bars around toilet(has equipment at [address] [place] [address], but none at home)  Home Equipment: 4 wheeled walker, Rolling walker(recently got 4WW, has been using RW for last ~1 month, was using SPC prior to previous admission)  ADL Assistance: Independent  Homemaking Assistance: Independent  Ambulation Assistance: Independent  Transfer Assistance: Independent  Active : Yes  Additional Comments: Pt recently was admitted to [facility] and just completed brief stent in ARU; previous to admission, pt was independent with SPC; pt has been ambulating with RW independently but has not been cooking or cleaning  Cognition   Cognition  Overall Cognitive Status: WFL    Objective     Observation/Palpation  Posture: Fair    AROM RLE (degrees)  RLE AROM: WFL(did not formally assess)  AROM LLE (degrees)  LLE AROM : WFL(did not formally assess)  Strength RLE  Strength RLE: WFL(did not formally assess)  Strength LLE  Strength LLE: WFL(did not formally assess)     Sensation  Overall Sensation Status: Impaired(N/T in Jonathan hands and feet)  Bed mobility  Supine to Sit: Minimal assistance  Scooting: Stand by assistance;Maximal assistance(SBA to EOB, max A toward HOB)  Transfers  Sit to Stand: Unable to assess  Stand to sit: Unable to assess  Bed to Chair: Unable to assess  Ambulation  Ambulation?: No  Stairs/Curb  Stairs?: No        Exercises  Ankle Pumps: 1x 15 BLE     Plan   Plan  Times per week: 3-5x/week  Plan weeks: 3/5/20  Specific instructions for Next Treatment: progress transfers and trial ambulation as tolerated  Current Treatment Recommendations: Strengthening, Transfer Training, Endurance Training, Patient/Caregiver Education & Training, Balance Training, Gait Training, Home Exercise Program, Functional Mobility Training, Safety Education & Training  Safety Devices  Type of devices:  All fall risk precautions in place, Bed alarm in place, Patient at risk for falls, Nurse notified, Call light within reach, Left in bed  Restraints  Initially in place: No    AM-PAC Score     AM-PAC Inpatient Mobility without Stair Climbing Raw Score : 7 (03/01/20 1208)  AM-PAC Inpatient without Stair Climbing T-Scale Score : 28.66 (03/01/20

## 2020-03-01 NOTE — PROGRESS NOTES
Hospitalist Progress Note  3/1/2020 9:40 AM  Subjective:   Admit Date: 2/28/2020  PCP: CHRYSTAL Connolly  Status: Inpatient [101]  Interval History: Hospital Day: 3, readmitted with metabolic encephalopathy, hypoglycemia, and sepsis secondary to urinary tract infection treated with ceftriaxone (day #3). Notes diarrhea for a week prior to admission. Mental status has improved. Still has diarrhea, prompting testing for C diff. History of present illness:  Discharged from ARU 2/23 to the [facility]. Chief complaint of metabolic encephalopathy, altered mental state and confusion, according to neighbor patient was found lethargic and sleepy last night and this early morning patient could not be woke up. Paramedics found patient was hypoglycemic, which was treated and subsequently transferred to the hospital for further evaluation management. Patient is feeling fatigued, weak, denies any chest pain, abdominal pain, nausea vomiting, fever, cough or congestion. DIET CARB CONTROL;   Left wrist peripheral IV (2/28, day #3)  Urethral catheter (2/29, day #2)  Medications:   Sodium chloride at 100 ml/hr  insulin lispro SSI  0-6 Units Subcutaneous TID WC / HS   ferrous sulfate  325 mg Oral Daily (discontinue)   polyethylene glycol  17 g Oral Daily   pantoprazole  40 mg Oral BID AC   oxybutynin  5 mg Oral BID   fludrocortisone  0.1 mg Oral Daily   atorvastatin  40 mg Oral Daily   Vitamin D  4 tablet Oral Daily   enoxaparin  40 mg Subcutaneous Daily   ceftriaxone  1 g Intravenous Q24H (2/28, day #3)     Recent Labs     02/28/20  1110 02/29/20  0549 03/01/20  0520   WBC 17.9* 20.4* 24.3*   HGB 10.3* 9.2* 8.8*    346 354   MCV 93.2 92.8 92.1     Recent Labs     02/28/20  1110 03/01/20  0520    137   K 3.8 3.7    107   CO2 23 17*   BUN 27* 31*   CREATININE 1.2 1.2   GLUCOSE 91 160*     Recent Labs     02/28/20  1110   AST 18   ALT 11   BILITOT 0.3   ALKPHOS 110     Troponin T:  < 0.01 ng/mL  INR:

## 2020-03-01 NOTE — PROGRESS NOTES
Patient had BM. It does not qualify to be sent to lab according to cdiff algorithm. Stool was soft, not watery/liquidy.

## 2020-03-02 ENCOUNTER — APPOINTMENT (OUTPATIENT)
Dept: GENERAL RADIOLOGY | Age: 75
DRG: 871 | End: 2020-03-02
Payer: MEDICARE

## 2020-03-02 LAB
ANION GAP SERPL CALCULATED.3IONS-SCNC: 11 MMOL/L (ref 3–16)
ANISOCYTOSIS: ABNORMAL
BANDED NEUTROPHILS RELATIVE PERCENT: 35 % (ref 0–7)
BASOPHILS ABSOLUTE: 0 K/UL (ref 0–0.2)
BASOPHILS RELATIVE PERCENT: 0 %
BUN BLDV-MCNC: 23 MG/DL (ref 7–20)
BURR CELLS: ABNORMAL
CALCIUM SERPL-MCNC: 7.9 MG/DL (ref 8.3–10.6)
CHLORIDE BLD-SCNC: 108 MMOL/L (ref 99–110)
CO2: 18 MMOL/L (ref 21–32)
CREAT SERPL-MCNC: 0.9 MG/DL (ref 0.6–1.2)
DOHLE BODIES: PRESENT
EOSINOPHILS ABSOLUTE: 0 K/UL (ref 0–0.6)
EOSINOPHILS RELATIVE PERCENT: 0 %
GFR AFRICAN AMERICAN: >60
GFR NON-AFRICAN AMERICAN: >60
GLUCOSE BLD-MCNC: 127 MG/DL (ref 70–99)
GLUCOSE BLD-MCNC: 128 MG/DL (ref 70–99)
GLUCOSE BLD-MCNC: 141 MG/DL (ref 70–99)
GLUCOSE BLD-MCNC: 157 MG/DL (ref 70–99)
GLUCOSE BLD-MCNC: 166 MG/DL (ref 70–99)
HCT VFR BLD CALC: 27.6 % (ref 36–48)
HEMOGLOBIN: 8.9 G/DL (ref 12–16)
IRON SATURATION: 14 % (ref 15–50)
IRON: 16 UG/DL (ref 37–145)
LYMPHOCYTES ABSOLUTE: 1.3 K/UL (ref 1–5.1)
LYMPHOCYTES RELATIVE PERCENT: 5 %
MACROCYTES: ABNORMAL
MCH RBC QN AUTO: 29.6 PG (ref 26–34)
MCHC RBC AUTO-ENTMCNC: 32.1 G/DL (ref 31–36)
MCV RBC AUTO: 92.1 FL (ref 80–100)
METAMYELOCYTES RELATIVE PERCENT: 1 %
MICROCYTES: ABNORMAL
MONOCYTES ABSOLUTE: 1.6 K/UL (ref 0–1.3)
MONOCYTES RELATIVE PERCENT: 6 %
NEUTROPHILS ABSOLUTE: 23.8 K/UL (ref 1.7–7.7)
NEUTROPHILS RELATIVE PERCENT: 53 %
OVALOCYTES: ABNORMAL
PDW BLD-RTO: 16.1 % (ref 12.4–15.4)
PERFORMED ON: ABNORMAL
PLATELET # BLD: 362 K/UL (ref 135–450)
PMV BLD AUTO: 7.4 FL (ref 5–10.5)
POIKILOCYTES: ABNORMAL
POTASSIUM SERPL-SCNC: 3.9 MMOL/L (ref 3.5–5.1)
RBC # BLD: 3 M/UL (ref 4–5.2)
SCHISTOCYTES: ABNORMAL
SODIUM BLD-SCNC: 137 MMOL/L (ref 136–145)
TEAR DROP CELLS: ABNORMAL
TOTAL IRON BINDING CAPACITY: 117 UG/DL (ref 260–445)
TOXIC GRANULATION: PRESENT
WBC # BLD: 26.7 K/UL (ref 4–11)

## 2020-03-02 PROCEDURE — 6370000000 HC RX 637 (ALT 250 FOR IP): Performed by: INTERNAL MEDICINE

## 2020-03-02 PROCEDURE — 6370000000 HC RX 637 (ALT 250 FOR IP): Performed by: NURSE PRACTITIONER

## 2020-03-02 PROCEDURE — 74022 RADEX COMPL AQT ABD SERIES: CPT

## 2020-03-02 PROCEDURE — 80048 BASIC METABOLIC PNL TOTAL CA: CPT

## 2020-03-02 PROCEDURE — 85025 COMPLETE CBC W/AUTO DIFF WBC: CPT

## 2020-03-02 PROCEDURE — 6360000002 HC RX W HCPCS: Performed by: INTERNAL MEDICINE

## 2020-03-02 PROCEDURE — 2580000003 HC RX 258: Performed by: NURSE PRACTITIONER

## 2020-03-02 PROCEDURE — 2580000003 HC RX 258: Performed by: INTERNAL MEDICINE

## 2020-03-02 PROCEDURE — 36415 COLL VENOUS BLD VENIPUNCTURE: CPT

## 2020-03-02 PROCEDURE — 2060000000 HC ICU INTERMEDIATE R&B

## 2020-03-02 RX ORDER — MORPHINE SULFATE 2 MG/ML
1 INJECTION, SOLUTION INTRAMUSCULAR; INTRAVENOUS EVERY 4 HOURS PRN
Status: DISCONTINUED | OUTPATIENT
Start: 2020-03-02 | End: 2020-03-03

## 2020-03-02 RX ADMIN — VITAMIN D, TAB 1000IU (100/BT) 4000 UNITS: 25 TAB at 08:39

## 2020-03-02 RX ADMIN — MORPHINE SULFATE 1 MG: 2 INJECTION, SOLUTION INTRAMUSCULAR; INTRAVENOUS at 21:44

## 2020-03-02 RX ADMIN — Medication 250 MG: at 12:52

## 2020-03-02 RX ADMIN — Medication 250 MG: at 17:44

## 2020-03-02 RX ADMIN — CIPROFLOXACIN 400 MG: 2 INJECTION, SOLUTION INTRAVENOUS at 00:20

## 2020-03-02 RX ADMIN — Medication 80 MG: at 21:33

## 2020-03-02 RX ADMIN — MORPHINE SULFATE 1 MG: 2 INJECTION, SOLUTION INTRAMUSCULAR; INTRAVENOUS at 16:49

## 2020-03-02 RX ADMIN — CIPROFLOXACIN 400 MG: 2 INJECTION, SOLUTION INTRAVENOUS at 11:21

## 2020-03-02 RX ADMIN — FLUDROCORTISONE ACETATE 0.1 MG: 0.1 TABLET ORAL at 08:39

## 2020-03-02 RX ADMIN — Medication 250 MG: at 23:59

## 2020-03-02 RX ADMIN — ACETAMINOPHEN 650 MG: 325 TABLET ORAL at 08:39

## 2020-03-02 RX ADMIN — Medication 80 MG: at 08:39

## 2020-03-02 RX ADMIN — Medication 125 MG: at 06:04

## 2020-03-02 RX ADMIN — PANTOPRAZOLE SODIUM 40 MG: 40 TABLET, DELAYED RELEASE ORAL at 16:48

## 2020-03-02 RX ADMIN — PANTOPRAZOLE SODIUM 40 MG: 40 TABLET, DELAYED RELEASE ORAL at 06:04

## 2020-03-02 RX ADMIN — ATORVASTATIN CALCIUM 40 MG: 40 TABLET, FILM COATED ORAL at 08:39

## 2020-03-02 RX ADMIN — OXYBUTYNIN CHLORIDE 5 MG: 5 TABLET ORAL at 21:33

## 2020-03-02 RX ADMIN — OXYBUTYNIN CHLORIDE 5 MG: 5 TABLET ORAL at 08:39

## 2020-03-02 RX ADMIN — Medication 10 ML: at 21:46

## 2020-03-02 RX ADMIN — CIPROFLOXACIN 400 MG: 2 INJECTION, SOLUTION INTRAVENOUS at 21:46

## 2020-03-02 RX ADMIN — SODIUM CHLORIDE: 9 INJECTION, SOLUTION INTRAVENOUS at 03:15

## 2020-03-02 ASSESSMENT — PAIN SCALES - GENERAL
PAINLEVEL_OUTOF10: 7
PAINLEVEL_OUTOF10: 7
PAINLEVEL_OUTOF10: 10
PAINLEVEL_OUTOF10: 10
PAINLEVEL_OUTOF10: 8

## 2020-03-02 ASSESSMENT — PAIN DESCRIPTION - LOCATION
LOCATION: ABDOMEN

## 2020-03-02 ASSESSMENT — PAIN DESCRIPTION - DESCRIPTORS
DESCRIPTORS: CRAMPING

## 2020-03-02 ASSESSMENT — PAIN DESCRIPTION - PAIN TYPE
TYPE: ACUTE PAIN

## 2020-03-02 ASSESSMENT — PAIN DESCRIPTION - FREQUENCY
FREQUENCY: CONTINUOUS
FREQUENCY: CONTINUOUS

## 2020-03-02 NOTE — PROGRESS NOTES
Hospitalist Progress Note      PCP: CHRYSTAL Griffin    Date of Admission: 2/28/2020    Chief Complaint: Confusion, fatigue, hypoglycemia    Hospital Course: See H&P    Subjective:   Patient is up in bed, comfortable, not in distress. Complaining of abdominal cramps and diarrhea. No new event overnight noted. Medications:  Reviewed    Infusion Medications    dextrose      sodium chloride 100 mL/hr at 03/02/20 0315     Scheduled Medications    vancomycin  250 mg Oral 4 times per day    ciprofloxacin  400 mg Intravenous Q12H    insulin lispro  0-6 Units Subcutaneous TID WC    insulin lispro  0-3 Units Subcutaneous Nightly    polyethylene glycol  17 g Oral Daily    pantoprazole  40 mg Oral BID AC    oxybutynin  5 mg Oral BID    fludrocortisone  0.1 mg Oral Daily    atorvastatin  40 mg Oral Daily    Vitamin D  4 tablet Oral Daily    sodium chloride flush  10 mL Intravenous 2 times per day    enoxaparin  40 mg Subcutaneous Daily     PRN Meds: calcium carbonate, simethicone, sodium chloride flush, acetaminophen **OR** acetaminophen, glucose, dextrose, glucagon (rDNA), dextrose      Intake/Output Summary (Last 24 hours) at 3/2/2020 1244  Last data filed at 3/2/2020 0828  Gross per 24 hour   Intake 511.67 ml   Output 1000 ml   Net -488. 33 ml       Physical Exam Performed:    /77   Pulse 87   Temp 99.4 °F (37.4 °C) (Oral)   Resp 18   Ht 5' 4\" (1.626 m)   Wt 218 lb 8 oz (99.1 kg)   SpO2 96%   BMI 37.51 kg/m²     General appearance: No apparent distress, appears stated age and cooperative. HEENT: Pupils equal, round, and reactive to light. Conjunctivae/corneas clear. Neck: Supple, with full range of motion. No jugular venous distention. Trachea midline. Respiratory:  Normal respiratory effort. Clear to auscultation, bilaterally without Rales/Wheezes/Rhonchi. Cardiovascular: Regular rate and rhythm with normal S1/S2 without murmurs, rubs or gallops.   Abdomen: Soft, non-tender, gradually.     UTI, pending urine culture patient started on ceftriaxone, urine culture showing Enterococcus faecalis sensitive to Cipro, patient was transitioned to ciprofloxacin. C. difficile colitis, initial episode, started on vancomycin 250 mg 4 times daily, plan to continue therapy for total of 14 days. Monitor.     Hypoglycemia, will be holding sulfonylurea for now, PRN D50, monitor. Clinically resolved.     Diabetes mellitus type 2, reported controlled with current regimen, will be holding sulfonylurea for now, monitor. We will add low-dose insulin sliding scale, monitor.     Metabolic encephalopathy, likely due to hypoglycemia, clinically resolved after treating hypoglycemia, supportive care, monitor. Clinically resolved now. Iron deficiency anemia on oral ferrous sulfate, hemoglobin remained stable around 9, monitor.     Morbid Obesity complicating assessment and treatment. Placing patient at risk for multiple co-morbidities as well as early death and contributing to the patient's presentation. Counseled on weight loss. Body mass index is 33.99 kg/m².     DVT Prophylaxis: Lovenox  Diet: DIET CARB CONTROL;  Code Status: Full Code    PT/OT Eval Status: Ordered    Dispo -2 to 4 days    Adolfo Valle MD

## 2020-03-02 NOTE — DISCHARGE INSTR - COC
Medical Equipment (for information only, NOT a DME order):  {EQUIPMENT:[id number]}  Other Treatments: ***    Patient's personal belongings (please select all that are sent with patient):  None    RN SIGNATURE:  Electronically signed by Li Mahoney RN on 3/8/20 at 11:47 AM EDT    CASE MANAGEMENT/SOCIAL WORK SECTION    Inpatient Status Date: 2/28/20    Readmission Risk Assessment Score:  Readmission Risk              Risk of Unplanned Readmission:        24           Discharging to Facility/ Agency   · Name: [facility]  · Address:  · Phone: [phone]  · Fax:    Dialysis Facility (if applicable)   · Name:  · Address:  · Dialysis Schedule:  · Phone:  · Fax:    / signature: Electronically signed by eDnisa Perea RN on 3/8/20 at 11:14 AM EDT    PHYSICIAN SECTION    Prognosis: Fair    Condition at Discharge: Stable    Rehab Potential (if transferring to Rehab): Fair    Recommended Labs or Other Treatments After Discharge: CBC, BMP in 5 days  Recommended Follow-up, Labs or Other Treatments After Discharge:    Pt-ot             Physician Certification: I certify the above information and transfer of Anita Strauss  is necessary for the continuing treatment of the diagnosis listed and that she requires [facility] for less 30 days.      Update Admission H&P: No change in H&P    PHYSICIAN SIGNATURE:  Electronically signed by Gio Jackson MD on 3/8/20 at 10:41 AM EDT

## 2020-03-02 NOTE — CARE COORDINATION
CASE MANAGEMENT INITIAL ASSESSMENT      Reviewed chart and met with patient today, re: potential dc planning needs  Explained Case Management role/services. Family present: friend at bedside   Primary contact information: India Brooks, brother [phone]    Admit date/status: 02/28/20  Diagnosis: Sepsis  Is this a Readmission?:  No     Insurance: medicare with [id number] required for SNF - N        3 night stay required - Y    Living arrangements, Adls, care needs, prior to admission: lives [facility] in respite suite at this time until she is safe enough to return home alone. Pt states that she will dc back to [address] [place] [address] at discharge. Transportation: drives, although not driving at this time due to illness. Durable Medical Equipment at home:  Walker_X_Cane_X_RTS__ BSC__Shower Chair_X_  02__ HHN__ CPAP__  BiPap__  Hospital Bed__ W/C___ Other grab bars     Services in the home and/or outpatient, prior to admission:  Per facility. Dialysis Facility (if applicable) n/a  · Name:  · Address:  · Dialysis Schedule:  · Phone:  · Fax:    PT/OT recs: rec \"continue to assess pending progress\"     Hospital Exemption Notification (HEN): will be needed for SNF placement. Barriers to discharge: none identified    Plan/comments: plans to dc back to [address] [place] [address] and wants to start [facility] services with [address] [place] [address]. Ref. Made. Follow.      ECOC on chart for MD signature

## 2020-03-02 NOTE — CONSULTS
Gastroenterology Consult Note    Patient: Darling Carlos   YOB: 1945   Facility:   [facility]   Referring/PCP: Eddy Griffin  Date:     3/2/2020  Consultant:   Carl Gaston MD    Subjective: This 69 y.o. female was admitted 2/28/2020 with a diagnosis of \"Sepsis ([address] [A41.9]  Sepsis ([address] [A41.9]\" and is seen in consultation regarding \"diarrhea\". Information was obtained from interview of  the patient, examination of the patient, and review of records. I did  update the past medical, surgical, social and / or family history. Diarrhea  Mod in colon for days assoc w C diff    Current status  Present  Diet Order: DIET CARB CONTROL; and she is tolerating diet. Recently, she has experienced no abdominal  Pain and she has not required Intravenous narcotic analgesics. The patient has also experienced no constipation, hematochezia, melena, nausea and vomiting      Prior to Admission medications    Medication Sig Start Date End Date Taking?  Authorizing Provider   fludrocortisone (FLORINEF) 0.1 MG tablet Take 1 tablet by mouth daily 2/22/20 3/23/20  Lucian Vela MD   ferrous sulfate 325 (65 Fe) MG tablet Take 1 tablet by mouth daily (with breakfast) 2/9/20   Kathi Lehman MD   polyethylene glycol [facility]) packet Take 17 g by mouth daily 2/9/20 3/10/20  Kathi Lehman MD   pantoprazole (PROTONIX) 40 MG tablet Take 1 tablet by mouth 2 times daily (before meals) 2/9/20   Kathi Lehman MD   glipiZIDE (GLUCOTROL) 10 MG tablet TAKE 1 TABLET BY MOUTH EVERY DAY 1/28/20   CHRYSTAL Griffin   losartan (COZAAR) 100 MG tablet TAKE 1 TABLET BY MOUTH EVERY DAY 1/6/20   CHRYSTAL Griffin   Handicap Placard MISC Expiration 1/2025 12/6/19   CHRYSTAL Griffin   oxybutynin (DITROPAN) 5 MG tablet Take 1 tablet by mouth 2 times daily 11/5/19   CHRYSTAL Griffin   Handicap Placard MISC by Does not apply route 10/29/19   CHRYSTAL Griffin   atorvastatin (LIPITOR) 40 MG tablet Take 1 tablet by mouth daily 10/28/19   CHRYSTAL Hunter   Cholecalciferol 100 MCG (4000 UT) CAPS Take 1 capsule by mouth daily 10/28/19   CHRYSTAL Hunter      Scheduled Medications:    vancomycin  250 mg Oral 4 times per day    ciprofloxacin  400 mg Intravenous Q12H    insulin lispro  0-6 Units Subcutaneous TID WC    insulin lispro  0-3 Units Subcutaneous Nightly    polyethylene glycol  17 g Oral Daily    pantoprazole  40 mg Oral BID AC    oxybutynin  5 mg Oral BID    fludrocortisone  0.1 mg Oral Daily    atorvastatin  40 mg Oral Daily    Vitamin D  4 tablet Oral Daily    sodium chloride flush  10 mL Intravenous 2 times per day    enoxaparin  40 mg Subcutaneous Daily     Infusions:    dextrose      sodium chloride 100 mL/hr at 03/02/20 0315     PRN Medications: morphine, calcium carbonate, simethicone, sodium chloride flush, acetaminophen **OR** acetaminophen, glucose, dextrose, glucagon (rDNA), dextrose  Allergies:    Allergies   Allergen Reactions    Metformin      diarrhea    Ramipril      cough  cough         Past Medical History:   Diagnosis Date    Clostridium difficile infection 03/01/2020    Hyperlipidemia     Hypertension     Obesity     Type 2 diabetes mellitus without complication [facility])      Past Surgical History:   Procedure Laterality Date    COLONOSCOPY      UPPER GASTROINTESTINAL ENDOSCOPY N/A 2/7/2020    EGD BIOPSY performed by Apple Huerta MD at [address]:   Social History     Tobacco Use    Smoking status: Passive Smoke Exposure - Never Smoker    Smokeless tobacco: Never Used   Substance Use Topics    Alcohol use: Yes     Comment: every 2 months      Family:   Family History   Problem Relation Age of Onset    Diabetes Brother     Obesity Brother     Other Brother         TIA    Diabetes Mother     Other Mother         COPD    Heart Attack Father     Coronary Art Dis Father     Other Father         aortic aneurysm    Diabetes Brother

## 2020-03-02 NOTE — PLAN OF CARE
Problem: Falls - Risk of:  Goal: Absence of physical injury  Description  Absence of physical injury  Outcome: Ongoing     Problem: Risk for Impaired Skin Integrity  Goal: Tissue integrity - skin and mucous membranes  Description  Structural intactness and normal physiological function of skin and  mucous membranes.   3/1/2020 2233 by Nicolas Ballard RN  Outcome: Ongoing

## 2020-03-03 LAB
ACANTHOCYTES: ABNORMAL
ANION GAP SERPL CALCULATED.3IONS-SCNC: 11 MMOL/L (ref 3–16)
ANISOCYTOSIS: ABNORMAL
BANDED NEUTROPHILS RELATIVE PERCENT: 25 % (ref 0–7)
BASOPHILS ABSOLUTE: 0 K/UL (ref 0–0.2)
BASOPHILS RELATIVE PERCENT: 0 %
BLOOD CULTURE, ROUTINE: NORMAL
BUN BLDV-MCNC: 23 MG/DL (ref 7–20)
BURR CELLS: ABNORMAL
CALCIUM SERPL-MCNC: 7.9 MG/DL (ref 8.3–10.6)
CHLORIDE BLD-SCNC: 109 MMOL/L (ref 99–110)
CO2: 17 MMOL/L (ref 21–32)
CREAT SERPL-MCNC: 1 MG/DL (ref 0.6–1.2)
CULTURE, BLOOD 2: NORMAL
DOHLE BODIES: PRESENT
EOSINOPHILS ABSOLUTE: 0.3 K/UL (ref 0–0.6)
EOSINOPHILS RELATIVE PERCENT: 1 %
GFR AFRICAN AMERICAN: >60
GFR NON-AFRICAN AMERICAN: 54
GLUCOSE BLD-MCNC: 119 MG/DL (ref 70–99)
GLUCOSE BLD-MCNC: 120 MG/DL (ref 70–99)
GLUCOSE BLD-MCNC: 137 MG/DL (ref 70–99)
GLUCOSE BLD-MCNC: 141 MG/DL (ref 70–99)
GLUCOSE BLD-MCNC: 151 MG/DL (ref 70–99)
HCT VFR BLD CALC: 28.5 % (ref 36–48)
HEMOGLOBIN: 9.2 G/DL (ref 12–16)
LYMPHOCYTES ABSOLUTE: 0.6 K/UL (ref 1–5.1)
LYMPHOCYTES RELATIVE PERCENT: 2 %
MACROCYTES: ABNORMAL
MCH RBC QN AUTO: 29.8 PG (ref 26–34)
MCHC RBC AUTO-ENTMCNC: 32.3 G/DL (ref 31–36)
MCV RBC AUTO: 92.1 FL (ref 80–100)
METAMYELOCYTES RELATIVE PERCENT: 2 %
MICROCYTES: ABNORMAL
MONOCYTES ABSOLUTE: 0 K/UL (ref 0–1.3)
MONOCYTES RELATIVE PERCENT: 0 %
NEUTROPHILS ABSOLUTE: 28.2 K/UL (ref 1.7–7.7)
NEUTROPHILS RELATIVE PERCENT: 70 %
OVALOCYTES: ABNORMAL
PDW BLD-RTO: 16.3 % (ref 12.4–15.4)
PERFORMED ON: ABNORMAL
PLATELET # BLD: 366 K/UL (ref 135–450)
PLATELET SLIDE REVIEW: ADEQUATE
PMV BLD AUTO: 7.3 FL (ref 5–10.5)
POIKILOCYTES: ABNORMAL
POTASSIUM SERPL-SCNC: 4.2 MMOL/L (ref 3.5–5.1)
RBC # BLD: 3.1 M/UL (ref 4–5.2)
SCHISTOCYTES: ABNORMAL
SLIDE REVIEW: ABNORMAL
SODIUM BLD-SCNC: 137 MMOL/L (ref 136–145)
TEAR DROP CELLS: ABNORMAL
TOXIC GRANULATION: PRESENT
WBC # BLD: 29.1 K/UL (ref 4–11)

## 2020-03-03 PROCEDURE — 85025 COMPLETE CBC W/AUTO DIFF WBC: CPT

## 2020-03-03 PROCEDURE — 97110 THERAPEUTIC EXERCISES: CPT

## 2020-03-03 PROCEDURE — 2580000003 HC RX 258: Performed by: INTERNAL MEDICINE

## 2020-03-03 PROCEDURE — 6370000000 HC RX 637 (ALT 250 FOR IP): Performed by: NURSE PRACTITIONER

## 2020-03-03 PROCEDURE — 2580000003 HC RX 258: Performed by: NURSE PRACTITIONER

## 2020-03-03 PROCEDURE — 97535 SELF CARE MNGMENT TRAINING: CPT

## 2020-03-03 PROCEDURE — 2060000000 HC ICU INTERMEDIATE R&B

## 2020-03-03 PROCEDURE — 99222 1ST HOSP IP/OBS MODERATE 55: CPT | Performed by: INTERNAL MEDICINE

## 2020-03-03 PROCEDURE — 97530 THERAPEUTIC ACTIVITIES: CPT

## 2020-03-03 PROCEDURE — 6370000000 HC RX 637 (ALT 250 FOR IP): Performed by: INTERNAL MEDICINE

## 2020-03-03 PROCEDURE — 6360000002 HC RX W HCPCS: Performed by: INTERNAL MEDICINE

## 2020-03-03 PROCEDURE — 80048 BASIC METABOLIC PNL TOTAL CA: CPT

## 2020-03-03 PROCEDURE — 36415 COLL VENOUS BLD VENIPUNCTURE: CPT

## 2020-03-03 RX ORDER — CIPROFLOXACIN 500 MG/1
500 TABLET, FILM COATED ORAL EVERY 12 HOURS SCHEDULED
Status: DISCONTINUED | OUTPATIENT
Start: 2020-03-03 | End: 2020-03-03

## 2020-03-03 RX ORDER — MORPHINE SULFATE 2 MG/ML
2 INJECTION, SOLUTION INTRAMUSCULAR; INTRAVENOUS EVERY 4 HOURS PRN
Status: DISCONTINUED | OUTPATIENT
Start: 2020-03-03 | End: 2020-03-08 | Stop reason: HOSPADM

## 2020-03-03 RX ORDER — SACCHAROMYCES BOULARDII 250 MG
250 CAPSULE ORAL 2 TIMES DAILY
Status: DISCONTINUED | OUTPATIENT
Start: 2020-03-03 | End: 2020-03-08 | Stop reason: HOSPADM

## 2020-03-03 RX ADMIN — OXYBUTYNIN CHLORIDE 5 MG: 5 TABLET ORAL at 20:13

## 2020-03-03 RX ADMIN — Medication 250 MG: at 20:13

## 2020-03-03 RX ADMIN — ATORVASTATIN CALCIUM 40 MG: 40 TABLET, FILM COATED ORAL at 08:18

## 2020-03-03 RX ADMIN — Medication 250 MG: at 05:38

## 2020-03-03 RX ADMIN — MORPHINE SULFATE 2 MG: 2 INJECTION, SOLUTION INTRAMUSCULAR; INTRAVENOUS at 17:53

## 2020-03-03 RX ADMIN — SODIUM CHLORIDE: 9 INJECTION, SOLUTION INTRAVENOUS at 21:11

## 2020-03-03 RX ADMIN — Medication 250 MG: at 13:44

## 2020-03-03 RX ADMIN — PANTOPRAZOLE SODIUM 40 MG: 40 TABLET, DELAYED RELEASE ORAL at 05:37

## 2020-03-03 RX ADMIN — VITAMIN D, TAB 1000IU (100/BT) 4000 UNITS: 25 TAB at 08:18

## 2020-03-03 RX ADMIN — PANTOPRAZOLE SODIUM 40 MG: 40 TABLET, DELAYED RELEASE ORAL at 17:53

## 2020-03-03 RX ADMIN — FLUDROCORTISONE ACETATE 0.1 MG: 0.1 TABLET ORAL at 08:19

## 2020-03-03 RX ADMIN — CIPROFLOXACIN HYDROCHLORIDE 500 MG: 500 TABLET, FILM COATED ORAL at 20:13

## 2020-03-03 RX ADMIN — MORPHINE SULFATE 1 MG: 2 INJECTION, SOLUTION INTRAMUSCULAR; INTRAVENOUS at 08:18

## 2020-03-03 RX ADMIN — SODIUM CHLORIDE: 9 INJECTION, SOLUTION INTRAVENOUS at 09:36

## 2020-03-03 RX ADMIN — Medication 10 ML: at 17:54

## 2020-03-03 RX ADMIN — ENOXAPARIN SODIUM 40 MG: 40 INJECTION SUBCUTANEOUS at 08:18

## 2020-03-03 RX ADMIN — Medication 80 MG: at 08:18

## 2020-03-03 RX ADMIN — SODIUM CHLORIDE: 9 INJECTION, SOLUTION INTRAVENOUS at 00:00

## 2020-03-03 RX ADMIN — MORPHINE SULFATE 2 MG: 2 INJECTION, SOLUTION INTRAMUSCULAR; INTRAVENOUS at 13:44

## 2020-03-03 RX ADMIN — Medication 10 ML: at 20:14

## 2020-03-03 RX ADMIN — OXYBUTYNIN CHLORIDE 5 MG: 5 TABLET ORAL at 08:18

## 2020-03-03 ASSESSMENT — PAIN SCALES - GENERAL
PAINLEVEL_OUTOF10: 8
PAINLEVEL_OUTOF10: 9
PAINLEVEL_OUTOF10: 10
PAINLEVEL_OUTOF10: 7
PAINLEVEL_OUTOF10: 10
PAINLEVEL_OUTOF10: 5

## 2020-03-03 ASSESSMENT — PAIN DESCRIPTION - PAIN TYPE
TYPE: ACUTE PAIN

## 2020-03-03 ASSESSMENT — PAIN SCALES - WONG BAKER: WONGBAKER_NUMERICALRESPONSE: 4

## 2020-03-03 ASSESSMENT — PAIN DESCRIPTION - LOCATION
LOCATION: ABDOMEN

## 2020-03-03 NOTE — PROGRESS NOTES
Bob Tucker is a 69 y.o. female patient.     Current Facility-Administered Medications   Medication Dose Route Frequency Provider Last Rate Last Dose    vancomycin (VANCOCIN) oral solution 250 mg  250 mg Oral 4 times per day Kristi Escamilla MD   250 mg at 03/03/20 0538    morphine (PF) injection 1 mg  1 mg Intravenous Q4H PRN Kristi Escamilla MD   1 mg at 03/02/20 2144    ciprofloxacin (CIPRO) IVPB 400 mg  400 mg Intravenous Q12H Marilou Juares MD   Stopped at 03/02/20 2246    calcium carbonate (TUMS) chewable tablet 500 mg  500 mg Oral TID PRN Palmer Melgoza MD   500 mg at 02/29/20 1904    insulin lispro (HUMALOG) injection vial 0-6 Units  0-6 Units Subcutaneous TID WC Palmer Melgoza MD   1 Units at 03/01/20 1732    insulin lispro (HUMALOG) injection vial 0-3 Units  0-3 Units Subcutaneous Nightly Palmer Melgoza MD   1 Units at 03/01/20 2039    simethicone (MYLICON) chewable tablet 80 mg  80 mg Oral Q6H PRN ANGIE Yeager - CNP   80 mg at 03/02/20 2133    polyethylene glycol (GLYCOLAX) packet 17 g  17 g Oral Daily Kristi Escamilla MD        pantoprazole (PROTONIX) tablet 40 mg  40 mg Oral BID AC Kristi Escamilla MD   40 mg at 03/03/20 0537    oxybutynin (DITROPAN) tablet 5 mg  5 mg Oral BID Kristi Escamilla MD   5 mg at 03/02/20 2133    fludrocortisone (FLORINEF) tablet 0.1 mg  0.1 mg Oral Daily Kristi Escamilla MD   0.1 mg at 03/02/20 1037    atorvastatin (LIPITOR) tablet 40 mg  40 mg Oral Daily Kristi Escamilla MD   40 mg at 03/02/20 5015    Vitamin D (CHOLECALCIFEROL) tablet 4,000 Units  4 tablet Oral Daily Kristi Escamilla MD   4,000 Units at 03/02/20 0943    sodium chloride flush 0.9 % injection 10 mL  10 mL Intravenous 2 times per day Kristi Escamilla MD   10 mL at 03/02/20 2146    sodium chloride flush 0.9 % injection 10 mL  10 mL Intravenous PRN Kristi Escamilla MD        acetaminophen (TYLENOL) tablet 650 mg  650 mg Oral Q6H PRN Kristi Escamilla MD   650 mg at 03/02/20 3987    Or    acetaminophen (TYLENOL) suppository 650 mg  650 mg Rectal Q6H PRN Karina Hough MD        enoxaparin (LOVENOX) injection 40 mg  40 mg Subcutaneous Daily Karina Hough MD   40 mg at 02/28/20 1627    glucose (GLUTOSE) 40 % oral gel 15 g  15 g Oral PRN Karina Hough MD        dextrose 50 % IV solution  12.5 g Intravenous PRN Karina Hough MD        glucagon (rDNA) injection 1 mg  1 mg Intramuscular PRN Karina Hough MD        dextrose 5 % solution  100 mL/hr Intravenous PRN Karina Hough MD        0.9 % sodium chloride infusion   Intravenous Continuous ANGIE Robb -  mL/hr at 03/03/20 0000       Allergies   Allergen Reactions    Metformin      diarrhea    Ramipril      cough  cough       Principal Problem:    Sepsis ([address]  Active Problems:    UTI (urinary tract infection)    Hypoglycemia  Resolved Problems:    * No resolved hospital problems. *    Blood pressure 105/71, pulse 86, temperature 97.4 °F (36.3 °C), temperature source Oral, resp. rate 16, height 5' 4\" (1.626 m), weight 212 lb 11.9 oz (96.5 kg), SpO2 95 %. Subjective:  Symptoms:  Stable. Pain:  She reports no pain. Objective:  General Appearance: In no acute distress and not in pain. Vital signs: (most recent): Blood pressure 105/71, pulse 86, temperature 97.4 °F (36.3 °C), temperature source Oral, resp. rate 16, height 5' 4\" (1.626 m), weight 212 lb 11.9 oz (96.5 kg), SpO2 95 %. Abdomen: Abdomen is soft. Bowel sounds are normal.   There is no abdominal tenderness. Assessment & Plan  69year old female with HTN, HLD, DM and bleeding large DU on EGD on 2/7/20 admitted w diarrhea due to C diff and anemia. H/H 9/27, Fe 14%. Is on Cipro for a UTI.     Plan:   1. Continue 2 weeks of PO Vanc   2. Would minimize the duration of other antibiotics (Cipro) as much as possible  3. Will need EGD at some point  4.  Will follow     Carl Gaston MD       [phone]) [phone]    Carl Gaston MD  3/3/2020

## 2020-03-03 NOTE — PROGRESS NOTES
Dr. Kimber Marcus is aware that patient wants to transfer to another hospital. Pt has c/o uncontrolled pain, IVP PRN morphine has been increased from 1mg to 2mg and patient is aware. PT/OT ordered, and patient not very agreeable and needed a lot of encouragement from staff to get up into a chair. Patient has a friend at the bedside now who is apologetic to the staff regarding patient's verbally aggressive behaviors today. Pt denies any needs at this time.

## 2020-03-03 NOTE — PROGRESS NOTES
Christopher served Dr. Rosina Dorman @ [phone] 3/3/20 re: ID consult. -[phone] [facility]

## 2020-03-03 NOTE — PROGRESS NOTES
Hospitalist Progress Note      PCP: CHRYSTAL Rdz    Date of Admission: 2/28/2020    Chief Complaint: Confusion, fatigue, hypoglycemia    Hospital Course: See H&P    Subjective:   Patient is up in bed, comfortable, not in distress. Complaining of abdominal cramps and diarrhea. No new event overnight noted. Medications:  Reviewed    Infusion Medications    dextrose      sodium chloride 100 mL/hr at 03/03/20 5832     Scheduled Medications    saccharomyces boulardii  250 mg Oral BID    ciprofloxacin  500 mg Oral 2 times per day    vancomycin  250 mg Oral 4 times per day    insulin lispro  0-6 Units Subcutaneous TID WC    insulin lispro  0-3 Units Subcutaneous Nightly    polyethylene glycol  17 g Oral Daily    pantoprazole  40 mg Oral BID AC    oxybutynin  5 mg Oral BID    fludrocortisone  0.1 mg Oral Daily    atorvastatin  40 mg Oral Daily    Vitamin D  4 tablet Oral Daily    sodium chloride flush  10 mL Intravenous 2 times per day    enoxaparin  40 mg Subcutaneous Daily     PRN Meds: morphine, calcium carbonate, simethicone, sodium chloride flush, acetaminophen **OR** acetaminophen, glucose, dextrose, glucagon (rDNA), dextrose      Intake/Output Summary (Last 24 hours) at 3/3/2020 1326  Last data filed at 3/3/2020 0602  Gross per 24 hour   Intake 951.67 ml   Output 400 ml   Net 551.67 ml       Physical Exam Performed:    /65   Pulse 86   Temp 99.1 °F (37.3 °C) (Oral)   Resp 16   Ht 5' 4\" (1.626 m)   Wt 212 lb 11.9 oz (96.5 kg)   SpO2 95%   BMI 36.52 kg/m²     General appearance: No apparent distress, appears stated age and cooperative. HEENT: Pupils equal, round, and reactive to light. Conjunctivae/corneas clear. Neck: Supple, with full range of motion. No jugular venous distention. Trachea midline. Respiratory:  Normal respiratory effort. Clear to auscultation, bilaterally without Rales/Wheezes/Rhonchi.   Cardiovascular: Regular rate and rhythm with normal S1/S2 without

## 2020-03-03 NOTE — CONSULTS
Facility-Administered Medications: saccharomyces boulardii (FLORASTOR) capsule 250 mg, 250 mg, Oral, BID  ciprofloxacin (CIPRO) tablet 500 mg, 500 mg, Oral, 2 times per day  morphine (PF) injection 2 mg, 2 mg, Intravenous, Q4H PRN  vancomycin (VANCOCIN) oral solution 250 mg, 250 mg, Oral, 4 times per day  calcium carbonate (TUMS) chewable tablet 500 mg, 500 mg, Oral, TID PRN  insulin lispro (HUMALOG) injection vial 0-6 Units, 0-6 Units, Subcutaneous, TID WC  insulin lispro (HUMALOG) injection vial 0-3 Units, 0-3 Units, Subcutaneous, Nightly  simethicone (MYLICON) chewable tablet 80 mg, 80 mg, Oral, Q6H PRN  polyethylene glycol (GLYCOLAX) packet 17 g, 17 g, Oral, Daily  pantoprazole (PROTONIX) tablet 40 mg, 40 mg, Oral, BID AC  oxybutynin (DITROPAN) tablet 5 mg, 5 mg, Oral, BID  fludrocortisone (FLORINEF) tablet 0.1 mg, 0.1 mg, Oral, Daily  atorvastatin (LIPITOR) tablet 40 mg, 40 mg, Oral, Daily  Vitamin D (CHOLECALCIFEROL) tablet 4,000 Units, 4 tablet, Oral, Daily  sodium chloride flush 0.9 % injection 10 mL, 10 mL, Intravenous, 2 times per day  sodium chloride flush 0.9 % injection 10 mL, 10 mL, Intravenous, PRN  acetaminophen (TYLENOL) tablet 650 mg, 650 mg, Oral, Q6H PRN **OR** acetaminophen (TYLENOL) suppository 650 mg, 650 mg, Rectal, Q6H PRN  enoxaparin (LOVENOX) injection 40 mg, 40 mg, Subcutaneous, Daily  glucose (GLUTOSE) 40 % oral gel 15 g, 15 g, Oral, PRN  dextrose 50 % IV solution, 12.5 g, Intravenous, PRN  glucagon (rDNA) injection 1 mg, 1 mg, Intramuscular, PRN  dextrose 5 % solution, 100 mL/hr, Intravenous, PRN  0.9 % sodium chloride infusion, , Intravenous, Continuous      Allergies   Allergen Reactions    Metformin      diarrhea    Ramipril      cough  cough          REVIEW OF SYSTEMS:    CONSTITUTIONAL:   Per HPI    EYES:  negative for eye discharge, acute visual disturbance and icterus  HEENT:  negative for acute hearing loss, tinnitus, ear drainage, sinus pressure, nasal congestion, epistaxis and snoring  RESPIRATORY:  No cough, shortness of breath, hemoptysis  CARDIOVASCULAR:  negative for chest pain, palpitations, exertional chest pressure/discomfort, edema, syncope  GASTROINTESTINAL:   Per HPI   GENITOURINARY:   No overt sx cystitis prior to admission  Now with dennis in place   HEMATOLOGIC/LYMPHATIC:  negative for easy bruising, bleeding and lymphadenopathy  ALLERGIC/IMMUNOLOGIC:  negative for recurrent infections, angioedema, anaphylaxis and drug reactions  ENDOCRINE:  negative for weight changes and diabetic symptoms including polyuria, polydipsia and polyphagia  MUSCULOSKELETAL:  negative for acute joint pain, joint swelling, decreased range of motion and muscle weakness  NEUROLOGICAL:  negative for headaches, slurred speech, unilateral weakness  PSYCHIATRIC/BEHAVIORAL: negative for hallucinations, behavioral problems, confusion and agitation. Objective:   PHYSICAL EXAM:      VITALS:  /64   Pulse 87   Temp 98.9 °F (37.2 °C) (Oral)   Resp 16   Ht 5' 4\" (1.626 m)   Wt 212 lb 11.9 oz (96.5 kg)   SpO2 94%   BMI 36.52 kg/m²      24HR INTAKE/OUTPUT:      Intake/Output Summary (Last 24 hours) at 3/3/2020 1834  Last data filed at 3/3/2020 1454  Gross per 24 hour   Intake 240 ml   Output 550 ml   Net -310 ml     CONSTITUTIONAL:  Awake, alert, cooperative, no apparent distress, and appears stated age  Non-toxic appearing   HEENT: NCAT, PERRL, EOMI. Sclera white, conjunctiva full. OP with moist mucosal membranes, no thrush, tongue protrudes midline  NECK:  Supple, symmetrical, trachea midline, no adenopathy  LUNGS:  no increased work of breathing, CTA sharlene   CARDIOVASCULAR:  RRR  ABDOMEN:  normal bowel sounds, slightly tender diffusely without R/G  PSYCHIATRIC: Oriented to person place and time. No obvious depression or anxiety. MUSCULOSKELETAL: No obvious misalignment or effusion of the joints. No clubbing, cyanosis of the digits.   SKIN:  normal skin color, texture, turgor and no patient has risk factors for metastatic   disease, bone scan could be considered for further assessment. AXR 3/2/20  Impression   Moderate mural thickening diffusely throughout the colon with moderate   lobular thickening of the haustral folds typical of significant inflammatory   change and compatible given the diagnosis of Clostridium difficile.       Small bowel ileus. Assessment:     Patient Active Problem List   Diagnosis    GI bleed    Debility    Sepsis ([address]    UTI (urinary tract infection)    Hypoglycemia       Mariajose Monge is a 74yoF     Severe C diff colitis  First episode  WBC rising despite enteral vanc  Xray with evolving ileus     UTI vs bacteriuria  High level growth of E faecalis in urine culture   Staghorn calculus seen on CT 2/6/20 with mild L hydro     Recent UGIB, EGD with duodenal ulcer  On BID PPI     No abx allergies     Recs:  The development of ileus in the context is concerning, though she does not appear toxic   Will add IV flagyl and continue po vancat higher dose    DC cipro   If she fails to improve or WBC does not decline, will need to consider vanc enema  Watch carefully, high risk of further complication     We will follow       Winston Harper M.D. Thank you for the opportunity to participate in the care of your patient.     Please do not hesitate to contact me:   [phone] office  [phone] mobile

## 2020-03-03 NOTE — PROGRESS NOTES
2/7/2020). Restrictions  Restrictions/Precautions  Restrictions/Precautions: Fall Risk, Contact Precautions  Position Activity Restriction  Other position/activity restrictions: telemetry, C. diff rule out     Subjective   General  Chart Reviewed: Yes  Patient assessed for rehabilitation services?: Yes  Family / Caregiver Present: No  Referring Practitioner: Porfirio Bautista   Subjective  Subjective: Pt agreeable to therapy  General Comment  Comments: RN approved therapy  Pain Assessment  Pain Assessment: Faces  Carrera-Baker Pain Rating: Hurts little more  Pain Type: Acute pain  Pain Location: Abdomen  Non-Pharmaceutical Pain Intervention(s): Ambulation/Increased Activity; Distraction  Vital Signs  Patient Currently in Pain: Yes     Orientation  Orientation  Overall Orientation Status: Within Functional Limits     Objective    ADL  Grooming: Setup(seated to comb hair)  Toileting: Maximum assistance(pericare on toilet)        Balance  Sitting Balance: Stand by assistance  Standing Balance: Maximum assistance(max A HHA)  Functional Mobility  Functional Mobility Comments: deferred to pain, anxiety  Toilet Transfers  Toilet - Technique: Stand step  Equipment Used: Extra wide bedside commode  Toilet Transfer: 2 Person assistance; Moderate assistance  Bed mobility  Supine to Sit: Minimal assistance  Sit to Supine: Unable to assess(up in chair at end of session)  Transfers  Stand Step Transfers: Maximum assistance(max A to BSC with HHA, progressed to mod A with SW)  Sit to stand: Maximum assistance  Stand to sit: Maximum assistance                       Cognition  Overall Cognitive Status: Exceptions  Following Commands:  Follows one step commands with repetition  Attention Span: Attends with cues to redirect  Memory: Decreased short term memory;Decreased long term memory  Problem Solving: Assistance required to generate solutions;Assistance required to identify errors made  Insights: Decreased awareness of deficits  Cognition Comment:

## 2020-03-04 LAB
ANION GAP SERPL CALCULATED.3IONS-SCNC: 13 MMOL/L (ref 3–16)
ANISOCYTOSIS: ABNORMAL
ATYPICAL LYMPHOCYTE RELATIVE PERCENT: 1 % (ref 0–6)
BANDED NEUTROPHILS RELATIVE PERCENT: 7 % (ref 0–7)
BASOPHILS ABSOLUTE: 0 K/UL (ref 0–0.2)
BASOPHILS RELATIVE PERCENT: 0 %
BUN BLDV-MCNC: 21 MG/DL (ref 7–20)
BURR CELLS: ABNORMAL
CALCIUM SERPL-MCNC: 7.8 MG/DL (ref 8.3–10.6)
CHLORIDE BLD-SCNC: 110 MMOL/L (ref 99–110)
CO2: 15 MMOL/L (ref 21–32)
CREAT SERPL-MCNC: 1 MG/DL (ref 0.6–1.2)
EOSINOPHILS ABSOLUTE: 0 K/UL (ref 0–0.6)
EOSINOPHILS RELATIVE PERCENT: 0 %
GFR AFRICAN AMERICAN: >60
GFR NON-AFRICAN AMERICAN: 54
GLUCOSE BLD-MCNC: 101 MG/DL (ref 70–99)
GLUCOSE BLD-MCNC: 80 MG/DL (ref 70–99)
GLUCOSE BLD-MCNC: 88 MG/DL (ref 70–99)
GLUCOSE BLD-MCNC: 96 MG/DL (ref 70–99)
GLUCOSE BLD-MCNC: 99 MG/DL (ref 70–99)
HCT VFR BLD CALC: 28.1 % (ref 36–48)
HEMOGLOBIN: 9.1 G/DL (ref 12–16)
LYMPHOCYTES ABSOLUTE: 1.2 K/UL (ref 1–5.1)
LYMPHOCYTES RELATIVE PERCENT: 4 %
MCH RBC QN AUTO: 30 PG (ref 26–34)
MCHC RBC AUTO-ENTMCNC: 32.3 G/DL (ref 31–36)
MCV RBC AUTO: 92.9 FL (ref 80–100)
METAMYELOCYTES RELATIVE PERCENT: 1 %
MICROCYTES: ABNORMAL
MONOCYTES ABSOLUTE: 0.7 K/UL (ref 0–1.3)
MONOCYTES RELATIVE PERCENT: 3 %
MYELOCYTE PERCENT: 1 %
NEUTROPHILS ABSOLUTE: 22.4 K/UL (ref 1.7–7.7)
NEUTROPHILS RELATIVE PERCENT: 83 %
OVALOCYTES: ABNORMAL
PDW BLD-RTO: 16.3 % (ref 12.4–15.4)
PERFORMED ON: ABNORMAL
PERFORMED ON: NORMAL
PLATELET # BLD: 296 K/UL (ref 135–450)
PLATELET SLIDE REVIEW: ADEQUATE
PMV BLD AUTO: 7.9 FL (ref 5–10.5)
POIKILOCYTES: ABNORMAL
POTASSIUM SERPL-SCNC: 4.1 MMOL/L (ref 3.5–5.1)
RBC # BLD: 3.03 M/UL (ref 4–5.2)
SLIDE REVIEW: ABNORMAL
SODIUM BLD-SCNC: 138 MMOL/L (ref 136–145)
WBC # BLD: 24.3 K/UL (ref 4–11)

## 2020-03-04 PROCEDURE — 2060000000 HC ICU INTERMEDIATE R&B

## 2020-03-04 PROCEDURE — 80048 BASIC METABOLIC PNL TOTAL CA: CPT

## 2020-03-04 PROCEDURE — 6370000000 HC RX 637 (ALT 250 FOR IP): Performed by: INTERNAL MEDICINE

## 2020-03-04 PROCEDURE — 6370000000 HC RX 637 (ALT 250 FOR IP): Performed by: NURSE PRACTITIONER

## 2020-03-04 PROCEDURE — 99232 SBSQ HOSP IP/OBS MODERATE 35: CPT | Performed by: INTERNAL MEDICINE

## 2020-03-04 PROCEDURE — 2500000003 HC RX 250 WO HCPCS: Performed by: INTERNAL MEDICINE

## 2020-03-04 PROCEDURE — 97110 THERAPEUTIC EXERCISES: CPT

## 2020-03-04 PROCEDURE — 6360000002 HC RX W HCPCS: Performed by: INTERNAL MEDICINE

## 2020-03-04 PROCEDURE — 97535 SELF CARE MNGMENT TRAINING: CPT

## 2020-03-04 PROCEDURE — 2580000003 HC RX 258: Performed by: NURSE PRACTITIONER

## 2020-03-04 PROCEDURE — 85025 COMPLETE CBC W/AUTO DIFF WBC: CPT

## 2020-03-04 PROCEDURE — 2580000003 HC RX 258: Performed by: INTERNAL MEDICINE

## 2020-03-04 RX ADMIN — Medication 10 ML: at 19:47

## 2020-03-04 RX ADMIN — MORPHINE SULFATE 2 MG: 2 INJECTION, SOLUTION INTRAMUSCULAR; INTRAVENOUS at 08:41

## 2020-03-04 RX ADMIN — FLUDROCORTISONE ACETATE 0.1 MG: 0.1 TABLET ORAL at 08:49

## 2020-03-04 RX ADMIN — SODIUM CHLORIDE: 9 INJECTION, SOLUTION INTRAVENOUS at 19:48

## 2020-03-04 RX ADMIN — VITAMIN D, TAB 1000IU (100/BT) 4000 UNITS: 25 TAB at 08:40

## 2020-03-04 RX ADMIN — ENOXAPARIN SODIUM 40 MG: 40 INJECTION SUBCUTANEOUS at 08:41

## 2020-03-04 RX ADMIN — Medication 250 MG: at 12:10

## 2020-03-04 RX ADMIN — SODIUM CHLORIDE: 9 INJECTION, SOLUTION INTRAVENOUS at 08:42

## 2020-03-04 RX ADMIN — Medication 250 MG: at 18:06

## 2020-03-04 RX ADMIN — Medication 250 MG: at 08:40

## 2020-03-04 RX ADMIN — METRONIDAZOLE 500 MG: 500 INJECTION, SOLUTION INTRAVENOUS at 00:08

## 2020-03-04 RX ADMIN — OXYBUTYNIN CHLORIDE 5 MG: 5 TABLET ORAL at 19:47

## 2020-03-04 RX ADMIN — Medication 80 MG: at 06:06

## 2020-03-04 RX ADMIN — Medication 250 MG: at 00:08

## 2020-03-04 RX ADMIN — Medication 250 MG: at 19:47

## 2020-03-04 RX ADMIN — Medication 250 MG: at 06:06

## 2020-03-04 RX ADMIN — METRONIDAZOLE 500 MG: 500 INJECTION, SOLUTION INTRAVENOUS at 08:41

## 2020-03-04 RX ADMIN — PANTOPRAZOLE SODIUM 40 MG: 40 TABLET, DELAYED RELEASE ORAL at 15:26

## 2020-03-04 RX ADMIN — OXYBUTYNIN CHLORIDE 5 MG: 5 TABLET ORAL at 08:41

## 2020-03-04 RX ADMIN — METRONIDAZOLE 500 MG: 500 INJECTION, SOLUTION INTRAVENOUS at 15:27

## 2020-03-04 RX ADMIN — PANTOPRAZOLE SODIUM 40 MG: 40 TABLET, DELAYED RELEASE ORAL at 06:06

## 2020-03-04 RX ADMIN — ATORVASTATIN CALCIUM 40 MG: 40 TABLET, FILM COATED ORAL at 08:41

## 2020-03-04 RX ADMIN — MORPHINE SULFATE 2 MG: 2 INJECTION, SOLUTION INTRAMUSCULAR; INTRAVENOUS at 19:47

## 2020-03-04 ASSESSMENT — PAIN DESCRIPTION - PAIN TYPE
TYPE: ACUTE PAIN

## 2020-03-04 ASSESSMENT — PAIN DESCRIPTION - LOCATION
LOCATION: ABDOMEN

## 2020-03-04 ASSESSMENT — PAIN SCALES - GENERAL
PAINLEVEL_OUTOF10: 8
PAINLEVEL_OUTOF10: 7
PAINLEVEL_OUTOF10: 5
PAINLEVEL_OUTOF10: 7
PAINLEVEL_OUTOF10: 7

## 2020-03-04 ASSESSMENT — PAIN DESCRIPTION - DESCRIPTORS: DESCRIPTORS: CRAMPING

## 2020-03-04 NOTE — PROGRESS NOTES
Hospitalist Progress Note      PCP: CHRYSTAL Norris    Date of Admission: 2/28/2020    Chief Complaint: Confusion, fatigue, hypoglycemia    Hospital Course: See H&P    Subjective:   Patient is up in bed, comfortable, not in distress. Abdominal pain improved. No diarrhea overnight. No new event overnight noted. Medications:  Reviewed    Infusion Medications    dextrose      sodium chloride 100 mL/hr at 03/04/20 8295     Scheduled Medications    saccharomyces boulardii  250 mg Oral BID    metroNIDAZOLE  500 mg Intravenous Q8H    vancomycin  250 mg Oral 4 times per day    insulin lispro  0-6 Units Subcutaneous TID WC    insulin lispro  0-3 Units Subcutaneous Nightly    pantoprazole  40 mg Oral BID AC    oxybutynin  5 mg Oral BID    fludrocortisone  0.1 mg Oral Daily    atorvastatin  40 mg Oral Daily    Vitamin D  4 tablet Oral Daily    sodium chloride flush  10 mL Intravenous 2 times per day    enoxaparin  40 mg Subcutaneous Daily     PRN Meds: morphine, calcium carbonate, simethicone, sodium chloride flush, acetaminophen **OR** acetaminophen, glucose, dextrose, glucagon (rDNA), dextrose      Intake/Output Summary (Last 24 hours) at 3/4/2020 1233  Last data filed at 3/4/2020 0630  Gross per 24 hour   Intake 120 ml   Output 525 ml   Net -405 ml       Physical Exam Performed:    /71   Pulse 83   Temp 97.9 °F (36.6 °C) (Oral)   Resp 20   Ht 5' 4\" (1.626 m)   Wt 214 lb 4.6 oz (97.2 kg)   SpO2 95%   BMI 36.78 kg/m²     General appearance: No apparent distress, appears stated age and cooperative. HEENT: Pupils equal, round, and reactive to light. Conjunctivae/corneas clear. Neck: Supple, with full range of motion. No jugular venous distention. Trachea midline. Respiratory:  Normal respiratory effort. Clear to auscultation, bilaterally without Rales/Wheezes/Rhonchi. Cardiovascular: Regular rate and rhythm with normal S1/S2 without murmurs, rubs or gallops.   Abdomen: Soft,

## 2020-03-04 NOTE — PROGRESS NOTES
Inpatient Daily Activity Raw Score: 13 (03/04/20 1641)  AM-PAC Inpatient ADL T-Scale Score : 32.03 (03/04/20 1641)  ADL Inpatient CMS 0-100% Score: 63.03 (03/04/20 1641)  ADL Inpatient CMS G-Code Modifier : CL (03/04/20 1641)  Goals  Short term goals  Time Frame for Short term goals: 7 days  Short term goal 1: Pt will sit at EOB for greater than 5 minutes in order to participate in ADLs -goal met 3/4  Short term goal 2: Pt will complete ADLs while seated at EOB with SBA   Short term goal 3: Pt will complete BUE HEP while seated EOB with SBA by 3/08  Patient Goals   Patient goals : To be able to get my strength to do for myself      Therapy Time   Individual Concurrent Group Co-treatment   Time In 1300         Time Out 1325         Minutes 25         Timed Code Treatment Minutes: 25 Minutes    If pt is discharged prior to next OT session, this note will serve as the discharge summary.   Lacy GARCIA/BETH

## 2020-03-04 NOTE — PROGRESS NOTES
02/28/2020    ALT 11 02/28/2020    AST 18 02/28/2020    PROT 6.0 02/28/2020    BILITOT 0.3 02/28/2020    LABALBU 3.1 02/28/2020       MICRO:      IMAGING:            Assessment:     Patient Active Problem List    Diagnosis Date Noted    C. difficile colitis     Leukocytosis     Sepsis ([facility] [address] 02/28/2020    UTI (urinary tract infection) 02/28/2020    Hypoglycemia 02/28/2020    Debility 02/11/2020    GI bleed 02/06/2020     Severe C diff colitis  First episode  Xray with evolving ileus   Marked leukocytosis - now trending down.       UTI vs bacteriuria  High level growth of E faecalis in urine culture   Risk of abx exceeds benefit at this point. No further treatment      Recent UGIB, EGD with duodenal ulcer  On BID PPI      No abx allergies     Plan:   Continue po vanc and IV flagyl   Continue to follow trend WBC.     Would aim to remove dennis and use Purewick if needed  Encouraged po intake     Strict isolation      Discussed with patient/family, all questions answered        Shaneka Bonilla MD  Phone: [phone]   Fax : [phone]

## 2020-03-05 ENCOUNTER — APPOINTMENT (OUTPATIENT)
Dept: GENERAL RADIOLOGY | Age: 75
DRG: 871 | End: 2020-03-05
Payer: MEDICARE

## 2020-03-05 LAB
ACANTHOCYTES: ABNORMAL
ANION GAP SERPL CALCULATED.3IONS-SCNC: 12 MMOL/L (ref 3–16)
ANISOCYTOSIS: ABNORMAL
BANDED NEUTROPHILS RELATIVE PERCENT: 20 % (ref 0–7)
BASOPHILS ABSOLUTE: 0 K/UL (ref 0–0.2)
BASOPHILS RELATIVE PERCENT: 0 %
BUN BLDV-MCNC: 20 MG/DL (ref 7–20)
BURR CELLS: ABNORMAL
CALCIUM SERPL-MCNC: 7.8 MG/DL (ref 8.3–10.6)
CHLORIDE BLD-SCNC: 112 MMOL/L (ref 99–110)
CO2: 16 MMOL/L (ref 21–32)
CREAT SERPL-MCNC: 1.1 MG/DL (ref 0.6–1.2)
DOHLE BODIES: PRESENT
EOSINOPHILS ABSOLUTE: 0 K/UL (ref 0–0.6)
EOSINOPHILS RELATIVE PERCENT: 0 %
GFR AFRICAN AMERICAN: 59
GFR NON-AFRICAN AMERICAN: 49
GLUCOSE BLD-MCNC: 87 MG/DL (ref 70–99)
GLUCOSE BLD-MCNC: 90 MG/DL (ref 70–99)
GLUCOSE BLD-MCNC: 92 MG/DL (ref 70–99)
GLUCOSE BLD-MCNC: 99 MG/DL (ref 70–99)
HCT VFR BLD CALC: 27.2 % (ref 36–48)
HEMOGLOBIN: 8.7 G/DL (ref 12–16)
LYMPHOCYTES ABSOLUTE: 1.2 K/UL (ref 1–5.1)
LYMPHOCYTES RELATIVE PERCENT: 8 %
MACROCYTES: ABNORMAL
MCH RBC QN AUTO: 29.7 PG (ref 26–34)
MCHC RBC AUTO-ENTMCNC: 32.2 G/DL (ref 31–36)
MCV RBC AUTO: 92.4 FL (ref 80–100)
METAMYELOCYTES RELATIVE PERCENT: 2 %
MICROCYTES: ABNORMAL
MONOCYTES ABSOLUTE: 0.3 K/UL (ref 0–1.3)
MONOCYTES RELATIVE PERCENT: 2 %
NEUTROPHILS ABSOLUTE: 14 K/UL (ref 1.7–7.7)
NEUTROPHILS RELATIVE PERCENT: 68 %
OVALOCYTES: ABNORMAL
PDW BLD-RTO: 16.4 % (ref 12.4–15.4)
PERFORMED ON: NORMAL
PLATELET # BLD: 371 K/UL (ref 135–450)
PLATELET SLIDE REVIEW: ADEQUATE
PMV BLD AUTO: 7 FL (ref 5–10.5)
POIKILOCYTES: ABNORMAL
POTASSIUM SERPL-SCNC: 4.1 MMOL/L (ref 3.5–5.1)
RBC # BLD: 2.94 M/UL (ref 4–5.2)
SCHISTOCYTES: ABNORMAL
SLIDE REVIEW: ABNORMAL
SODIUM BLD-SCNC: 140 MMOL/L (ref 136–145)
TEAR DROP CELLS: ABNORMAL
TOXIC GRANULATION: PRESENT
WBC # BLD: 15.6 K/UL (ref 4–11)

## 2020-03-05 PROCEDURE — 2500000003 HC RX 250 WO HCPCS: Performed by: INTERNAL MEDICINE

## 2020-03-05 PROCEDURE — 6360000002 HC RX W HCPCS: Performed by: INTERNAL MEDICINE

## 2020-03-05 PROCEDURE — 85025 COMPLETE CBC W/AUTO DIFF WBC: CPT

## 2020-03-05 PROCEDURE — 99232 SBSQ HOSP IP/OBS MODERATE 35: CPT | Performed by: INTERNAL MEDICINE

## 2020-03-05 PROCEDURE — 6370000000 HC RX 637 (ALT 250 FOR IP): Performed by: INTERNAL MEDICINE

## 2020-03-05 PROCEDURE — 2580000003 HC RX 258: Performed by: NURSE PRACTITIONER

## 2020-03-05 PROCEDURE — 80048 BASIC METABOLIC PNL TOTAL CA: CPT

## 2020-03-05 PROCEDURE — 6370000000 HC RX 637 (ALT 250 FOR IP): Performed by: NURSE PRACTITIONER

## 2020-03-05 PROCEDURE — 2060000000 HC ICU INTERMEDIATE R&B

## 2020-03-05 PROCEDURE — 2580000003 HC RX 258: Performed by: INTERNAL MEDICINE

## 2020-03-05 PROCEDURE — 74018 RADEX ABDOMEN 1 VIEW: CPT

## 2020-03-05 RX ORDER — FLUCONAZOLE 100 MG/1
200 TABLET ORAL ONCE
Status: COMPLETED | OUTPATIENT
Start: 2020-03-05 | End: 2020-03-05

## 2020-03-05 RX ADMIN — Medication 10 ML: at 20:37

## 2020-03-05 RX ADMIN — PANTOPRAZOLE SODIUM 40 MG: 40 TABLET, DELAYED RELEASE ORAL at 17:59

## 2020-03-05 RX ADMIN — ATORVASTATIN CALCIUM 40 MG: 40 TABLET, FILM COATED ORAL at 08:54

## 2020-03-05 RX ADMIN — OXYBUTYNIN CHLORIDE 5 MG: 5 TABLET ORAL at 08:54

## 2020-03-05 RX ADMIN — OXYBUTYNIN CHLORIDE 5 MG: 5 TABLET ORAL at 20:37

## 2020-03-05 RX ADMIN — Medication 250 MG: at 20:37

## 2020-03-05 RX ADMIN — MORPHINE SULFATE 2 MG: 2 INJECTION, SOLUTION INTRAMUSCULAR; INTRAVENOUS at 20:37

## 2020-03-05 RX ADMIN — ENOXAPARIN SODIUM 40 MG: 40 INJECTION SUBCUTANEOUS at 08:55

## 2020-03-05 RX ADMIN — PANTOPRAZOLE SODIUM 40 MG: 40 TABLET, DELAYED RELEASE ORAL at 05:17

## 2020-03-05 RX ADMIN — Medication 250 MG: at 08:54

## 2020-03-05 RX ADMIN — FLUCONAZOLE 200 MG: 100 TABLET ORAL at 20:45

## 2020-03-05 RX ADMIN — FLUDROCORTISONE ACETATE 0.1 MG: 0.1 TABLET ORAL at 08:55

## 2020-03-05 RX ADMIN — Medication 80 MG: at 18:49

## 2020-03-05 RX ADMIN — METRONIDAZOLE 500 MG: 500 INJECTION, SOLUTION INTRAVENOUS at 00:15

## 2020-03-05 RX ADMIN — MORPHINE SULFATE 2 MG: 2 INJECTION, SOLUTION INTRAMUSCULAR; INTRAVENOUS at 15:16

## 2020-03-05 RX ADMIN — SODIUM CHLORIDE: 9 INJECTION, SOLUTION INTRAVENOUS at 05:17

## 2020-03-05 RX ADMIN — Medication 250 MG: at 17:59

## 2020-03-05 RX ADMIN — VITAMIN D, TAB 1000IU (100/BT) 4000 UNITS: 25 TAB at 08:54

## 2020-03-05 RX ADMIN — Medication 250 MG: at 05:17

## 2020-03-05 RX ADMIN — Medication 250 MG: at 00:14

## 2020-03-05 RX ADMIN — SODIUM BICARBONATE: 84 INJECTION, SOLUTION INTRAVENOUS at 16:04

## 2020-03-05 RX ADMIN — METRONIDAZOLE 500 MG: 500 INJECTION, SOLUTION INTRAVENOUS at 08:55

## 2020-03-05 RX ADMIN — METRONIDAZOLE 500 MG: 500 INJECTION, SOLUTION INTRAVENOUS at 17:59

## 2020-03-05 RX ADMIN — Medication 250 MG: at 13:07

## 2020-03-05 ASSESSMENT — PAIN SCALES - GENERAL
PAINLEVEL_OUTOF10: 9
PAINLEVEL_OUTOF10: 9
PAINLEVEL_OUTOF10: 7
PAINLEVEL_OUTOF10: 7
PAINLEVEL_OUTOF10: 9

## 2020-03-05 ASSESSMENT — PAIN DESCRIPTION - LOCATION
LOCATION: ABDOMEN
LOCATION: ABDOMEN

## 2020-03-05 ASSESSMENT — PAIN DESCRIPTION - PAIN TYPE: TYPE: ACUTE PAIN

## 2020-03-05 NOTE — PROGRESS NOTES
Supplement (ONS) Orders: None  · ONS intake: Unable to assess  · Anthropometric Measures:  · Ht: 5' 4\" (162.6 cm)   · Current Body Wt: 220 lb (99.8 kg)  · Ideal Body Wt: 120 lb (54.4 kg), % Ideal Body    · BMI Classification: BMI 35.0 - 39.9 Obese Class II    Nutrition Interventions:   Continue current diet, Start ONS  Continued Inpatient Monitoring    Nutrition Evaluation:   · Evaluation: Goals set   · Goals: Patient will eat 50% or greater of meals and supplements.       · Monitoring: Supplement Intake, Pertinent Labs, Nutrition Progression, Meal Intake      Electronically signed by Crisoforo Dancer, RD, LD on 3/5/20 at 3:44 PM    Contact Number: Office: [phone]; [address]: [phone]

## 2020-03-05 NOTE — PROGRESS NOTES
4 Eyes Skin Assessment     The patient is being assess for  Low Reese    I agree that 2 RN's have performed a thorough Head to Toe Skin Assessment on the patient. ALL assessment sites listed below have been assessed. Areas assessed by both nurses: Ynes/Kalina  [x]   Head, Face, and Ears   [x]   Shoulders, Back, and Chest  [x]   Arms, Elbows, and Hands   [x]   Coccyx, Sacrum, and Ischum  [x]   Legs, Feet, and Heels        Does the Patient have Skin Breakdown?   Yes a wound was noted on the Admission Assessment and an WOUND LDA was Initiated documentation include the Sola-wound, Wound Assessment, Measurements, Dressing Treatment, Drainage, and Color\",         Reese Prevention initiated:  Yes   Wound Care Orders initiated:  NA      WOC nurse consulted for Pressure Injury (Stage 3,4, Unstageable, DTI, NWPT, and Complex wounds):  NA      Nurse 1 eSignature: Electronically signed by Mallory Jones RN on 3/5/20 at 6:53 PM    **SHARE this note so that the co-signing nurse is able to place an eSignature**    Nurse 2 eSignature: Electronically signed by Giovani Evans RN on 3/5/20 at 7:47 PM

## 2020-03-05 NOTE — CARE COORDINATION
Received call from BODØ with [facility] and she states they are able to accept at discharge. Informed her that we anticipate that she will be discharged tomorrow.

## 2020-03-05 NOTE — PROGRESS NOTES
Hospitalist Progress Note      PCP: CHRYSTAL Rdz    Date of Admission: 2/28/2020    Chief Complaint: Confusion, fatigue, hypoglycemia    Hospital Course: See H&P    Subjective:   Patient is up in bed, comfortable, not in distress. Abdominal pain improved. No diarrhea overnight. No new event overnight noted. Medications:  Reviewed    Infusion Medications    sodium bicarbonate infusion      dextrose       Scheduled Medications    saccharomyces boulardii  250 mg Oral BID    metroNIDAZOLE  500 mg Intravenous Q8H    vancomycin  250 mg Oral 4 times per day    insulin lispro  0-6 Units Subcutaneous TID WC    insulin lispro  0-3 Units Subcutaneous Nightly    pantoprazole  40 mg Oral BID AC    oxybutynin  5 mg Oral BID    fludrocortisone  0.1 mg Oral Daily    atorvastatin  40 mg Oral Daily    Vitamin D  4 tablet Oral Daily    sodium chloride flush  10 mL Intravenous 2 times per day    enoxaparin  40 mg Subcutaneous Daily     PRN Meds: morphine, calcium carbonate, simethicone, sodium chloride flush, acetaminophen **OR** acetaminophen, glucose, dextrose, glucagon (rDNA), dextrose      Intake/Output Summary (Last 24 hours) at 3/5/2020 1252  Last data filed at 3/5/2020 1015  Gross per 24 hour   Intake 480 ml   Output 850 ml   Net -370 ml       Physical Exam Performed:    /83   Pulse 90   Temp 97.9 °F (36.6 °C) (Axillary)   Resp 18   Ht 5' 4\" (1.626 m)   Wt 220 lb 7.4 oz (100 kg)   SpO2 96%   BMI 37.84 kg/m²     General appearance: No apparent distress, appears stated age and cooperative. HEENT: Pupils equal, round, and reactive to light. Conjunctivae/corneas clear. Neck: Supple, with full range of motion. No jugular venous distention. Trachea midline. Respiratory:  Normal respiratory effort. Clear to auscultation, bilaterally without Rales/Wheezes/Rhonchi. Cardiovascular: Regular rate and rhythm with normal S1/S2 without murmurs, rubs or gallops.   Abdomen: Soft, non-tender, non-distended with normal bowel sounds. Musculoskeletal: No clubbing, cyanosis or edema bilaterally. Full range of motion without deformity. Skin: Skin color, texture, turgor normal.  No rashes or lesions. Neurologic:  Neurovascularly intact without any focal sensory/motor deficits. Cranial nerves: II-XII intact, grossly non-focal.  Psychiatric: Alert and oriented, thought content appropriate, normal insight  Capillary Refill: Brisk,< 3 seconds   Peripheral Pulses: +2 palpable, equal bilaterally       Labs:   Recent Labs     03/03/20 0920 03/04/20  0538 03/05/20  0547   WBC 29.1* 24.3* 15.6*   HGB 9.2* 9.1* 8.7*   HCT 28.5* 28.1* 27.2*    296 371     Recent Labs     03/03/20 0920 03/04/20  0538 03/05/20  0547    138 140   K 4.2 4.1 4.1    110 112*   CO2 17* 15* 16*   BUN 23* 21* 20   CREATININE 1.0 1.0 1.1   CALCIUM 7.9* 7.8* 7.8*       Urinalysis:      Lab Results   Component Value Date    NITRU Negative 02/28/2020    WBCUA 3-5 02/28/2020    BACTERIA Rare 02/28/2020    RBCUA 21-50 02/28/2020    BLOODU MODERATE 02/28/2020    SPECGRAV 1.025 02/28/2020    GLUCOSEU Negative 02/28/2020       Radiology:  XR Acute Abd Series Chest 1 VW   Final Result   Moderate mural thickening diffusely throughout the colon with moderate   lobular thickening of the haustral folds typical of significant inflammatory   change and compatible given the diagnosis of Clostridium difficile. Small bowel ileus. XR CHEST PORTABLE   Final Result   No acute cardiopulmonary disease.                  Assessment/Plan:    Active Hospital Problems    Diagnosis    Sepsis ([facility] [address] [A41.9]     Priority: High    C. difficile colitis [A04.72]    Leukocytosis [D72.829]    UTI (urinary tract infection) [N39.0]    Hypoglycemia [E16.2]     Sepsis, likely due to severe C. difficile colitis, meets criteria with hypotension, tachycardia, leukocytosis and elevated lactic acid level, admit to telemetry, sepsis protocol initiated in emergency room, blood culture x2 and urine culture pending, patient was given IV fluid as per protocol, continue normal saline at 100 cc/h, patient started on ceftriaxone pending cultures. Clinically improving gradually.     UTI, pending urine culture patient started on ceftriaxone, urine culture showing Enterococcus faecalis sensitive to Cipro, patient was transitioned to ciprofloxacin. DC Cipro    Severe C. difficile colitis, initial episode, started on vancomycin 250 mg 4 times daily, plan to continue therapy for total of 14 days. Persistent leukocytosis, x-ray abdomen showing mural thickening of entire colon, continue vancomycin, ID consult requested. ID input appreciated, continuing p.o. vancomycin, Flagyl was added, Cipro was discontinued. Clinically improving gradually, leukocytosis is also improving.     Hypoglycemia, will be holding sulfonylurea for now, PRN D50, monitor. Clinically resolved.     Diabetes mellitus type 2, reported controlled with current regimen, will be holding sulfonylurea for now, monitor. We will add low-dose insulin sliding scale, monitor.     Metabolic encephalopathy, likely due to hypoglycemia, clinically resolved after treating hypoglycemia, supportive care, monitor. Clinically resolved now. Iron deficiency anemia on oral ferrous sulfate, hemoglobin remained stable around 9, monitor.     Morbid Obesity complicating assessment and treatment. Placing patient at risk for multiple co-morbidities as well as early death and contributing to the patient's presentation. Counseled on weight loss. Body mass index is 33.99 kg/m².     DVT Prophylaxis: Lovenox  Diet: DIET CARB CONTROL;  Code Status: Full Code    PT/OT Eval Status: Ordered    Dispo - 1 to 2 days    Annie Breen MD negative...

## 2020-03-05 NOTE — PROGRESS NOTES
Infectious Disease Follow up Notes  CC :  Severe C diff colitis                Antibiotics:      Flagyl 500 IV q8   vanc 250 po QID   florastor     Admit Date:   2/28/2020  Hospital Day: 7    Subjective:   Pt states that he abdomen is still very tender and feels that its distended. Rates that pain as 9/10. No bowel movement today. Feels a little nauseated but is eating some liquids. Objective:     Patient Vitals for the past 8 hrs:   BP Temp Temp src Pulse Resp SpO2   03/05/20 1521 126/85 98 °F (36.7 °C) Axillary 87 16 96 %   03/05/20 1312 107/69 97.7 °F (36.5 °C) Axillary 79 16 97 %   03/05/20 0851 123/83 97.9 °F (36.6 °C) Axillary 90 18 96 %       EXAM:  General:  Alert, oriented, NAD                          HEENT:  NCAT, PERRL, sclera anicteric  MMM, no thrush  CV:  RRR                      ABD:    Diffusely tender without R/G.   Bowel sounds present                          EXT:    No focal rash or LE edema                               LINE:  PIV site ok   Javier in place                 Scheduled Meds:   saccharomyces boulardii  250 mg Oral BID    metroNIDAZOLE  500 mg Intravenous Q8H    vancomycin  250 mg Oral 4 times per day    insulin lispro  0-6 Units Subcutaneous TID WC    insulin lispro  0-3 Units Subcutaneous Nightly    pantoprazole  40 mg Oral BID AC    oxybutynin  5 mg Oral BID    fludrocortisone  0.1 mg Oral Daily    atorvastatin  40 mg Oral Daily    Vitamin D  4 tablet Oral Daily    sodium chloride flush  10 mL Intravenous 2 times per day    enoxaparin  40 mg Subcutaneous Daily       Continuous Infusions:   sodium bicarbonate infusion      dextrose            Data Review:    Lab Results   Component Value Date    WBC 15.6 (H) 03/05/2020    HGB 8.7 (L) 03/05/2020    HCT 27.2 (L) 03/05/2020    MCV 92.4 03/05/2020     03/05/2020     Lab Results   Component Value Date    CREATININE 1.1 03/05/2020    BUN 20 03/05/2020     03/05/2020    K 4.1 03/05/2020     (H) 03/05/2020    CO2 16 (L) 03/05/2020       Hepatic Function Panel:   Lab Results   Component Value Date    ALKPHOS 110 02/28/2020    ALT 11 02/28/2020    AST 18 02/28/2020    PROT 6.0 02/28/2020    BILITOT 0.3 02/28/2020    LABALBU 3.1 02/28/2020       MICRO:  -C Diff positive  -Enterococcus fecalis in urine      Assessment:     Patient Active Problem List    Diagnosis Date Noted    C. difficile colitis     Leukocytosis     Sepsis ([facility] [address] 02/28/2020    UTI (urinary tract infection) 02/28/2020    Hypoglycemia 02/28/2020    Debility 02/11/2020    GI bleed 02/06/2020       Severe C diff colitis  First episode  Xray with evolving ileus   Marked leukocytosis - now trending down.       UTI vs bacteriuria  High level growth of E faecalis in urine culture   Risk of abx exceeds benefit at this point. No further treatment      Recent UGIB, EGD with duodenal ulcer  On BID PPI      No abx allergies      Plan:   Continue po vanc and IV flagyl   Continue to follow trend WBC.  -KUB today     Would aim to remove dennis and use Purewick if needed  Encouraged po intake      Strict isolation      Discussed with patient/family, all questions answered    Cassandra Joseph PGY3      ID Attending   Patient seen and examined, case d/w Resident  Stool frequency has decreased, in fact no BM in more that 24 hours. Will repeat KUB to r/o ileus.   Abd exam somewhat improved and WBC is trending down, both reassuring findings   Dennis catheter to be removed for voiding trial     Continue to monitor closely    D/w JOAQUÍN Knutson MD

## 2020-03-06 LAB
ANION GAP SERPL CALCULATED.3IONS-SCNC: 13 MMOL/L (ref 3–16)
ANISOCYTOSIS: ABNORMAL
BANDED NEUTROPHILS RELATIVE PERCENT: 7 % (ref 0–7)
BASOPHILS ABSOLUTE: 0 K/UL (ref 0–0.2)
BASOPHILS RELATIVE PERCENT: 0 %
BUN BLDV-MCNC: 18 MG/DL (ref 7–20)
CALCIUM SERPL-MCNC: 7.7 MG/DL (ref 8.3–10.6)
CHLORIDE BLD-SCNC: 111 MMOL/L (ref 99–110)
CO2: 17 MMOL/L (ref 21–32)
CREAT SERPL-MCNC: 1.1 MG/DL (ref 0.6–1.2)
EOSINOPHILS ABSOLUTE: 0.1 K/UL (ref 0–0.6)
EOSINOPHILS RELATIVE PERCENT: 1 %
GFR AFRICAN AMERICAN: 59
GFR NON-AFRICAN AMERICAN: 49
GLUCOSE BLD-MCNC: 108 MG/DL (ref 70–99)
GLUCOSE BLD-MCNC: 123 MG/DL (ref 70–99)
GLUCOSE BLD-MCNC: 138 MG/DL (ref 70–99)
GLUCOSE BLD-MCNC: 139 MG/DL (ref 70–99)
GLUCOSE BLD-MCNC: 152 MG/DL (ref 70–99)
HCT VFR BLD CALC: 28.6 % (ref 36–48)
HEMOGLOBIN: 9.2 G/DL (ref 12–16)
LYMPHOCYTES ABSOLUTE: 1.2 K/UL (ref 1–5.1)
LYMPHOCYTES RELATIVE PERCENT: 9 %
MCH RBC QN AUTO: 29.7 PG (ref 26–34)
MCHC RBC AUTO-ENTMCNC: 32.1 G/DL (ref 31–36)
MCV RBC AUTO: 92.5 FL (ref 80–100)
MONOCYTES ABSOLUTE: 0 K/UL (ref 0–1.3)
MONOCYTES RELATIVE PERCENT: 0 %
MYELOCYTE PERCENT: 1 %
NEUTROPHILS ABSOLUTE: 11.8 K/UL (ref 1.7–7.7)
NEUTROPHILS RELATIVE PERCENT: 82 %
OVALOCYTES: ABNORMAL
PDW BLD-RTO: 16.4 % (ref 12.4–15.4)
PERFORMED ON: ABNORMAL
PLATELET # BLD: 412 K/UL (ref 135–450)
PLATELET SLIDE REVIEW: ADEQUATE
PMV BLD AUTO: 7.2 FL (ref 5–10.5)
POIKILOCYTES: ABNORMAL
POLYCHROMASIA: ABNORMAL
POTASSIUM SERPL-SCNC: 3.7 MMOL/L (ref 3.5–5.1)
RBC # BLD: 3.09 M/UL (ref 4–5.2)
SCHISTOCYTES: ABNORMAL
SLIDE REVIEW: ABNORMAL
SODIUM BLD-SCNC: 141 MMOL/L (ref 136–145)
TOXIC GRANULATION: PRESENT
WBC # BLD: 13.1 K/UL (ref 4–11)

## 2020-03-06 PROCEDURE — 97530 THERAPEUTIC ACTIVITIES: CPT

## 2020-03-06 PROCEDURE — 99232 SBSQ HOSP IP/OBS MODERATE 35: CPT | Performed by: INTERNAL MEDICINE

## 2020-03-06 PROCEDURE — 6370000000 HC RX 637 (ALT 250 FOR IP): Performed by: INTERNAL MEDICINE

## 2020-03-06 PROCEDURE — 2500000003 HC RX 250 WO HCPCS: Performed by: INTERNAL MEDICINE

## 2020-03-06 PROCEDURE — 2580000003 HC RX 258: Performed by: INTERNAL MEDICINE

## 2020-03-06 PROCEDURE — 85025 COMPLETE CBC W/AUTO DIFF WBC: CPT

## 2020-03-06 PROCEDURE — 51798 US URINE CAPACITY MEASURE: CPT

## 2020-03-06 PROCEDURE — 97110 THERAPEUTIC EXERCISES: CPT

## 2020-03-06 PROCEDURE — 36415 COLL VENOUS BLD VENIPUNCTURE: CPT

## 2020-03-06 PROCEDURE — 2060000000 HC ICU INTERMEDIATE R&B

## 2020-03-06 PROCEDURE — 6360000002 HC RX W HCPCS: Performed by: INTERNAL MEDICINE

## 2020-03-06 PROCEDURE — 80048 BASIC METABOLIC PNL TOTAL CA: CPT

## 2020-03-06 PROCEDURE — 6370000000 HC RX 637 (ALT 250 FOR IP): Performed by: NURSE PRACTITIONER

## 2020-03-06 RX ADMIN — Medication 250 MG: at 00:25

## 2020-03-06 RX ADMIN — METRONIDAZOLE 500 MG: 500 INJECTION, SOLUTION INTRAVENOUS at 00:25

## 2020-03-06 RX ADMIN — OXYBUTYNIN CHLORIDE 5 MG: 5 TABLET ORAL at 08:29

## 2020-03-06 RX ADMIN — Medication 10 ML: at 21:15

## 2020-03-06 RX ADMIN — MORPHINE SULFATE 2 MG: 2 INJECTION, SOLUTION INTRAMUSCULAR; INTRAVENOUS at 14:04

## 2020-03-06 RX ADMIN — Medication 250 MG: at 12:35

## 2020-03-06 RX ADMIN — ANTACID TABLETS 500 MG: 500 TABLET, CHEWABLE ORAL at 12:36

## 2020-03-06 RX ADMIN — Medication 250 MG: at 18:29

## 2020-03-06 RX ADMIN — MORPHINE SULFATE 2 MG: 2 INJECTION, SOLUTION INTRAMUSCULAR; INTRAVENOUS at 21:29

## 2020-03-06 RX ADMIN — PANTOPRAZOLE SODIUM 40 MG: 40 TABLET, DELAYED RELEASE ORAL at 05:26

## 2020-03-06 RX ADMIN — FLUDROCORTISONE ACETATE 0.1 MG: 0.1 TABLET ORAL at 08:29

## 2020-03-06 RX ADMIN — Medication 250 MG: at 08:29

## 2020-03-06 RX ADMIN — ENOXAPARIN SODIUM 40 MG: 40 INJECTION SUBCUTANEOUS at 08:29

## 2020-03-06 RX ADMIN — PANTOPRAZOLE SODIUM 40 MG: 40 TABLET, DELAYED RELEASE ORAL at 14:04

## 2020-03-06 RX ADMIN — METRONIDAZOLE 500 MG: 500 INJECTION, SOLUTION INTRAVENOUS at 08:24

## 2020-03-06 RX ADMIN — VITAMIN D, TAB 1000IU (100/BT) 4000 UNITS: 25 TAB at 08:29

## 2020-03-06 RX ADMIN — Medication 80 MG: at 08:29

## 2020-03-06 RX ADMIN — Medication 250 MG: at 21:14

## 2020-03-06 RX ADMIN — SODIUM BICARBONATE: 84 INJECTION, SOLUTION INTRAVENOUS at 12:35

## 2020-03-06 RX ADMIN — Medication 250 MG: at 05:27

## 2020-03-06 RX ADMIN — Medication 10 ML: at 08:30

## 2020-03-06 RX ADMIN — METRONIDAZOLE 500 MG: 500 INJECTION, SOLUTION INTRAVENOUS at 14:04

## 2020-03-06 RX ADMIN — MORPHINE SULFATE 2 MG: 2 INJECTION, SOLUTION INTRAMUSCULAR; INTRAVENOUS at 08:29

## 2020-03-06 RX ADMIN — OXYBUTYNIN CHLORIDE 5 MG: 5 TABLET ORAL at 21:15

## 2020-03-06 RX ADMIN — ATORVASTATIN CALCIUM 40 MG: 40 TABLET, FILM COATED ORAL at 08:29

## 2020-03-06 ASSESSMENT — PAIN SCALES - GENERAL
PAINLEVEL_OUTOF10: 10
PAINLEVEL_OUTOF10: 0
PAINLEVEL_OUTOF10: 0
PAINLEVEL_OUTOF10: 10
PAINLEVEL_OUTOF10: 8
PAINLEVEL_OUTOF10: 0

## 2020-03-06 ASSESSMENT — PAIN DESCRIPTION - PAIN TYPE: TYPE: ACUTE PAIN

## 2020-03-06 ASSESSMENT — PAIN DESCRIPTION - LOCATION: LOCATION: ABDOMEN

## 2020-03-06 NOTE — PROGRESS NOTES
Leukocytosis [D72.829]    UTI (urinary tract infection) [N39.0]    Hypoglycemia [E16.2]     Sepsis, likely due to severe C. difficile colitis, meets criteria with hypotension, tachycardia, leukocytosis and elevated lactic acid level, admit to telemetry, sepsis protocol initiated in emergency room, blood culture x2 and urine culture pending, patient was given IV fluid as per protocol, continue normal saline at 100 cc/h, patient started on ceftriaxone pending cultures. Clinically improving gradually.     UTI, pending urine culture patient started on ceftriaxone, urine culture showing Enterococcus faecalis sensitive to Cipro, patient was transitioned to ciprofloxacin. DC Cipro    Severe C. difficile colitis, initial episode, started on vancomycin 250 mg 4 times daily, plan to continue therapy for total of 14 days. Persistent leukocytosis, x-ray abdomen showing mural thickening of entire colon, continue vancomycin, ID consult requested. ID input appreciated, continuing p.o. vancomycin, Flagyl was added, Cipro was discontinued. Clinically improving gradually, leukocytosis is also improving.     Hypoglycemia, will be holding sulfonylurea for now, PRN D50, monitor. Clinically resolved.     Diabetes mellitus type 2, reported controlled with current regimen, will be holding sulfonylurea for now, monitor. We will add low-dose insulin sliding scale, monitor. Metabolic acidosis, CO2 at 17, anion gap is 13, consider bicarb supplement, monitor.     Metabolic encephalopathy, likely due to hypoglycemia, clinically resolved after treating hypoglycemia, supportive care, monitor. Clinically resolved now. Iron deficiency anemia on oral ferrous sulfate, hemoglobin remained stable around 9, monitor.     Morbid Obesity complicating assessment and treatment. Placing patient at risk for multiple co-morbidities as well as early death and contributing to the patient's presentation. Counseled on weight loss.    Body mass index

## 2020-03-06 NOTE — PROGRESS NOTES
Training, Home Exercise Program, Functional Mobility Training, Safety Education & Training  Safety Devices  Type of devices:  All fall risk precautions in place, Call light within reach, Gait belt, Left in bed, Bed alarm in place, Nurse notified  Restraints  Initially in place: No     Therapy Time   Individual Concurrent Group Co-treatment   Time In 1125         Time Out 1153         Minutes [address] [place], [place] [id number]

## 2020-03-06 NOTE — PROGRESS NOTES
Bob Tucker is a 69 y.o. female patient.     Current Facility-Administered Medications   Medication Dose Route Frequency Provider Last Rate Last Dose    sodium bicarbonate 50 mEq in dextrose 5 % 1,000 mL infusion   Intravenous Continuous Kristi Escamilla MD 50 mL/hr at 03/05/20 1934      saccharomyces boulardii (FLORASTOR) capsule 250 mg  250 mg Oral BID Kristi Escamilla MD   250 mg at 03/05/20 2037    morphine (PF) injection 2 mg  2 mg Intravenous Q4H PRN Kristi Escamilla MD   2 mg at 03/05/20 2037    metronidazole (FLAGYL) 500 mg in NaCl 100 mL IVPB premix  500 mg Intravenous Q8H Hai Dia MD   Stopped at 03/06/20 0115    vancomycin (VANCOCIN) oral solution 250 mg  250 mg Oral 4 times per day Kristi Escamilla MD   250 mg at 03/06/20 0527    calcium carbonate (TUMS) chewable tablet 500 mg  500 mg Oral TID PRN Palmer Melgoza MD   500 mg at 02/29/20 1904    insulin lispro (HUMALOG) injection vial 0-6 Units  0-6 Units Subcutaneous TID WC Palmer Melgoza MD   1 Units at 03/01/20 1732    insulin lispro (HUMALOG) injection vial 0-3 Units  0-3 Units Subcutaneous Nightly Palmer Melgoza MD   1 Units at 03/01/20 2039    simethicone (MYLICON) chewable tablet 80 mg  80 mg Oral Q6H PRN ANGIE Yeager - CNP   80 mg at 03/05/20 1849    pantoprazole (PROTONIX) tablet 40 mg  40 mg Oral BID AC Kristi Escamilla MD   40 mg at 03/06/20 0526    oxybutynin (DITROPAN) tablet 5 mg  5 mg Oral BID Kristi Escamilla MD   5 mg at 03/05/20 2037    fludrocortisone (FLORINEF) tablet 0.1 mg  0.1 mg Oral Daily Kristi Escamilla MD   0.1 mg at 03/05/20 0855    atorvastatin (LIPITOR) tablet 40 mg  40 mg Oral Daily Kristi Escamilla MD   40 mg at 03/05/20 0854    Vitamin D (CHOLECALCIFEROL) tablet 4,000 Units  4 tablet Oral Daily Kristi Escamilla MD   4,000 Units at 03/05/20 0854    sodium chloride flush 0.9 % injection 10 mL  10 mL Intravenous 2 times per day Kristi Escamilla MD   10 mL at 03/05/20 2037    sodium chloride flush 0.9 % injection dropping.     Plan:   1. Continue 2 weeks of PO Vanc   2. Agree w IV Flagyl  3. Consider repeating C diff toxin and consider starting probiotics  4. Will need outpatient EGD to F/U on large DU  5.  Will follow  Celestino Swanson MD       ([phone]) [phone]    Jonathan Herrera MD  3/6/2020

## 2020-03-06 NOTE — PROGRESS NOTES
Present: No  Referring Practitioner: Gabriele   Diagnosis: sepsis     Subjective  Subjective: Pt resting in bed, agreeable to OT treatment. Vital Signs  Patient Currently in Pain: Yes(pt c/o cramping in abdomen, not rated)     Orientation  Orientation  Overall Orientation Status: Within Functional Limits     Objective    Balance  Sitting Balance: Stand by assistance(~12 mins for BUE ther ex, limited by cramping and nausea)  Standing Balance: Unable to assess (pt declines transfers/OOB)    Bed mobility  Rolling to Left: Minimal assistance  Rolling to Right: Minimal assistance  Supine to Sit: Dependent/Total(min A of 2 to pt R)  Sit to Supine: Dependent/Total;2 Person assistance(min A of 2)     Cognition  Overall Cognitive Status: Exceptions  Safety Judgement: Decreased awareness of need for safety  Insights: Decreased awareness of deficits     Type of ROM/Therapeutic Exercise  Type of ROM/Therapeutic Exercise: AROM  Exercises  Elbow Flexion: 15x  Elbow Extension: 15x  Other: 10x backwards shoulder rolls    Plan   Plan  Times per week: 3-5  Current Treatment Recommendations: Strengthening, Functional Mobility Training, Patient/Caregiver Education & Training, Endurance Training, Balance Training, Safety Education & Training, Self-Care / ADL    AM-[facility] Score  -[facility] Inpatient Daily Activity Raw Score: 13 (03/06/20 1428)  AM-[facility] Inpatient ADL T-Scale Score : 32.03 (03/06/20 1428)  ADL Inpatient CMS 0-100% Score: 63.03 (03/06/20 1428)  ADL Inpatient CMS G-Code Modifier : CL (03/06/20 1428)    Goals  Short term goals  Time Frame for Short term goals: 7 days  Short term goal 1: Pt will sit at EOB for greater than 5 minutes in order to participate in ADLs -goal met 3/4  Short term goal 2: Pt will complete ADLs while seated at EOB with SBA (ongoing 3/6/20)   Short term goal 3: Pt will complete BUE HEP while seated EOB with SBA by 3/08 (ongoing 3/6/20)  Patient Goals   Patient goals :  To be able to get my strength to do for myself        Therapy Time   Individual Concurrent Group Co-treatment   Time In 1130         Time Out 1153         Minutes [address]/BETH

## 2020-03-07 LAB
ACANTHOCYTES: ABNORMAL
ANION GAP SERPL CALCULATED.3IONS-SCNC: 10 MMOL/L (ref 3–16)
ANISOCYTOSIS: ABNORMAL
BANDED NEUTROPHILS RELATIVE PERCENT: 3 % (ref 0–7)
BASOPHILS ABSOLUTE: 0 K/UL (ref 0–0.2)
BASOPHILS RELATIVE PERCENT: 0 %
BUN BLDV-MCNC: 14 MG/DL (ref 7–20)
BURR CELLS: ABNORMAL
CALCIUM SERPL-MCNC: 8 MG/DL (ref 8.3–10.6)
CHLORIDE BLD-SCNC: 110 MMOL/L (ref 99–110)
CO2: 19 MMOL/L (ref 21–32)
CREAT SERPL-MCNC: 0.9 MG/DL (ref 0.6–1.2)
DOHLE BODIES: PRESENT
EOSINOPHILS ABSOLUTE: 0.4 K/UL (ref 0–0.6)
EOSINOPHILS RELATIVE PERCENT: 3 %
GFR AFRICAN AMERICAN: >60
GFR NON-AFRICAN AMERICAN: >60
GLUCOSE BLD-MCNC: 141 MG/DL (ref 70–99)
GLUCOSE BLD-MCNC: 143 MG/DL (ref 70–99)
GLUCOSE BLD-MCNC: 161 MG/DL (ref 70–99)
GLUCOSE BLD-MCNC: 163 MG/DL (ref 70–99)
GLUCOSE BLD-MCNC: 185 MG/DL (ref 70–99)
HCT VFR BLD CALC: 30.4 % (ref 36–48)
HEMOGLOBIN: 9.9 G/DL (ref 12–16)
LYMPHOCYTES ABSOLUTE: 1 K/UL (ref 1–5.1)
LYMPHOCYTES RELATIVE PERCENT: 8 %
MCH RBC QN AUTO: 29.9 PG (ref 26–34)
MCHC RBC AUTO-ENTMCNC: 32.5 G/DL (ref 31–36)
MCV RBC AUTO: 92 FL (ref 80–100)
METAMYELOCYTES RELATIVE PERCENT: 2 %
MICROCYTES: ABNORMAL
MONOCYTES ABSOLUTE: 0.4 K/UL (ref 0–1.3)
MONOCYTES RELATIVE PERCENT: 3 %
MYELOCYTE PERCENT: 0 %
NEUTROPHILS ABSOLUTE: 11.3 K/UL (ref 1.7–7.7)
NEUTROPHILS RELATIVE PERCENT: 81 %
OVALOCYTES: ABNORMAL
PDW BLD-RTO: 16.5 % (ref 12.4–15.4)
PERFORMED ON: ABNORMAL
PLATELET # BLD: 466 K/UL (ref 135–450)
PLATELET SLIDE REVIEW: ADEQUATE
PMV BLD AUTO: 7.3 FL (ref 5–10.5)
POIKILOCYTES: ABNORMAL
POTASSIUM SERPL-SCNC: 3.8 MMOL/L (ref 3.5–5.1)
RBC # BLD: 3.3 M/UL (ref 4–5.2)
SCHISTOCYTES: ABNORMAL
SLIDE REVIEW: ABNORMAL
SODIUM BLD-SCNC: 139 MMOL/L (ref 136–145)
TEAR DROP CELLS: ABNORMAL
TOXIC GRANULATION: PRESENT
WBC # BLD: 13.1 K/UL (ref 4–11)

## 2020-03-07 PROCEDURE — 2500000003 HC RX 250 WO HCPCS: Performed by: INTERNAL MEDICINE

## 2020-03-07 PROCEDURE — 2580000003 HC RX 258: Performed by: INTERNAL MEDICINE

## 2020-03-07 PROCEDURE — 92526 ORAL FUNCTION THERAPY: CPT

## 2020-03-07 PROCEDURE — 6370000000 HC RX 637 (ALT 250 FOR IP): Performed by: INTERNAL MEDICINE

## 2020-03-07 PROCEDURE — 80048 BASIC METABOLIC PNL TOTAL CA: CPT

## 2020-03-07 PROCEDURE — 36415 COLL VENOUS BLD VENIPUNCTURE: CPT

## 2020-03-07 PROCEDURE — 2580000003 HC RX 258

## 2020-03-07 PROCEDURE — 85025 COMPLETE CBC W/AUTO DIFF WBC: CPT

## 2020-03-07 PROCEDURE — 2060000000 HC ICU INTERMEDIATE R&B

## 2020-03-07 PROCEDURE — 6360000002 HC RX W HCPCS: Performed by: INTERNAL MEDICINE

## 2020-03-07 PROCEDURE — 99232 SBSQ HOSP IP/OBS MODERATE 35: CPT | Performed by: INTERNAL MEDICINE

## 2020-03-07 PROCEDURE — 92610 EVALUATE SWALLOWING FUNCTION: CPT

## 2020-03-07 RX ORDER — SODIUM CHLORIDE 9 MG/ML
INJECTION, SOLUTION INTRAVENOUS
Status: COMPLETED
Start: 2020-03-07 | End: 2020-03-07

## 2020-03-07 RX ORDER — SODIUM BICARBONATE 650 MG/1
650 TABLET ORAL 2 TIMES DAILY
Status: DISCONTINUED | OUTPATIENT
Start: 2020-03-07 | End: 2020-03-08 | Stop reason: HOSPADM

## 2020-03-07 RX ADMIN — METRONIDAZOLE 500 MG: 500 INJECTION, SOLUTION INTRAVENOUS at 09:13

## 2020-03-07 RX ADMIN — ENOXAPARIN SODIUM 40 MG: 40 INJECTION SUBCUTANEOUS at 09:14

## 2020-03-07 RX ADMIN — SODIUM CHLORIDE 250 ML: 9 INJECTION, SOLUTION INTRAVENOUS at 09:13

## 2020-03-07 RX ADMIN — Medication 10 ML: at 20:27

## 2020-03-07 RX ADMIN — INSULIN LISPRO 1 UNITS: 100 INJECTION, SOLUTION INTRAVENOUS; SUBCUTANEOUS at 16:47

## 2020-03-07 RX ADMIN — PANTOPRAZOLE SODIUM 40 MG: 40 TABLET, DELAYED RELEASE ORAL at 15:34

## 2020-03-07 RX ADMIN — METRONIDAZOLE 500 MG: 500 INJECTION, SOLUTION INTRAVENOUS at 15:34

## 2020-03-07 RX ADMIN — MORPHINE SULFATE 2 MG: 2 INJECTION, SOLUTION INTRAMUSCULAR; INTRAVENOUS at 11:32

## 2020-03-07 RX ADMIN — OXYBUTYNIN CHLORIDE 5 MG: 5 TABLET ORAL at 20:27

## 2020-03-07 RX ADMIN — Medication 250 MG: at 05:18

## 2020-03-07 RX ADMIN — Medication 250 MG: at 11:32

## 2020-03-07 RX ADMIN — SODIUM BICARBONATE 650 MG TABLET 650 MG: at 20:27

## 2020-03-07 RX ADMIN — Medication 250 MG: at 00:28

## 2020-03-07 RX ADMIN — MORPHINE SULFATE 2 MG: 2 INJECTION, SOLUTION INTRAMUSCULAR; INTRAVENOUS at 05:18

## 2020-03-07 RX ADMIN — INSULIN LISPRO 1 UNITS: 100 INJECTION, SOLUTION INTRAVENOUS; SUBCUTANEOUS at 11:38

## 2020-03-07 RX ADMIN — VITAMIN D, TAB 1000IU (100/BT) 4000 UNITS: 25 TAB at 09:13

## 2020-03-07 RX ADMIN — Medication 250 MG: at 20:27

## 2020-03-07 RX ADMIN — FLUDROCORTISONE ACETATE 0.1 MG: 0.1 TABLET ORAL at 09:13

## 2020-03-07 RX ADMIN — Medication 250 MG: at 16:49

## 2020-03-07 RX ADMIN — ATORVASTATIN CALCIUM 40 MG: 40 TABLET, FILM COATED ORAL at 09:13

## 2020-03-07 RX ADMIN — INSULIN LISPRO 1 UNITS: 100 INJECTION, SOLUTION INTRAVENOUS; SUBCUTANEOUS at 09:14

## 2020-03-07 RX ADMIN — SODIUM BICARBONATE 650 MG TABLET 650 MG: at 09:56

## 2020-03-07 RX ADMIN — Medication 250 MG: at 09:13

## 2020-03-07 RX ADMIN — OXYBUTYNIN CHLORIDE 5 MG: 5 TABLET ORAL at 09:13

## 2020-03-07 RX ADMIN — SODIUM BICARBONATE: 84 INJECTION, SOLUTION INTRAVENOUS at 09:28

## 2020-03-07 RX ADMIN — METRONIDAZOLE 500 MG: 500 INJECTION, SOLUTION INTRAVENOUS at 00:28

## 2020-03-07 ASSESSMENT — PAIN SCALES - GENERAL
PAINLEVEL_OUTOF10: 0
PAINLEVEL_OUTOF10: 9
PAINLEVEL_OUTOF10: 10

## 2020-03-07 NOTE — PROGRESS NOTES
Component Value Date    ALKPHOS 110 02/28/2020    ALT 11 02/28/2020    AST 18 02/28/2020    PROT 6.0 02/28/2020    BILITOT 0.3 02/28/2020    LABALBU 3.1 02/28/2020       MICRO:  -C Diff positive  -Enterococcus fecalis in urine    Assessment:     Patient Active Problem List    Diagnosis Date Noted    C. difficile colitis     Leukocytosis     Sepsis ([address] 02/28/2020    UTI (urinary tract infection) 02/28/2020    Hypoglycemia 02/28/2020    Debility 02/11/2020    GI bleed 02/06/2020       Severe C diff colitis  First episode - improved    Marked leukocytosis secondary to CDI - improved     Asymptomatic bacteriuria  High level growth of E faecalis in urine culture   Risk of abx exceeds benefit at this point.  No further workup or treatment     Recent UGIB, EGD with duodenal ulcer  On BID PPI      No abx allergies      Plan:   She continues to improve    Continue po vanc.   She will need to complete 14d course of therapy   I think flagyl can be stopped      Discussed with patient/family, all questions answered    Will continue to follow     Bridget Talavera MD

## 2020-03-07 NOTE — PROGRESS NOTES
Speech Language Pathology  Facility/Department: [facility] C4 PCU   CLINICAL BEDSIDE SWALLOW EVALUATION    NAME: Mary Beth Gaitan  : [birth year]  MRN: [id number]    ADMISSION DATE: 2020  ADMITTING DIAGNOSIS: has GI bleed; Debility; Sepsis ([address] UTI (urinary tract infection); Hypoglycemia; C. difficile colitis; and Leukocytosis on their problem list.  ONSET DATE: 3/7/2020     Recent Chest Xray/CT of Chest: 20  Impression   No acute cardiopulmonary disease. Date of Eval: 3/7/2020  Evaluating Therapist: Jonah Samayoa    Current Diet level:  Current Diet : Regular  Current Liquid Diet : Thin      Primary Complaint  Patient Complaint: Pt with no complaints re: swallow function. Pain:  Pain Assessment  Denies. Reason for Referral  Mary Beth Gaitan was referred for a bedside swallow evaluation to assess the efficiency of her swallow function, identify signs and symptoms of aspiration and make recommendations regarding safe dietary consistencies, effective compensatory strategies, and safe eating environment. Impression  Dysphagia Diagnosis: Swallow function appears grossly intact  Dysphagia Impression : Pt's swallow function appears grossly intact. Pt able to tolerate thin liquids; regular consistencies with no overt s/s aspiration or penetraiton. Pt states she has never had a concern re: swallow function and RN stated she had been tolerating foods and liquids with no difficulty. Recommend continuing thin liquids and regular diet. No further ST indicated at this time. Treatment Plan  Requires SLP Intervention: No  Duration/Frequency of Treatment: 2-3x/wk for LOS  D/C Recommendations: To be determined       Recommended Diet and Intervention  Diet Solids Recommendation: Regular  Liquid Consistency Recommendation:  Thin  Recommended Form of Meds: Whole with water  Recommendations: Dysphagia treatment  Therapeutic Interventions: Therapeutic PO trials with SLP;Patient/Family education;Diet

## 2020-03-07 NOTE — PLAN OF CARE
Bedside swallow evaluation completed this date. All further notes may be found in EMR.     Elvie Pittman, [facility]  Speech-Language Pathologist

## 2020-03-07 NOTE — PROGRESS NOTES
Hospitalist Progress Note      PCP: CHRYSTAL Colvin    Date of Admission: 2/28/2020    Chief Complaint: Confusion, fatigue, hypoglycemia    Hospital Course: See H&P    Subjective:   Patient is up in bed, comfortable, not in distress. Abdominal pain improved. No diarrhea overnight. Improving p.o. intake. No new event overnight noted. Medications:  Reviewed    Infusion Medications    dextrose       Scheduled Medications    sodium bicarbonate  650 mg Oral BID    saccharomyces boulardii  250 mg Oral BID    metroNIDAZOLE  500 mg Intravenous Q8H    vancomycin  250 mg Oral 4 times per day    insulin lispro  0-6 Units Subcutaneous TID WC    insulin lispro  0-3 Units Subcutaneous Nightly    pantoprazole  40 mg Oral BID AC    oxybutynin  5 mg Oral BID    fludrocortisone  0.1 mg Oral Daily    atorvastatin  40 mg Oral Daily    Vitamin D  4 tablet Oral Daily    sodium chloride flush  10 mL Intravenous 2 times per day    enoxaparin  40 mg Subcutaneous Daily     PRN Meds: morphine, calcium carbonate, simethicone, sodium chloride flush, acetaminophen **OR** acetaminophen, glucose, dextrose, glucagon (rDNA), dextrose      Intake/Output Summary (Last 24 hours) at 3/7/2020 1407  Last data filed at 3/7/2020 1340  Gross per 24 hour   Intake 230 ml   Output 650 ml   Net -420 ml       Physical Exam Performed:    /84   Pulse 89   Temp 97.9 °F (36.6 °C) (Oral)   Resp 16   Ht 5' 4\" (1.626 m)   Wt 221 lb 8 oz (100.5 kg)   SpO2 96%   BMI 38.02 kg/m²     General appearance: No apparent distress, appears stated age and cooperative. HEENT: Pupils equal, round, and reactive to light. Conjunctivae/corneas clear. Neck: Supple, with full range of motion. No jugular venous distention. Trachea midline. Respiratory:  Normal respiratory effort. Clear to auscultation, bilaterally without Rales/Wheezes/Rhonchi.   Cardiovascular: Regular rate and rhythm with normal S1/S2 without murmurs, rubs or gallops. Abdomen: Soft, non-tender, non-distended with normal bowel sounds. Musculoskeletal: No clubbing, cyanosis or edema bilaterally. Full range of motion without deformity. Skin: Skin color, texture, turgor normal.  No rashes or lesions. Neurologic:  Neurovascularly intact without any focal sensory/motor deficits. Cranial nerves: II-XII intact, grossly non-focal.  Psychiatric: Alert and oriented, thought content appropriate, normal insight  Capillary Refill: Brisk,< 3 seconds   Peripheral Pulses: +2 palpable, equal bilaterally       Labs:   Recent Labs     03/05/20  0547 03/06/20  0545 03/07/20  0543   WBC 15.6* 13.1* 13.1*   HGB 8.7* 9.2* 9.9*   HCT 27.2* 28.6* 30.4*    412 466*     Recent Labs     03/05/20  0547 03/06/20  0545 03/07/20  0543    141 139   K 4.1 3.7 3.8   * 111* 110   CO2 16* 17* 19*   BUN 20 18 14   CREATININE 1.1 1.1 0.9   CALCIUM 7.8* 7.7* 8.0*       Urinalysis:      Lab Results   Component Value Date    NITRU Negative 02/28/2020    WBCUA 3-5 02/28/2020    BACTERIA Rare 02/28/2020    RBCUA 21-50 02/28/2020    BLOODU MODERATE 02/28/2020    SPECGRAV 1.025 02/28/2020    GLUCOSEU Negative 02/28/2020       Radiology:  XR ABDOMEN (KUB) (SINGLE AP VIEW)   Final Result   Persistent wall thickening of the colon related to colitis. Mild bowel   dilation consistent with ileus. Findings very similar to the comparison 3   days ago with mild if any change. XR Acute Abd Series Chest 1 VW   Final Result   Moderate mural thickening diffusely throughout the colon with moderate   lobular thickening of the haustral folds typical of significant inflammatory   change and compatible given the diagnosis of Clostridium difficile. Small bowel ileus. XR CHEST PORTABLE   Final Result   No acute cardiopulmonary disease.                  Assessment/Plan:    Active Hospital Problems    Diagnosis    Sepsis ([facility] [A41.9]     Priority: High    C. difficile colitis [A04.72]    Leukocytosis [D72.829]    UTI (urinary tract infection) [N39.0]    Hypoglycemia [E16.2]     Sepsis, likely due to severe C. difficile colitis, meets criteria with hypotension, tachycardia, leukocytosis and elevated lactic acid level, admit to telemetry, sepsis protocol initiated in emergency room, blood culture x2 and urine culture pending, patient was given IV fluid as per protocol, continue normal saline at 100 cc/h, patient started on ceftriaxone pending cultures. Clinically improving gradually.     UTI, pending urine culture patient started on ceftriaxone, urine culture showing Enterococcus faecalis sensitive to Cipro, patient was transitioned to ciprofloxacin. DC Cipro    Severe C. difficile colitis, initial episode, started on vancomycin 250 mg 4 times daily, plan to continue therapy for total of 14 days. Persistent leukocytosis, x-ray abdomen showing mural thickening of entire colon, continue vancomycin, ID consult requested. ID input appreciated, continuing p.o. vancomycin, Flagyl was added, Cipro was discontinued. Clinically improving gradually, leukocytosis is also improving. Plan to continue IV Flagyl for few more days and to finish p.o. vancomycin course for total of 14 days.     Hypoglycemia, will be holding sulfonylurea for now, PRN D50, monitor. Clinically resolved.     Diabetes mellitus type 2, reported controlled with current regimen, will be holding sulfonylurea for now, monitor. We will add low-dose insulin sliding scale, monitor. Metabolic acidosis, CO2 at 17, anion gap is 13, consider bicarb supplement, monitor.     Metabolic encephalopathy, likely due to hypoglycemia, clinically resolved after treating hypoglycemia, supportive care, monitor. Clinically resolved now. Iron deficiency anemia on oral ferrous sulfate, hemoglobin remained stable around 9, monitor.     Morbid Obesity complicating assessment and treatment.  Placing patient at risk for multiple co-morbidities as well as early death and contributing to the patient's presentation. Counseled on weight loss. Body mass index is 33.99 kg/m². DVT Prophylaxis: Lovenox  Diet: DIET CARB CONTROL;   Dietary Nutrition Supplements: Clear Liquid Oral Supplement, Frozen Oral Supplement  Code Status: Full Code    PT/OT Eval Status: Ordered    Dispo - 1 to 2 days    Kristi Escamilla MD

## 2020-03-08 VITALS
TEMPERATURE: 98.5 F | BODY MASS INDEX: 38.39 KG/M2 | WEIGHT: 224.87 LBS | HEART RATE: 89 BPM | DIASTOLIC BLOOD PRESSURE: 84 MMHG | SYSTOLIC BLOOD PRESSURE: 129 MMHG | HEIGHT: 64 IN | RESPIRATION RATE: 14 BRPM | OXYGEN SATURATION: 96 %

## 2020-03-08 LAB
ANION GAP SERPL CALCULATED.3IONS-SCNC: 8 MMOL/L (ref 3–16)
BASOPHILS ABSOLUTE: 0.1 K/UL (ref 0–0.2)
BASOPHILS RELATIVE PERCENT: 0.7 %
BUN BLDV-MCNC: 15 MG/DL (ref 7–20)
CALCIUM SERPL-MCNC: 8 MG/DL (ref 8.3–10.6)
CHLORIDE BLD-SCNC: 109 MMOL/L (ref 99–110)
CO2: 19 MMOL/L (ref 21–32)
CREAT SERPL-MCNC: 0.8 MG/DL (ref 0.6–1.2)
EOSINOPHILS ABSOLUTE: 0.2 K/UL (ref 0–0.6)
EOSINOPHILS RELATIVE PERCENT: 1.2 %
GFR AFRICAN AMERICAN: >60
GFR NON-AFRICAN AMERICAN: >60
GLUCOSE BLD-MCNC: 137 MG/DL (ref 70–99)
GLUCOSE BLD-MCNC: 152 MG/DL (ref 70–99)
GLUCOSE BLD-MCNC: 156 MG/DL (ref 70–99)
HCT VFR BLD CALC: 30.2 % (ref 36–48)
HEMOGLOBIN: 9.9 G/DL (ref 12–16)
LYMPHOCYTES ABSOLUTE: 1.2 K/UL (ref 1–5.1)
LYMPHOCYTES RELATIVE PERCENT: 8.8 %
MCH RBC QN AUTO: 29.8 PG (ref 26–34)
MCHC RBC AUTO-ENTMCNC: 32.7 G/DL (ref 31–36)
MCV RBC AUTO: 91.4 FL (ref 80–100)
MONOCYTES ABSOLUTE: 0.6 K/UL (ref 0–1.3)
MONOCYTES RELATIVE PERCENT: 4.5 %
NEUTROPHILS ABSOLUTE: 11.4 K/UL (ref 1.7–7.7)
NEUTROPHILS RELATIVE PERCENT: 84.8 %
PDW BLD-RTO: 16.5 % (ref 12.4–15.4)
PERFORMED ON: ABNORMAL
PERFORMED ON: ABNORMAL
PLATELET # BLD: 507 K/UL (ref 135–450)
PMV BLD AUTO: 7.5 FL (ref 5–10.5)
POTASSIUM SERPL-SCNC: 3.6 MMOL/L (ref 3.5–5.1)
RBC # BLD: 3.31 M/UL (ref 4–5.2)
SODIUM BLD-SCNC: 136 MMOL/L (ref 136–145)
WBC # BLD: 13.4 K/UL (ref 4–11)

## 2020-03-08 PROCEDURE — 85025 COMPLETE CBC W/AUTO DIFF WBC: CPT

## 2020-03-08 PROCEDURE — 6370000000 HC RX 637 (ALT 250 FOR IP): Performed by: INTERNAL MEDICINE

## 2020-03-08 PROCEDURE — 6360000002 HC RX W HCPCS: Performed by: INTERNAL MEDICINE

## 2020-03-08 PROCEDURE — 2580000003 HC RX 258: Performed by: INTERNAL MEDICINE

## 2020-03-08 PROCEDURE — 36415 COLL VENOUS BLD VENIPUNCTURE: CPT

## 2020-03-08 PROCEDURE — 80048 BASIC METABOLIC PNL TOTAL CA: CPT

## 2020-03-08 RX ORDER — SODIUM BICARBONATE 650 MG/1
650 TABLET ORAL 2 TIMES DAILY
DISCHARGE
Start: 2020-03-08 | End: 2020-03-18

## 2020-03-08 RX ADMIN — Medication 10 ML: at 08:39

## 2020-03-08 RX ADMIN — FLUDROCORTISONE ACETATE 0.1 MG: 0.1 TABLET ORAL at 08:39

## 2020-03-08 RX ADMIN — ENOXAPARIN SODIUM 40 MG: 40 INJECTION SUBCUTANEOUS at 08:39

## 2020-03-08 RX ADMIN — Medication 250 MG: at 00:30

## 2020-03-08 RX ADMIN — INSULIN LISPRO 1 UNITS: 100 INJECTION, SOLUTION INTRAVENOUS; SUBCUTANEOUS at 08:39

## 2020-03-08 RX ADMIN — OXYBUTYNIN CHLORIDE 5 MG: 5 TABLET ORAL at 08:39

## 2020-03-08 RX ADMIN — Medication 250 MG: at 08:38

## 2020-03-08 RX ADMIN — INSULIN LISPRO 1 UNITS: 100 INJECTION, SOLUTION INTRAVENOUS; SUBCUTANEOUS at 11:11

## 2020-03-08 RX ADMIN — PANTOPRAZOLE SODIUM 40 MG: 40 TABLET, DELAYED RELEASE ORAL at 06:26

## 2020-03-08 RX ADMIN — ATORVASTATIN CALCIUM 40 MG: 40 TABLET, FILM COATED ORAL at 08:39

## 2020-03-08 RX ADMIN — VITAMIN D, TAB 1000IU (100/BT) 4000 UNITS: 25 TAB at 08:39

## 2020-03-08 RX ADMIN — Medication 250 MG: at 11:11

## 2020-03-08 RX ADMIN — Medication 250 MG: at 06:25

## 2020-03-08 RX ADMIN — SODIUM BICARBONATE 650 MG TABLET 650 MG: at 08:39

## 2020-03-08 ASSESSMENT — PAIN SCALES - GENERAL
PAINLEVEL_OUTOF10: 0
PAINLEVEL_OUTOF10: 0

## 2020-03-08 NOTE — CARE COORDINATION
CASE MANAGEMENT DISCHARGE SUMMARY      Discharge to: SNF @ [facility] Crut completed: 3 [address] Schwab stay Hospital Exemption Notification (HENS) completed: yes    New Durable Medical Equipment ordered/agency: per facility    Transportation:    Family/car: no   Medical Transport explained to pt/family. Pt/family voice no agency preference. Agency used: [address] [place] [address] Road up time: 1245   Ambulance form completed: Yes    Confirmed discharge plan with:     Patient: yes per RN     Family, name and contact number: Josi Titus [phone]     Facility/Agency, name:  [facility]     Phone number for report to facility: [phone], name: [facility]    Note: Discharging nurse to complete MORA, reconcile AVS, and place final copy with patient's discharge packet. RN to ensure that written prescriptions for  Level II medications are sent with patient to the facility as per protocol.

## 2020-03-08 NOTE — PROGRESS NOTES
GI Progress Note      SUBJECTIVE:  Continues to feel better. Abdominal pain is improving. Denies nausea or emesis. Appetite is also improving. One BM recorded overnight.     OBJECTIVE      Medications    Current Facility-Administered Medications: sodium bicarbonate tablet 650 mg, 650 mg, Oral, BID  saccharomyces boulardii (FLORASTOR) capsule 250 mg, 250 mg, Oral, BID  morphine (PF) injection 2 mg, 2 mg, Intravenous, Q4H PRN  vancomycin (VANCOCIN) oral solution 250 mg, 250 mg, Oral, 4 times per day  calcium carbonate (TUMS) chewable tablet 500 mg, 500 mg, Oral, TID PRN  insulin lispro (HUMALOG) injection vial 0-6 Units, 0-6 Units, Subcutaneous, TID WC  insulin lispro (HUMALOG) injection vial 0-3 Units, 0-3 Units, Subcutaneous, Nightly  simethicone (MYLICON) chewable tablet 80 mg, 80 mg, Oral, Q6H PRN  pantoprazole (PROTONIX) tablet 40 mg, 40 mg, Oral, BID AC  oxybutynin (DITROPAN) tablet 5 mg, 5 mg, Oral, BID  fludrocortisone (FLORINEF) tablet 0.1 mg, 0.1 mg, Oral, Daily  atorvastatin (LIPITOR) tablet 40 mg, 40 mg, Oral, Daily  Vitamin D (CHOLECALCIFEROL) tablet 4,000 Units, 4 tablet, Oral, Daily  sodium chloride flush 0.9 % injection 10 mL, 10 mL, Intravenous, 2 times per day  sodium chloride flush 0.9 % injection 10 mL, 10 mL, Intravenous, PRN  acetaminophen (TYLENOL) tablet 650 mg, 650 mg, Oral, Q6H PRN **OR** acetaminophen (TYLENOL) suppository 650 mg, 650 mg, Rectal, Q6H PRN  enoxaparin (LOVENOX) injection 40 mg, 40 mg, Subcutaneous, Daily  glucose (GLUTOSE) 40 % oral gel 15 g, 15 g, Oral, PRN  dextrose 50 % IV solution, 12.5 g, Intravenous, PRN  glucagon (rDNA) injection 1 mg, 1 mg, Intramuscular, PRN  dextrose 5 % solution, 100 mL/hr, Intravenous, PRN  Physical    VITALS:  /81   Pulse 81   Temp 98.1 °F (36.7 °C) (Oral)   Resp 16   Ht 5' 4\" (1.626 m)   Wt 224 lb 13.9 oz (102 kg)   SpO2 96%   BMI 38.60 kg/m²   ABD: soft, mild BLQ tenderness, NABS  Data    CBC with Differential:    Lab Results Component Value Date    WBC 13.4 03/08/2020    RBC 3.31 03/08/2020    HGB 9.9 03/08/2020    HCT 30.2 03/08/2020     03/08/2020    MCV 91.4 03/08/2020    MCH 29.8 03/08/2020    MCHC 32.7 03/08/2020    RDW 16.5 03/08/2020    BANDSPCT 3 03/07/2020    METASPCT 2 03/07/2020    LYMPHOPCT 8.8 03/08/2020    MONOPCT 4.5 03/08/2020    MYELOPCT 0 03/07/2020    BASOPCT 0.7 03/08/2020    MONOSABS 0.6 03/08/2020    LYMPHSABS 1.2 03/08/2020    EOSABS 0.2 03/08/2020    BASOSABS 0.1 03/08/2020     CMP:    Lab Results   Component Value Date     03/08/2020    K 3.6 03/08/2020    K 4.0 02/20/2020     03/08/2020    CO2 19 03/08/2020    BUN 15 03/08/2020    CREATININE 0.8 03/08/2020    GFRAA >60 03/08/2020    AGRATIO 1.1 02/28/2020    LABGLOM >60 03/08/2020    GLUCOSE 137 03/08/2020    PROT 6.0 02/28/2020    LABALBU 3.1 02/28/2020    CALCIUM 8.0 03/08/2020    BILITOT 0.3 02/28/2020    ALKPHOS 110 02/28/2020    AST 18 02/28/2020    ALT 11 02/28/2020       ASSESSMENT AND PLAN  67yo F admitted with severe C diff colitis. She remains afebrile with stable mild leukocytosis. Sxs are slowly improving. Mngmnt involves Vanco 250mg QID. Flagyl was stopped yesterday.   Florastor is being provided as well  - continue present mngmnt

## 2020-03-08 NOTE — DISCHARGE SUMMARY
Hospital Medicine Discharge Summary    Patient ID: Darling Carlos      Patient's PCP: CHRYSTAL Griffin    Admit Date: 2/28/2020     Discharge Date:   3/8/2020    Admitting Physician: Karina Hough MD     Discharge Physician: Karina Hough MD     Discharge Diagnoses: Active Hospital Problems    Diagnosis    Sepsis ([facility] [address] [A41.9]     Priority: High    C. difficile colitis [A04.72]    Leukocytosis [D72.829]    UTI (urinary tract infection) [N39.0]    Hypoglycemia [E16.2]       The patient was seen and examined on day of discharge and this discharge summary is in conjunction with any daily progress note from day of discharge. Hospital Course:     Sepsis, likely due to severe C. difficile colitis, meets criteria with hypotension, tachycardia, leukocytosis and elevated lactic acid level, admit to telemetry, sepsis protocol initiated in emergency room, blood culture x2 and urine culture pending, patient was given IV fluid as per protocol, continue normal saline at 100 cc/h, patient started on ceftriaxone pending cultures. Clinically improving gradually.     UTI, pending urine culture patient started on ceftriaxone, urine culture showing Enterococcus faecalis sensitive to Cipro, patient was transitioned to ciprofloxacin. DC Cipro     Severe C. difficile colitis, initial episode, started on vancomycin 250 mg 4 times daily, plan to continue therapy for total of 14 days. Persistent leukocytosis, x-ray abdomen showing mural thickening of entire colon, continue vancomycin, ID consult requested. ID input appreciated, continuing p.o. vancomycin, Flagyl was added, Cipro was discontinued. Clinically improving gradually, leukocytosis is also improving. Plan to continue IV Flagyl for few more days and to finish p.o. vancomycin course for total of 14 days.     Hypoglycemia, will be holding sulfonylurea for now, PRN D50, monitor.   Clinically resolved.     Diabetes mellitus type 2, reported controlled with palpable, equal bilaterally       Labs: For convenience and continuity at follow-up the following most recent labs are provided:      CBC:    Lab Results   Component Value Date    WBC 13.4 03/08/2020    HGB 9.9 03/08/2020    HCT 30.2 03/08/2020     03/08/2020       Renal:    Lab Results   Component Value Date     03/08/2020    K 3.6 03/08/2020    K 4.0 02/20/2020     03/08/2020    CO2 19 03/08/2020    BUN 15 03/08/2020    CREATININE 0.8 03/08/2020    CALCIUM 8.0 03/08/2020         Significant Diagnostic Studies    Radiology:   XR ABDOMEN (KUB) (SINGLE AP VIEW)   Final Result   Persistent wall thickening of the colon related to colitis. Mild bowel   dilation consistent with ileus. Findings very similar to the comparison 3   days ago with mild if any change. XR Acute Abd Series Chest 1 VW   Final Result   Moderate mural thickening diffusely throughout the colon with moderate   lobular thickening of the haustral folds typical of significant inflammatory   change and compatible given the diagnosis of Clostridium difficile. Small bowel ileus. XR CHEST PORTABLE   Final Result   No acute cardiopulmonary disease.                 Consults:     IP CONSULT TO HOSPITALIST  IP CONSULT TO GI  IP CONSULT TO INFECTIOUS DISEASES    Disposition:  SNF     Condition at Discharge: Stable    Discharge Instructions/Follow-up:  PCP     Code Status:  Full Code     Activity: activity as tolerated    Diet: diabetic diet      Discharge Medications:     Current Discharge Medication List           Details   sodium bicarbonate 650 MG tablet Take 1 tablet by mouth 2 times daily for 10 days      enoxaparin (LOVENOX) 40 MG/0.4ML injection Inject 0.4 mLs into the skin daily for 10 days  Refills: 0      insulin lispro (HUMALOG) 100 UNIT/ML injection vial Inject 0-6 Units into the skin 3 times daily (with meals)  Qty: 1 vial, Refills: 3      vancomycin (VANCOCIN) 50 mg/mL oral solution Take 5 mLs by mouth every 8 hours for 10 days              Details   fludrocortisone (FLORINEF) 0.1 MG tablet Take 1 tablet by mouth daily  Qty: 30 tablet, Refills: 3      ferrous sulfate 325 (65 Fe) MG tablet Take 1 tablet by mouth daily (with breakfast)  Qty: 30 tablet, Refills: 3      pantoprazole (PROTONIX) 40 MG tablet Take 1 tablet by mouth 2 times daily (before meals)  Qty: 60 tablet, Refills: 1      glipiZIDE (GLUCOTROL) 10 MG tablet TAKE 1 TABLET BY MOUTH EVERY DAY  Qty: 90 tablet, Refills: 0      !! Handicap Placard MISC Expiration 1/2025  Qty: 1 each, Refills: 0      oxybutynin (DITROPAN) 5 MG tablet Take 1 tablet by mouth 2 times daily  Qty: 60 tablet, Refills: 3      !! Handicap Placard MISC by Does not apply route  Qty: 1 each, Refills: 0    Associated Diagnoses: Chronic pain of both knees; Type 2 diabetes mellitus without complication, without long-term current use of insulin ([facility])      atorvastatin (LIPITOR) 40 MG tablet Take 1 tablet by mouth daily  Qty: 90 tablet, Refills: 1      Cholecalciferol 100 MCG (4000 UT) CAPS Take 1 capsule by mouth daily  Qty: 90 capsule, Refills: 1       !! - Potential duplicate medications found. Please discuss with provider. Time Spent on discharge is more than 30 minutes in the examination, evaluation, counseling and review of medications and discharge plan. Signed:    Ronaldo White MD   3/8/2020      Thank you CHRYSTAL Goins for the opportunity to be involved in this patient's care. If you have any questions or concerns please feel free to contact me at [phone].

## 2020-03-09 ENCOUNTER — TELEPHONE (OUTPATIENT)
Dept: FAMILY MEDICINE CLINIC | Age: 75
End: 2020-03-09

## 2020-03-29 PROBLEM — N39.0 UTI (URINARY TRACT INFECTION): Status: RESOLVED | Noted: 2020-02-28 | Resolved: 2020-03-29

## 2020-03-31 ENCOUNTER — HOSPITAL ENCOUNTER (OUTPATIENT)
Age: 75
Setting detail: SPECIMEN
Discharge: HOME | End: 2020-03-31

## 2020-03-31 LAB
ALBUMIN SERPL-MCNC: 3.1 GM/DL (ref 3.4–5)
ALP BLD-CCNC: 79 IU/L (ref 40–128)
ALT SERPL-CCNC: 6 U/L (ref 10–40)
ANION GAP SERPL CALCULATED.3IONS-SCNC: 12 MMOL/L (ref 4–16)
AST SERPL-CCNC: 12 IU/L (ref 15–37)
BILIRUB SERPL-MCNC: 0.3 MG/DL (ref 0–1)
BUN BLDV-MCNC: 7 MG/DL (ref 6–23)
CALCIUM SERPL-MCNC: 8.6 MG/DL (ref 8.3–10.6)
CHLORIDE BLD-SCNC: 104 MMOL/L (ref 99–110)
CO2: 27 MMOL/L (ref 21–32)
CREAT SERPL-MCNC: 0.9 MG/DL (ref 0.6–1.1)
GFR AFRICAN AMERICAN: >60 ML/MIN/1.73M2
GFR NON-AFRICAN AMERICAN: >60 ML/MIN/1.73M2
GLUCOSE BLD-MCNC: 91 MG/DL (ref 70–99)
HCT VFR BLD CALC: 31.3 % (ref 37–47)
HEMOGLOBIN: 9.3 GM/DL (ref 12.5–16)
MCH RBC QN AUTO: 29.4 PG (ref 27–31)
MCHC RBC AUTO-ENTMCNC: 29.7 % (ref 32–36)
MCV RBC AUTO: 99.1 FL (ref 78–100)
PDW BLD-RTO: 15.7 % (ref 11.7–14.9)
PLATELET # BLD: 318 K/CU MM (ref 140–440)
PMV BLD AUTO: 10.3 FL (ref 7.5–11.1)
POTASSIUM SERPL-SCNC: 3.5 MMOL/L (ref 3.5–5.1)
RBC # BLD: 3.16 M/CU MM (ref 4.2–5.4)
SODIUM BLD-SCNC: 143 MMOL/L (ref 135–145)
TOTAL PROTEIN: 5.1 GM/DL (ref 6.4–8.2)
WBC # BLD: 6.3 K/CU MM (ref 4–10.5)

## 2020-03-31 PROCEDURE — 80053 COMPREHEN METABOLIC PANEL: CPT

## 2020-03-31 PROCEDURE — 36415 COLL VENOUS BLD VENIPUNCTURE: CPT

## 2020-03-31 PROCEDURE — 85027 COMPLETE CBC AUTOMATED: CPT

## 2020-04-07 ENCOUNTER — HOSPITAL ENCOUNTER (OUTPATIENT)
Age: 75
Setting detail: SPECIMEN
Discharge: HOME | End: 2020-04-07
Payer: COMMERCIAL

## 2020-04-07 LAB
ANION GAP SERPL CALCULATED.3IONS-SCNC: 18 MMOL/L (ref 4–16)
BUN BLDV-MCNC: 10 MG/DL (ref 6–23)
CALCIUM SERPL-MCNC: 9.6 MG/DL (ref 8.3–10.6)
CHLORIDE BLD-SCNC: 101 MMOL/L (ref 99–110)
CO2: 20 MMOL/L (ref 21–32)
CREAT SERPL-MCNC: 1.1 MG/DL (ref 0.6–1.1)
GFR AFRICAN AMERICAN: 59 ML/MIN/1.73M2
GFR NON-AFRICAN AMERICAN: 49 ML/MIN/1.73M2
GLUCOSE BLD-MCNC: 153 MG/DL (ref 70–99)
HCT VFR BLD CALC: 34.2 % (ref 37–47)
HEMOGLOBIN: 10.4 GM/DL (ref 12.5–16)
MCH RBC QN AUTO: 29 PG (ref 27–31)
MCHC RBC AUTO-ENTMCNC: 30.4 % (ref 32–36)
MCV RBC AUTO: 95.3 FL (ref 78–100)
PDW BLD-RTO: 15 % (ref 11.7–14.9)
PLATELET # BLD: 368 K/CU MM (ref 140–440)
PMV BLD AUTO: 10.3 FL (ref 7.5–11.1)
POTASSIUM SERPL-SCNC: 5.1 MMOL/L (ref 3.5–5.1)
RBC # BLD: 3.59 M/CU MM (ref 4.2–5.4)
SODIUM BLD-SCNC: 139 MMOL/L (ref 135–145)
WBC # BLD: 9.1 K/CU MM (ref 4–10.5)

## 2020-04-07 PROCEDURE — 85027 COMPLETE CBC AUTOMATED: CPT

## 2020-04-07 PROCEDURE — 36415 COLL VENOUS BLD VENIPUNCTURE: CPT

## 2020-04-07 PROCEDURE — 80048 BASIC METABOLIC PNL TOTAL CA: CPT

## 2020-04-14 ENCOUNTER — HOSPITAL ENCOUNTER (OUTPATIENT)
Age: 75
Setting detail: SPECIMEN
Discharge: HOME | End: 2020-04-14
Payer: MEDICARE

## 2020-04-14 LAB
ANION GAP SERPL CALCULATED.3IONS-SCNC: 16 MMOL/L (ref 4–16)
BUN BLDV-MCNC: 17 MG/DL (ref 6–23)
CALCIUM SERPL-MCNC: 10 MG/DL (ref 8.3–10.6)
CHLORIDE BLD-SCNC: 106 MMOL/L (ref 99–110)
CO2: 19 MMOL/L (ref 21–32)
CREAT SERPL-MCNC: 1.4 MG/DL (ref 0.6–1.1)
GFR AFRICAN AMERICAN: 45 ML/MIN/1.73M2
GFR NON-AFRICAN AMERICAN: 37 ML/MIN/1.73M2
GLUCOSE BLD-MCNC: 136 MG/DL (ref 70–99)
HCT VFR BLD CALC: 41 % (ref 37–47)
HEMOGLOBIN: 12.1 GM/DL (ref 12.5–16)
MCH RBC QN AUTO: 28.9 PG (ref 27–31)
MCHC RBC AUTO-ENTMCNC: 29.5 % (ref 32–36)
MCV RBC AUTO: 98.1 FL (ref 78–100)
PDW BLD-RTO: 14.7 % (ref 11.7–14.9)
PLATELET # BLD: 413 K/CU MM (ref 140–440)
PMV BLD AUTO: 10.4 FL (ref 7.5–11.1)
POTASSIUM SERPL-SCNC: 5.7 MMOL/L (ref 3.5–5.1)
RBC # BLD: 4.18 M/CU MM (ref 4.2–5.4)
SODIUM BLD-SCNC: 141 MMOL/L (ref 135–145)
WBC # BLD: 13.4 K/CU MM (ref 4–10.5)

## 2020-04-14 PROCEDURE — 80048 BASIC METABOLIC PNL TOTAL CA: CPT

## 2020-04-14 PROCEDURE — 36415 COLL VENOUS BLD VENIPUNCTURE: CPT

## 2020-04-14 PROCEDURE — 85027 COMPLETE CBC AUTOMATED: CPT

## 2020-04-15 ENCOUNTER — HOSPITAL ENCOUNTER (OUTPATIENT)
Age: 75
Setting detail: SPECIMEN
Discharge: HOME | End: 2020-04-15
Payer: MEDICARE

## 2020-04-15 PROCEDURE — 87324 CLOSTRIDIUM AG IA: CPT

## 2020-04-16 LAB
CLOSTRIDIUM DIFFICILE, PCR: ABNORMAL
CLOSTRIDIUM DIFFICILE, PCR: ABNORMAL

## 2020-04-17 ENCOUNTER — TELEPHONE (OUTPATIENT)
Dept: FAMILY MEDICINE CLINIC | Age: 75
End: 2020-04-17

## 2020-04-28 ENCOUNTER — HOSPITAL ENCOUNTER (OUTPATIENT)
Age: 75
Setting detail: SPECIMEN
Discharge: HOME | End: 2020-04-28
Payer: MEDICARE

## 2020-04-28 LAB
ANION GAP SERPL CALCULATED.3IONS-SCNC: 18 MMOL/L (ref 4–16)
BUN BLDV-MCNC: 28 MG/DL (ref 6–23)
CALCIUM SERPL-MCNC: 9.9 MG/DL (ref 8.3–10.6)
CHLORIDE BLD-SCNC: 104 MMOL/L (ref 99–110)
CO2: 16 MMOL/L (ref 21–32)
CREAT SERPL-MCNC: 1.9 MG/DL (ref 0.6–1.1)
GFR AFRICAN AMERICAN: 31 ML/MIN/1.73M2
GFR NON-AFRICAN AMERICAN: 26 ML/MIN/1.73M2
GLUCOSE BLD-MCNC: 194 MG/DL (ref 70–99)
HCT VFR BLD CALC: 39.9 % (ref 37–47)
HEMOGLOBIN: 12.2 GM/DL (ref 12.5–16)
MCH RBC QN AUTO: 28.2 PG (ref 27–31)
MCHC RBC AUTO-ENTMCNC: 30.6 % (ref 32–36)
MCV RBC AUTO: 92.1 FL (ref 78–100)
PDW BLD-RTO: 13.9 % (ref 11.7–14.9)
PLATELET # BLD: 420 K/CU MM (ref 140–440)
PMV BLD AUTO: 10.4 FL (ref 7.5–11.1)
POTASSIUM SERPL-SCNC: 5.4 MMOL/L (ref 3.5–5.1)
RBC # BLD: 4.33 M/CU MM (ref 4.2–5.4)
SODIUM BLD-SCNC: 138 MMOL/L (ref 135–145)
WBC # BLD: 10.6 K/CU MM (ref 4–10.5)

## 2020-04-28 PROCEDURE — 80048 BASIC METABOLIC PNL TOTAL CA: CPT

## 2020-04-28 PROCEDURE — 36415 COLL VENOUS BLD VENIPUNCTURE: CPT

## 2020-04-28 PROCEDURE — 85027 COMPLETE CBC AUTOMATED: CPT

## 2020-05-05 ENCOUNTER — HOSPITAL ENCOUNTER (OUTPATIENT)
Age: 75
Setting detail: SPECIMEN
Discharge: HOME | End: 2020-05-05
Payer: MEDICARE

## 2020-05-05 LAB
ANION GAP SERPL CALCULATED.3IONS-SCNC: 14 MMOL/L (ref 4–16)
BUN BLDV-MCNC: 32 MG/DL (ref 6–23)
CALCIUM SERPL-MCNC: 9.5 MG/DL (ref 8.3–10.6)
CHLORIDE BLD-SCNC: 99 MMOL/L (ref 99–110)
CO2: 23 MMOL/L (ref 21–32)
CREAT SERPL-MCNC: 1.7 MG/DL (ref 0.6–1.1)
GFR AFRICAN AMERICAN: 36 ML/MIN/1.73M2
GFR NON-AFRICAN AMERICAN: 30 ML/MIN/1.73M2
GLUCOSE BLD-MCNC: 133 MG/DL (ref 70–99)
HCT VFR BLD CALC: 35.6 % (ref 37–47)
HEMOGLOBIN: 11.2 GM/DL (ref 12.5–16)
MCH RBC QN AUTO: 28.4 PG (ref 27–31)
MCHC RBC AUTO-ENTMCNC: 31.5 % (ref 32–36)
MCV RBC AUTO: 90.4 FL (ref 78–100)
PDW BLD-RTO: 14.1 % (ref 11.7–14.9)
PLATELET # BLD: 380 K/CU MM (ref 140–440)
PMV BLD AUTO: 10.4 FL (ref 7.5–11.1)
POTASSIUM SERPL-SCNC: 4 MMOL/L (ref 3.5–5.1)
RBC # BLD: 3.94 M/CU MM (ref 4.2–5.4)
SODIUM BLD-SCNC: 136 MMOL/L (ref 135–145)
WBC # BLD: 10.5 K/CU MM (ref 4–10.5)

## 2020-05-05 PROCEDURE — 80048 BASIC METABOLIC PNL TOTAL CA: CPT

## 2020-05-05 PROCEDURE — 85027 COMPLETE CBC AUTOMATED: CPT

## 2020-05-05 PROCEDURE — 36415 COLL VENOUS BLD VENIPUNCTURE: CPT

## 2020-05-12 ENCOUNTER — HOSPITAL ENCOUNTER (OUTPATIENT)
Age: 75
Setting detail: SPECIMEN
Discharge: HOME | End: 2020-05-12
Payer: MEDICARE

## 2020-05-12 LAB
ANION GAP SERPL CALCULATED.3IONS-SCNC: 16 MMOL/L (ref 4–16)
BUN BLDV-MCNC: 23 MG/DL (ref 6–23)
CALCIUM SERPL-MCNC: 9.4 MG/DL (ref 8.3–10.6)
CHLORIDE BLD-SCNC: 101 MMOL/L (ref 99–110)
CO2: 23 MMOL/L (ref 21–32)
CREAT SERPL-MCNC: 1.7 MG/DL (ref 0.6–1.1)
GFR AFRICAN AMERICAN: 36 ML/MIN/1.73M2
GFR NON-AFRICAN AMERICAN: 30 ML/MIN/1.73M2
GLUCOSE BLD-MCNC: 138 MG/DL (ref 70–99)
HCT VFR BLD CALC: 37.2 % (ref 37–47)
HEMOGLOBIN: 11.7 GM/DL (ref 12.5–16)
MCH RBC QN AUTO: 27.9 PG (ref 27–31)
MCHC RBC AUTO-ENTMCNC: 31.5 % (ref 32–36)
MCV RBC AUTO: 88.6 FL (ref 78–100)
PDW BLD-RTO: 14.3 % (ref 11.7–14.9)
PLATELET # BLD: 394 K/CU MM (ref 140–440)
PMV BLD AUTO: 10.7 FL (ref 7.5–11.1)
POTASSIUM SERPL-SCNC: 3.9 MMOL/L (ref 3.5–5.1)
RBC # BLD: 4.2 M/CU MM (ref 4.2–5.4)
SODIUM BLD-SCNC: 140 MMOL/L (ref 135–145)
WBC # BLD: 9.2 K/CU MM (ref 4–10.5)

## 2020-05-12 PROCEDURE — 36415 COLL VENOUS BLD VENIPUNCTURE: CPT

## 2020-05-12 PROCEDURE — 85027 COMPLETE CBC AUTOMATED: CPT

## 2020-05-12 PROCEDURE — 80048 BASIC METABOLIC PNL TOTAL CA: CPT

## 2020-05-19 ENCOUNTER — HOSPITAL ENCOUNTER (OUTPATIENT)
Age: 75
Setting detail: SPECIMEN
Discharge: HOME | End: 2020-05-19
Payer: MEDICARE

## 2020-05-19 LAB
ANION GAP SERPL CALCULATED.3IONS-SCNC: 14 MMOL/L (ref 4–16)
BUN BLDV-MCNC: 27 MG/DL (ref 6–23)
CALCIUM SERPL-MCNC: 9.6 MG/DL (ref 8.3–10.6)
CHLORIDE BLD-SCNC: 99 MMOL/L (ref 99–110)
CO2: 26 MMOL/L (ref 21–32)
CREAT SERPL-MCNC: 1.5 MG/DL (ref 0.6–1.1)
GFR AFRICAN AMERICAN: 41 ML/MIN/1.73M2
GFR NON-AFRICAN AMERICAN: 34 ML/MIN/1.73M2
GLUCOSE BLD-MCNC: 116 MG/DL (ref 70–99)
HCT VFR BLD CALC: 41.1 % (ref 37–47)
HEMOGLOBIN: 12.6 GM/DL (ref 12.5–16)
MCH RBC QN AUTO: 27 PG (ref 27–31)
MCHC RBC AUTO-ENTMCNC: 30.7 % (ref 32–36)
MCV RBC AUTO: 88.2 FL (ref 78–100)
PDW BLD-RTO: 14.4 % (ref 11.7–14.9)
PLATELET # BLD: 451 K/CU MM (ref 140–440)
PMV BLD AUTO: 10.5 FL (ref 7.5–11.1)
POTASSIUM SERPL-SCNC: 4.3 MMOL/L (ref 3.5–5.1)
RBC # BLD: 4.66 M/CU MM (ref 4.2–5.4)
SODIUM BLD-SCNC: 139 MMOL/L (ref 135–145)
WBC # BLD: 11.1 K/CU MM (ref 4–10.5)

## 2020-05-19 PROCEDURE — 85027 COMPLETE CBC AUTOMATED: CPT

## 2020-05-19 PROCEDURE — 36415 COLL VENOUS BLD VENIPUNCTURE: CPT

## 2020-05-19 PROCEDURE — 80048 BASIC METABOLIC PNL TOTAL CA: CPT

## 2020-06-23 ENCOUNTER — HOSPITAL ENCOUNTER (OUTPATIENT)
Age: 75
Setting detail: SPECIMEN
Discharge: HOME | End: 2020-06-23
Payer: MEDICARE

## 2020-06-23 LAB
ANION GAP SERPL CALCULATED.3IONS-SCNC: 9 MMOL/L (ref 4–16)
BUN BLDV-MCNC: 24 MG/DL (ref 6–23)
CALCIUM SERPL-MCNC: 9.4 MG/DL (ref 8.3–10.6)
CHLORIDE BLD-SCNC: 100 MMOL/L (ref 99–110)
CO2: 29 MMOL/L (ref 21–32)
CREAT SERPL-MCNC: 1.2 MG/DL (ref 0.6–1.1)
ESTIMATED AVERAGE GLUCOSE: 140 MG/DL
GFR AFRICAN AMERICAN: 53 ML/MIN/1.73M2
GFR NON-AFRICAN AMERICAN: 44 ML/MIN/1.73M2
GLUCOSE BLD-MCNC: 81 MG/DL (ref 70–99)
HBA1C MFR BLD: 6.5 % (ref 4.2–6.3)
HCT VFR BLD CALC: 35.8 % (ref 37–47)
HEMOGLOBIN: 11.2 GM/DL (ref 12.5–16)
MCH RBC QN AUTO: 27.1 PG (ref 27–31)
MCHC RBC AUTO-ENTMCNC: 31.3 % (ref 32–36)
MCV RBC AUTO: 86.7 FL (ref 78–100)
PDW BLD-RTO: 15.3 % (ref 11.7–14.9)
PLATELET # BLD: 313 K/CU MM (ref 140–440)
PMV BLD AUTO: 10.5 FL (ref 7.5–11.1)
POTASSIUM SERPL-SCNC: 4 MMOL/L (ref 3.5–5.1)
RBC # BLD: 4.13 M/CU MM (ref 4.2–5.4)
SODIUM BLD-SCNC: 138 MMOL/L (ref 135–145)
WBC # BLD: 7.8 K/CU MM (ref 4–10.5)

## 2020-06-23 PROCEDURE — 36415 COLL VENOUS BLD VENIPUNCTURE: CPT

## 2020-06-23 PROCEDURE — 80048 BASIC METABOLIC PNL TOTAL CA: CPT

## 2020-06-23 PROCEDURE — 85027 COMPLETE CBC AUTOMATED: CPT

## 2020-06-23 PROCEDURE — 83036 HEMOGLOBIN GLYCOSYLATED A1C: CPT

## 2020-08-13 ENCOUNTER — HOSPITAL ENCOUNTER (OUTPATIENT)
Age: 75
Setting detail: SPECIMEN
Discharge: HOME | End: 2020-08-13
Payer: MEDICARE

## 2020-08-13 PROCEDURE — U0002 COVID-19 LAB TEST NON-CDC: HCPCS

## 2020-08-14 LAB
SARS-COV-2: NOT DETECTED
SOURCE: NORMAL

## 2020-09-22 ENCOUNTER — HOSPITAL ENCOUNTER (OUTPATIENT)
Age: 75
Setting detail: SPECIMEN
Discharge: HOME | End: 2020-09-22
Payer: MEDICARE

## 2020-09-22 LAB
ANION GAP SERPL CALCULATED.3IONS-SCNC: 13 MMOL/L (ref 4–16)
BUN BLDV-MCNC: 26 MG/DL (ref 6–23)
CALCIUM SERPL-MCNC: 9.4 MG/DL (ref 8.3–10.6)
CHLORIDE BLD-SCNC: 95 MMOL/L (ref 99–110)
CO2: 30 MMOL/L (ref 21–32)
CREAT SERPL-MCNC: 1.3 MG/DL (ref 0.6–1.1)
ESTIMATED AVERAGE GLUCOSE: 114 MG/DL
GFR AFRICAN AMERICAN: 49 ML/MIN/1.73M2
GFR NON-AFRICAN AMERICAN: 40 ML/MIN/1.73M2
GLUCOSE BLD-MCNC: 109 MG/DL (ref 70–99)
HBA1C MFR BLD: 5.6 % (ref 4.2–6.3)
HCT VFR BLD CALC: 37.5 % (ref 37–47)
HEMOGLOBIN: 12 GM/DL (ref 12.5–16)
MCH RBC QN AUTO: 28 PG (ref 27–31)
MCHC RBC AUTO-ENTMCNC: 32 % (ref 32–36)
MCV RBC AUTO: 87.4 FL (ref 78–100)
PDW BLD-RTO: 14.1 % (ref 11.7–14.9)
PLATELET # BLD: 328 K/CU MM (ref 140–440)
PMV BLD AUTO: 10.2 FL (ref 7.5–11.1)
POTASSIUM SERPL-SCNC: 3.5 MMOL/L (ref 3.5–5.1)
RBC # BLD: 4.29 M/CU MM (ref 4.2–5.4)
SODIUM BLD-SCNC: 138 MMOL/L (ref 135–145)
WBC # BLD: 7.3 K/CU MM (ref 4–10.5)

## 2020-09-22 PROCEDURE — 83036 HEMOGLOBIN GLYCOSYLATED A1C: CPT

## 2020-09-22 PROCEDURE — 85027 COMPLETE CBC AUTOMATED: CPT

## 2020-09-22 PROCEDURE — 36415 COLL VENOUS BLD VENIPUNCTURE: CPT

## 2020-09-22 PROCEDURE — 80048 BASIC METABOLIC PNL TOTAL CA: CPT

## 2020-09-30 ENCOUNTER — HOSPITAL ENCOUNTER (OUTPATIENT)
Age: 75
Setting detail: SPECIMEN
Discharge: HOME | End: 2020-09-30
Payer: MEDICARE

## 2020-09-30 PROCEDURE — 81001 URINALYSIS AUTO W/SCOPE: CPT

## 2020-09-30 PROCEDURE — 87086 URINE CULTURE/COLONY COUNT: CPT

## 2020-10-01 LAB
BACTERIA: NEGATIVE /HPF
BILIRUBIN URINE: NEGATIVE MG/DL
BLOOD, URINE: ABNORMAL
CLARITY: ABNORMAL
COLOR: YELLOW
GLUCOSE, URINE: NEGATIVE MG/DL
HYALINE CASTS: 5 /LPF
KETONES, URINE: NEGATIVE MG/DL
LEUKOCYTE ESTERASE, URINE: ABNORMAL
NITRITE URINE, QUANTITATIVE: NEGATIVE
PH, URINE: 5 (ref 5–8)
PROTEIN UA: 100 MG/DL
RBC URINE: 9 /HPF (ref 0–6)
SPECIFIC GRAVITY UA: 1.01 (ref 1–1.03)
TRANSITIONAL EPITHELIAL: 4 /HPF
TRICHOMONAS: ABNORMAL /HPF
UROBILINOGEN, URINE: NORMAL MG/DL (ref 0.2–1)
WBC CLUMP: ABNORMAL /HPF
WBC UA: 739 /HPF (ref 0–5)

## 2020-10-02 LAB
CULTURE: NORMAL
Lab: NORMAL
SPECIMEN: NORMAL

## 2020-10-05 ENCOUNTER — HOSPITAL ENCOUNTER (OUTPATIENT)
Age: 75
Setting detail: SPECIMEN
Discharge: HOME | End: 2020-10-05
Payer: MEDICARE

## 2020-10-05 PROCEDURE — 87086 URINE CULTURE/COLONY COUNT: CPT

## 2020-10-06 LAB
CULTURE: NORMAL
Lab: NORMAL
SPECIMEN: NORMAL

## 2020-10-07 ENCOUNTER — HOSPITAL ENCOUNTER (OUTPATIENT)
Age: 75
Setting detail: SPECIMEN
Discharge: HOME | End: 2020-10-07
Payer: MEDICARE

## 2020-10-07 LAB
ALBUMIN SERPL-MCNC: 4.3 GM/DL (ref 3.4–5)
ALP BLD-CCNC: 85 IU/L (ref 40–128)
ALT SERPL-CCNC: 16 U/L (ref 10–40)
AMMONIA: 26 UMOL/L (ref 11–51)
ANION GAP SERPL CALCULATED.3IONS-SCNC: 13 MMOL/L (ref 4–16)
AST SERPL-CCNC: 24 IU/L (ref 15–37)
BILIRUB SERPL-MCNC: 0.5 MG/DL (ref 0–1)
BUN BLDV-MCNC: 17 MG/DL (ref 6–23)
CALCIUM SERPL-MCNC: 9.6 MG/DL (ref 8.3–10.6)
CHLORIDE BLD-SCNC: 86 MMOL/L (ref 99–110)
CO2: 29 MMOL/L (ref 21–32)
CREAT SERPL-MCNC: 1 MG/DL (ref 0.6–1.1)
GFR AFRICAN AMERICAN: >60 ML/MIN/1.73M2
GFR NON-AFRICAN AMERICAN: 54 ML/MIN/1.73M2
GLUCOSE BLD-MCNC: 143 MG/DL (ref 70–99)
HCT VFR BLD CALC: 36.7 % (ref 37–47)
HEMOGLOBIN: 12.3 GM/DL (ref 12.5–16)
MCH RBC QN AUTO: 27.8 PG (ref 27–31)
MCHC RBC AUTO-ENTMCNC: 33.5 % (ref 32–36)
MCV RBC AUTO: 83 FL (ref 78–100)
PDW BLD-RTO: 13.6 % (ref 11.7–14.9)
PLATELET # BLD: 373 K/CU MM (ref 140–440)
PMV BLD AUTO: 9.5 FL (ref 7.5–11.1)
POTASSIUM SERPL-SCNC: 3.1 MMOL/L (ref 3.5–5.1)
RBC # BLD: 4.42 M/CU MM (ref 4.2–5.4)
SODIUM BLD-SCNC: 128 MMOL/L (ref 135–145)
TOTAL PROTEIN: 6.8 GM/DL (ref 6.4–8.2)
URIC ACID: 6.4 MG/DL (ref 2.6–6)
WBC # BLD: 7.5 K/CU MM (ref 4–10.5)

## 2020-10-07 PROCEDURE — 85027 COMPLETE CBC AUTOMATED: CPT

## 2020-10-07 PROCEDURE — 36415 COLL VENOUS BLD VENIPUNCTURE: CPT

## 2020-10-07 PROCEDURE — 84550 ASSAY OF BLOOD/URIC ACID: CPT

## 2020-10-07 PROCEDURE — 80053 COMPREHEN METABOLIC PANEL: CPT

## 2020-10-07 PROCEDURE — 82140 ASSAY OF AMMONIA: CPT

## 2020-10-09 ENCOUNTER — HOSPITAL ENCOUNTER (OUTPATIENT)
Age: 75
Setting detail: SPECIMEN
Discharge: HOME | End: 2020-10-09
Payer: MEDICARE

## 2020-10-09 LAB
ANION GAP SERPL CALCULATED.3IONS-SCNC: 13 MMOL/L (ref 4–16)
BUN BLDV-MCNC: 14 MG/DL (ref 6–23)
CALCIUM SERPL-MCNC: 8.4 MG/DL (ref 8.3–10.6)
CHLORIDE BLD-SCNC: 89 MMOL/L (ref 99–110)
CO2: 27 MMOL/L (ref 21–32)
CREAT SERPL-MCNC: 1 MG/DL (ref 0.6–1.1)
GFR AFRICAN AMERICAN: >60 ML/MIN/1.73M2
GFR NON-AFRICAN AMERICAN: 54 ML/MIN/1.73M2
GLUCOSE BLD-MCNC: 96 MG/DL (ref 70–99)
POTASSIUM SERPL-SCNC: 3 MMOL/L (ref 3.5–5.1)
SODIUM BLD-SCNC: 129 MMOL/L (ref 135–145)
VITAMIN D 25-HYDROXY: 42.75 NG/ML

## 2020-10-09 PROCEDURE — 36415 COLL VENOUS BLD VENIPUNCTURE: CPT

## 2020-10-09 PROCEDURE — 82306 VITAMIN D 25 HYDROXY: CPT

## 2020-10-09 PROCEDURE — 80048 BASIC METABOLIC PNL TOTAL CA: CPT

## 2020-10-14 ENCOUNTER — HOSPITAL ENCOUNTER (OUTPATIENT)
Age: 75
Setting detail: SPECIMEN
Discharge: HOME | End: 2020-10-14
Payer: MEDICARE

## 2020-10-14 LAB
ANION GAP SERPL CALCULATED.3IONS-SCNC: 10 MMOL/L (ref 4–16)
BUN BLDV-MCNC: 17 MG/DL (ref 6–23)
CALCIUM SERPL-MCNC: 9 MG/DL (ref 8.3–10.6)
CHLORIDE BLD-SCNC: 96 MMOL/L (ref 99–110)
CO2: 30 MMOL/L (ref 21–32)
CREAT SERPL-MCNC: 1.1 MG/DL (ref 0.6–1.1)
GFR AFRICAN AMERICAN: 59 ML/MIN/1.73M2
GFR NON-AFRICAN AMERICAN: 49 ML/MIN/1.73M2
GLUCOSE BLD-MCNC: 75 MG/DL (ref 70–99)
POTASSIUM SERPL-SCNC: 3.9 MMOL/L (ref 3.5–5.1)
SODIUM BLD-SCNC: 136 MMOL/L (ref 135–145)

## 2020-10-14 PROCEDURE — 36415 COLL VENOUS BLD VENIPUNCTURE: CPT

## 2020-10-14 PROCEDURE — 80048 BASIC METABOLIC PNL TOTAL CA: CPT

## 2020-10-21 ENCOUNTER — HOSPITAL ENCOUNTER (OUTPATIENT)
Age: 75
Setting detail: SPECIMEN
Discharge: HOME | End: 2020-10-21
Payer: MEDICARE

## 2020-10-21 LAB
ANION GAP SERPL CALCULATED.3IONS-SCNC: 12 MMOL/L (ref 4–16)
BUN BLDV-MCNC: 25 MG/DL (ref 6–23)
CALCIUM SERPL-MCNC: 9 MG/DL (ref 8.3–10.6)
CHLORIDE BLD-SCNC: 104 MMOL/L (ref 99–110)
CO2: 23 MMOL/L (ref 21–32)
CREAT SERPL-MCNC: 1.1 MG/DL (ref 0.6–1.1)
GFR AFRICAN AMERICAN: 59 ML/MIN/1.73M2
GFR NON-AFRICAN AMERICAN: 49 ML/MIN/1.73M2
GLUCOSE BLD-MCNC: 82 MG/DL (ref 70–99)
POTASSIUM SERPL-SCNC: 4.5 MMOL/L (ref 3.5–5.1)
SODIUM BLD-SCNC: 139 MMOL/L (ref 135–145)

## 2020-10-21 PROCEDURE — 80048 BASIC METABOLIC PNL TOTAL CA: CPT

## 2020-10-21 PROCEDURE — 36415 COLL VENOUS BLD VENIPUNCTURE: CPT

## 2020-10-21 PROCEDURE — 81001 URINALYSIS AUTO W/SCOPE: CPT

## 2020-10-27 ENCOUNTER — HOSPITAL ENCOUNTER (OUTPATIENT)
Age: 75
Setting detail: SPECIMEN
Discharge: HOME | End: 2020-10-27
Payer: MEDICARE

## 2020-10-27 LAB
ANION GAP SERPL CALCULATED.3IONS-SCNC: 14 MMOL/L (ref 4–16)
BUN BLDV-MCNC: 31 MG/DL (ref 6–23)
CALCIUM SERPL-MCNC: 9 MG/DL (ref 8.3–10.6)
CHLORIDE BLD-SCNC: 98 MMOL/L (ref 99–110)
CO2: 23 MMOL/L (ref 21–32)
CREAT SERPL-MCNC: 1 MG/DL (ref 0.6–1.1)
GFR AFRICAN AMERICAN: >60 ML/MIN/1.73M2
GFR NON-AFRICAN AMERICAN: 54 ML/MIN/1.73M2
GLUCOSE BLD-MCNC: 186 MG/DL (ref 70–99)
POTASSIUM SERPL-SCNC: 4.7 MMOL/L (ref 3.5–5.1)
SODIUM BLD-SCNC: 135 MMOL/L (ref 135–145)

## 2020-10-27 PROCEDURE — 80048 BASIC METABOLIC PNL TOTAL CA: CPT

## 2020-10-27 PROCEDURE — 36415 COLL VENOUS BLD VENIPUNCTURE: CPT

## 2020-10-30 ENCOUNTER — HOSPITAL ENCOUNTER (OUTPATIENT)
Age: 75
Setting detail: SPECIMEN
Discharge: HOME | End: 2020-10-30
Payer: MEDICARE

## 2020-10-30 LAB
ANION GAP SERPL CALCULATED.3IONS-SCNC: 11 MMOL/L (ref 4–16)
BUN BLDV-MCNC: 23 MG/DL (ref 6–23)
CALCIUM SERPL-MCNC: 9.5 MG/DL (ref 8.3–10.6)
CHLORIDE BLD-SCNC: 103 MMOL/L (ref 99–110)
CO2: 28 MMOL/L (ref 21–32)
CREAT SERPL-MCNC: 1 MG/DL (ref 0.6–1.1)
GFR AFRICAN AMERICAN: >60 ML/MIN/1.73M2
GFR NON-AFRICAN AMERICAN: 54 ML/MIN/1.73M2
GLUCOSE BLD-MCNC: 121 MG/DL (ref 70–99)
POTASSIUM SERPL-SCNC: 4.6 MMOL/L (ref 3.5–5.1)
SODIUM BLD-SCNC: 142 MMOL/L (ref 135–145)

## 2020-10-30 PROCEDURE — 80048 BASIC METABOLIC PNL TOTAL CA: CPT

## 2020-10-30 PROCEDURE — 36415 COLL VENOUS BLD VENIPUNCTURE: CPT

## 2020-11-03 ENCOUNTER — HOSPITAL ENCOUNTER (OUTPATIENT)
Age: 75
Setting detail: SPECIMEN
Discharge: HOME | End: 2020-11-03
Payer: MEDICARE

## 2020-11-06 ENCOUNTER — HOSPITAL ENCOUNTER (OUTPATIENT)
Age: 75
Setting detail: SPECIMEN
Discharge: HOME | End: 2020-11-06
Payer: MEDICARE

## 2020-11-06 LAB
ANION GAP SERPL CALCULATED.3IONS-SCNC: 12 MMOL/L (ref 4–16)
BUN BLDV-MCNC: 21 MG/DL (ref 6–23)
CALCIUM SERPL-MCNC: 9.5 MG/DL (ref 8.3–10.6)
CHLORIDE BLD-SCNC: 100 MMOL/L (ref 99–110)
CO2: 29 MMOL/L (ref 21–32)
CREAT SERPL-MCNC: 1.2 MG/DL (ref 0.6–1.1)
GFR AFRICAN AMERICAN: 53 ML/MIN/1.73M2
GFR NON-AFRICAN AMERICAN: 44 ML/MIN/1.73M2
GLUCOSE BLD-MCNC: 89 MG/DL (ref 70–99)
POTASSIUM SERPL-SCNC: 3.9 MMOL/L (ref 3.5–5.1)
SODIUM BLD-SCNC: 141 MMOL/L (ref 135–145)

## 2020-11-06 PROCEDURE — 36415 COLL VENOUS BLD VENIPUNCTURE: CPT

## 2020-11-06 PROCEDURE — 80048 BASIC METABOLIC PNL TOTAL CA: CPT

## 2020-11-10 ENCOUNTER — HOSPITAL ENCOUNTER (OUTPATIENT)
Age: 75
Setting detail: SPECIMEN
Discharge: HOME | End: 2020-11-10
Payer: MEDICARE

## 2020-11-10 LAB
ANION GAP SERPL CALCULATED.3IONS-SCNC: 11 MMOL/L (ref 4–16)
BUN BLDV-MCNC: 29 MG/DL (ref 6–23)
CALCIUM SERPL-MCNC: 9.3 MG/DL (ref 8.3–10.6)
CHLORIDE BLD-SCNC: 105 MMOL/L (ref 99–110)
CO2: 28 MMOL/L (ref 21–32)
CREAT SERPL-MCNC: 1 MG/DL (ref 0.6–1.1)
GFR AFRICAN AMERICAN: >60 ML/MIN/1.73M2
GFR NON-AFRICAN AMERICAN: 54 ML/MIN/1.73M2
GLUCOSE BLD-MCNC: 117 MG/DL (ref 70–99)
POTASSIUM SERPL-SCNC: 4.2 MMOL/L (ref 3.5–5.1)
SODIUM BLD-SCNC: 144 MMOL/L (ref 135–145)

## 2020-11-10 PROCEDURE — 80048 BASIC METABOLIC PNL TOTAL CA: CPT

## 2020-11-10 PROCEDURE — 36415 COLL VENOUS BLD VENIPUNCTURE: CPT

## 2020-11-13 ENCOUNTER — HOSPITAL ENCOUNTER (OUTPATIENT)
Age: 75
Setting detail: SPECIMEN
Discharge: HOME | End: 2020-11-13
Payer: MEDICARE

## 2020-11-17 ENCOUNTER — HOSPITAL ENCOUNTER (OUTPATIENT)
Age: 75
Setting detail: SPECIMEN
Discharge: HOME | End: 2020-11-17
Payer: MEDICARE

## 2020-11-17 LAB
ANION GAP SERPL CALCULATED.3IONS-SCNC: 17 MMOL/L (ref 4–16)
BUN BLDV-MCNC: 26 MG/DL (ref 6–23)
CALCIUM SERPL-MCNC: 9 MG/DL (ref 8.3–10.6)
CHLORIDE BLD-SCNC: 101 MMOL/L (ref 99–110)
CO2: 21 MMOL/L (ref 21–32)
CREAT SERPL-MCNC: 1 MG/DL (ref 0.6–1.1)
GFR AFRICAN AMERICAN: >60 ML/MIN/1.73M2
GFR NON-AFRICAN AMERICAN: 54 ML/MIN/1.73M2
GLUCOSE BLD-MCNC: 118 MG/DL (ref 70–99)
POTASSIUM SERPL-SCNC: 5.1 MMOL/L (ref 3.5–5.1)
SODIUM BLD-SCNC: 139 MMOL/L (ref 135–145)

## 2020-11-17 PROCEDURE — 80048 BASIC METABOLIC PNL TOTAL CA: CPT

## 2020-11-17 PROCEDURE — 36415 COLL VENOUS BLD VENIPUNCTURE: CPT

## 2020-11-24 ENCOUNTER — HOSPITAL ENCOUNTER (OUTPATIENT)
Age: 75
Setting detail: SPECIMEN
Discharge: HOME | End: 2020-11-24
Payer: MEDICARE

## 2020-11-24 LAB
ANION GAP SERPL CALCULATED.3IONS-SCNC: 9 MMOL/L (ref 4–16)
BUN BLDV-MCNC: 30 MG/DL (ref 6–23)
CALCIUM SERPL-MCNC: 9.3 MG/DL (ref 8.3–10.6)
CHLORIDE BLD-SCNC: 99 MMOL/L (ref 99–110)
CO2: 28 MMOL/L (ref 21–32)
CREAT SERPL-MCNC: 1.2 MG/DL (ref 0.6–1.1)
GFR AFRICAN AMERICAN: 53 ML/MIN/1.73M2
GFR NON-AFRICAN AMERICAN: 44 ML/MIN/1.73M2
GLUCOSE BLD-MCNC: 107 MG/DL (ref 70–99)
POTASSIUM SERPL-SCNC: 4.1 MMOL/L (ref 3.5–5.1)
SODIUM BLD-SCNC: 136 MMOL/L (ref 135–145)

## 2020-11-24 PROCEDURE — 36415 COLL VENOUS BLD VENIPUNCTURE: CPT

## 2020-11-24 PROCEDURE — 80048 BASIC METABOLIC PNL TOTAL CA: CPT

## 2020-11-27 ENCOUNTER — HOSPITAL ENCOUNTER (OUTPATIENT)
Age: 75
Setting detail: SPECIMEN
Discharge: HOME | End: 2020-11-27
Payer: MEDICARE

## 2020-11-27 PROCEDURE — 80048 BASIC METABOLIC PNL TOTAL CA: CPT

## 2020-12-01 ENCOUNTER — HOSPITAL ENCOUNTER (OUTPATIENT)
Age: 75
Setting detail: SPECIMEN
Discharge: HOME | End: 2020-12-01
Payer: MEDICARE

## 2020-12-01 LAB
ALBUMIN SERPL-MCNC: 4.1 GM/DL (ref 3.4–5)
ALP BLD-CCNC: 92 IU/L (ref 40–128)
ALT SERPL-CCNC: 13 U/L (ref 10–40)
ANION GAP SERPL CALCULATED.3IONS-SCNC: 13 MMOL/L (ref 4–16)
AST SERPL-CCNC: 20 IU/L (ref 15–37)
BILIRUB SERPL-MCNC: 0.3 MG/DL (ref 0–1)
BUN BLDV-MCNC: 30 MG/DL (ref 6–23)
CALCIUM SERPL-MCNC: 9.5 MG/DL (ref 8.3–10.6)
CHLORIDE BLD-SCNC: 98 MMOL/L (ref 99–110)
CO2: 27 MMOL/L (ref 21–32)
CREAT SERPL-MCNC: 1.2 MG/DL (ref 0.6–1.1)
GFR AFRICAN AMERICAN: 53 ML/MIN/1.73M2
GFR NON-AFRICAN AMERICAN: 44 ML/MIN/1.73M2
GLUCOSE BLD-MCNC: 188 MG/DL (ref 70–99)
POTASSIUM SERPL-SCNC: 4.5 MMOL/L (ref 3.5–5.1)
SODIUM BLD-SCNC: 138 MMOL/L (ref 135–145)
TOTAL PROTEIN: 6.5 GM/DL (ref 6.4–8.2)

## 2020-12-01 PROCEDURE — 82040 ASSAY OF SERUM ALBUMIN: CPT

## 2020-12-01 PROCEDURE — 82247 BILIRUBIN TOTAL: CPT

## 2020-12-01 PROCEDURE — 84075 ASSAY ALKALINE PHOSPHATASE: CPT

## 2020-12-01 PROCEDURE — 84155 ASSAY OF PROTEIN SERUM: CPT

## 2020-12-01 PROCEDURE — 84460 ALANINE AMINO (ALT) (SGPT): CPT

## 2020-12-01 PROCEDURE — 36415 COLL VENOUS BLD VENIPUNCTURE: CPT

## 2020-12-01 PROCEDURE — 80048 BASIC METABOLIC PNL TOTAL CA: CPT

## 2020-12-01 PROCEDURE — 84450 TRANSFERASE (AST) (SGOT): CPT

## 2020-12-11 ENCOUNTER — HOSPITAL ENCOUNTER (OUTPATIENT)
Age: 75
Setting detail: SPECIMEN
Discharge: HOME | End: 2020-12-11
Payer: MEDICARE

## 2020-12-11 LAB
ANION GAP SERPL CALCULATED.3IONS-SCNC: 11 MMOL/L (ref 4–16)
BUN BLDV-MCNC: 28 MG/DL (ref 6–23)
CALCIUM SERPL-MCNC: 9.1 MG/DL (ref 8.3–10.6)
CHLORIDE BLD-SCNC: 101 MMOL/L (ref 99–110)
CO2: 27 MMOL/L (ref 21–32)
CREAT SERPL-MCNC: 1.1 MG/DL (ref 0.6–1.1)
GFR AFRICAN AMERICAN: 59 ML/MIN/1.73M2
GFR NON-AFRICAN AMERICAN: 49 ML/MIN/1.73M2
GLUCOSE BLD-MCNC: 83 MG/DL (ref 70–99)
POTASSIUM SERPL-SCNC: 4.6 MMOL/L (ref 3.5–5.1)
SODIUM BLD-SCNC: 139 MMOL/L (ref 135–145)

## 2020-12-11 PROCEDURE — 80048 BASIC METABOLIC PNL TOTAL CA: CPT

## 2020-12-11 PROCEDURE — 36415 COLL VENOUS BLD VENIPUNCTURE: CPT

## 2020-12-15 ENCOUNTER — HOSPITAL ENCOUNTER (OUTPATIENT)
Age: 75
Setting detail: SPECIMEN
Discharge: HOME | End: 2020-12-15
Payer: MEDICARE

## 2020-12-15 LAB
ANION GAP SERPL CALCULATED.3IONS-SCNC: 12 MMOL/L (ref 4–16)
BUN BLDV-MCNC: 28 MG/DL (ref 6–23)
CALCIUM SERPL-MCNC: 9.2 MG/DL (ref 8.3–10.6)
CHLORIDE BLD-SCNC: 101 MMOL/L (ref 99–110)
CO2: 27 MMOL/L (ref 21–32)
CREAT SERPL-MCNC: 1.2 MG/DL (ref 0.6–1.1)
GFR AFRICAN AMERICAN: 53 ML/MIN/1.73M2
GFR NON-AFRICAN AMERICAN: 44 ML/MIN/1.73M2
GLUCOSE BLD-MCNC: 101 MG/DL (ref 70–99)
POTASSIUM SERPL-SCNC: 4.6 MMOL/L (ref 3.5–5.1)
SODIUM BLD-SCNC: 140 MMOL/L (ref 135–145)

## 2020-12-15 PROCEDURE — 80048 BASIC METABOLIC PNL TOTAL CA: CPT

## 2020-12-15 PROCEDURE — 36415 COLL VENOUS BLD VENIPUNCTURE: CPT

## 2020-12-22 ENCOUNTER — HOSPITAL ENCOUNTER (OUTPATIENT)
Age: 75
Setting detail: SPECIMEN
Discharge: HOME | End: 2020-12-22
Payer: MEDICARE

## 2020-12-22 LAB
ALBUMIN SERPL-MCNC: 3.8 GM/DL (ref 3.4–5)
ALP BLD-CCNC: 103 IU/L (ref 40–129)
ALT SERPL-CCNC: 11 U/L (ref 10–40)
ANION GAP SERPL CALCULATED.3IONS-SCNC: 11 MMOL/L (ref 4–16)
AST SERPL-CCNC: 19 IU/L (ref 15–37)
BILIRUB SERPL-MCNC: 0.3 MG/DL (ref 0–1)
BUN BLDV-MCNC: 31 MG/DL (ref 6–23)
CALCIUM SERPL-MCNC: 9.1 MG/DL (ref 8.3–10.6)
CHLORIDE BLD-SCNC: 100 MMOL/L (ref 99–110)
CO2: 26 MMOL/L (ref 21–32)
CREAT SERPL-MCNC: 1.2 MG/DL (ref 0.6–1.1)
ESTIMATED AVERAGE GLUCOSE: 105 MG/DL
GFR AFRICAN AMERICAN: 53 ML/MIN/1.73M2
GFR NON-AFRICAN AMERICAN: 44 ML/MIN/1.73M2
GLUCOSE BLD-MCNC: 121 MG/DL (ref 70–99)
HBA1C MFR BLD: 5.3 % (ref 4.2–6.3)
HCT VFR BLD CALC: 38.1 % (ref 37–47)
HEMOGLOBIN: 11.9 GM/DL (ref 12.5–16)
MCH RBC QN AUTO: 28.1 PG (ref 27–31)
MCHC RBC AUTO-ENTMCNC: 31.2 % (ref 32–36)
MCV RBC AUTO: 89.9 FL (ref 78–100)
PDW BLD-RTO: 16.4 % (ref 11.7–14.9)
PLATELET # BLD: 343 K/CU MM (ref 140–440)
PMV BLD AUTO: 10.3 FL (ref 7.5–11.1)
POTASSIUM SERPL-SCNC: 4.1 MMOL/L (ref 3.5–5.1)
RBC # BLD: 4.24 M/CU MM (ref 4.2–5.4)
SODIUM BLD-SCNC: 137 MMOL/L (ref 135–145)
TOTAL PROTEIN: 6.5 GM/DL (ref 6.4–8.2)
WBC # BLD: 6.5 K/CU MM (ref 4–10.5)

## 2020-12-22 PROCEDURE — 83036 HEMOGLOBIN GLYCOSYLATED A1C: CPT

## 2020-12-22 PROCEDURE — 85027 COMPLETE CBC AUTOMATED: CPT

## 2020-12-22 PROCEDURE — 36415 COLL VENOUS BLD VENIPUNCTURE: CPT

## 2020-12-22 PROCEDURE — 80053 COMPREHEN METABOLIC PANEL: CPT

## 2020-12-29 ENCOUNTER — HOSPITAL ENCOUNTER (OUTPATIENT)
Age: 75
Setting detail: SPECIMEN
Discharge: HOME | End: 2020-12-29
Payer: MEDICARE

## 2020-12-29 LAB
ANION GAP SERPL CALCULATED.3IONS-SCNC: 11 MMOL/L (ref 4–16)
BUN BLDV-MCNC: 32 MG/DL (ref 6–23)
CALCIUM SERPL-MCNC: 8.9 MG/DL (ref 8.3–10.6)
CHLORIDE BLD-SCNC: 99 MMOL/L (ref 99–110)
CO2: 28 MMOL/L (ref 21–32)
CREAT SERPL-MCNC: 1.3 MG/DL (ref 0.6–1.1)
GFR AFRICAN AMERICAN: 49 ML/MIN/1.73M2
GFR NON-AFRICAN AMERICAN: 40 ML/MIN/1.73M2
GLUCOSE BLD-MCNC: 93 MG/DL (ref 70–99)
POTASSIUM SERPL-SCNC: 4.4 MMOL/L (ref 3.5–5.1)
SODIUM BLD-SCNC: 138 MMOL/L (ref 135–145)

## 2020-12-29 PROCEDURE — 36415 COLL VENOUS BLD VENIPUNCTURE: CPT

## 2020-12-29 PROCEDURE — 80048 BASIC METABOLIC PNL TOTAL CA: CPT

## 2021-01-05 ENCOUNTER — HOSPITAL ENCOUNTER (OUTPATIENT)
Age: 76
Setting detail: SPECIMEN
Discharge: HOME | End: 2021-01-05
Payer: MEDICARE

## 2021-01-05 LAB
ANION GAP SERPL CALCULATED.3IONS-SCNC: 12 MMOL/L (ref 4–16)
BUN BLDV-MCNC: 28 MG/DL (ref 6–23)
CALCIUM SERPL-MCNC: 9.1 MG/DL (ref 8.3–10.6)
CHLORIDE BLD-SCNC: 97 MMOL/L (ref 99–110)
CO2: 25 MMOL/L (ref 21–32)
CREAT SERPL-MCNC: 1.1 MG/DL (ref 0.6–1.1)
GFR AFRICAN AMERICAN: 59 ML/MIN/1.73M2
GFR NON-AFRICAN AMERICAN: 49 ML/MIN/1.73M2
GLUCOSE BLD-MCNC: 142 MG/DL (ref 70–99)
POTASSIUM SERPL-SCNC: 4.2 MMOL/L (ref 3.5–5.1)
SODIUM BLD-SCNC: 134 MMOL/L (ref 135–145)

## 2021-01-05 PROCEDURE — 80048 BASIC METABOLIC PNL TOTAL CA: CPT

## 2021-01-05 PROCEDURE — 36415 COLL VENOUS BLD VENIPUNCTURE: CPT

## 2021-01-12 ENCOUNTER — HOSPITAL ENCOUNTER (OUTPATIENT)
Age: 76
Setting detail: SPECIMEN
Discharge: HOME | End: 2021-01-12
Payer: MEDICARE

## 2021-01-12 LAB
ANION GAP SERPL CALCULATED.3IONS-SCNC: 13 MMOL/L (ref 4–16)
BUN BLDV-MCNC: 33 MG/DL (ref 6–23)
CALCIUM SERPL-MCNC: 9.1 MG/DL (ref 8.3–10.6)
CHLORIDE BLD-SCNC: 102 MMOL/L (ref 99–110)
CO2: 26 MMOL/L (ref 21–32)
CREAT SERPL-MCNC: 1.2 MG/DL (ref 0.6–1.1)
GFR AFRICAN AMERICAN: 53 ML/MIN/1.73M2
GFR NON-AFRICAN AMERICAN: 44 ML/MIN/1.73M2
GLUCOSE BLD-MCNC: 107 MG/DL (ref 70–99)
POTASSIUM SERPL-SCNC: 4.6 MMOL/L (ref 3.5–5.1)
SODIUM BLD-SCNC: 141 MMOL/L (ref 135–145)

## 2021-01-12 PROCEDURE — 80048 BASIC METABOLIC PNL TOTAL CA: CPT

## 2021-01-12 PROCEDURE — 36415 COLL VENOUS BLD VENIPUNCTURE: CPT

## 2021-01-19 ENCOUNTER — HOSPITAL ENCOUNTER (OUTPATIENT)
Age: 76
Setting detail: SPECIMEN
Discharge: HOME | End: 2021-01-19
Payer: MEDICARE

## 2021-02-23 ENCOUNTER — HOSPITAL ENCOUNTER (OUTPATIENT)
Age: 76
Setting detail: SPECIMEN
Discharge: HOME | End: 2021-02-23
Payer: MEDICARE

## 2021-02-23 LAB
ANION GAP SERPL CALCULATED.3IONS-SCNC: 7 MMOL/L (ref 4–16)
BUN BLDV-MCNC: 25 MG/DL (ref 6–23)
CALCIUM SERPL-MCNC: 9.2 MG/DL (ref 8.3–10.6)
CHLORIDE BLD-SCNC: 102 MMOL/L (ref 99–110)
CO2: 31 MMOL/L (ref 21–32)
CREAT SERPL-MCNC: 1.2 MG/DL (ref 0.6–1.1)
GFR AFRICAN AMERICAN: 53 ML/MIN/1.73M2
GFR NON-AFRICAN AMERICAN: 44 ML/MIN/1.73M2
GLUCOSE BLD-MCNC: 95 MG/DL (ref 70–99)
POTASSIUM SERPL-SCNC: 4.2 MMOL/L (ref 3.5–5.1)
SODIUM BLD-SCNC: 140 MMOL/L (ref 135–145)

## 2021-02-23 PROCEDURE — 80048 BASIC METABOLIC PNL TOTAL CA: CPT

## 2021-02-23 PROCEDURE — 36415 COLL VENOUS BLD VENIPUNCTURE: CPT

## 2021-03-23 ENCOUNTER — HOSPITAL ENCOUNTER (OUTPATIENT)
Age: 76
Setting detail: SPECIMEN
Discharge: HOME | End: 2021-03-23
Payer: MEDICARE

## 2021-03-23 LAB
ANION GAP SERPL CALCULATED.3IONS-SCNC: 9 MMOL/L (ref 4–16)
BUN BLDV-MCNC: 29 MG/DL (ref 6–23)
CALCIUM SERPL-MCNC: 9.1 MG/DL (ref 8.3–10.6)
CHLORIDE BLD-SCNC: 99 MMOL/L (ref 99–110)
CO2: 28 MMOL/L (ref 21–32)
CREAT SERPL-MCNC: 1.3 MG/DL (ref 0.6–1.1)
ESTIMATED AVERAGE GLUCOSE: 117 MG/DL
GFR AFRICAN AMERICAN: 49 ML/MIN/1.73M2
GFR NON-AFRICAN AMERICAN: 40 ML/MIN/1.73M2
GLUCOSE BLD-MCNC: 127 MG/DL (ref 70–99)
HBA1C MFR BLD: 5.7 % (ref 4.2–6.3)
HCT VFR BLD CALC: 41.3 % (ref 37–47)
HEMOGLOBIN: 13.1 GM/DL (ref 12.5–16)
MCH RBC QN AUTO: 28.7 PG (ref 27–31)
MCHC RBC AUTO-ENTMCNC: 31.7 % (ref 32–36)
MCV RBC AUTO: 90.6 FL (ref 78–100)
PDW BLD-RTO: 15.2 % (ref 11.7–14.9)
PLATELET # BLD: 326 K/CU MM (ref 140–440)
PMV BLD AUTO: 10.1 FL (ref 7.5–11.1)
POTASSIUM SERPL-SCNC: 4.6 MMOL/L (ref 3.5–5.1)
RBC # BLD: 4.56 M/CU MM (ref 4.2–5.4)
SODIUM BLD-SCNC: 136 MMOL/L (ref 135–145)
WBC # BLD: 7 K/CU MM (ref 4–10.5)

## 2021-03-23 PROCEDURE — 85027 COMPLETE CBC AUTOMATED: CPT

## 2021-03-23 PROCEDURE — 83036 HEMOGLOBIN GLYCOSYLATED A1C: CPT

## 2021-03-23 PROCEDURE — 36415 COLL VENOUS BLD VENIPUNCTURE: CPT

## 2021-03-23 PROCEDURE — 80048 BASIC METABOLIC PNL TOTAL CA: CPT

## 2021-04-27 ENCOUNTER — HOSPITAL ENCOUNTER (OUTPATIENT)
Age: 76
Setting detail: SPECIMEN
Discharge: HOME | End: 2021-04-27
Payer: MEDICARE

## 2021-04-27 LAB
ANION GAP SERPL CALCULATED.3IONS-SCNC: 10 MMOL/L (ref 4–16)
BUN BLDV-MCNC: 34 MG/DL (ref 6–23)
CALCIUM SERPL-MCNC: 9.3 MG/DL (ref 8.3–10.6)
CHLORIDE BLD-SCNC: 101 MMOL/L (ref 99–110)
CO2: 28 MMOL/L (ref 21–32)
CREAT SERPL-MCNC: 1.2 MG/DL (ref 0.6–1.1)
GFR AFRICAN AMERICAN: 53 ML/MIN/1.73M2
GFR NON-AFRICAN AMERICAN: 44 ML/MIN/1.73M2
GLUCOSE BLD-MCNC: 106 MG/DL (ref 70–99)
POTASSIUM SERPL-SCNC: 4.3 MMOL/L (ref 3.5–5.1)
SODIUM BLD-SCNC: 139 MMOL/L (ref 135–145)

## 2021-04-27 PROCEDURE — 80048 BASIC METABOLIC PNL TOTAL CA: CPT

## 2021-04-27 PROCEDURE — 36415 COLL VENOUS BLD VENIPUNCTURE: CPT

## 2021-05-07 ENCOUNTER — HOSPITAL ENCOUNTER (OUTPATIENT)
Age: 76
Setting detail: SPECIMEN
Discharge: HOME | End: 2021-05-07
Payer: MEDICARE

## 2021-05-07 PROCEDURE — 87086 URINE CULTURE/COLONY COUNT: CPT

## 2021-05-07 PROCEDURE — 81001 URINALYSIS AUTO W/SCOPE: CPT

## 2021-05-08 LAB
BACTERIA: NEGATIVE /HPF
BILIRUBIN URINE: NEGATIVE MG/DL
BLOOD, URINE: ABNORMAL
CLARITY: ABNORMAL
COLOR: ABNORMAL
GLUCOSE, URINE: NEGATIVE MG/DL
HYALINE CASTS: 0 /LPF
KETONES, URINE: NEGATIVE MG/DL
LEUKOCYTE ESTERASE, URINE: ABNORMAL
NITRITE URINE, QUANTITATIVE: NEGATIVE
PH, URINE: 5 (ref 5–8)
PROTEIN UA: NEGATIVE MG/DL
RBC URINE: 5 /HPF (ref 0–6)
SPECIFIC GRAVITY UA: 1.01 (ref 1–1.03)
TRICHOMONAS: ABNORMAL /HPF
UROBILINOGEN, URINE: NEGATIVE MG/DL (ref 0.2–1)
WBC CLUMP: ABNORMAL /HPF
WBC UA: 351 /HPF (ref 0–5)

## 2021-05-09 LAB
CULTURE: NORMAL
Lab: NORMAL
SPECIMEN: NORMAL

## 2021-05-25 ENCOUNTER — HOSPITAL ENCOUNTER (OUTPATIENT)
Age: 76
Setting detail: SPECIMEN
Discharge: HOME | End: 2021-05-25
Payer: MEDICARE

## 2021-05-25 LAB
ANION GAP SERPL CALCULATED.3IONS-SCNC: 13 MMOL/L (ref 4–16)
BUN BLDV-MCNC: 30 MG/DL (ref 6–23)
CALCIUM SERPL-MCNC: 9.4 MG/DL (ref 8.3–10.6)
CHLORIDE BLD-SCNC: 101 MMOL/L (ref 99–110)
CO2: 26 MMOL/L (ref 21–32)
CREAT SERPL-MCNC: 1.2 MG/DL (ref 0.6–1.1)
GFR AFRICAN AMERICAN: 53 ML/MIN/1.73M2
GFR NON-AFRICAN AMERICAN: 44 ML/MIN/1.73M2
GLUCOSE BLD-MCNC: 95 MG/DL (ref 70–99)
POTASSIUM SERPL-SCNC: 4.4 MMOL/L (ref 3.5–5.1)
SODIUM BLD-SCNC: 140 MMOL/L (ref 135–145)

## 2021-05-25 PROCEDURE — 80048 BASIC METABOLIC PNL TOTAL CA: CPT

## 2021-05-25 PROCEDURE — 36415 COLL VENOUS BLD VENIPUNCTURE: CPT

## 2021-06-22 ENCOUNTER — HOSPITAL ENCOUNTER (OUTPATIENT)
Age: 76
Setting detail: SPECIMEN
Discharge: HOME | End: 2021-06-22
Payer: MEDICARE

## 2021-06-22 LAB
ANION GAP SERPL CALCULATED.3IONS-SCNC: 16 MMOL/L (ref 4–16)
BUN BLDV-MCNC: 33 MG/DL (ref 6–23)
CALCIUM SERPL-MCNC: 9.8 MG/DL (ref 8.3–10.6)
CHLORIDE BLD-SCNC: 101 MMOL/L (ref 99–110)
CO2: 24 MMOL/L (ref 21–32)
CREAT SERPL-MCNC: 1.2 MG/DL (ref 0.6–1.1)
ESTIMATED AVERAGE GLUCOSE: 120 MG/DL
GFR AFRICAN AMERICAN: 53 ML/MIN/1.73M2
GFR NON-AFRICAN AMERICAN: 44 ML/MIN/1.73M2
GLUCOSE BLD-MCNC: 104 MG/DL (ref 70–99)
HBA1C MFR BLD: 5.8 % (ref 4.2–6.3)
HCT VFR BLD CALC: 41.7 % (ref 37–47)
HEMOGLOBIN: 13.1 GM/DL (ref 12.5–16)
MCH RBC QN AUTO: 28.9 PG (ref 27–31)
MCHC RBC AUTO-ENTMCNC: 31.4 % (ref 32–36)
MCV RBC AUTO: 91.9 FL (ref 78–100)
PDW BLD-RTO: 13.8 % (ref 11.7–14.9)
PLATELET # BLD: 355 K/CU MM (ref 140–440)
PMV BLD AUTO: 9.6 FL (ref 7.5–11.1)
POTASSIUM SERPL-SCNC: 4.8 MMOL/L (ref 3.5–5.1)
RBC # BLD: 4.54 M/CU MM (ref 4.2–5.4)
SODIUM BLD-SCNC: 141 MMOL/L (ref 135–145)
WBC # BLD: 7.3 K/CU MM (ref 4–10.5)

## 2021-06-22 PROCEDURE — 80048 BASIC METABOLIC PNL TOTAL CA: CPT

## 2021-06-22 PROCEDURE — 36415 COLL VENOUS BLD VENIPUNCTURE: CPT

## 2021-06-22 PROCEDURE — 85027 COMPLETE CBC AUTOMATED: CPT

## 2021-06-22 PROCEDURE — 83036 HEMOGLOBIN GLYCOSYLATED A1C: CPT

## 2021-07-27 ENCOUNTER — HOSPITAL ENCOUNTER (OUTPATIENT)
Age: 76
Setting detail: SPECIMEN
Discharge: HOME | End: 2021-07-27
Payer: MEDICARE

## 2021-07-27 LAB
ANION GAP SERPL CALCULATED.3IONS-SCNC: 11 MMOL/L (ref 4–16)
BUN BLDV-MCNC: 35 MG/DL (ref 6–23)
CALCIUM SERPL-MCNC: 9.6 MG/DL (ref 8.3–10.6)
CHLORIDE BLD-SCNC: 101 MMOL/L (ref 99–110)
CO2: 27 MMOL/L (ref 21–32)
CREAT SERPL-MCNC: 1.3 MG/DL (ref 0.6–1.1)
GFR AFRICAN AMERICAN: 48 ML/MIN/1.73M2
GFR NON-AFRICAN AMERICAN: 40 ML/MIN/1.73M2
GLUCOSE BLD-MCNC: 99 MG/DL (ref 70–99)
POTASSIUM SERPL-SCNC: 4.2 MMOL/L (ref 3.5–5.1)
SODIUM BLD-SCNC: 139 MMOL/L (ref 135–145)

## 2021-07-27 PROCEDURE — 36415 COLL VENOUS BLD VENIPUNCTURE: CPT

## 2021-07-27 PROCEDURE — 80048 BASIC METABOLIC PNL TOTAL CA: CPT

## 2021-08-09 ENCOUNTER — OUTSIDE SERVICES (OUTPATIENT)
Dept: PSYCHIATRY | Age: 76
End: 2021-08-09
Payer: MEDICARE

## 2021-08-09 DIAGNOSIS — F33.1 MODERATE RECURRENT MAJOR DEPRESSION (HCC): ICD-10-CM

## 2021-08-09 DIAGNOSIS — F41.1 GAD (GENERALIZED ANXIETY DISORDER): ICD-10-CM

## 2021-08-09 PROCEDURE — 90792 PSYCH DIAG EVAL W/MED SRVCS: CPT | Performed by: NURSE PRACTITIONER

## 2021-08-09 RX ORDER — MECLIZINE HYDROCHLORIDE CHEWABLE TABLETS 25 MG/1
25 TABLET, CHEWABLE ORAL
COMMUNITY

## 2021-08-09 RX ORDER — OXYBUTYNIN CHLORIDE 10 MG/1
10 TABLET, EXTENDED RELEASE ORAL DAILY
COMMUNITY

## 2021-08-09 RX ORDER — MECLIZINE HYDROCHLORIDE CHEWABLE TABLETS 25 MG/1
25 TABLET, CHEWABLE ORAL 2 TIMES DAILY
Status: ON HOLD | COMMUNITY
End: 2021-08-25 | Stop reason: HOSPADM

## 2021-08-09 RX ORDER — QUETIAPINE FUMARATE 25 MG/1
25 TABLET, FILM COATED ORAL 2 TIMES DAILY
COMMUNITY

## 2021-08-09 RX ORDER — METOPROLOL SUCCINATE 25 MG/1
25 TABLET, EXTENDED RELEASE ORAL DAILY
COMMUNITY

## 2021-08-09 RX ORDER — FUROSEMIDE 20 MG/1
20 TABLET ORAL
COMMUNITY

## 2021-08-09 NOTE — PROGRESS NOTES
Behavioral Health Consultation  Jairo Lacey PMHNP-BC  8/9/2021, 4:09 PM      Time spent with Patient:  60 minutes  This was a outpatient visit. Patient Location: Home. Provider [facility]: [facility]    Chief Complaint:increase in depression    Patient notes she had been seeing psychologist Dr Deena Uribe and had done well. She reports being sick and going to an \"unlicensed faciity\" She became ill again and went back to the hospital before coming here. Discussed how this is her space, but does not yet feel like her home. She has never been able to say goodbye to her home where all her belongings have been. She also has not felt well and feels demoralized when ill as they call her brother, CAROLINE and do not take her word for illness. She states they will tell him, \"but her numbers are good. ... [place] \" Discussed how somatically things will occur when depressed. She is experiencing lots of anger in her depression. She does not feel she has any control and will get mixed messages from staff. [place]:  Reason for visit is medication management initial visit   She has been compliant with medications. Pt reports that medications have not  been working. Pt denies side effects from medications. Pt denies hallucinations. Pt reports there has been no changes to appetite. Pt reports sleep has been ok. Pt denies  current exercise. Pt denies current suicidal ideation, plan and intent. Pt  denies current homicidal ideation, plan and Kiran[employer]). Social:  Patient was born in [facility]. She has three brothers. She was the youngest of four. She earned her MSW. She was very active within the [employer] as a . She never  nor had children. She was close to her nieces and nephews. She felt she was getting better with DR Deena Uribe and instituded a group with \"where were you when. ...... [place] \"   Past Psychiatric history:   The patient has no prior history of mental health diagnoses. Current treatment includes Anti-depressant: zoloft 100 mg po daily and Anti-psychotic: seroquel 25 mg po at bedtime. Patient denies from current treatment. Previous treatment has included: unknown. Family Mental Health history:   Pertinent family history: unknown. MSE:    Appearance: alert, cooperative, smiling  Attention:Intact  Appetite: normal  Ambulation: unable to assess.    Sleep disturbance: No  Loss of pleasure: Yes  Speech: spontaneous, normal rate, normal volume and well articulated  Mood: Depressed  Affect: depressed affect  Thought Content: no control  Insight: Good  Judgment: Intact  Memory: Intact long-term and Intact short-term  Suicide Assessment: no suicidal ideation  Homicide Assessment: denies current homicidal ideation, plan and intent    History:      Review of Systems:       Current Outpatient Medications:     insulin lispro (HUMALOG) 100 UNIT/ML injection vial, Inject 0-6 Units into the skin 3 times daily (with meals), Disp: 1 vial, Rfl: 3    ferrous sulfate 325 (65 Fe) MG tablet, Take 1 tablet by mouth daily (with breakfast), Disp: 30 tablet, Rfl: 3    pantoprazole (PROTONIX) 40 MG tablet, Take 1 tablet by mouth 2 times daily (before meals), Disp: 60 tablet, Rfl: 1    glipiZIDE (GLUCOTROL) 10 MG tablet, TAKE 1 TABLET BY MOUTH EVERY DAY, Disp: 90 tablet, Rfl: 0    Handicap Placard MISC, Expiration 1/2025, Disp: 1 each, Rfl: 0    oxybutynin (DITROPAN) 5 MG tablet, Take 1 tablet by mouth 2 times daily, Disp: 60 tablet, Rfl: 3    Handicap Placard MISC, by Does not apply route, Disp: 1 each, Rfl: 0    atorvastatin (LIPITOR) 40 MG tablet, Take 1 tablet by mouth daily, Disp: 90 tablet, Rfl: 1    Cholecalciferol 100 MCG (4000 UT) CAPS, Take 1 capsule by mouth daily, Disp: 90 capsule, Rfl: 1     PDMP Monitoring:    Last PDMP [place] as Reviewed [facility]):  Review User Review Instant Review Result            Urine Drug Screenings (1 yr)    No resulted procedures found.       Medication Contract and Consent for Opioid Use Documents Filed      No documents found                 OARRS checked and there were no signs of substance abuse, or prescription misuse. Social History     Socioeconomic History    Marital status: Single     Spouse name: Not on file    Number of children: Not on file    Years of education: Not on file    Highest education level: Not on file   Occupational History    Not on file   Tobacco Use    Smoking status: Passive Smoke Exposure - Never Smoker    Smokeless tobacco: Never Used   Vaping Use    Vaping Use: Never used   Substance and Sexual Activity    Alcohol use: Yes     Comment: every 2 months     Drug use: Never    Sexual activity: Not on file   Other Topics Concern    Not on file   Social History Narrative    Not on file     Social Determinants of Health     Financial Resource Strain:     Difficulty of Paying Living Expenses:    Food Insecurity:     Worried About Running Out of Food in the Last Year:     [address] in the Last Year:    Transportation Needs:     Lack of Transportation (Medical):  Lack of Transportation (Non-Medical):    Physical Activity:     Days of Exercise per Week:     Minutes of Exercise per Session:    Stress:     Feeling of Stress :    Social Connections:     Frequency of Communication with Friends and Family:     Frequency of Social Gatherings with Friends and Family:     Attends Yazidism Services:     Active Member of Clubs or Organizations:     Attends Club or Organization Meetings:     Marital Status:    Intimate Partner Violence:     Fear of Current or Ex-Partner:     Emotionally Abused:     Physically Abused:     Sexually Abused:        TOBACCO: Tania Diez  reports that she is a non-smoker but has been exposed to tobacco smoke. She has been exposed to 0.25 packs per day. She has never used smokeless tobacco.  ETOH: Tania Diez  reports current alcohol use.     Past Medical History:   Diagnosis Date    Clostridium difficile infection 03/01/2020    Hyperlipidemia     Hypertension     Obesity     Type 2 diabetes mellitus without complication ([address]       Metabolic monitoring is   being done by PCP. Family History   Problem Relation Age of Onset    Diabetes Brother     Obesity Brother     Other Brother         TIA    Diabetes Mother     Other Mother         COPD    Heart Attack Father     Coronary Art Dis Father     Other Father         aortic aneurysm    Diabetes Brother     Cancer Brother      Last Labs:   Lab Results   Component Value Date    LABA1C 5.8 06/22/2021     Lab Results   Component Value Date     06/22/2021      Lab Results   Component Value Date    WBC 7.3 06/22/2021    HGB 13.1 06/22/2021    HCT 41.7 06/22/2021    MCV 91.9 06/22/2021     06/22/2021    LYMPHOPCT 8.8 03/08/2020    RBC 4.54 06/22/2021    MCH 28.9 06/22/2021    MCHC 31.4 (L) 06/22/2021    RDW 13.8 06/22/2021          Lab Results   Component Value Date     07/27/2021    K 4.2 07/27/2021     07/27/2021    CO2 27 07/27/2021    BUN 35 (H) 07/27/2021    CREATININE 1.3 (H) 07/27/2021    GLUCOSE 99 07/27/2021    CALCIUM 9.6 07/27/2021    PROT 6.5 12/22/2020    LABALBU 3.8 12/22/2020    BILITOT 0.3 12/22/2020    ALKPHOS 103 12/22/2020    AST 19 12/22/2020    ALT 11 12/22/2020    LABGLOM 40 (L) 07/27/2021    GFRAA 48 (L) 07/27/2021    AGRATIO 1.1 02/28/2020    GLOB 2.9 02/28/2020      . last    Diagnosis:      1. Moderate recurrent major depression ([address]    2. MICHAEL (generalized anxiety disorder)      Plan:    Discussed increasing sertraline 150 mg po daily. Discussed risks and benefits of medications, as well as need for yearly lab work. Follow up with psych provider in one month   Continue work with PCP to manage medical concerns, and [facility] for continued follow-up. No orders of the defined types were placed in this encounter.      No orders of the defined types were placed in this encounter.       Pt interventions:    Discussed importance of medication adherence, Discussed risks, benefits, side effects of medication and need for follow up treatment, Provided education, Established rapport and Supportive techniques

## 2021-08-23 ENCOUNTER — APPOINTMENT (OUTPATIENT)
Dept: GENERAL RADIOLOGY | Age: 76
DRG: 313 | End: 2021-08-23
Payer: MEDICARE

## 2021-08-23 ENCOUNTER — APPOINTMENT (OUTPATIENT)
Dept: CT IMAGING | Age: 76
DRG: 313 | End: 2021-08-23
Payer: MEDICARE

## 2021-08-23 ENCOUNTER — HOSPITAL ENCOUNTER (INPATIENT)
Age: 76
LOS: 2 days | Discharge: NURSING, REHAB OR LONG-TERM CARE | DRG: 313 | End: 2021-08-25
Attending: INTERNAL MEDICINE | Admitting: INTERNAL MEDICINE
Payer: MEDICARE

## 2021-08-23 DIAGNOSIS — R77.8 ELEVATED TROPONIN: Primary | ICD-10-CM

## 2021-08-23 DIAGNOSIS — R53.1 GENERALIZED WEAKNESS: ICD-10-CM

## 2021-08-23 DIAGNOSIS — R07.9 CHEST PAIN, UNSPECIFIED TYPE: ICD-10-CM

## 2021-08-23 DIAGNOSIS — R42 DIZZINESS: ICD-10-CM

## 2021-08-23 PROBLEM — R79.89 ELEVATED TROPONIN: Status: ACTIVE | Noted: 2021-08-23

## 2021-08-23 LAB
ALBUMIN SERPL-MCNC: 3.8 GM/DL (ref 3.4–5)
ALP BLD-CCNC: 146 IU/L (ref 40–129)
ALT SERPL-CCNC: 9 U/L (ref 10–40)
ANION GAP SERPL CALCULATED.3IONS-SCNC: 11 MMOL/L (ref 4–16)
AST SERPL-CCNC: 15 IU/L (ref 15–37)
BASOPHILS ABSOLUTE: 0 K/CU MM
BASOPHILS RELATIVE PERCENT: 0.5 % (ref 0–1)
BILIRUB SERPL-MCNC: 0.3 MG/DL (ref 0–1)
BUN BLDV-MCNC: 31 MG/DL (ref 6–23)
CALCIUM SERPL-MCNC: 9.3 MG/DL (ref 8.3–10.6)
CHLORIDE BLD-SCNC: 100 MMOL/L (ref 99–110)
CO2: 26 MMOL/L (ref 21–32)
CREAT SERPL-MCNC: 1.4 MG/DL (ref 0.6–1.1)
D DIMER: 443 NG/ML(DDU)
DIFFERENTIAL TYPE: ABNORMAL
EOSINOPHILS ABSOLUTE: 0.1 K/CU MM
EOSINOPHILS RELATIVE PERCENT: 0.9 % (ref 0–3)
GFR AFRICAN AMERICAN: 44 ML/MIN/1.73M2
GFR NON-AFRICAN AMERICAN: 37 ML/MIN/1.73M2
GLUCOSE BLD-MCNC: 115 MG/DL (ref 70–99)
GLUCOSE BLD-MCNC: 94 MG/DL (ref 70–99)
HCT VFR BLD CALC: 37.1 % (ref 37–47)
HEMOGLOBIN: 11.5 GM/DL (ref 12.5–16)
IMMATURE NEUTROPHIL %: 0.5 % (ref 0–0.43)
LYMPHOCYTES ABSOLUTE: 1.4 K/CU MM
LYMPHOCYTES RELATIVE PERCENT: 16.3 % (ref 24–44)
MAGNESIUM: 1.8 MG/DL (ref 1.8–2.4)
MCH RBC QN AUTO: 27.4 PG (ref 27–31)
MCHC RBC AUTO-ENTMCNC: 31 % (ref 32–36)
MCV RBC AUTO: 88.5 FL (ref 78–100)
MONOCYTES ABSOLUTE: 0.5 K/CU MM
MONOCYTES RELATIVE PERCENT: 5.8 % (ref 0–4)
NUCLEATED RBC %: 0 %
PDW BLD-RTO: 15.1 % (ref 11.7–14.9)
PLATELET # BLD: 286 K/CU MM (ref 140–440)
PMV BLD AUTO: 9.8 FL (ref 7.5–11.1)
POTASSIUM SERPL-SCNC: 3.7 MMOL/L (ref 3.5–5.1)
PRO-BNP: 510.7 PG/ML
RBC # BLD: 4.19 M/CU MM (ref 4.2–5.4)
SEGMENTED NEUTROPHILS ABSOLUTE COUNT: 6.5 K/CU MM
SEGMENTED NEUTROPHILS RELATIVE PERCENT: 76 % (ref 36–66)
SODIUM BLD-SCNC: 137 MMOL/L (ref 135–145)
TOTAL IMMATURE NEUTOROPHIL: 0.04 K/CU MM
TOTAL NUCLEATED RBC: 0 K/CU MM
TOTAL PROTEIN: 6.7 GM/DL (ref 6.4–8.2)
TROPONIN T: 0.03 NG/ML
TROPONIN T: 0.12 NG/ML
TSH HIGH SENSITIVITY: 1.17 UIU/ML (ref 0.27–4.2)
WBC # BLD: 8.5 K/CU MM (ref 4–10.5)

## 2021-08-23 PROCEDURE — 83735 ASSAY OF MAGNESIUM: CPT

## 2021-08-23 PROCEDURE — 71045 X-RAY EXAM CHEST 1 VIEW: CPT

## 2021-08-23 PROCEDURE — 71250 CT THORAX DX C-: CPT

## 2021-08-23 PROCEDURE — 85025 COMPLETE CBC W/AUTO DIFF WBC: CPT

## 2021-08-23 PROCEDURE — 99284 EMERGENCY DEPT VISIT MOD MDM: CPT

## 2021-08-23 PROCEDURE — 83880 ASSAY OF NATRIURETIC PEPTIDE: CPT

## 2021-08-23 PROCEDURE — 70450 CT HEAD/BRAIN W/O DYE: CPT

## 2021-08-23 PROCEDURE — 80053 COMPREHEN METABOLIC PANEL: CPT

## 2021-08-23 PROCEDURE — 84443 ASSAY THYROID STIM HORMONE: CPT

## 2021-08-23 PROCEDURE — 87635 SARS-COV-2 COVID-19 AMP PRB: CPT

## 2021-08-23 PROCEDURE — 6370000000 HC RX 637 (ALT 250 FOR IP): Performed by: NURSE PRACTITIONER

## 2021-08-23 PROCEDURE — 85730 THROMBOPLASTIN TIME PARTIAL: CPT

## 2021-08-23 PROCEDURE — P9612 CATHETERIZE FOR URINE SPEC: HCPCS

## 2021-08-23 PROCEDURE — 85379 FIBRIN DEGRADATION QUANT: CPT

## 2021-08-23 PROCEDURE — 1200000000 HC SEMI PRIVATE

## 2021-08-23 PROCEDURE — 82962 GLUCOSE BLOOD TEST: CPT

## 2021-08-23 PROCEDURE — 93005 ELECTROCARDIOGRAM TRACING: CPT | Performed by: PHYSICIAN ASSISTANT

## 2021-08-23 PROCEDURE — 36415 COLL VENOUS BLD VENIPUNCTURE: CPT

## 2021-08-23 PROCEDURE — 84484 ASSAY OF TROPONIN QUANT: CPT

## 2021-08-23 RX ORDER — PANTOPRAZOLE SODIUM 40 MG/1
40 TABLET, DELAYED RELEASE ORAL
Status: DISCONTINUED | OUTPATIENT
Start: 2021-08-24 | End: 2021-08-25 | Stop reason: HOSPADM

## 2021-08-23 RX ORDER — ONDANSETRON 4 MG/1
4 TABLET, ORALLY DISINTEGRATING ORAL EVERY 8 HOURS PRN
Status: DISCONTINUED | OUTPATIENT
Start: 2021-08-23 | End: 2021-08-25 | Stop reason: HOSPADM

## 2021-08-23 RX ORDER — POTASSIUM CHLORIDE 7.45 MG/ML
10 INJECTION INTRAVENOUS PRN
Status: DISCONTINUED | OUTPATIENT
Start: 2021-08-23 | End: 2021-08-25 | Stop reason: HOSPADM

## 2021-08-23 RX ORDER — POTASSIUM CHLORIDE 20 MEQ/1
40 TABLET, EXTENDED RELEASE ORAL PRN
Status: DISCONTINUED | OUTPATIENT
Start: 2021-08-23 | End: 2021-08-25 | Stop reason: HOSPADM

## 2021-08-23 RX ORDER — FUROSEMIDE 20 MG/1
20 TABLET ORAL DAILY
Status: CANCELLED | OUTPATIENT
Start: 2021-08-23

## 2021-08-23 RX ORDER — ASPIRIN 325 MG
325 TABLET ORAL ONCE
Status: DISCONTINUED | OUTPATIENT
Start: 2021-08-23 | End: 2021-08-25 | Stop reason: HOSPADM

## 2021-08-23 RX ORDER — QUETIAPINE FUMARATE 25 MG/1
25 TABLET, FILM COATED ORAL NIGHTLY
Status: DISCONTINUED | OUTPATIENT
Start: 2021-08-23 | End: 2021-08-25 | Stop reason: HOSPADM

## 2021-08-23 RX ORDER — ACETAMINOPHEN 650 MG/1
650 SUPPOSITORY RECTAL EVERY 6 HOURS PRN
Status: DISCONTINUED | OUTPATIENT
Start: 2021-08-23 | End: 2021-08-25 | Stop reason: HOSPADM

## 2021-08-23 RX ORDER — ATORVASTATIN CALCIUM 40 MG/1
40 TABLET, FILM COATED ORAL DAILY
Status: DISCONTINUED | OUTPATIENT
Start: 2021-08-23 | End: 2021-08-25 | Stop reason: HOSPADM

## 2021-08-23 RX ORDER — MECLIZINE HYDROCHLORIDE 25 MG/1
25 TABLET ORAL 2 TIMES DAILY
Status: DISCONTINUED | OUTPATIENT
Start: 2021-08-23 | End: 2021-08-25 | Stop reason: HOSPADM

## 2021-08-23 RX ORDER — OXYBUTYNIN CHLORIDE 10 MG/1
10 TABLET, EXTENDED RELEASE ORAL DAILY
Status: DISCONTINUED | OUTPATIENT
Start: 2021-08-23 | End: 2021-08-25 | Stop reason: HOSPADM

## 2021-08-23 RX ORDER — FERROUS SULFATE 325(65) MG
325 TABLET ORAL
Status: DISCONTINUED | OUTPATIENT
Start: 2021-08-24 | End: 2021-08-25 | Stop reason: HOSPADM

## 2021-08-23 RX ORDER — MECLIZINE HYDROCHLORIDE 25 MG/1
25 TABLET ORAL
Status: DISCONTINUED | OUTPATIENT
Start: 2021-08-24 | End: 2021-08-25 | Stop reason: HOSPADM

## 2021-08-23 RX ORDER — ONDANSETRON 2 MG/ML
4 INJECTION INTRAMUSCULAR; INTRAVENOUS EVERY 6 HOURS PRN
Status: DISCONTINUED | OUTPATIENT
Start: 2021-08-23 | End: 2021-08-25 | Stop reason: HOSPADM

## 2021-08-23 RX ORDER — ACETAMINOPHEN 325 MG/1
650 TABLET ORAL EVERY 6 HOURS PRN
Status: DISCONTINUED | OUTPATIENT
Start: 2021-08-23 | End: 2021-08-25 | Stop reason: HOSPADM

## 2021-08-23 RX ORDER — SODIUM CHLORIDE 9 MG/ML
25 INJECTION, SOLUTION INTRAVENOUS PRN
Status: DISCONTINUED | OUTPATIENT
Start: 2021-08-23 | End: 2021-08-25 | Stop reason: HOSPADM

## 2021-08-23 RX ORDER — METOPROLOL SUCCINATE 25 MG/1
25 TABLET, EXTENDED RELEASE ORAL DAILY
Status: DISCONTINUED | OUTPATIENT
Start: 2021-08-23 | End: 2021-08-25 | Stop reason: HOSPADM

## 2021-08-23 RX ORDER — SODIUM CHLORIDE 0.9 % (FLUSH) 0.9 %
5-40 SYRINGE (ML) INJECTION EVERY 12 HOURS SCHEDULED
Status: DISCONTINUED | OUTPATIENT
Start: 2021-08-23 | End: 2021-08-25 | Stop reason: HOSPADM

## 2021-08-23 RX ORDER — 0.9 % SODIUM CHLORIDE 0.9 %
1000 INTRAVENOUS SOLUTION INTRAVENOUS ONCE
Status: DISCONTINUED | OUTPATIENT
Start: 2021-08-23 | End: 2021-08-25 | Stop reason: HOSPADM

## 2021-08-23 RX ORDER — SODIUM CHLORIDE 0.9 % (FLUSH) 0.9 %
5-40 SYRINGE (ML) INJECTION PRN
Status: DISCONTINUED | OUTPATIENT
Start: 2021-08-23 | End: 2021-08-25 | Stop reason: HOSPADM

## 2021-08-23 RX ORDER — POLYETHYLENE GLYCOL 3350 17 G/17G
17 POWDER, FOR SOLUTION ORAL DAILY PRN
Status: DISCONTINUED | OUTPATIENT
Start: 2021-08-23 | End: 2021-08-25 | Stop reason: HOSPADM

## 2021-08-23 RX ORDER — MECLIZINE HYDROCHLORIDE 25 MG/1
25 TABLET ORAL ONCE
Status: COMPLETED | OUTPATIENT
Start: 2021-08-23 | End: 2021-08-23

## 2021-08-23 RX ORDER — MAGNESIUM SULFATE IN WATER 40 MG/ML
2000 INJECTION, SOLUTION INTRAVENOUS PRN
Status: DISCONTINUED | OUTPATIENT
Start: 2021-08-23 | End: 2021-08-25 | Stop reason: HOSPADM

## 2021-08-23 RX ORDER — ONDANSETRON 2 MG/ML
4 INJECTION INTRAMUSCULAR; INTRAVENOUS EVERY 30 MIN PRN
Status: DISCONTINUED | OUTPATIENT
Start: 2021-08-23 | End: 2021-08-25 | Stop reason: HOSPADM

## 2021-08-23 RX ADMIN — OXYBUTYNIN CHLORIDE 10 MG: 10 TABLET, EXTENDED RELEASE ORAL at 22:49

## 2021-08-23 RX ADMIN — MECLIZINE HYDROCHLORIDE 25 MG: 25 TABLET ORAL at 22:50

## 2021-08-23 RX ADMIN — QUETIAPINE FUMARATE 25 MG: 25 TABLET ORAL at 22:49

## 2021-08-23 RX ADMIN — ATORVASTATIN CALCIUM 40 MG: 20 TABLET, FILM COATED ORAL at 22:49

## 2021-08-23 RX ADMIN — METOPROLOL SUCCINATE 25 MG: 25 TABLET, EXTENDED RELEASE ORAL at 22:49

## 2021-08-23 ASSESSMENT — ENCOUNTER SYMPTOMS
CONSTIPATION: 0
VOMITING: 0
SORE THROAT: 0
SHORTNESS OF BREATH: 0
EYES NEGATIVE: 1
COUGH: 0
NAUSEA: 0
ABDOMINAL PAIN: 0

## 2021-08-23 NOTE — ED NOTES
EKG: done     POC glucose : [address] [facility], [address] [facility]  08/23/21 [id number]

## 2021-08-23 NOTE — ED NOTES
Bed: H-01  Expected date:   Expected time:   Means of arrival:   Comments:     Shellie Gayle RN  08/23/21 [id number]

## 2021-08-23 NOTE — ED PROVIDER NOTES
6:01 PM EDT  Amrik Chong was checked out to me by CHRYSTAL Walsh. Please see his initial documentation for details of the patient's ED presentation, physical exam and completed studies. At time of patient signout, urinalysis and CT chest are pending. In brief, Amrik Chong presented from long-term care facility for generalized weakness, dizziness for the last several weeks to months, and bilateral lower extremity weakness. Patient reports that symptoms have been worse the last 2 to 3 days. Patient reportedly sent to the ED today because she is now a \"fall risk\" due to her worsening lower extremity weakness. Patient also reports associated urinary frequency and is concerned for UTI. See HPI and nursing notes for additional information       PHYSICAL EXAM    VITAL SIGNS: BP (!) 140/69   Pulse 68   Temp 98.4 °F (36.9 °C)   Resp 16   SpO2 97%    Constitutional:  Well developed, Well nourished  HENT:  Normocephalic, Atraumatic. EOMI. Sclera clear. Conjunctiva normal, No discharge. Neck: supple, no JVD  Cardiovascular:  Rate regular, regular Rhythm, no murmurs/rubs/gallops. Respiratory:  Nonlabored breathing. Normal breath sounds, No wheezing  Abdomen: Bowel sounds normal, Soft, No tenderness, no masses. Musculoskeletal:    There is no pitting edema, asymmetry, or calf / thigh tenderness bilaterally. No cyanosis. No cool or pale-appearing limb. Distal cap refill and pulses intact bilateral upper and lower extremities  Bilateral upper and lower extremity ROM intact without pain or obvious deficit  Integument:  Warm, Dry  Neurologic: Alert & oriented, No focal deficits noted. Cranial nerves II through XII grossly intact. Normal gross motor coordination & motor strength bilateral upper and lower extremities. Sensation intact.   Psychiatric:  Affect normal, Mood normal.       I have reviewed and interpreted all of the currently available lab/imaging results from this visit (if applicable):  LABS:  Results for orders placed or performed during the hospital encounter of 08/23/21   CBC auto diff   Result Value Ref Range    WBC 8.5 4.0 - 10.5 K/CU MM    RBC 4.19 (L) 4.2 - 5.4 M/CU MM    Hemoglobin 11.5 (L) 12.5 - 16.0 GM/DL    Hematocrit 37.1 37 - 47 %    MCV 88.5 78 - 100 FL    MCH 27.4 27 - 31 PG    MCHC 31.0 (L) 32.0 - 36.0 %    RDW 15.1 (H) 11.7 - 14.9 %    Platelets 278 617 - 330 K/CU MM    MPV 9.8 7.5 - 11.1 FL    Differential Type AUTOMATED DIFFERENTIAL     Segs Relative 76.0 (H) 36 - 66 %    Lymphocytes % 16.3 (L) 24 - 44 %    Monocytes % 5.8 (H) 0 - 4 %    Eosinophils % 0.9 0 - 3 %    Basophils % 0.5 0 - 1 %    Segs Absolute 6.5 K/CU MM    Lymphocytes Absolute 1.4 K/CU MM    Monocytes Absolute 0.5 K/CU MM    Eosinophils Absolute 0.1 K/CU MM    Basophils Absolute 0.0 K/CU MM    Nucleated RBC % 0.0 %    Total Nucleated RBC 0.0 K/CU MM    Total Immature Neutrophil 0.04 K/CU MM    Immature Neutrophil % 0.5 (H) 0 - 0.43 %   CMP   Result Value Ref Range    Sodium 137 135 - 145 MMOL/L    Potassium 3.7 3.5 - 5.1 MMOL/L    Chloride 100 99 - 110 mMol/L    CO2 26 21 - 32 MMOL/L    BUN 31 (H) 6 - 23 MG/DL    CREATININE 1.4 (H) 0.6 - 1.1 MG/DL    Glucose 94 70 - 99 MG/DL    Calcium 9.3 8.3 - 10.6 MG/DL    Albumin 3.8 3.4 - 5.0 GM/DL    Total Protein 6.7 6.4 - 8.2 GM/DL    Total Bilirubin 0.3 0.0 - 1.0 MG/DL    ALT 9 (L) 10 - 40 U/L    AST 15 15 - 37 IU/L    Alkaline Phosphatase 146 (H) 40 - 129 IU/L    GFR Non- 37 (L) >60 mL/min/1.73m2    GFR  44 (L) >60 mL/min/1.73m2    Anion Gap 11 4 - 16   Troponin   Result Value Ref Range    Troponin T 0.034 (H) <0.01 NG/ML   Brain Natriuretic Peptide   Result Value Ref Range    Pro-.7 (H) <300 PG/ML   TSH without Reflex   Result Value Ref Range    TSH, High Sensitivity 1.170 0.270 - 4.20 uIu/ml   Magnesium   Result Value Ref Range    Magnesium 1.8 1.8 - 2.4 mg/dl   POCT Glucose   Result Value Ref Range    POC Glucose 115 (H) 70 - 99 MG/DL       EKG Interpretation  Please see ED physician's note for EKG interpretation - Dr. Fernandez Spine:  CT CHEST WO CONTRAST   Preliminary Result   No suspicious lung nodule or mass. Normal pulmonary vasculature corresponding to the questioned nodule on recent   radiograph. CT HEAD WO CONTRAST   Preliminary Result   No acute intracranial abnormality. Cerebral atrophy. Atherosclerotic calcification of the cavernous carotid   arteries and vertebral arteries. Chronic small vessel ischemic change. XR CHEST PORTABLE   Final Result   Possible irregular nodule right upper lobe. A chest CT is suggested for   further evaluation                     ED COURSE & MEDICAL DECISION MAKING       Vital signs and nursing notes reviewed during ED course. I have independently evaluated this patient. Supervising physician present in the Emergency Department, available for consultation, throughout entirety of patient care. Patient presents as above with bilateral lower extremity weakness, generalized fatigue and weakness, and ongoing dizziness. Patient was signed out to me by physician assistant, Fabian Tan, pending urinalysis and CT chest.  Labs, EKG, and imaging were obtained. For EKG interpretation-see ED physician's note. Labs reveal mild anemia with hemoglobin 11.5. Serum creatinine near baseline at 1.4 today, troponin is elevated at 0.034. Rest of labs without clinically significant derangements. CT head reveals no acute intracranial abnormality. Chest x-ray does reveal a possible irregular nodule of the right upper lobe and CT chest ordered by CHRYSTAL Marie, for further evaluation. Patient was ordered IV fluids, full dose aspirin, meclizine, and IV Zofran for symptoms. Urinalysis ordered but not yet obtained. CT chest reveals no suspicious lung nodules or mass.   Given patient's generalized weakness and did order a rapid COVID-19 test to evaluate for COVID-19 as possible cause of weakness. Given patient's generalized weakness and newly elevated troponin-will admit patient for further evaluation and care. I consulted hospitalist, Cheryle London, APP, and we discussed the patient's case. Hospitalist agrees to admit the patient at this time for further evaluation and care. All pertinent Lab data and radiographic results reviewed with patient at bedside. The patient and/or the family were informed of the results of any tests/labs/imaging, the treatment plan, and time was allotted to answer questions. Diagnosis, disposition, and treatment plan reviewed in detail with patient. Patient understands and agrees to admission at this time for further evaluation and care. Final Impression:  1. Elevated troponin    2. Generalized weakness    3.  Dizziness        (Please note that portions of this note may have been completed with a voice recognition program. Efforts were made to edit the dictations but occasionally words are mis-transcribed.)    BILLIE Mooney PA-C  08/23/21 2030

## 2021-08-23 NOTE — ED PROVIDER NOTES
EMERGENCY DEPARTMENT ENCOUNTER      PCP: CHRYSTAL Valenzuela    CHIEF COMPLAINT    No chief complaint on file. Of note, this patient was not evaluated by attending physician, any physician available consultation. HPI    Kim Liang is a 78 y.o. female who presents to the emergency department today from [facility] for generalized fatigue, weakness, long-term dizziness, lower leg weakness/instability on her feet. States has been developing over the last 2 or 3 days. She describes a vertigo sensation and dizziness that she has had for several months. She states that is not new. States that she now just feels weak in the legs and feels off balance like she is a \"fall risk\". This was evaluated by the [facility] nurse who got the physician involved and she was sent to the emergency department for further evaluation of this. She describes no headache. No chest pain no palpitations. No abdominal pain. No change in bowel habits, no dark-colored stools or bright red blood per the rectum. Does have urinary frequency, no burning with urination, does have a concern for possible urinary tract infection. No flank pains. Patient is a diabetic, she also history of hypertension, hyperlipidemia. REVIEW OF SYSTEMS    Constitutional:  Denies fever, chills, weight loss or weakness   HENT:  Denies sore throat or ear pain   Cardiovascular:  Denies chest pain, palpitations   Respiratory:  Denies cough or shortness of breath    GI:  Denies abdominal pain, nausea, vomiting, or diarrhea  :  Denies any urinary symptoms or vaginal symptoms.    Musculoskeletal:  Denies back pain  Skin:  Denies rash  Neurologic:  Denies headache, focal weakness or sensory changes   Endocrine:  Denies polyuria or polydypsia   Lymphatic:  Denies swollen glands     All other review of systems are negative  See HPI and nursing notes for additional information     [address]    Past Medical History:   Diagnosis Date    Clostridium difficile infection 03/01/2020    Hyperlipidemia     Hypertension     Obesity     Type 2 diabetes mellitus without complication ([facility] [address]      Past Surgical History:   Procedure Laterality Date    COLONOSCOPY      UPPER GASTROINTESTINAL ENDOSCOPY N/A 2/7/2020    EGD BIOPSY performed by Zully Wang MD at [address] [place] [address]    Current Outpatient Rx   Medication Sig Dispense Refill    oxybutynin (DITROPAN-XL) 10 MG extended release tablet Take 10 mg by mouth daily      sertraline (ZOLOFT) 50 MG tablet Take 150 mg by mouth daily      QUEtiapine (SEROQUEL) 25 MG tablet Take 25 mg by mouth nightly      meclizine (ANTIVERT) 25 MG CHEW Take 25 mg by mouth Daily with lunch      meclizine (ANTIVERT) 25 MG CHEW Take 25 mg by mouth 2 times daily      metoprolol succinate (TOPROL XL) 25 MG extended release tablet Take 25 mg by mouth daily      furosemide (LASIX) 20 MG tablet Take 20 mg by mouth daily Daily on M, W and Friday for edema      insulin lispro (HUMALOG) 100 UNIT/ML injection vial Inject 0-6 Units into the skin 3 times daily (with meals) 1 vial 3    ferrous sulfate 325 (65 Fe) MG tablet Take 1 tablet by mouth daily (with breakfast) 30 tablet 3    pantoprazole (PROTONIX) 40 MG tablet Take 1 tablet by mouth 2 times daily (before meals) 60 tablet 1    glipiZIDE (GLUCOTROL) 10 MG tablet TAKE 1 TABLET BY MOUTH EVERY DAY (Patient taking differently: 2.5 mg ) 90 tablet 0    Handicap Placard MISC Expiration 1/2025 (Patient not taking: Reported on 8/9/2021) 1 each 0    Handicap Placard MISC by Does not apply route (Patient not taking: Reported on 8/9/2021) 1 each 0    atorvastatin (LIPITOR) 40 MG tablet Take 1 tablet by mouth daily 90 tablet 1    Cholecalciferol 100 MCG (4000 UT) CAPS Take 1 capsule by mouth daily 90 capsule 1       ALLERGIES    Allergies   Allergen Reactions    Metformin      diarrhea    Ramipril      cough  cough SOCIAL AND FAMILY HISTORY    Social History     Socioeconomic History    Marital status: Single     Spouse name: Not on file    Number of children: Not on file    Years of education: Not on file    Highest education level: Not on file   Occupational History    Not on file   Tobacco Use    Smoking status: Passive Smoke Exposure - Never Smoker    Smokeless tobacco: Never Used   Vaping Use    Vaping Use: Never used   Substance and Sexual Activity    Alcohol use: Yes     Comment: every 2 months     Drug use: Never    Sexual activity: Not on file   Other Topics Concern    Not on file   Social History Narrative    Not on file     Social Determinants of Health     Financial Resource Strain:     Difficulty of Paying Living Expenses:    Food Insecurity:     Worried About Running Out of Food in the Last Year:     [address] in the Last Year:    Transportation Needs:     Lack of Transportation (Medical):      Lack of Transportation (Non-Medical):    Physical Activity:     Days of Exercise per Week:     Minutes of Exercise per Session:    Stress:     Feeling of Stress :    Social Connections:     Frequency of Communication with Friends and Family:     Frequency of Social Gatherings with Friends and Family:     Attends Spiritism Services:     Active Member of Clubs or Organizations:     Attends Club or Organization Meetings:     Marital Status:    Intimate Partner Violence:     Fear of Current or Ex-Partner:     Emotionally Abused:     Physically Abused:     Sexually Abused:      Family History   Problem Relation Age of Onset    Diabetes Brother     Obesity Brother     Other Brother         TIA    Diabetes Mother     Other Mother         COPD    Heart Attack Father     Coronary Art Dis Father     Other Father         aortic aneurysm    Diabetes Brother     Cancer Brother          PHYSICAL EXAM    VITAL SIGNS: BP (!) 140/69   Pulse 68   Temp 98.4 °F (36.9 °C)   Resp 16   SpO2 97%    Constitutional:  Well developed, Well nourished. No distress  HENT:  Normocephalic, Atraumatic, PERRL. EOMI. Sclera clear. Conjunctiva normal, No discharge. Neck/Lymphatics: supple, no JVD, no swollen nodes  Cardiovascular:   RRR,  no murmurs/rubs/gallops. No JVD  No carotid bruits or murmurs heard in carotids. Respiratory:  Nonlabored breathing. Normal breath sounds, No wheezing  Abdomen: Bowel sounds normal, Soft, No tenderness, no masses. Musculoskeletal:    There is no edema, asymmetry, or calf / thigh tenderness bilaterally. No cyanosis. No cool or pale-appearing limb. Distal cap refill and pulses intact bilateral upper and lower extremities  Bilateral upper and lower extremity ROM intact without pain or obvious deficit  Integument:  Warm, Dry  Neurologic: Alert & oriented , No focal deficits noted. Cranial nerves II through XII grossly intact. Normal gross motor coordination & motor strength bilateral upper and lower extremities  Sensation intact.   Psychiatric:  Affect normal, Mood normal.       Labs:  Results for orders placed or performed during the hospital encounter of 08/23/21   CBC auto diff   Result Value Ref Range    WBC 8.5 4.0 - 10.5 K/CU MM    RBC 4.19 (L) 4.2 - 5.4 M/CU MM    Hemoglobin 11.5 (L) 12.5 - 16.0 GM/DL    Hematocrit 37.1 37 - 47 %    MCV 88.5 78 - 100 FL    MCH 27.4 27 - 31 PG    MCHC 31.0 (L) 32.0 - 36.0 %    RDW 15.1 (H) 11.7 - 14.9 %    Platelets 495 885 - 018 K/CU MM    MPV 9.8 7.5 - 11.1 FL    Differential Type AUTOMATED DIFFERENTIAL     Segs Relative 76.0 (H) 36 - 66 %    Lymphocytes % 16.3 (L) 24 - 44 %    Monocytes % 5.8 (H) 0 - 4 %    Eosinophils % 0.9 0 - 3 %    Basophils % 0.5 0 - 1 %    Segs Absolute 6.5 K/CU MM    Lymphocytes Absolute 1.4 K/CU MM    Monocytes Absolute 0.5 K/CU MM    Eosinophils Absolute 0.1 K/CU MM    Basophils Absolute 0.0 K/CU MM    Nucleated RBC % 0.0 %    Total Nucleated RBC 0.0 K/CU MM    Total Immature Neutrophil 0.04 K/CU MM Immature Neutrophil % 0.5 (H) 0 - 0.43 %   CMP   Result Value Ref Range    Sodium 137 135 - 145 MMOL/L    Potassium 3.7 3.5 - 5.1 MMOL/L    Chloride 100 99 - 110 mMol/L    CO2 26 21 - 32 MMOL/L    BUN 31 (H) 6 - 23 MG/DL    CREATININE 1.4 (H) 0.6 - 1.1 MG/DL    Glucose 94 70 - 99 MG/DL    Calcium 9.3 8.3 - 10.6 MG/DL    Albumin 3.8 3.4 - 5.0 GM/DL    Total Protein 6.7 6.4 - 8.2 GM/DL    Total Bilirubin 0.3 0.0 - 1.0 MG/DL    ALT 9 (L) 10 - 40 U/L    AST 15 15 - 37 IU/L    Alkaline Phosphatase 146 (H) 40 - 129 IU/L    GFR Non- 37 (L) >60 mL/min/1.73m2    GFR  44 (L) >60 mL/min/1.73m2    Anion Gap 11 4 - 16   Troponin   Result Value Ref Range    Troponin T 0.034 (H) <0.01 NG/ML   Brain Natriuretic Peptide   Result Value Ref Range    Pro-.7 (H) <300 PG/ML   TSH without Reflex   Result Value Ref Range    TSH, High Sensitivity 1.170 0.270 - 4.20 uIu/ml   Magnesium   Result Value Ref Range    Magnesium 1.8 1.8 - 2.4 mg/dl   POCT Glucose   Result Value Ref Range    POC Glucose 115 (H) 70 - 99 MG/DL     EKG    See supervising physician note for EKG interpretation  RADIOLOGY    CT HEAD WO CONTRAST    Result Date: 8/23/2021  EXAMINATION: CT OF THE HEAD WITHOUT CONTRAST  8/23/2021 4:53 pm TECHNIQUE: CT of the head was performed without the administration of intravenous contrast. Dose modulation, iterative reconstruction, and/or weight based adjustment of the mA/kV was utilized to reduce the radiation dose to as low as reasonably achievable. COMPARISON: None. HISTORY: ORDERING SYSTEM PROVIDED HISTORY: dizziness TECHNOLOGIST PROVIDED HISTORY: Reason for exam:->dizziness Has a \"code stroke\" or \"stroke alert\" been called? ->No Decision Support Exception - unselect if not a suspected or confirmed emergency medical condition->Emergency Medical Condition (MA) Reason for Exam: VERTIGO, DIZZINESS Initial evaluation FINDINGS: BRAIN/VENTRICLES:  The ventricles and cisternal spaces are prominent consistent with cerebral atrophy. There is atherosclerotic calcification of the cavernous carotids. There is atherosclerotic disease of the distal vertebral arteries. There are confluent areas of hypoattenuation in the periventricular white matter and centrum semiovale that are likely related to chronic small vessel ischemic disease. There is no midline shift or mass effect. No hemorrhage is identified in the brain parenchyma. ORBITS: The visualized portion of the orbits demonstrate no acute abnormality. SINUSES:  The visualized paranasal sinuses and mastoid air cells are for the most part clear. SOFT TISSUES/SKULL:  No acute abnormality of the visualized skull or soft tissues. No acute intracranial abnormality. Cerebral atrophy. Atherosclerotic calcification of the cavernous carotid arteries and vertebral arteries. Chronic small vessel ischemic change. XR CHEST PORTABLE    Result Date: 8/23/2021  EXAMINATION: ONE XRAY VIEW OF THE CHEST 8/23/2021 5:11 pm COMPARISON: None. HISTORY: ORDERING SYSTEM PROVIDED HISTORY: dizziness TECHNOLOGIST PROVIDED HISTORY: Reason for exam:->dizziness Reason for Exam: dizziness Acuity: Acute Type of Exam: Initial Additional signs and symptoms: none Relevant Medical/Surgical History: none FINDINGS: Cardiomegaly. No acute airspace disease. No pneumothorax. No pleural effusion. Irregular nodular opacity right upper lobe. Possible irregular nodule right upper lobe. A chest CT is suggested for further evaluation     CT CHEST [address] PENDING    ED [facility]     Patient presents as above. Emergent etiologies considered. Patient seen and evaluated. Overall appears well. Normal vital signs, patient is presenting with \"being a fall risk\", states that she is having weakness in her bilateral lower extremities, she states that time that she feels off balance.   No obvious ataxia on exam.  States that she was seen by her doctor at the facility and was advised to come to the emergency department for further evaluation. Denying any active chest pain. Does admit to intermittent dizziness, vertigo-like symptoms. Is complaining of increased urination. Has states that she has history of recurrent urinary tract infections. Overall she appears well, neurologically intact, no obvious focal neurologic findings. Secondary to her weakness, increased fall risk a broad work-up was initiated. EKG demonstrated no acute no obvious ischemic changes or life-threatening arrhythmias. No ST elevations. Patient's troponin was slightly detectable and BNP mildly elevated. Chest x-ray showing concern for a irregular nodule in the right upper lobe, suggesting a CT of the chest, will further evaluate the CTA pulmonary study throughout any acute pathology. Patient's lab work otherwise appears to be at her baseline, no significant leukocytosis, anemia, thrombocytopenia. Electrolytes appear well, creatinine and BUN at their normal limit. TSH, mag within normal limits. Patient receiving fluids, meclizine, Zofran, will look to add aspirin. Patient will likely need admission secondary to the elevated troponin, weakness, fall risk, possible PT/OT. Will further evaluate abnormal CT scan. Will also need orthostatic vital signs/urinalysis evaluated  ________________________________________________________________________    5:51 PM EDT I have signed out [facility] Emergency Department care to [facility]  Bedside hand-off performed. We discussed the history, physical exam, completed/pending test results (if obtained) and current treatment plan.    At time of patient signout UA and dispostion pending.  ________________________________________________________________________    Comment: Please note this report has been produced using speech recognition software and may contain errors related to that system including errors in grammar, punctuation, and spelling, as well as words and phrases that may be inappropriate. If there are any questions or concerns please feel free to contact the dictating provider for clarification.         Mariana Tuttle 411, PA  08/24/21 [id number]

## 2021-08-23 NOTE — ED TRIAGE NOTES
79 Y/o presented to  ED for:    1. Dizziness x 3 months , Sates she feels like she's spinning when she has eyes open and closed  Home health nurse stated  she has a right sided shift when sitting down ? 2.  Nontraumatic left knee pain x 3 days

## 2021-08-23 NOTE — ED PROVIDER NOTES
EKG is interpreted by me. EKG shows sinus rhythm at 60 bpm, axis is left deviated, no remarkable ST segmentations or depressions, T waves are unremarkable, SC interval 136, QRS duration of 82, QTC of 4-21. Final impression, nonspecific EKG.     Yogi Lopez MD  8/23/2021  6:22 PM       Yogi Lopez MD  08/23/21 Kristin Yuen

## 2021-08-23 NOTE — Clinical Note
Patient Class: Inpatient [101]   REQUIRED: Diagnosis: Elevated troponin [123915]   Estimated Length of Stay: Estimated stay of less than 2 midnights   Admitting Provider: Caryle Hauser [[id number]]   Telemetry/Cardiac Monitoring Required?: Yes

## 2021-08-24 ENCOUNTER — APPOINTMENT (OUTPATIENT)
Dept: NUCLEAR MEDICINE | Age: 76
DRG: 313 | End: 2021-08-24
Payer: MEDICARE

## 2021-08-24 PROBLEM — R07.9 CHEST PAIN: Status: ACTIVE | Noted: 2021-08-24

## 2021-08-24 LAB
APTT: 27.4 SECONDS (ref 25.1–37.1)
APTT: 43.7 SECONDS (ref 25.1–37.1)
APTT: 50.2 SECONDS (ref 25.1–37.1)
BACTERIA: ABNORMAL /HPF
BILIRUBIN URINE: NEGATIVE MG/DL
BLOOD, URINE: ABNORMAL
CLARITY: ABNORMAL
COLOR: YELLOW
EKG ATRIAL RATE: 68 BPM
EKG ATRIAL RATE: 68 BPM
EKG DIAGNOSIS: NORMAL
EKG DIAGNOSIS: NORMAL
EKG P AXIS: 49 DEGREES
EKG P AXIS: 71 DEGREES
EKG P-R INTERVAL: 128 MS
EKG P-R INTERVAL: 136 MS
EKG Q-T INTERVAL: 396 MS
EKG Q-T INTERVAL: 396 MS
EKG QRS DURATION: 72 MS
EKG QRS DURATION: 82 MS
EKG QTC CALCULATION (BAZETT): 421 MS
EKG QTC CALCULATION (BAZETT): 421 MS
EKG R AXIS: -23 DEGREES
EKG R AXIS: -9 DEGREES
EKG T AXIS: 44 DEGREES
EKG T AXIS: 45 DEGREES
EKG VENTRICULAR RATE: 68 BPM
EKG VENTRICULAR RATE: 68 BPM
GLUCOSE BLD-MCNC: 123 MG/DL (ref 70–99)
GLUCOSE BLD-MCNC: 159 MG/DL (ref 70–99)
GLUCOSE BLD-MCNC: 85 MG/DL (ref 70–99)
GLUCOSE, URINE: NEGATIVE MG/DL
KETONES, URINE: ABNORMAL MG/DL
LEUKOCYTE ESTERASE, URINE: ABNORMAL
LV EF: 60 %
LV EF: 71 %
LVEF MODALITY: NORMAL
LVEF MODALITY: NORMAL
NITRITE URINE, QUANTITATIVE: NEGATIVE
PH, URINE: 6 (ref 5–8)
PROTEIN UA: 100 MG/DL
RBC URINE: 380 /HPF (ref 0–6)
SARS-COV-2, NAAT: NOT DETECTED
SOURCE: NORMAL
SPECIFIC GRAVITY UA: 1.01 (ref 1–1.03)
TRICHOMONAS: ABNORMAL /HPF
TROPONIN T: 0.12 NG/ML
TROPONIN T: 0.15 NG/ML
UROBILINOGEN, URINE: NEGATIVE MG/DL (ref 0.2–1)
WBC UA: 123 /HPF (ref 0–5)

## 2021-08-24 PROCEDURE — 6370000000 HC RX 637 (ALT 250 FOR IP): Performed by: INTERNAL MEDICINE

## 2021-08-24 PROCEDURE — 1200000000 HC SEMI PRIVATE

## 2021-08-24 PROCEDURE — 93306 TTE W/DOPPLER COMPLETE: CPT

## 2021-08-24 PROCEDURE — 78452 HT MUSCLE IMAGE SPECT MULT: CPT

## 2021-08-24 PROCEDURE — 6360000002 HC RX W HCPCS: Performed by: INTERNAL MEDICINE

## 2021-08-24 PROCEDURE — 81001 URINALYSIS AUTO W/SCOPE: CPT

## 2021-08-24 PROCEDURE — G0378 HOSPITAL OBSERVATION PER HR: HCPCS

## 2021-08-24 PROCEDURE — 96365 THER/PROPH/DIAG IV INF INIT: CPT

## 2021-08-24 PROCEDURE — 99205 OFFICE O/P NEW HI 60 MIN: CPT | Performed by: INTERNAL MEDICINE

## 2021-08-24 PROCEDURE — A9500 TC99M SESTAMIBI: HCPCS | Performed by: INTERNAL MEDICINE

## 2021-08-24 PROCEDURE — 2580000003 HC RX 258: Performed by: NURSE PRACTITIONER

## 2021-08-24 PROCEDURE — 6370000000 HC RX 637 (ALT 250 FOR IP): Performed by: NURSE PRACTITIONER

## 2021-08-24 PROCEDURE — 96367 TX/PROPH/DG ADDL SEQ IV INF: CPT

## 2021-08-24 PROCEDURE — 96366 THER/PROPH/DIAG IV INF ADDON: CPT

## 2021-08-24 PROCEDURE — 82962 GLUCOSE BLOOD TEST: CPT

## 2021-08-24 PROCEDURE — 93010 ELECTROCARDIOGRAM REPORT: CPT | Performed by: INTERNAL MEDICINE

## 2021-08-24 PROCEDURE — 84484 ASSAY OF TROPONIN QUANT: CPT

## 2021-08-24 PROCEDURE — 2580000003 HC RX 258: Performed by: INTERNAL MEDICINE

## 2021-08-24 PROCEDURE — 93017 CV STRESS TEST TRACING ONLY: CPT

## 2021-08-24 PROCEDURE — 93005 ELECTROCARDIOGRAM TRACING: CPT | Performed by: INTERNAL MEDICINE

## 2021-08-24 PROCEDURE — 85730 THROMBOPLASTIN TIME PARTIAL: CPT

## 2021-08-24 PROCEDURE — 3430000000 HC RX DIAGNOSTIC RADIOPHARMACEUTICAL: Performed by: INTERNAL MEDICINE

## 2021-08-24 RX ORDER — HEPARIN SODIUM 1000 [USP'U]/ML
5000 INJECTION, SOLUTION INTRAVENOUS; SUBCUTANEOUS ONCE
Status: COMPLETED | OUTPATIENT
Start: 2021-08-24 | End: 2021-08-24

## 2021-08-24 RX ORDER — DEXTROSE MONOHYDRATE 25 G/50ML
12.5 INJECTION, SOLUTION INTRAVENOUS PRN
Status: DISCONTINUED | OUTPATIENT
Start: 2021-08-24 | End: 2021-08-25 | Stop reason: HOSPADM

## 2021-08-24 RX ORDER — HEPARIN SODIUM 1000 [USP'U]/ML
2000 INJECTION, SOLUTION INTRAVENOUS; SUBCUTANEOUS PRN
Status: DISCONTINUED | OUTPATIENT
Start: 2021-08-24 | End: 2021-08-25 | Stop reason: HOSPADM

## 2021-08-24 RX ORDER — HEPARIN SODIUM 10000 [USP'U]/100ML
5-30 INJECTION, SOLUTION INTRAVENOUS CONTINUOUS
Status: DISCONTINUED | OUTPATIENT
Start: 2021-08-24 | End: 2021-08-24

## 2021-08-24 RX ORDER — ASPIRIN 81 MG/1
81 TABLET, CHEWABLE ORAL DAILY
Status: DISCONTINUED | OUTPATIENT
Start: 2021-08-24 | End: 2021-08-25 | Stop reason: HOSPADM

## 2021-08-24 RX ORDER — ACETAMINOPHEN 325 MG/1
650 TABLET ORAL EVERY 4 HOURS PRN
COMMUNITY

## 2021-08-24 RX ORDER — DEXTROSE MONOHYDRATE 50 MG/ML
100 INJECTION, SOLUTION INTRAVENOUS PRN
Status: DISCONTINUED | OUTPATIENT
Start: 2021-08-24 | End: 2021-08-25 | Stop reason: HOSPADM

## 2021-08-24 RX ORDER — OYSTER SHELL CALCIUM WITH VITAMIN D 500; 200 MG/1; [IU]/1
1 TABLET, FILM COATED ORAL 2 TIMES DAILY
COMMUNITY

## 2021-08-24 RX ORDER — ACETAMINOPHEN 325 MG/1
650 TABLET ORAL 4 TIMES DAILY
Status: ON HOLD | COMMUNITY
End: 2021-08-25 | Stop reason: HOSPADM

## 2021-08-24 RX ORDER — HEPARIN SODIUM 1000 [USP'U]/ML
4000 INJECTION, SOLUTION INTRAVENOUS; SUBCUTANEOUS PRN
Status: DISCONTINUED | OUTPATIENT
Start: 2021-08-24 | End: 2021-08-25

## 2021-08-24 RX ORDER — ALBUTEROL SULFATE 90 UG/1
2 AEROSOL, METERED RESPIRATORY (INHALATION) EVERY 6 HOURS PRN
Status: DISCONTINUED | OUTPATIENT
Start: 2021-08-24 | End: 2021-08-25 | Stop reason: HOSPADM

## 2021-08-24 RX ORDER — NICOTINE POLACRILEX 4 MG
15 LOZENGE BUCCAL PRN
Status: DISCONTINUED | OUTPATIENT
Start: 2021-08-24 | End: 2021-08-25 | Stop reason: HOSPADM

## 2021-08-24 RX ADMIN — HEPARIN SODIUM 9.8 UNITS/KG/HR: 10000 INJECTION, SOLUTION INTRAVENOUS at 02:55

## 2021-08-24 RX ADMIN — ATORVASTATIN CALCIUM 40 MG: 20 TABLET, FILM COATED ORAL at 21:31

## 2021-08-24 RX ADMIN — KIT FOR THE PREPARATION OF TECHNETIUM TC99M SESTAMIBI 10 MILLICURIE: 1 INJECTION, POWDER, LYOPHILIZED, FOR SOLUTION PARENTERAL at 15:06

## 2021-08-24 RX ADMIN — MECLIZINE HYDROCHLORIDE 25 MG: 25 TABLET ORAL at 21:26

## 2021-08-24 RX ADMIN — SODIUM CHLORIDE, PRESERVATIVE FREE 10 ML: 5 INJECTION INTRAVENOUS at 21:29

## 2021-08-24 RX ADMIN — QUETIAPINE FUMARATE 25 MG: 25 TABLET ORAL at 21:26

## 2021-08-24 RX ADMIN — SODIUM CHLORIDE, PRESERVATIVE FREE 10 ML: 5 INJECTION INTRAVENOUS at 09:48

## 2021-08-24 RX ADMIN — REGADENOSON 0.4 MG: 0.08 INJECTION, SOLUTION INTRAVENOUS at 13:23

## 2021-08-24 RX ADMIN — SODIUM CHLORIDE, PRESERVATIVE FREE 10 ML: 5 INJECTION INTRAVENOUS at 21:30

## 2021-08-24 RX ADMIN — SERTRALINE HYDROCHLORIDE 150 MG: 50 TABLET ORAL at 21:30

## 2021-08-24 RX ADMIN — HEPARIN SODIUM 5000 UNITS: 1000 INJECTION INTRAVENOUS; SUBCUTANEOUS at 02:55

## 2021-08-24 RX ADMIN — KIT FOR THE PREPARATION OF TECHNETIUM TC99M SESTAMIBI 30 MILLICURIE: 1 INJECTION, POWDER, LYOPHILIZED, FOR SOLUTION PARENTERAL at 15:05

## 2021-08-24 RX ADMIN — CEFTRIAXONE SODIUM 1000 MG: 1 INJECTION, POWDER, FOR SOLUTION INTRAMUSCULAR; INTRAVENOUS at 12:00

## 2021-08-24 ASSESSMENT — PAIN SCALES - GENERAL: PAINLEVEL_OUTOF10: 0

## 2021-08-24 NOTE — PROGRESS NOTES
Medication History  [facility]    Patient Name: Paola Huber [birth year]     Medication history has been completed by: Evelyn Edward CPhT    Source(s) of information: Medication list provided by [facility]     Primary Care Physician: CHRYSTAL Gomez     Pharmacy:     Allergies as of 08/23/2021 - Review Complete 03/05/2020   Allergen Reaction Noted    Metformin  09/13/2017    Ramipril  09/22/2014        Prior to Admission medications    Medication Sig Start Date End Date Taking?  Authorizing Provider   acetaminophen (TYLENOL) 325 MG tablet Take 650 mg by mouth every 4 hours as needed for Pain   Yes Historical Provider, MD   calcium-vitamin D (OSCAL-500) 500-200 MG-UNIT per tablet Take 1 tablet by mouth 2 times daily   Yes Historical Provider, MD   bismuth subsalicylate (PEPTO BISMOL) 262 MG/15ML suspension Take 30 mLs by mouth every 6 hours as needed for Indigestion   Yes Historical Provider, MD   acetaminophen (TYLENOL) 325 MG tablet Take 650 mg by mouth 4 times daily   Yes Historical Provider, MD   oxybutynin (DITROPAN-XL) 10 MG extended release tablet Take 10 mg by mouth daily   Yes Historical Provider, MD   sertraline (ZOLOFT) 50 MG tablet Take 150 mg by mouth daily   Yes Historical Provider, MD   QUEtiapine (SEROQUEL) 25 MG tablet Take 25 mg by mouth nightly   Yes Historical Provider, MD   meclizine (ANTIVERT) 25 MG CHEW Take 25 mg by mouth Daily with lunch   Yes Historical Provider, MD   meclizine (ANTIVERT) 25 MG CHEW Take 25 mg by mouth 2 times daily   Yes Historical Provider, MD   metoprolol succinate (TOPROL XL) 25 MG extended release tablet Take 25 mg by mouth daily   Yes Historical Provider, MD   furosemide (LASIX) 20 MG tablet Take 20 mg by mouth three times a week M, W and Friday for edema   Yes Historical Provider, MD   insulin lispro (HUMALOG) 100 UNIT/ML injection vial Inject 0-6 Units into the skin 3 times daily (with meals) 3/8/20  Yes Allison Hobson MD pantoprazole (PROTONIX) 40 MG tablet Take 1 tablet by mouth 2 times daily (before meals)  Patient taking differently: Take 40 mg by mouth daily  2/9/20  Yes Claude Spearing, MD   glipiZIDE (GLUCOTROL) 10 MG tablet TAKE 1 TABLET BY MOUTH EVERY DAY  Patient taking differently: Take 2.5 mg by mouth daily (with breakfast)  1/28/20  Yes CHRYSTAL Ricardo   Cholecalciferol 100 MCG (4000 UT) CAPS Take 1 capsule by mouth daily 10/28/19  Yes CHRYSTAL Ricardo     Medications added or changed (ex.  new medication, dosage change, interval change, formulation change):  Acetaminophen 650 mg 4 times daily and every 4 hours prn (added)  Calcium-vitamin D 500-200 BID (added)  Pepto-bismol prn (added)    Medications removed from list (include reason, ex. noncompliance, medication cost, therapy complete etc.):   Atorvastatin ordered not on med list from facility  Ferrous sulfate ordered not on med list from facility  Handicap placard clean up list    Medications requiring reconciliation with provider:    Acetaminophen 650 mg 4 times daily and every 4 hours prn (added)  Calcium-vitamin D 500-200 BID (added)  Pepto-bismol prn (added)  Atorvastatin ordered not on med list from facility  Ferrous sulfate ordered not on med list from facility    Comments:   Medication list provided by [facility]     To my knowledge the above medication history is accurate as of 8/24/2021 7:44 AM.   Daisy Figueroa CPhT   8/24/2021 7:44 AM

## 2021-08-24 NOTE — ED NOTES
Spoke with Dr. Michael Landon on the phone at this time and informed him of the elevated troponin level per verbal order earlier today. Dr. Michael Landon verbalized going to do a cardiac cath procedure today. Unsure time.       Mervat Rios RN  08/24/21 [id number]

## 2021-08-24 NOTE — ED NOTES
Bed: ED-19  Expected date:   Expected time:   Means of arrival:   Comments:  [facility] bed     Annamarie Ortiz RN  08/23/21 1972

## 2021-08-24 NOTE — ED NOTES
DNR form signed and cath signed as well with this RN as witness.       Em Palma, JOAQUÍN  08/24/21 [id number]

## 2021-08-24 NOTE — PROGRESS NOTES
Meal tray ordered for patient at this time. POCT BG 85 . Ramez crackers and milk provided in the mean time.

## 2021-08-24 NOTE — H&P
Historical Provider, MD   insulin lispro (HUMALOG) 100 UNIT/ML injection vial Inject 0-6 Units into the skin 3 times daily (with meals) 3/8/20   Liliane Phelan MD   ferrous sulfate 325 (65 Fe) MG tablet Take 1 tablet by mouth daily (with breakfast) 2/9/20   Mimi Armstrong MD   pantoprazole (PROTONIX) 40 MG tablet Take 1 tablet by mouth 2 times daily (before meals) 2/9/20   Mimi Armstrong MD   glipiZIDE ([address]) 10 MG tablet TAKE 1 TABLET BY MOUTH EVERY DAY  Patient taking differently: 2.5 mg  1/28/20   CHRYSTAL Jones   Handicap Placard MISC Expiration 1/2025  Patient not taking: Reported on 8/9/2021 12/6/19   CHRYSTAL Jones   Handicap Placard MISC by Does not apply route  Patient not taking: Reported on 8/9/2021 10/29/19   CHRYSTAL Jones   atorvastatin (LIPITOR) 40 MG tablet Take 1 tablet by mouth daily 10/28/19   CHRYSTAL Jones   Cholecalciferol 100 MCG (4000 UT) CAPS Take 1 capsule by mouth daily 10/28/19   CHRYSTAL Jones        Allergies   Metformin and Ramipril    Social History     Social History     Tobacco History     Smoking Status  Passive Smoke Exposure - Never Smoker Smoking Tobacco Type  Cigarettes    Smokeless Tobacco Use  Never Used          Alcohol History     Alcohol Use Status  Yes Comment  every 2 months           Drug Use     Drug Use Status  Never          Sexual Activity     Sexually Active  Not Asked                Family History     Family History   Problem Relation Age of Onset    Diabetes Brother     Obesity Brother     Other Brother         TIA    Diabetes Mother     Other Mother         COPD    Heart Attack Father     Coronary Art Dis Father     Other Father         aortic aneurysm    Diabetes Brother     Cancer Brother        Review of Systems   Review of Systems   Constitutional: Positive for fatigue. HENT: Negative for congestion and sore throat. Eyes: Negative. Respiratory: Negative for cough and shortness of breath.     Cardiovascular: Positive for chest pain. Negative for leg swelling. Gastrointestinal: Negative for abdominal pain, constipation, nausea and vomiting. Endocrine: Negative. Genitourinary: Positive for urgency. Negative for dysuria and hematuria. Musculoskeletal: Negative for neck pain. Skin: Negative for wound. Neurological: Positive for dizziness. Negative for light-headedness. All other systems reviewed and are negative. Physical Exam   BP (!) 140/69   Pulse 68   Temp 98.4 °F (36.9 °C)   Resp 16   SpO2 97%     Physical Exam  Vitals and nursing note reviewed. Constitutional:       General: She is not in acute distress. Appearance: Normal appearance. HENT:      Head: Normocephalic. Cardiovascular:      Rate and Rhythm: Tachycardia present. Pulses: Normal pulses. Pulmonary:      Effort: Pulmonary effort is normal. No respiratory distress. Breath sounds: Wheezing present. No rhonchi. Abdominal:      General: Abdomen is flat. Bowel sounds are normal. There is no distension. Musculoskeletal:         General: Normal range of motion. Skin:     General: Skin is warm and dry. Capillary Refill: Capillary refill takes 2 to 3 seconds. Neurological:      General: No focal deficit present. Mental Status: She is alert and oriented to person, place, and time.          Labs      Recent Results (from the past 24 hour(s))   POCT Glucose    Collection Time: 08/23/21  3:43 PM   Result Value Ref Range    POC Glucose 115 (H) 70 - 99 MG/DL   CBC auto diff    Collection Time: 08/23/21  4:17 PM   Result Value Ref Range    WBC 8.5 4.0 - 10.5 K/CU MM    RBC 4.19 (L) 4.2 - 5.4 M/CU MM    Hemoglobin 11.5 (L) 12.5 - 16.0 GM/DL    Hematocrit 37.1 37 - 47 %    MCV 88.5 78 - 100 FL    MCH 27.4 27 - 31 PG    MCHC 31.0 (L) 32.0 - 36.0 %    RDW 15.1 (H) 11.7 - 14.9 %    Platelets 555 399 - 096 K/CU MM    MPV 9.8 7.5 - 11.1 FL    Differential Type AUTOMATED DIFFERENTIAL     Segs Relative 76.0 (H) 36 - 66 %    Lymphocytes % 16.3 (L) 24 - 44 %    Monocytes % 5.8 (H) 0 - 4 %    Eosinophils % 0.9 0 - 3 %    Basophils % 0.5 0 - 1 %    Segs Absolute 6.5 K/CU MM    Lymphocytes Absolute 1.4 K/CU MM    Monocytes Absolute 0.5 K/CU MM    Eosinophils Absolute 0.1 K/CU MM    Basophils Absolute 0.0 K/CU MM    Nucleated RBC % 0.0 %    Total Nucleated RBC 0.0 K/CU MM    Total Immature Neutrophil 0.04 K/CU MM    Immature Neutrophil % 0.5 (H) 0 - 0.43 %   CMP    Collection Time: 08/23/21  4:17 PM   Result Value Ref Range    Sodium 137 135 - 145 MMOL/L    Potassium 3.7 3.5 - 5.1 MMOL/L    Chloride 100 99 - 110 mMol/L    CO2 26 21 - 32 MMOL/L    BUN 31 (H) 6 - 23 MG/DL    CREATININE 1.4 (H) 0.6 - 1.1 MG/DL    Glucose 94 70 - 99 MG/DL    Calcium 9.3 8.3 - 10.6 MG/DL    Albumin 3.8 3.4 - 5.0 GM/DL    Total Protein 6.7 6.4 - 8.2 GM/DL    Total Bilirubin 0.3 0.0 - 1.0 MG/DL    ALT 9 (L) 10 - 40 U/L    AST 15 15 - 37 IU/L    Alkaline Phosphatase 146 (H) 40 - 129 IU/L    GFR Non- 37 (L) >60 mL/min/1.73m2    GFR  44 (L) >60 mL/min/1.73m2    Anion Gap 11 4 - 16   Troponin    Collection Time: 08/23/21  4:17 PM   Result Value Ref Range    Troponin T 0.034 (H) <0.01 NG/ML   Brain Natriuretic Peptide    Collection Time: 08/23/21  4:17 PM   Result Value Ref Range    Pro-.7 (H) <300 PG/ML   TSH without Reflex    Collection Time: 08/23/21  4:17 PM   Result Value Ref Range    TSH, High Sensitivity 1.170 0.270 - 4.20 uIu/ml   Magnesium    Collection Time: 08/23/21  4:17 PM   Result Value Ref Range    Magnesium 1.8 1.8 - 2.4 mg/dl        Imaging/Diagnostics Last 24 Hours   CT HEAD WO CONTRAST    Result Date: 8/23/2021  EXAMINATION: CT OF THE HEAD WITHOUT CONTRAST  8/23/2021 4:53 pm TECHNIQUE: CT of the head was performed without the administration of intravenous contrast. Dose modulation, iterative reconstruction, and/or weight based adjustment of the mA/kV was utilized to reduce the radiation dose to as low as reasonably achievable. COMPARISON: None. HISTORY: ORDERING SYSTEM PROVIDED HISTORY: dizziness TECHNOLOGIST PROVIDED HISTORY: Reason for exam:->dizziness Has a \"code stroke\" or \"stroke alert\" been called? ->No Decision Support Exception - unselect if not a suspected or confirmed emergency medical condition->Emergency Medical Condition (MA) Reason for Exam: VERTIGO, DIZZINESS Initial evaluation FINDINGS: BRAIN/VENTRICLES:  The ventricles and cisternal spaces are prominent consistent with cerebral atrophy. There is atherosclerotic calcification of the cavernous carotids. There is atherosclerotic disease of the distal vertebral arteries. There are confluent areas of hypoattenuation in the periventricular white matter and centrum semiovale that are likely related to chronic small vessel ischemic disease. There is no midline shift or mass effect. No hemorrhage is identified in the brain parenchyma. ORBITS: The visualized portion of the orbits demonstrate no acute abnormality. SINUSES:  The visualized paranasal sinuses and mastoid air cells are for the most part clear. SOFT TISSUES/SKULL:  No acute abnormality of the visualized skull or soft tissues. No acute intracranial abnormality. Cerebral atrophy. Atherosclerotic calcification of the cavernous carotid arteries and vertebral arteries. Chronic small vessel ischemic change. CT CHEST WO CONTRAST    Result Date: 8/23/2021  EXAMINATION: CT OF THE CHEST WITHOUT CONTRAST 8/23/2021 6:11 pm TECHNIQUE: CT of the chest was performed without the administration of intravenous contrast. Multiplanar reformatted images are provided for review. Dose modulation, iterative reconstruction, and/or weight based adjustment of the mA/kV was utilized to reduce the radiation dose to as low as reasonably achievable. COMPARISON: Imaging from earlier the same day.  HISTORY: ORDERING SYSTEM PROVIDED HISTORY: Abnormal chest x-ray, elevated troponin, weakness TECHNOLOGIST PROVIDED HISTORY: Reason for exam:->Abnormal chest x-ray, elevated troponin, weakness Reason for Exam: NODULE ON CHEST X-RAY FINDINGS: Mediastinum: There is no enlarged lymph node. The heart and great vessels sizes are normal.  There is no pericardial effusion. Lungs/pleura: No lung nodule or mass is identified. The questioned nodule on recent radiograph likely represents overlying normal pulmonary vasculature. There is no consolidation. No pleural effusion. Upper Abdomen: No acute abnormality. Soft Tissues/Bones: There is diffuse idiopathic skeletal hyperostosis. No suspicious lung nodule or mass. Normal pulmonary vasculature corresponding to the questioned nodule on recent radiograph. XR CHEST PORTABLE    Result Date: 8/23/2021  EXAMINATION: ONE XRAY VIEW OF THE CHEST 8/23/2021 5:11 pm COMPARISON: None. HISTORY: ORDERING SYSTEM PROVIDED HISTORY: dizziness TECHNOLOGIST PROVIDED HISTORY: Reason for exam:->dizziness Reason for Exam: dizziness Acuity: Acute Type of Exam: Initial Additional signs and symptoms: none Relevant Medical/Surgical History: none FINDINGS: Cardiomegaly. No acute airspace disease. No pneumothorax. No pleural effusion. Irregular nodular opacity right upper lobe. Possible irregular nodule right upper lobe. A chest CT is suggested for further evaluation       Assessment      Hospital Problems         Last Modified POA    Elevated troponin 8/23/2021 Yes          Plan   #Elevated troponin  #Chest pain    -Admit to obs inpatient, consult cardiology   -Patient denied chest pain at presentation. Upon further questioning admits she is having chest pain and shortness of breath.    -Obtain twelve-lead EKG . Initial troponin 0 0.034. Will trend troponin x3   -Cardiology consulted as noted above   -Check D-dimer   -Continue home aspirin statin. Received 325 mg aspirin .   -Continue home medication Toprol-XL 2 daily  #Fatigue  #Dizziness   -CT head \"without acute intracranial abnormality.   Evidence of chronic

## 2021-08-24 NOTE — PROGRESS NOTES
Perfect serve message sent to Dr Ayah Rivera asking for diet order. Last diet order was for NPO after midnight. Per sending RN, patient can eat. Pending new diet order at this time.

## 2021-08-24 NOTE — PROGRESS NOTES
Hospitalist Progress Note      Name:  Joselin Eldridge /Age/Sex: 89  [de-identified]78 y.o. female)   MRN & CSN:  [id number] & [id number] Admission Date/Time: 2021  3:34 PM   Location:  ED19/ED-19 PCP: Carey Kemp, [facility] Day: 2    Assessment and Plan:   79 y/o F that presented with dizziness, fatigue, substernal chest pressure    Dizziness   Chest Pain  ACS rule-out  - Overnight patient complained of substernal chest pain; troponin rising overnight so started on Heparin drip  - Continue ASA/Statin  - Repeat troponin this AM continues to rise  - Cardiology following; possible cath today; appreciate recs  - Continue Metoprolol   - NPO for now    Dysuria  - UA with leuk est and wbc/rbc  - Started on Ceftriaxone  - f/u Urine culture    Hx DM Type II  - SSI and hypoglycemia protocol    Hx COPD  - Albuterol PRN    Hx Overactive Bladder  - Continue home Oxybutinin    Hx Anxiety/Depression  - Home meds resumed    Diet Diet NPO   DVT Prophylaxis [] Lovenox, []  Heparin, [] SCDs, [] Ambulation   GI Prophylaxis [] PPI,  [] H2 Blocker,  [] Carafate,  [] Diet/Tube Feeds   Code Status Full Code   Disposition Patient requires continued admission due to    MDM [] Low, [] Moderate,[]  High  Patient's risk as above due to      History of Present Illness:     Events from overnight noted; states her chest pain has resolved; no nausea or vomiting; no fevers or chills    Objective:   No intake or output data in the 24 hours ending 21 1124   Vitals:   Vitals:    21 0922   BP:    Pulse: 69   Resp: 18   Temp:    SpO2: 100%     Physical Exam:   GEN Awake female, sitting upright in bed in no apparent distress. Appears given age. NECK Supple, no apparent thyromegaly or masses. RESP Clear to auscultation, no wheezes, rales or rhonchi. Symmetric chest movement while on room air. CARDIO/VASC S1/S2 auscultated. Regular rate without appreciable murmurs, rubs, or gallops.   GI Abdomen is soft without significant tenderness, masses, or guarding   No costovertebral angle tenderness  HEME/LYMPHNo petechiae or ecchymoses. MSK No gross joint deformities. SKIN Normal coloration, warm, dry. NEURO Cranial nerves appear grossly intact, normal speech  PSYCH Awake, alert, oriented x 4. Affect appropriate.     Medications:   Medications:    aspirin  81 mg Oral Daily    insulin lispro  0-12 Units Subcutaneous TID WC    insulin lispro  0-6 Units Subcutaneous Nightly    sodium chloride  1,000 mL Intravenous Once    aspirin  325 mg Oral Once    atorvastatin  40 mg Oral Daily    vitamin D  4,000 Int'l Units Oral Daily    ferrous sulfate  325 mg Oral Daily with breakfast    pantoprazole  40 mg Oral BID AC    oxybutynin  10 mg Oral Daily    sertraline  150 mg Oral Daily    QUEtiapine  25 mg Oral Nightly    meclizine  25 mg Oral Lunch    meclizine  25 mg Oral BID    metoprolol succinate  25 mg Oral Daily    sodium chloride flush  5-40 mL Intravenous 2 times per day      Infusions:    heparin (PORCINE) Infusion 11.8 Units/kg/hr (08/24/21 0954)    dextrose      sodium chloride       PRN Meds: heparin (porcine), 4,000 Units, PRN  heparin (porcine), 2,000 Units, PRN  glucose, 15 g, PRN  dextrose, 12.5 g, PRN  glucagon (rDNA), 1 mg, PRN  dextrose, 100 mL/hr, PRN  ondansetron, 4 mg, Q30 Min PRN  sodium chloride flush, 5-40 mL, PRN  sodium chloride, 25 mL, PRN  ondansetron, 4 mg, Q8H PRN   Or  ondansetron, 4 mg, Q6H PRN  acetaminophen, 650 mg, Q6H PRN   Or  acetaminophen, 650 mg, Q6H PRN  polyethylene glycol, 17 g, Daily PRN  potassium chloride, 40 mEq, PRN   Or  potassium alternative oral replacement, 40 mEq, PRN   Or  potassium chloride, 10 mEq, PRN  magnesium sulfate, 2,000 mg, PRN          Electronically signed by Ariana Mcdonald MD on 8/24/2021 at 11:24 AM

## 2021-08-24 NOTE — ED NOTES
PT unable to stand at this time. PT is unable to tune herself from side to side for a bed change. PT is a 2 person assist. Unable to obtain orthostatics.       Mansi Greenwood RN  08/24/21 [id number]

## 2021-08-24 NOTE — ED NOTES
Pt moved to ED room. BM and urine soaked depends and sheets. Full linen change. Changed into gown. Placed on purewick and chapstick provided. Warm blankets. Pt placed on tele.       Bev Chavez RN  08/23/21 [id number]

## 2021-08-24 NOTE — SIGNIFICANT EVENT
SIGNIFICANT EVENT:  Elevated troponin    SUBJECTIVE: c/o substernal chest pressure, lasted a few hours, was very anxious, not associated with diaphoresis, however c/o SOB, no n/v, or dizziness, CP did not radiate    RAPID RESPONSE was called for Birder Abhi for + troponin    Patient seen and examined in ED19/ED-19. OBJECTIVE:      VITALS  Vitals:    08/23/21 1540   BP: (!) 140/69   Pulse: 68   Resp: 16   Temp: 98.4 °F (36.9 °C)   SpO2: 97%          PHYSICAL EXAM   GEN axo4  HENT patent  RESP Tachypnea, CTA  CARDIO/VASC RRR  GI nontender  SKIN dry  PSYCH anxious    ASSESSMENT/PLAN:    + troponin with CP  - will start treatment for NSTEMI, heparin drip  -  repeat EKG, without any significant ischemic changes, no STEMI  - f/u cardiology consult      Lolis Hernandez MD  Internal Medicine Hospitalist  [facility]  8/24/2021 12:18 AM    Due to the immediate potential for life-threatening deterioration due to NSTEMI requiring heparin drip , I spent 35 minutes providing critical care. This time is excluding time spent performing procedures.

## 2021-08-24 NOTE — CONSULTS
CARDIOLOGY CONSULT NOTE   Reason for consultation:  Abnormal troponin     Referring physician:  Hakeem Castaneda MD     Primary care physician: CHRYSTAL Sacles      Dear  Dr. Hakeem Castaneda MD   Thanks for the consult. Chief Complaints :  Chief Complaint   Patient presents with    Dizziness        History of present illness: Mansi Dunlap is a 78 y. o.year old who presents from nursing home primarily complaining that she has been tired fatigued has significantly reduced strength in her legs could not walk felt dizzy unstable in the emergency department work-up revealed abnormal troponin levels hence cardiology consulted although she denies any chest pain she has no shortness of breath. She is a retired  and carries a DNR status but she wants aggressive treatment options including worsening of DNR if invasive procedures were done. He says her legs have been weak and she has a lot of knee pain and she was recently being in the middle of transferred from rehab nursing home  Apparently while in the emergency department rapid response was called due to chest pain although patient denies any symptoms to me? She came to the hospital due to concerns for \"head spinning\"  Due to chest pain abnormal troponins overnight started on heparin drip  EKG shows sinus rhythm with LVH and PVC    Past medical history:    has a past medical history of Clostridium difficile infection, Hyperlipidemia, Hypertension, Obesity, and Type 2 diabetes mellitus without complication ([address] Past surgical history:   has a past surgical history that includes Colonoscopy and Upper gastrointestinal endoscopy (N/A, 2/7/2020). Social History:   reports that she is a non-smoker but has been exposed to tobacco smoke. She has been exposed to 0.25 packs per day. She has never used smokeless tobacco. She reports current alcohol use. She reports that she does not use drugs.   Family history:   no family history of CAD, STROKE of DM at early age    Allergies   Allergen Reactions    Metformin      diarrhea    Ramipril      cough  cough         heparin (porcine) injection 4,000 Units, PRN  heparin (porcine) injection 2,000 Units, PRN  heparin 25,000 units in dextrose 5% 250 mL (premix) infusion, Continuous  aspirin chewable tablet 81 mg, Daily  insulin lispro (HUMALOG) injection vial 0-12 Units, TID WC  insulin lispro (HUMALOG) injection vial 0-6 Units, Nightly  glucose (GLUTOSE) 40 % oral gel 15 g, PRN  dextrose 50 % IV solution, PRN  glucagon (rDNA) injection 1 mg, PRN  dextrose 5 % solution, PRN  cefTRIAXone (ROCEPHIN) 1000 mg IVPB in 50 mL D5W minibag, Q24H  albuterol sulfate  (90 Base) MCG/ACT inhaler 2 puff, Q6H PRN  0.9 % sodium chloride bolus, Once  ondansetron (ZOFRAN) injection 4 mg, Q30 Min PRN  aspirin tablet 325 mg, Once  atorvastatin (LIPITOR) tablet 40 mg, Daily  vitamin D CAPS 4,000 Units, Daily  ferrous sulfate (IRON 325) tablet 325 mg, Daily with breakfast  pantoprazole (PROTONIX) tablet 40 mg, BID AC  oxybutynin (DITROPAN-XL) extended release tablet 10 mg, Daily  sertraline (ZOLOFT) tablet 150 mg, Daily  QUEtiapine (SEROQUEL) tablet 25 mg, Nightly  meclizine (ANTIVERT) tablet 25 mg, Lunch  meclizine (ANTIVERT) tablet 25 mg, BID  metoprolol succinate (TOPROL XL) extended release tablet 25 mg, Daily  sodium chloride flush 0.9 % injection 5-40 mL, 2 times per day  sodium chloride flush 0.9 % injection 5-40 mL, PRN  0.9 % sodium chloride infusion, PRN  ondansetron (ZOFRAN-ODT) disintegrating tablet 4 mg, Q8H PRN   Or  ondansetron (ZOFRAN) injection 4 mg, Q6H PRN  acetaminophen (TYLENOL) tablet 650 mg, Q6H PRN   Or  acetaminophen (TYLENOL) suppository 650 mg, Q6H PRN  polyethylene glycol (GLYCOLAX) packet 17 g, Daily PRN  potassium chloride (KLOR-CON M) extended release tablet 40 mEq, PRN   Or  potassium bicarb-citric acid (EFFER-K) effervescent tablet 40 mEq, PRN   Or  potassium chloride 10 mEq/100 mL IVPB (Peripheral Line), PRN  magnesium sulfate 2000 mg in 50 mL IVPB premix, PRN      Current Facility-Administered Medications   Medication Dose Route Frequency Provider Last Rate Last Admin    heparin (porcine) injection 4,000 Units  4,000 Units Intravenous PRN Chintan Moss MD        heparin (porcine) injection 2,000 Units  2,000 Units Intravenous PRN Chintan Moss MD        heparin 25,000 units in dextrose 5% 250 mL (premix) infusion  5-30 Units/kg/hr Intravenous Continuous Chintan Moss MD 12 mL/hr at 08/24/21 0954 11.8 Units/kg/hr at 08/24/21 0954    aspirin chewable tablet 81 mg  81 mg Oral Daily Martin Lagos MD        insulin lispro (HUMALOG) injection vial 0-12 Units  0-12 Units Subcutaneous TID WC Martin Lagos MD        insulin lispro (HUMALOG) injection vial 0-6 Units  0-6 Units Subcutaneous Nightly Martin Lagos MD        glucose (GLUTOSE) 40 % oral gel 15 g  15 g Oral PRN Martin Lagos MD        dextrose 50 % IV solution  12.5 g Intravenous PRN Martin Lagos MD        glucagon (rDNA) injection 1 mg  1 mg Intramuscular PRN Martin Lagos MD        dextrose 5 % solution  100 mL/hr Intravenous PRN Martin Lagos MD        cefTRIAXone (ROCEPHIN) 1000 mg IVPB in 50 mL D5W minibag  1,000 mg Intravenous Q24H Martin Lagos MD        albuterol sulfate  (90 Base) MCG/ACT inhaler 2 puff  2 puff Inhalation Q6H PRN Martin Lagos MD        0.9 % sodium chloride bolus  1,000 mL Intravenous Once ANGIE Richardson CNP        ondansetron (ZOFRAN) injection 4 mg  4 mg Intravenous Q30 Min PRN ANGIE Richardson CNP        aspirin tablet 325 mg  325 mg Oral Once ANGIE Richardson CNP        atorvastatin (LIPITOR) tablet 40 mg  40 mg Oral Daily ANGIE Richardson CNP   40 mg at 08/23/21 2249    vitamin D CAPS 4,000 Units  4,000 Int'l Units Oral Daily ANGIE Richardson CNP        ferrous sulfate (IRON 325) tablet 325 mg  325 mg Oral Daily with I ANGIE Ballesteros CNP        Or    potassium chloride 10 mEq/100 mL IVPB (Peripheral Line)  10 mEq Intravenous PRN ANGIE Richardson CNP        magnesium sulfate 2000 mg in 50 mL IVPB premix  2,000 mg Intravenous PRN ANGIE Richardson CNP         Current Outpatient Medications   Medication Sig Dispense Refill    acetaminophen (TYLENOL) 325 MG tablet Take 650 mg by mouth every 4 hours as needed for Pain      calcium-vitamin D (OSCAL-500) 500-200 MG-UNIT per tablet Take 1 tablet by mouth 2 times daily      bismuth subsalicylate (PEPTO BISMOL) 262 MG/15ML suspension Take 30 mLs by mouth every 6 hours as needed for Indigestion      acetaminophen (TYLENOL) 325 MG tablet Take 650 mg by mouth 4 times daily      oxybutynin (DITROPAN-XL) 10 MG extended release tablet Take 10 mg by mouth daily      sertraline (ZOLOFT) 50 MG tablet Take 150 mg by mouth daily      QUEtiapine (SEROQUEL) 25 MG tablet Take 25 mg by mouth nightly      meclizine (ANTIVERT) 25 MG CHEW Take 25 mg by mouth Daily with lunch      meclizine (ANTIVERT) 25 MG CHEW Take 25 mg by mouth 2 times daily      metoprolol succinate (TOPROL XL) 25 MG extended release tablet Take 25 mg by mouth daily      furosemide (LASIX) 20 MG tablet Take 20 mg by mouth three times a week M, W and Friday for edema      insulin lispro (HUMALOG) 100 UNIT/ML injection vial Inject 0-6 Units into the skin 3 times daily (with meals) 1 vial 3    pantoprazole (PROTONIX) 40 MG tablet Take 1 tablet by mouth 2 times daily (before meals) (Patient taking differently: Take 40 mg by mouth daily ) 60 tablet 1    glipiZIDE (GLUCOTROL) 10 MG tablet TAKE 1 TABLET BY MOUTH EVERY DAY (Patient taking differently: Take 2.5 mg by mouth daily (with breakfast) ) 90 tablet 0    Cholecalciferol 100 MCG (4000 UT) CAPS Take 1 capsule by mouth daily 90 capsule 1     Review of Systems:   · Constitutional: No Fever or Weight Loss   · Eyes: No Decreased Vision  · ENT: No Headaches, Hearing Loss or Vertigo  · Cardiovascular: As per HPI  · Respiratory: As per HPI  · Gastrointestinal: No abdominal pain, appetite loss, blood in stools, constipation, diarrhea or heartburn  · Genitourinary: No dysuria, trouble voiding, or hematuria  · Musculoskeletal:  No gait disturbance, weakness or joint complaints  · Integumentary: No rash or pruritis  · Neurological: No TIA or stroke symptoms  · Psychiatric: No anxiety or depression  · Endocrine: No malaise, fatigue or temperature intolerance  · Hematologic/Lymphatic: No bleeding problems, blood clots or swollen lymph nodes  · Allergic/Immunologic: No nasal congestion or hives  All systems negative except as marked. Physical Examination:    Vitals:    08/24/21 0231 08/24/21 0301 08/24/21 0901 08/24/21 0922   BP: 129/77 (!) 130/58 124/63    Pulse: 77 75 71 69   Resp: 25 17 19 18   Temp:       SpO2:   99% 100%   Weight:       Height:           General Appearance:  No distress, conversant    Constitutional:  Well developed, Well nourished, No acute distress, Non-toxic appearance. HENT:  Normocephalic, Atraumatic, Bilateral external ears normal, Oropharynx moist, No oral exudates, Nose normal. Neck- Normal range of motion, No tenderness, Supple, No stridor,no apical-carotid delay  Lymphatics : no palpable lymph nodes  Eyes:  PERRL, EOMI, Conjunctiva normal, No discharge. Respiratory:  Normal breath sounds, No respiratory distress, No wheezing, No chest tenderness. ,no use of accessory muscles, crackles Absent   Cardiovascular: (PMI) apex non displaced,no lifts no thrills, ankle swelling Absent  , 1+, s1 and s2 audible,Murmur. Absent , JVD not noted    Abdomen /GI:  Bowel sounds normal, Soft, No tenderness, No masses, No gross visceromegaly   :  No costovertebral angle tenderness   Musculoskeletal:  No edema, no tenderness, no deformities.  Back- no tenderness  Integument:  Well hydrated, no rash   Lymphatic:  No lymphadenopathy noted Neurologic:  Alert & oriented x 3, CN 2-12 normal, normal motor function, normal sensory function, no focal deficits noted           Medical decision making and Data review:    Lab Review   Recent Labs     08/23/21  1617   WBC 8.5   HGB 11.5*   HCT 37.1         Recent Labs     08/23/21  1617      K 3.7      CO2 26   BUN 31*   CREATININE 1.4*     Recent Labs     08/23/21  1617   AST 15   ALT 9*   BILITOT 0.3   ALKPHOS 146*     Recent Labs     08/23/21  1617 08/23/21  2301 08/24/21  0854   TROPONINT 0.034* 0.123* 0.148*       Recent Labs     08/23/21  1617   PROBNP 510.7*     Lab Results   Component Value Date    INR 1.09 02/28/2020    PROTIME 12.7 02/28/2020       EKG: (reviewed by myself)    ECHO:(reviewed by myself)    Chest Xray:(reviewed by myself)  CT HEAD WO CONTRAST    Result Date: 8/23/2021  EXAMINATION: CT OF THE HEAD WITHOUT CONTRAST  8/23/2021 4:53 pm TECHNIQUE: CT of the head was performed without the administration of intravenous contrast. Dose modulation, iterative reconstruction, and/or weight based adjustment of the mA/kV was utilized to reduce the radiation dose to as low as reasonably achievable. COMPARISON: None. HISTORY: ORDERING SYSTEM PROVIDED HISTORY: dizziness TECHNOLOGIST PROVIDED HISTORY: Reason for exam:->dizziness Has a \"code stroke\" or \"stroke alert\" been called? ->No Decision Support Exception - unselect if not a suspected or confirmed emergency medical condition->Emergency Medical Condition (MA) Reason for Exam: VERTIGO, DIZZINESS Initial evaluation FINDINGS: BRAIN/VENTRICLES:  The ventricles and cisternal spaces are prominent consistent with cerebral atrophy. There is atherosclerotic calcification of the cavernous carotids. There is atherosclerotic disease of the distal vertebral arteries. There are confluent areas of hypoattenuation in the periventricular white matter and centrum semiovale that are likely related to chronic small vessel ischemic disease.   There is ORDERING SYSTEM PROVIDED HISTORY: dizziness TECHNOLOGIST PROVIDED HISTORY: Reason for exam:->dizziness Reason for Exam: dizziness Acuity: Acute Type of Exam: Initial Additional signs and symptoms: none Relevant Medical/Surgical History: none FINDINGS: Cardiomegaly. No acute airspace disease. No pneumothorax. No pleural effusion. Irregular nodular opacity right upper lobe. Possible irregular nodule right upper lobe. A chest CT is suggested for further evaluation       All labs, medications and tests reviewed by myself including data  from outside source , patient and available family . Continue all other medications of all above medical condition listed as is. Impression:  Active Problems:    Debility    Elevated troponin  Resolved Problems:    * No resolved hospital problems. *      Assessment: 78 y. o.year old with PMH of  has a past medical history of Clostridium difficile infection, Hyperlipidemia, Hypertension, Obesity, and Type 2 diabetes mellitus without complication ([facility] [place] [address] Plan and Recommendations:    Elevated Troponin : Check serial troponin if they are not rising we will continue medical management for now. probably Demand ischemia related leak, atypical features  Get stress test for risk stratification due to multiple risk factors    She denies chest pain to me? Apparently was anxious yesterday  Get echo  Dizziness vertigo? Physical therapy rehab  DVT prophylaxis if no contraindication  6. Dyslipidemia: continue statins           Thank you  much for consult and giving us the opportunity in contributing in the care of this patient. Please feel free to call me for any questions.        Carmen Ferrell MD, 8/24/2021 11:47 AM

## 2021-08-25 VITALS
HEART RATE: 65 BPM | HEIGHT: 64 IN | WEIGHT: 195.77 LBS | RESPIRATION RATE: 16 BRPM | TEMPERATURE: 97.4 F | SYSTOLIC BLOOD PRESSURE: 143 MMHG | DIASTOLIC BLOOD PRESSURE: 63 MMHG | BODY MASS INDEX: 33.42 KG/M2 | OXYGEN SATURATION: 98 %

## 2021-08-25 LAB
ANION GAP SERPL CALCULATED.3IONS-SCNC: 9 MMOL/L (ref 4–16)
APTT: 40.6 SECONDS (ref 25.1–37.1)
APTT: 73.5 SECONDS (ref 25.1–37.1)
BASOPHILS ABSOLUTE: 0 K/CU MM
BASOPHILS RELATIVE PERCENT: 0.5 % (ref 0–1)
BUN BLDV-MCNC: 28 MG/DL (ref 6–23)
CALCIUM SERPL-MCNC: 9 MG/DL (ref 8.3–10.6)
CHLORIDE BLD-SCNC: 104 MMOL/L (ref 99–110)
CO2: 26 MMOL/L (ref 21–32)
CREAT SERPL-MCNC: 1 MG/DL (ref 0.6–1.1)
DIFFERENTIAL TYPE: ABNORMAL
EOSINOPHILS ABSOLUTE: 0.2 K/CU MM
EOSINOPHILS RELATIVE PERCENT: 2.5 % (ref 0–3)
GFR AFRICAN AMERICAN: >60 ML/MIN/1.73M2
GFR NON-AFRICAN AMERICAN: 54 ML/MIN/1.73M2
GLUCOSE BLD-MCNC: 110 MG/DL (ref 70–99)
GLUCOSE BLD-MCNC: 121 MG/DL (ref 70–99)
GLUCOSE BLD-MCNC: 140 MG/DL (ref 70–99)
HCT VFR BLD CALC: 39.5 % (ref 37–47)
HCT VFR BLD CALC: 41.3 % (ref 37–47)
HEMOGLOBIN: 12.2 GM/DL (ref 12.5–16)
HEMOGLOBIN: 12.2 GM/DL (ref 12.5–16)
IMMATURE NEUTROPHIL %: 0.2 % (ref 0–0.43)
LYMPHOCYTES ABSOLUTE: 1.2 K/CU MM
LYMPHOCYTES RELATIVE PERCENT: 15.4 % (ref 24–44)
MCH RBC QN AUTO: 27.7 PG (ref 27–31)
MCHC RBC AUTO-ENTMCNC: 30.9 % (ref 32–36)
MCV RBC AUTO: 89.8 FL (ref 78–100)
MONOCYTES ABSOLUTE: 0.5 K/CU MM
MONOCYTES RELATIVE PERCENT: 6.1 % (ref 0–4)
NUCLEATED RBC %: 0 %
PDW BLD-RTO: 15.5 % (ref 11.7–14.9)
PLATELET # BLD: 268 K/CU MM (ref 140–440)
PMV BLD AUTO: 10.2 FL (ref 7.5–11.1)
POTASSIUM SERPL-SCNC: 3.8 MMOL/L (ref 3.5–5.1)
RBC # BLD: 4.4 M/CU MM (ref 4.2–5.4)
SARS-COV-2, NAAT: NOT DETECTED
SEGMENTED NEUTROPHILS ABSOLUTE COUNT: 6.1 K/CU MM
SEGMENTED NEUTROPHILS RELATIVE PERCENT: 75.3 % (ref 36–66)
SODIUM BLD-SCNC: 139 MMOL/L (ref 135–145)
SOURCE: NORMAL
TOTAL IMMATURE NEUTOROPHIL: 0.02 K/CU MM
TOTAL NUCLEATED RBC: 0 K/CU MM
TROPONIN T: 0.09 NG/ML
WBC # BLD: 8.1 K/CU MM (ref 4–10.5)

## 2021-08-25 PROCEDURE — 87635 SARS-COV-2 COVID-19 AMP PRB: CPT

## 2021-08-25 PROCEDURE — 82962 GLUCOSE BLOOD TEST: CPT

## 2021-08-25 PROCEDURE — 84484 ASSAY OF TROPONIN QUANT: CPT

## 2021-08-25 PROCEDURE — 97162 PT EVAL MOD COMPLEX 30 MIN: CPT

## 2021-08-25 PROCEDURE — G0378 HOSPITAL OBSERVATION PER HR: HCPCS

## 2021-08-25 PROCEDURE — 6360000002 HC RX W HCPCS: Performed by: INTERNAL MEDICINE

## 2021-08-25 PROCEDURE — 36415 COLL VENOUS BLD VENIPUNCTURE: CPT

## 2021-08-25 PROCEDURE — 97530 THERAPEUTIC ACTIVITIES: CPT

## 2021-08-25 PROCEDURE — 85014 HEMATOCRIT: CPT

## 2021-08-25 PROCEDURE — 6370000000 HC RX 637 (ALT 250 FOR IP): Performed by: NURSE PRACTITIONER

## 2021-08-25 PROCEDURE — 97166 OT EVAL MOD COMPLEX 45 MIN: CPT

## 2021-08-25 PROCEDURE — 6370000000 HC RX 637 (ALT 250 FOR IP): Performed by: INTERNAL MEDICINE

## 2021-08-25 PROCEDURE — 2580000003 HC RX 258: Performed by: NURSE PRACTITIONER

## 2021-08-25 PROCEDURE — 94760 N-INVAS EAR/PLS OXIMETRY 1: CPT

## 2021-08-25 PROCEDURE — 85730 THROMBOPLASTIN TIME PARTIAL: CPT

## 2021-08-25 PROCEDURE — 97116 GAIT TRAINING THERAPY: CPT

## 2021-08-25 PROCEDURE — 80048 BASIC METABOLIC PNL TOTAL CA: CPT

## 2021-08-25 PROCEDURE — 85025 COMPLETE CBC W/AUTO DIFF WBC: CPT

## 2021-08-25 PROCEDURE — 97535 SELF CARE MNGMENT TRAINING: CPT

## 2021-08-25 PROCEDURE — 2580000003 HC RX 258: Performed by: INTERNAL MEDICINE

## 2021-08-25 PROCEDURE — 85018 HEMOGLOBIN: CPT

## 2021-08-25 RX ORDER — ASPIRIN 81 MG/1
81 TABLET, CHEWABLE ORAL DAILY
Qty: 30 TABLET | Refills: 3 | Status: SHIPPED | OUTPATIENT
Start: 2021-08-26

## 2021-08-25 RX ORDER — ALBUTEROL SULFATE 90 UG/1
2 AEROSOL, METERED RESPIRATORY (INHALATION) EVERY 6 HOURS PRN
Qty: 1 INHALER | Refills: 3 | Status: SHIPPED | OUTPATIENT
Start: 2021-08-25

## 2021-08-25 RX ORDER — CEPHALEXIN 500 MG/1
500 CAPSULE ORAL 2 TIMES DAILY
Qty: 14 CAPSULE | Refills: 0 | Status: SHIPPED | OUTPATIENT
Start: 2021-08-25 | End: 2021-09-01

## 2021-08-25 RX ADMIN — ASPIRIN 81 MG: 81 TABLET, CHEWABLE ORAL at 10:11

## 2021-08-25 RX ADMIN — MECLIZINE HYDROCHLORIDE 25 MG: 25 TABLET ORAL at 12:54

## 2021-08-25 RX ADMIN — SODIUM CHLORIDE, PRESERVATIVE FREE 10 ML: 5 INJECTION INTRAVENOUS at 13:01

## 2021-08-25 RX ADMIN — Medication 4000 UNITS: at 10:10

## 2021-08-25 RX ADMIN — SODIUM CHLORIDE, PRESERVATIVE FREE 10 ML: 5 INJECTION INTRAVENOUS at 10:12

## 2021-08-25 RX ADMIN — OXYBUTYNIN CHLORIDE 10 MG: 10 TABLET, EXTENDED RELEASE ORAL at 00:05

## 2021-08-25 RX ADMIN — FERROUS SULFATE TAB 325 MG (65 MG ELEMENTAL FE) 325 MG: 325 (65 FE) TAB at 10:11

## 2021-08-25 RX ADMIN — MECLIZINE HYDROCHLORIDE 25 MG: 25 TABLET ORAL at 10:11

## 2021-08-25 RX ADMIN — PANTOPRAZOLE SODIUM 40 MG: 40 TABLET, DELAYED RELEASE ORAL at 06:11

## 2021-08-25 RX ADMIN — CEFTRIAXONE SODIUM 1000 MG: 1 INJECTION, POWDER, FOR SOLUTION INTRAMUSCULAR; INTRAVENOUS at 12:54

## 2021-08-25 RX ADMIN — METOPROLOL SUCCINATE 25 MG: 25 TABLET, EXTENDED RELEASE ORAL at 10:10

## 2021-08-25 NOTE — PROGRESS NOTES
Cardiology Progress Note     Admit Date:  8/23/2021    Consult reason/ Seen today for :   Abnormal troponin    Subjective and  Overnight Events : He is still fatigued tired has less energy stress test yesterday did not show any ischemia  Reports feeling somnolent drowsy more importantly complaining of bilateral leg weakness and pain  Echo shows hyperdynamic left ventricle with no wall motion abnormality    Chief complain on admission : 78 y. o.year old who is admitted for  Chief Complaint   Patient presents with    Dizziness      Assessment / Plan:  Abnormal troponin level: Echo and stress test are normal no further cardiac work-up at this time she is not having any chest pain nonspecific troponin elevation  Fatigue tiredness leg weakness as per primary team  She is unable to stand to get orthostatics but denies any vertigo-like symptoms  HTN: stable, continue To titrate up medication as needed  DVT Prophylaxis if no contraindication  We will sign off call with questions    Past medical history:    has a past medical history of Clostridium difficile infection, Hyperlipidemia, Hypertension, Obesity, and Type 2 diabetes mellitus without complication ([facility] [address] Past surgical history:   has a past surgical history that includes Colonoscopy and Upper gastrointestinal endoscopy (N/A, 2/7/2020). Social History:   reports that she is a non-smoker but has been exposed to tobacco smoke. She has been exposed to 0.25 packs per day. She has never used smokeless tobacco. She reports current alcohol use. She reports that she does not use drugs. Family history:  family history includes Cancer in her brother; Coronary Art Dis in her father; Diabetes in her brother, brother, and mother; Heart Attack in her father; Obesity in her brother; Other in her brother, father, and mother.     Allergies   Allergen Reactions    Metformin      diarrhea    Ramipril cough  cough         Review of Systems:    All 14 systems were reviewed and are negative  Except for the positive findings  which as documented     BP (!) 143/63   Pulse 65   Temp 97.4 °F (36.3 °C) (Oral)   Resp 16   Ht 5' 4\" (1.626 m)   Wt 195 lb 12.3 oz (88.8 kg)   SpO2 98%   BMI 33.60 kg/m²       Intake/Output Summary (Last 24 hours) at 8/25/2021 1229  Last data filed at 8/25/2021 0636  Gross per 24 hour   Intake --   Output 400 ml   Net -400 ml     Physical Exam:  Constitutional:  Well developed, Well nourished, No acute distress, Non-toxic appearance. HENT:  Normocephalic, Atraumatic, Bilateral external ears normal, Oropharynx moist, No oral exudates, Nose normal. Neck- Normal range of motion, No tenderness, Supple, No stridor. Eyes:  PERRL, EOMI, Conjunctiva normal, No discharge. Respiratory:  Normal breath sounds, No respiratory distress, No wheezing, No chest tenderness. Cardiovascular:  Normal heart rate, Normal rhythm, No murmurs, No rubs, No gallops, JVP not elevated  Abdomen/GI:  Bowel sounds normal, Soft, No tenderness, No masses, No pulsatile masses. Musculoskeletal:  Intact distal pulses, No edema, No tenderness, No cyanosis, No clubbing. Good range of motion in all major joints. No tenderness to palpation or major deformities noted. Back- No tenderness. Integument:  Warm, Dry, No erythema, No rash. Lymphatic:  No lymphadenopathy noted. Neurologic:  Alert & oriented x 3, Normal motor function, Normal sensory function, No focal deficits noted.    Psychiatric:  Affect  and  Mood :no change    Medications:    aspirin  81 mg Oral Daily    insulin lispro  0-12 Units Subcutaneous TID     insulin lispro  0-6 Units Subcutaneous Nightly    cefTRIAXone (ROCEPHIN) IV  1,000 mg IntraVENous Q24H    sodium chloride  1,000 mL IntraVENous Once    aspirin  325 mg Oral Once    atorvastatin  40 mg Oral Daily    vitamin D  4,000 Int'l Units Oral Daily    ferrous sulfate  325 mg Oral Daily with breakfast    pantoprazole  40 mg Oral BID AC    oxybutynin  10 mg Oral Daily    sertraline  150 mg Oral Daily    QUEtiapine  25 mg Oral Nightly    meclizine  25 mg Oral Lunch    meclizine  25 mg Oral BID    metoprolol succinate  25 mg Oral Daily    sodium chloride flush  5-40 mL IntraVENous 2 times per day      dextrose      sodium chloride       heparin (porcine), heparin (porcine), glucose, dextrose, glucagon (rDNA), dextrose, albuterol sulfate HFA, ondansetron, sodium chloride flush, sodium chloride, ondansetron **OR** ondansetron, acetaminophen **OR** acetaminophen, polyethylene glycol, potassium chloride **OR** potassium alternative oral replacement **OR** potassium chloride, magnesium sulfate    Lab Data:  CBC:   Recent Labs     08/23/21  1617 08/25/21  0605   WBC 8.5 8.1   HGB 11.5* 12.2*   HCT 37.1 39.5   MCV 88.5 89.8    268     BMP:   Recent Labs     08/23/21  1617 08/25/21  0605    139   K 3.7 3.8    104   CO2 26 26   BUN 31* 28*   CREATININE 1.4* 1.0     PT/INR: No results for input(s): PROTIME, INR in the last 72 hours. BNP:    Recent Labs     08/23/21  1617   PROBNP 510.7*     TROPONIN:   Recent Labs     08/23/21  2301 08/24/21  0854 08/24/21  1735   TROPONINT 0.123* 0.148* 0.123*        ECHO :   echocardiogram    Assessment:  78 y. o.year old who is admitted for  Chief Complaint   Patient presents with    Dizziness    , active issues as noted below:  Impression:  Active Problems:    Debility    Elevated troponin    Chest pain  Resolved Problems:    * No resolved hospital problems. *            All labs, medications and tests reviewed by myself , continue all other medications of all above medical condition listed as is except for changes mentioned above. Thank you very much for consult , please call with questions.     Katty Herman MD, MD 8/25/2021 12:29 PM

## 2021-08-25 NOTE — CARE COORDINATION
Chart reviewed and met w/ pt to initiate discharge planning. CM introduced self and explained role. Pt is from [address] [facility] at [facility]. Pt states she typically ambulates with a walker. CM discussed therapy coming to eval pt today and possible need for short rehab stay. Pt is in agreement for therapy if needed. Pt states she has been to the skilled unit at [facility] before but it didn't seem to be enough therapy at that time. CM did discuss possible ARU with pt if therapy recommends that she will be able to tolerate that much therapy. Pt is in agreement to wait for therapy evals and recommendations for confirmed discharge plan. CM advised pt that CM will follow up with her after therapy evals. 10:43 AM CM was updated by therapy that pt would be appropriate for ARU. Referral sent to [facility]. Per Kianna, no bed available at this time and will not be available until Friday/Saturday. CM remet w/ pt to inform her that no bed available and she is in agreement to return to [facility] with short stay in the skilled unit. Message to Os[place], [facility] admissions, to inform her of therapy recommendations and potential discharge today. 12:01 PM Electronic PASR completed. 2:46 PM Transport arranged with [facility] for 8-9pm. CM updated primary RN, Karen Evangelista and [place], [facility] admissions. 3:38 PM Transport time changed to 4pm w/ [facility].

## 2021-08-25 NOTE — DISCHARGE INSTR - OTHER ORDERS
TTE 8/24/2021 Summary   Left ventricular systolic function is hyperdynamic. Ejection fraction is visually estimated at >60%. Mild left ventricular hypertrophy. Grade I diastolic dysfunction. Sclerotic, but non-stenotic aortic valve. Mitral annular calcification is present. No evidence of any pericardial effusion. Stress Test     Summary    Normal EF 71 % with normal ventricular contractility. No infarct or ischemia    noted. Normal stress myocardial perfusion. This is a normal study.

## 2021-08-25 NOTE — PROGRESS NOTES
Hospitalist Progress Note      Name:  Marquise Brenner /Age/Sex: 2019  (73 y.o. female)   MRN & CSN:  [id number] & [id number] Admission Date/Time: 2021  3:34 PM   Location:  OBS VKI22-01 PCP: Roxane Coreas, [facility] Day: 3    Assessment and Plan: Marquise Brenner is a 78 y.o.  female  who presents with dizziness, fatigue, and substernal chest pressure     1. Chest pain     Troponin x 3-.123, .148, . 123   Started on Heparin (), DC'd () due to normal stress test    BNP-510   Stress test ()-negative, EF of 71%   CXR () shows an irregular nodule in the right upper lobe, FU CT recommended    CT chest () shows normal vasculature and no suspicious lung nodule noted on CXR   Continue metoprolol     mg daily    Cardiology consulted (signed off )    2. Acute cystitis with hematuria    UA () shows large blood, large leuks, RBC, WBC, bacteria, protein and ketones    Rocephin started    Repeat UA on     3. Dizziness-resolved   Probably 2/2 anxiety and chest pain    Ct head () shows no acute intracranial abnormality, small vessel ischemic changes, and atherosclerotic calcification of the cavernous carotid    Trial antivert     4. Type 2 DM   A1C () 5.8    SSI   Hold home glipizide     5. Hyperlipidemia    Continue home lipitor     This patient was seen and examined autonomously  A hospitalist attending physician was available for questions/consultation as needed. Diet ADULT DIET; Regular; 5 carb choices (75 gm/meal)   DVT Prophylaxis [] Lovenox, [x]  Heparin, [] SCDs, [] Warfarin  [] NOAC     GI Prophylaxis [] PPI,  [] H2 Blocker,  [] Carafate,  [] Diet/Tube Feeds   Code Status Full Code     History of Present Illness:     Chief Complaint: dizziness, fatigue and substernal chest pain. Currently pain free. Ten point ROS reviewed negative, unless as noted above    Objective:        Intake/Output Summary (Last 24 hours) at 2021 [address] filed at 8/25/2021 0636  Gross per 24 hour   Intake --   Output 400 ml   Net -400 ml      Vitals:   Vitals:    08/25/21 1007   BP: (!) 143/63   Pulse: 65   Resp: 16   Temp:    SpO2: 98%     Physical Exam:   GEN Awake female, sitting upright in bed in no apparent distress. Appears given age. EYES Pupils are equally round. No scleral erythema, discharge, or conjunctivitis. HENT Mucous membranes are moist. Oral pharynx without exudates, no evidence of thrush. NECK Supple, no apparent thyromegaly or masses. RESP Clear to auscultation, no wheezes, rales or rhonchi. Symmetric chest movement while on room air. CARDIO/VASC S1/S2 auscultated. Regular rate without appreciable murmurs, rubs, or gallops. No JVD or carotid bruits. Peripheral pulses equal bilaterally and palpable. +1 peripheral edema. GI Abdomen is soft without significant tenderness, masses, or guarding. Bowel sounds are normoactive. Rectal exam deferred. MSK No gross joint deformities. SKIN Normal coloration, warm, dry. NEURO Cranial nerves appear grossly intact, normal speech, no lateralizing weakness. PSYCH Awake, alert, oriented x 4. Affect appropriate.     Medications:   Medications:    aspirin  81 mg Oral Daily    insulin lispro  0-12 Units Subcutaneous TID     insulin lispro  0-6 Units Subcutaneous Nightly    cefTRIAXone (ROCEPHIN) IV  1,000 mg IntraVENous Q24H    sodium chloride  1,000 mL IntraVENous Once    aspirin  325 mg Oral Once    atorvastatin  40 mg Oral Daily    vitamin D  4,000 Int'l Units Oral Daily    ferrous sulfate  325 mg Oral Daily with breakfast    pantoprazole  40 mg Oral BID AC    oxybutynin  10 mg Oral Daily    sertraline  150 mg Oral Daily    QUEtiapine  25 mg Oral Nightly    meclizine  25 mg Oral Lunch    meclizine  25 mg Oral BID    metoprolol succinate  25 mg Oral Daily    sodium chloride flush  5-40 mL IntraVENous 2 times per day      Infusions:    dextrose      sodium chloride PRN Meds: heparin (porcine), 4,000 Units, PRN  heparin (porcine), 2,000 Units, PRN  glucose, 15 g, PRN  dextrose, 12.5 g, PRN  glucagon (rDNA), 1 mg, PRN  dextrose, 100 mL/hr, PRN  albuterol sulfate HFA, 2 puff, Q6H PRN  ondansetron, 4 mg, Q30 Min PRN  sodium chloride flush, 5-40 mL, PRN  sodium chloride, 25 mL, PRN  ondansetron, 4 mg, Q8H PRN   Or  ondansetron, 4 mg, Q6H PRN  acetaminophen, 650 mg, Q6H PRN   Or  acetaminophen, 650 mg, Q6H PRN  polyethylene glycol, 17 g, Daily PRN  potassium chloride, 40 mEq, PRN   Or  potassium alternative oral replacement, 40 mEq, PRN   Or  potassium chloride, 10 mEq, PRN  magnesium sulfate, 2,000 mg, PRN        Recent Labs     08/23/21  1617 08/25/21  0605   WBC 8.5 8.1   HGB 11.5* 12.2*   HCT 37.1 39.5    268      Recent Labs     08/23/21  1617 08/25/21  0605    139   K 3.7 3.8    104   CO2 26 26   BUN 31* 28*   CREATININE 1.4* 1.0     Recent Labs     08/23/21  1617   AST 15   ALT 9*   BILITOT 0.3   ALKPHOS 146*     No results for input(s): INR in the last 72 hours.   Recent Labs     08/23/21  2301 08/24/21  0854 08/24/21  1735   TROPONINT 0.123* 0.148* 0.123*        Imaging reviewed      Electronically signed by ANGIE Thomas CNP on 8/25/2021 at 10:25 AM

## 2021-08-25 NOTE — PROGRESS NOTES
Occupational [address] [place] [address] ACUTE CARE OCCUPATIONAL THERAPY EVALUATION    Ramses Sampson, 1945, OBS 05/NYE20-26, 8/25/2021    Discharge Recommendation: Inpatient Rehabilitation      History:  [place]:  The primary encounter diagnosis was Elevated troponin. Diagnoses of Generalized weakness, Dizziness, and Chest pain, unspecified type were also pertinent to this visit. Subjective:  Patient states: \"I have never turned down therapy in the past! It always helps me! \"  Pain: Pt reported 7/10 pain in Lt thigh  Communication with other providers: PT Gretel Ames, RN AGNIESZKA Crystal  Restrictions: General Precautions, Fall Risk, Telemetry, Pulse Ox, BP cuff, Bed exit alarm    Home Setup/Prior level of function:  Social/Functional History  Lives With: Alone  Type of Home: [facility]isted [facility]  Home Layout: One level  Home Access: Level entry  Bathroom Shower/Tub: Walk-in shower, Shower chair with back  [employer]: Grab bars in shower, Grab bars around toilet  Bathroom Accessibility: Accessible  Home Equipment: 4 wheeled walker  ADL Assistance: Independent (staff is present with bathing for safety, able to bathe, dress, and toilet independently)  [address]: Needs assistance (Staff does house cleaning and laundry)  Homemaking Responsibilities: No  Ambulation Assistance: Independent (mod I with 4ww in apartment and to dining atkinson)  Transfer Assistance: Independent  Active : No (transport staff at [facility])  Additional Comments: Pt states last fall was 6-8 weeks ago    Examination:  · Observation: Supine in bed upon arrival. Pleasant and agreeable to evaluation.   · Vision: SUSHILA/[employer] [facility]  · Hearing: WFL  · Vitals: Stable vitals throughout session    Body Systems and functions:  · ROM: Active shoulder flexion grossly 0-100', WNL distally in BL UEs  · Strength: 4/5 MMT BL UE shoulder flexors, 4+/5 MMT elbow flexors, elbow extensors, and grasp  · Sensation: WFL (denies numbness/tingling)  · Tone: Normal  · Coordination: WFL for ADLs  · Perception: WNL    Activities of Daily Living (ADLs):  · Feeding: Independent   · Grooming: Supervision (completed facial hygiene and oral hygiene tasks of brushing/rinsing in High-Key's position at end of session; unable to complete in standing this date)  · UB bathing: SBA   · LB bathing: Min A (thoroughness with washing inner thighs after urinary incontinence)  · UB dressing: SBA (donning clean robe seated EOB)  · LB dressing: Min A (assist for thorough mgmt of clothing to hips in standing, able to don BL socks seated EOB SBA by crossing legs into \"figure 4 position\" with increased time/effort required)  · Toileting: Dependent (incontinent of reddish colored urine with ambulation; RN notified)    Cognitive and Psychosocial Functioning:  · Overall cognitive status: WFL (grossly for pt's age)  · Affect: Normal     Balance:   · Sitting: SBA in unsupported sitting EOB  · Standing: CGA with RW    Functional Mobility:  · Bed Mobility: SBA supine to sitting EOB (HOB elevated to 30', increased time/effort required, utilized bed rail), Min A sitting EOB to supine (assist for fully lifting Rt LE onto bed)  · Transfers: CGA to/from bed (min cues for safe hand placement each direction)  · Ambulation: Min A with RW 10 ft in room; unsteady gait, evident Rt LE tremoring, hesitant and fearful throughout      AM-PAC 6 click short form for inpatient daily activity:   How much help from another person does the patient currently need. .. Unable  Dep A Lot  Max A A Lot   Mod A A Little  Min A A Little   CGA  SBA None   Mod I  Indep  Sup   1. Putting on and taking off regular lower body clothing? [] 1    [] 2   [] 2   [x] 3   [] 3   [] 4      2. Bathing (including washing, rinsing, drying)? [] 1   [] 2   [] 2 [x] 3 [] 3 [] 4   3. Toileting, which includes using toilet, bedpan, or urinal? [x] 1    [] 2   [] 2   [] 3   [] 3   [] 4     4.  Putting on and taking off awareness  5. Pt will ambulate HH distance to bathroom for toileting SBA with RW  6. Pt will complete all aspects of toileting task with SBA  7.  Pt will complete oral hygiene/grooming routine in standing at sink with supervision/no seated rest breaks      Time:   Time in: 917  Time out: 957  Timed treatment minutes: 25  Total time: 40      Electronically signed by:    JOSE Jerry/L, [place], .[id number]

## 2021-08-25 NOTE — DISCHARGE INSTR - COC
Continuity of Care Form    Patient Name: Kalia Simpson   :    MRN:  [id number]    Admit date:  2021  Discharge date:  ***    Code Status Order: Full Code   Advance Directives:      Admitting Physician:  Horacio Diallo MD  PCP: CHRYSTAL Giraldo    Discharging Nurse: [facility] Unit/Room#: OBS 05/BQV31-03  Discharging Unit Phone Number: ***    Emergency Contact:   Extended Emergency Contact Information  Primary Emergency Contact: [place] Phone: [phone]  Relation: Brother/Sister  Secondary Emergency Contact: [address] [place] [address] Phone: [phone]  Relation: Other  Preferred language: English   needed?  No    Past Surgical History:  Past Surgical History:   Procedure Laterality Date    COLONOSCOPY      UPPER GASTROINTESTINAL ENDOSCOPY N/A 2020    EGD BIOPSY performed by Ольга Carlisle MD at [address] [place] [address]       Immunization History:   Immunization History   Administered Date(s) Administered    Influenza, Triv, inactivated, subunit, adjuvanted, IM (Fluad 65 yrs and older) 10/28/2019    Pneumococcal Polysaccharide ([id number]) 2012, 2015    Td (Adult), 2 Lf Tetanus Toxoid, Pf (Td, Absorbed) 2000, 2012    Zoster Live (Zostavax) 2012       Active Problems:  Patient Active Problem List   Diagnosis Code    GI bleed K92.2    Debility R53.81    Sepsis ([facility] [address] A41.9    Hypoglycemia E16.2    C. difficile colitis A04.72    Leukocytosis D72.829    Moderate recurrent major depression ([facility] [address] F33.1    MICHAEL (generalized anxiety disorder) F41.1    Elevated troponin R77.8    Chest pain R07.9       Isolation/Infection:   Isolation            No Isolation          Patient Infection Status       Infection Onset Added Last Indicated Last Indicated By Review Planned Expiration Resolved Resolved By    None active    Resolved    COVID-19 Rule Out 21 COVID-19, Rapid (Ordered)   21 Rule-Out Test Resulted    COVID-19 Rule Out 08/23/21 08/23/21 08/23/21 COVID-19, Rapid (Ordered)   08/24/21 Rule-Out Test Resulted    COVID-19 Rule Out 08/13/20 08/13/20 08/13/20 Covid-19 Ambulatory (Ordered)   08/14/20 Rule-Out Test Resulted    C-diff Rule Out 04/15/20 04/16/20 04/15/20 C difficile Molecular/PCR (Ordered)   04/16/20 Rule-Out Test Resulted            Nurse Assessment:  Last Vital Signs: BP (!) 143/63   Pulse 65   Temp 97.4 °F (36.3 °C) (Oral)   Resp 16   Ht 5' 4\" (1.626 m)   Wt 195 lb 12.3 oz (88.8 kg)   SpO2 98%   BMI 33.60 kg/m²     Last documented pain score (0-10 scale): Pain Level: 0  Last Weight:   Wt Readings from Last 1 Encounters:   08/24/21 195 lb 12.3 oz (88.8 kg)     Mental Status:  oriented    IV Access:  - None    Nursing Mobility/ADLs:  Walking   Dependent  Transfer  Dependent  Bathing  Assisted  Dressing  Assisted  Toileting  Dependent  Feeding  Assisted  Med Admin  Assisted  Med Delivery   whole    Wound Care Documentation and Therapy:        Elimination:  Continence:   · Bowel: Yes  · Bladder: Yes  Urinary Catheter: None   Colostomy/Ileostomy/Ileal Conduit: No       Date of Last BM: ***    Intake/Output Summary (Last 24 hours) at 8/25/2021 1537  Last data filed at 8/25/2021 0636  Gross per 24 hour   Intake --   Output 400 ml   Net -400 ml     I/O last 3 completed shifts:  In: -   Out: 400 [Urine:400]    Safety Concerns:      At Risk for Falls    Impairments/Disabilities:      Vision    Patient's personal belongings (please select all that are sent with patient):  {[facility] DME Belongings:481345902}    RN SIGNATURE:  Electronically signed by Osvaldo Hancock RN on 8/25/21 at 3:47 PM EDT    CASE MANAGEMENT/SOCIAL WORK SECTION    Inpatient Status Date: ***    Readmission Risk Assessment Score:  Readmission Risk              Risk of Unplanned Readmission:  18           Discharging to Facility/ Agency   · Name:   · Address:  · Phone:  · Fax:    Dialysis Facility (if applicable) · Name:  · Address:  · Dialysis Schedule:  · Phone:  · Fax:    / signature: {Esignature:[id number]}    PHYSICIAN SECTION  Nutrition Therapy:  Current Nutrition Therapy:   Elis Mcleod MORA Diet List:492701502}    Routes of Feeding: {CHP DME Other Feedings:987769045}  Liquids: {Slp liquid thickness:09848}  Daily Fluid Restriction: {CHP DME Yes amt example:294740116}  Last Modified Barium Swallow with Video (Video Swallowing Test): {Done Not Done NAEN:016733339}    Treatments at the Time of Hospital Discharge:   Respiratory Treatments: ***  Oxygen Therapy:  {Therapy; copd oxygen:41099}  Ventilator:    {[facility] Vent WQAK:644850975}    Rehab Therapies: {THERAPEUTIC INTERVENTION:3518308156}  Weight Bearing Status/Restrictions: { CC Weight Bearin}  Other Medical Equipment (for information only, NOT a DME order):  {EQUIPMENT:811877718}  Other Treatments: ***    Prognosis: {Prognosis:6299117287}    Condition at Discharge: 50Murphy Mcleod Patient Condition:915596632}    Rehab Potential (if transferring to Rehab): {Prognosis:9172173502}    Recommended Labs or Other Treatments After Discharge: PT/OT     Physician Certification: I certify the above information and transfer of Moris Lo  is necessary for the continuing treatment of the diagnosis listed and that she requires [facility] for less 30 days.      Update Admission H&P: Changes in H&P as follows - Chest pain    Chest pain                Troponin trend-.123, .148, .123, .085              Started on Heparin (), DC'd () due to normal stress test               BNP-510              Stress test ()-negative, EF of 71%              CXR () shows an irregular nodule in the right upper lobe, FU CT recommended               CT chest () shows normal vasculature and no suspicious lung nodule noted on CXR              Continue metoprolol                mg daily               Cardiology consulted (signed off )     Acute cystitis with hematuria               UA (8/24) shows large blood, large leuks, RBC, WBC, bacteria, protein and ketones               Rocephin started continue with Keflex x7 days              Repeat UA on 8/25    PHYSICIAN SIGNATURE:  Electronically signed by ANGIE Elkins CNP on 8/25/21 at 3:42 PM EDT

## 2021-08-25 NOTE — CARE COORDINATION
Received referral for ARU. Unfortunately, ARU will not have a bed available at this time. Per Zulma patient is medically ready for discharge and will Plan B will be pursued.

## 2021-08-25 NOTE — CONSULTS
[facility], 1945, OBS 05/GMH37-17, 8/25/2021    History  [place]:  The primary encounter diagnosis was Elevated troponin. Diagnoses of Generalized weakness, Dizziness, and Chest pain, unspecified type were also pertinent to this visit. Patient  has a past medical history of Clostridium difficile infection, Hyperlipidemia, Hypertension, Obesity, and Type 2 diabetes mellitus without complication ([address] Patient  has a past surgical history that includes Colonoscopy and Upper gastrointestinal endoscopy (N/A, 2/7/2020). Subjective:  Patient states: \"There's always someone with me when I go to the shower for safety\", \"I walk down to the dining room with my walker\", \"I'm scared of falling\"    Pain: C/o L lateral/posterior thigh pain 5/10.    Communication with other providers:  Handoff to RN, co-eval with MOHIT Benavidez  Restrictions: Fall risk, tele, urinary incontinence    Home Setup/Prior level of function  Social/Functional History  Lives With: Alone  Type of Home: Apartment  Home Layout: One level  Home Access: Level entry  Bathroom Shower/Tub: Walk-in shower, Shower chair with back  [employer]: Grab bars in shower, Grab bars around toilet  Bathroom Accessibility: Accessible  Home Equipment: Roll About  ADL Assistance: Needs assistance (staff is present with bathing for safety)  Homemaking Assistance: Needs assistance (Staff does house cleaning and laundry)  Homemaking Responsibilities: No  Ambulation Assistance: Needs assistance  Transfer Assistance: Needs assistance  Active : No (transport staff at [facility])  Additional Comments: Pt typically AMB to dining room with RW, last fall 6-8 wks ago    Examination of body systems (includes body structures/functions, activity/participation limitations):  · Observation:  Pt asleep in supine upon arrival  · Vision:  [employer] [place]  · [facility]:  [facility] [place]  · Cardiopulmonary: No 02 needs, BP supine: 140/77, in siting: 140/81 mmHg. · Cognition: A&O x3, see OT/SLP note for further evaluation. Musculoskeletal  · ROM R/L:  WFL BLE. · Strength R/L:  Generally 4-/5, decreased in function and endurance. · Neuro:  Pain in L LLE along sciatic nerve distribution, (+) slump test    · Gait pattern: Step-through L, step-to R, with increased difficulty clearing and advancing RLE, decreased WS, louis, clearance NICANOR. Pt use of RW throughout with hesitancy and fear of falling, CGA. Mobility:  · Supine to sit:  CGA  · Transfers: CGA  · Sitting balance:  SBA, retro with dynamic. · Standing balance:  CGA. · Gait: Min     [facility] 6 Clicks Inpatient Mobility:  AM-PAC Inpatient Mobility Raw Score : 17    Treatment:  Supine <> Sit: Pt able to perform sup> sit with CGA, increased time and effort, v/c for sequence, pt with heavy use of UE on bed rail. Increased time and effort with increased UE support for anterior scooting to EOB x3 to achieve feet to floor. Return to supine end of session Min A for LE advancement. Max A x2 for scooting to [facility] via milagro pad. Sitting Balance: Upon seated pt reports \"dizziness\", BP taken as above. PT attempts to determine the \"type\" of dizziness, pt describes as both \"room spinning and fear of falling\". Pt with initial CGA progressing to SBA d/t retro leaning, with v/c for anterior w/s pt able to improve posture, determined retro lean possibly related to fear of falling, education on improved safety with anterior WS position. Pt tolerates sitting EOB x5 min during PT assessment of LE and dynamic balance. Additional x5 min sitting end of session for Max A lower body cleansing and dressing following episode of urine incontinence. STS: Pt performs x1 STS from EOB to RW with CGA, v/c for proper placement of UE with good carryover. Return to seated with v/c for RW sequencing, v/c for reaching for surface and controlled sit. Pt demonstrates fair eccentric control.     Standing balance: Pt demonstrates fair- standing balance at EOB with RW, CGA, initial min retro lean, v/c for anterior WS with fair carryover. Pt reports FOF with standing, PT provides encouragement throughout. Gait: With close CGA, RW, pt AMB x10 ft in room (x5 ft anterior, x5 ft retro). Trial limited d/t episode of urine incontinence, noted blood in urine, RN alerted, provided cleanup. Pt demonstrates gait pattern as above, overall unsteady with gait and FOF. V/c for sequencing with RW and management of RW in small space. Safety: patient left in supine with Purewick replaced, bed alarm, call light within reach, RN notified, gait belt used. Assessment:  Pt is a 79 y/o female admitted to ED 8/23 for episode of increased weakness and dizziness. Pt is from [place] [address] where pt receives assistance for meal preparation, household chores, supervision for transfers. Per pt report pt is Mod I for AMB with RW at baseline, decreased mobility recently. At this time pt presenting with deficits in: functional balance, functional endurance, LE strength, gait deficits, pain, and fear of falling which prevent pt from functioning at baseline status. Pt would benefit from continued skilled PT services with d/c to ARU for rehab to promote return to PLOF. Anticipate return to care home following rehab stay. Complexity: Moderate  Prognosis: Good, no significant barriers to participation at this time. Plan Times per week: 4+/week, 1 week,   Discharge Recommendations: IP Rehab  Equipment: Defer to next LOC    Goals:  Short term goals  Time Frame for Short term goals: 1 week  Short term goal 1: Pt will AMB x30 ft with RW and CGA  Short term goal 2: Pt will perform all bed mobility tasks with SBA  Short term goal 3: Pt will participate in standing dynamic balance activity with intermittent UE support, CGA x5 min.        Treatment plan:  Bed mobility, transfers, balance, gait, TA, TX,     Recommendations for NURSING mobility: AMB in room with RW and close CGA    Time:   Time in: 09:16  Time out: 09:51  Timed treatment minutes: 25  Total time: 35    Electronically signed by:    Dragan Casas, PT  8/25/2021, 1:04 PM

## 2021-08-25 NOTE — DISCHARGE SUMMARY
Discharge Summary    Name:  Christen Marquez /Age/Sex:   (73 y.o. female)   MRN & CSN:  [id number] & [id number] Admission Date/Time: 2021  3:34 PM   Attending:  Karlo Galeano MD Discharging Provider Elvin Sims,  [facility] Course: Christen Marquez is a 78 y.o.  female  who presents with chest pain     Hospital Course: Patient was initially admitted on 2021 for concerns of dizziness and chest pain. While emergency room her troponin was noted to be trending up. She was started on heparin drip and cardiology evaluation for concern of NSTEMI she had a normal stress test and echo with EF greater than 60%. Heparin infusion was stopped after stress test.  She was also treated for acute cystitis with hematuria and started on Rocephin.        1. Chest pain                Troponin trend-.123, .148, .123, .085              Started on Heparin (), DC'd () due to normal stress test               BNP-510              Stress test ()-negative, EF of 71%              CXR () shows an irregular nodule in the right upper lobe, FU CT recommended               CT chest () shows normal vasculature and no suspicious lung nodule noted on CXR              Continue metoprolol                mg daily               Cardiology consulted (signed off )     2. Acute cystitis with hematuria               UA () shows large blood, large leuks, RBC, WBC, bacteria, protein and ketones               Rocephin started continue with Keflex x7 days              Repeat UA on      3. Dizziness-resolved              Probably 2/2 anxiety and chest pain               Ct head () shows no acute intracranial abnormality, small vessel ischemic changes, and atherosclerotic calcification of the cavernous carotid               Trial antivert      4. Type 2 DM              A1C () 5.8               SSI             Resume home glipizide      5.  Hyperlipidemia Continue home lipitor     The patient expressed appropriate understanding of and agreement with the discharge recommendations, medications, and plan.      Consults this admission:  IP CONSULT TO HOSPITALIST  IP CONSULT TO CARDIOLOGY    Discharge Instruction:   Follow up appointments:   Primary care physician:  within 2 weeks  Cardiology x 2 weeks   Diet:  cardiac diet   Activity: activity as tolerated  Disposition: Discharged to:   []Home, [][facility], [x]SNF, []Acute Rehab, []Hospice   Condition on discharge: Stable    Discharge Medications:      Wendy Boothe   Home Medication Instructions KASSIE:[id number]    Printed on:08/25/21 1125   Medication Information                      acetaminophen (TYLENOL) 325 MG tablet  Take 650 mg by mouth every 4 hours as needed for Pain             acetaminophen (TYLENOL) 325 MG tablet  Take 650 mg by mouth 4 times daily             bismuth subsalicylate (PEPTO BISMOL) 262 MG/15ML suspension  Take 30 mLs by mouth every 6 hours as needed for Indigestion             calcium-vitamin D (OSCAL-500) 500-200 MG-UNIT per tablet  Take 1 tablet by mouth 2 times daily             Cholecalciferol 100 MCG (4000 UT) CAPS  Take 1 capsule by mouth daily             furosemide (LASIX) 20 MG tablet  Take 20 mg by mouth three times a week M, W and Friday for edema             glipiZIDE (GLUCOTROL) 10 MG tablet  TAKE 1 TABLET BY MOUTH EVERY DAY             insulin lispro (HUMALOG) 100 UNIT/ML injection vial  Inject 0-6 Units into the skin 3 times daily (with meals)             meclizine (ANTIVERT) 25 MG CHEW  Take 25 mg by mouth Daily with lunch             meclizine (ANTIVERT) 25 MG CHEW  Take 25 mg by mouth 2 times daily             metoprolol succinate (TOPROL XL) 25 MG extended release tablet  Take 25 mg by mouth daily             oxybutynin (DITROPAN-XL) 10 MG extended release tablet  Take 10 mg by mouth daily             pantoprazole (PROTONIX) 40 MG tablet  Take 1 tablet by mouth 2 times daily (before meals)             QUEtiapine (SEROQUEL) 25 MG tablet  Take 25 mg by mouth nightly             sertraline (ZOLOFT) 50 MG tablet  Take 150 mg by mouth daily                 Objective Findings at Discharge:     Vitals:    08/24/21 2212 08/25/21 0505 08/25/21 0807 08/25/21 1007   BP: (!) 146/78 (!) 116/56  (!) 143/63   Pulse: 71 73  65   Resp: 18 18 17 16   Temp: 97.4 °F (36.3 °C) 97.4 °F (36.3 °C)     TempSrc: Oral Oral     SpO2: 98% 100% 96% 98%   Weight: 195 lb 12.3 oz (88.8 kg)      Height:                  Physical Exam: 08/25/21     Gen:  awake, alert, cooperative, no apparent distress obese body habitus  Head/Eyes:  Normocephalic atraumatic, EOMI   NECK:   symmetrical, trachea midline  LUNGS: Normal Effort/ symmetry movement   CARDIOVASCULAR:  Normal rate + 1 edema  ABDOMEN: slight suprapubic tenderness, non distended, no HSM noted. MUSCULOSKELETAL: no gross deformities  NEUROLOGIC: Alert and Oriented,  Cranial nerves II-XII are grossly intact. SKIN:  no bruising or bleeding, normal skin color,  no redness    Data:     Laboratory this visit:  Reviewed  Recent Labs     08/23/21  1617 08/25/21  0605 08/25/21  1340   WBC 8.5 8.1  --    HGB 11.5* 12.2* 12.2*   HCT 37.1 39.5 41.3    268  --       Recent Labs     08/23/21  1617 08/25/21  0605    139   K 3.7 3.8    104   CO2 26 26   BUN 31* 28*   CREATININE 1.4* 1.0     Recent Labs     08/23/21  1617   AST 15   ALT 9*   BILITOT 0.3   ALKPHOS 146*     Radiology this visit:  Reviewed.     ECHO Complete 2D W Doppler W Color    Result Date: 8/24/2021  Transthoracic Echocardiography Report (TTE)  Demographics   Patient Name       [facility] CAMELIA H SEVERIANO SHAFFER   Date of Study       08/24/2021   Date of Birth      1945         Gender              Female   Age                78 year(s)         Race                   Patient Number     [id number]         Room Number         ED19   Visit Number       [id number]   Corporate ID       [id number] Accession Number   [id number]         Matthew Toure RVT   Ordering Physician Ochoa Pratt MD                 Physician           MD  Procedure Type of Study   TTE procedure:ECHOCARDIOGRAM COMPLETE 2D W DOPPLER W COLOR. Procedure Date Date: 08/24/2021 Start: 02:03 PM Study Location: Portable Technical Quality: Fair visualization Indications:Abn. stress test. Patient Status: Routine Height: 64 inches Weight: 224 pounds BSA: 2.05 m2 BMI: 38.45 kg/m2 HR: 81 bpm BP: 122/73 mmHg  Conclusions   Summary  Left ventricular systolic function is hyperdynamic. Ejection fraction is visually estimated at >60%. Mild left ventricular hypertrophy. Grade I diastolic dysfunction. Sclerotic, but non-stenotic aortic valve. Mitral annular calcification is present. No evidence of any pericardial effusion. Signature   ------------------------------------------------------------------  Electronically signed by Aleena Poole MD (Interpreting  physician) on 08/24/2021 at 03:25 PM  ------------------------------------------------------------------   Findings   Left Ventricle  Left ventricular systolic function is hyperdynamic. Ejection fraction is visually estimated at >60%. Mild left ventricular hypertrophy. Grade I diastolic dysfunction. Left ventricle size is normal.  No regional wall motion abnormalites. Left Atrium  Essentially normal left atrium. Right Atrium  Essentially normal right atrium. Right Ventricle  Essentially normal right ventricle. Aortic Valve  Sclerotic, but non-stenotic aortic valve. Mitral Valve  Mitral annular calcification is present. Tricuspid Valve  Trace tricuspid regurgitation; RVSP: 22 mmHg. Pulmonic Valve  The pulmonic valve was not well visualized. Pericardial Effusion  No evidence of any pericardial effusion.    Pleural CONTRAST    Result Date: 8/23/2021  EXAMINATION: CT OF THE HEAD WITHOUT CONTRAST  8/23/2021 4:53 pm TECHNIQUE: CT of the head was performed without the administration of intravenous contrast. Dose modulation, iterative reconstruction, and/or weight based adjustment of the mA/kV was utilized to reduce the radiation dose to as low as reasonably achievable. COMPARISON: None. HISTORY: ORDERING SYSTEM PROVIDED HISTORY: dizziness TECHNOLOGIST PROVIDED HISTORY: Reason for exam:->dizziness Has a \"code stroke\" or \"stroke alert\" been called? ->No Decision Support Exception - unselect if not a suspected or confirmed emergency medical condition->Emergency Medical Condition (MA) Reason for Exam: VERTIGO, DIZZINESS Initial evaluation FINDINGS: BRAIN/VENTRICLES:  The ventricles and cisternal spaces are prominent consistent with cerebral atrophy. There is atherosclerotic calcification of the cavernous carotids. There is atherosclerotic disease of the distal vertebral arteries. There are confluent areas of hypoattenuation in the periventricular white matter and centrum semiovale that are likely related to chronic small vessel ischemic disease. There is no midline shift or mass effect. No hemorrhage is identified in the brain parenchyma. ORBITS: The visualized portion of the orbits demonstrate no acute abnormality. SINUSES:  The visualized paranasal sinuses and mastoid air cells are for the most part clear. SOFT TISSUES/SKULL:  No acute abnormality of the visualized skull or soft tissues. No acute intracranial abnormality. Cerebral atrophy. Atherosclerotic calcification of the cavernous carotid arteries and vertebral arteries. Chronic small vessel ischemic change. CT CHEST WO CONTRAST    Result Date: 8/25/2021  EXAMINATION: CT OF THE CHEST WITHOUT CONTRAST 8/23/2021 6:11 pm TECHNIQUE: CT of the chest was performed without the administration of intravenous contrast. Multiplanar reformatted images are provided for review. Dose modulation, iterative reconstruction, and/or weight based adjustment of the mA/kV was utilized to reduce the radiation dose to as low as reasonably achievable. COMPARISON: Imaging from earlier the same day. HISTORY: ORDERING SYSTEM PROVIDED HISTORY: Abnormal chest x-ray, elevated troponin, weakness TECHNOLOGIST PROVIDED HISTORY: Reason for exam:->Abnormal chest x-ray, elevated troponin, weakness Reason for Exam: NODULE ON CHEST X-RAY FINDINGS: Mediastinum: There is no enlarged lymph node. The heart and great vessels sizes are normal.  There is no pericardial effusion. Lungs/pleura: No lung nodule or mass is identified. The questioned nodule on recent radiograph likely represents overlying normal pulmonary vasculature. There is no consolidation. No pleural effusion. Upper Abdomen: No acute abnormality. Soft Tissues/Bones: There is diffuse idiopathic skeletal hyperostosis. No suspicious lung nodule or mass. Normal pulmonary vasculature corresponding to the questioned nodule on recent radiograph. XR CHEST PORTABLE    Result Date: 8/23/2021  EXAMINATION: ONE XRAY VIEW OF THE CHEST 8/23/2021 5:11 pm COMPARISON: None. HISTORY: ORDERING SYSTEM PROVIDED HISTORY: dizziness TECHNOLOGIST PROVIDED HISTORY: Reason for exam:->dizziness Reason for Exam: dizziness Acuity: Acute Type of Exam: Initial Additional signs and symptoms: none Relevant Medical/Surgical History: none FINDINGS: Cardiomegaly. No acute airspace disease. No pneumothorax. No pleural effusion. Irregular nodular opacity right upper lobe. Possible irregular nodule right upper lobe.   A chest CT is suggested for further evaluation     NM MYOCARDIAL SPECT REST EXERCISE OR RX    Result Date: 8/24/2021  Cardiac Perfusion Imaging   Demographics   Patient Name      [facility]   Date of study        08/24/2021   Date of Birth     1945         Gender               Female   Age               [age] [facility] year(s)         Race                  Patient Number    [id number]         Room Number          ED19   Visit Number      [id number]          Height               64 inches   Corporate ID      [id number]            Weight               224 pounds   Accession Number  [id number]                                        [address] [place] [address]   Ordering          Hilaria Marsh  Physician         MD                 Cardiologist         MD   Conclusions   Summary  Normal EF 71 % with normal ventricular contractility. No infarct or ischemia  noted. Normal stress myocardial perfusion. This is a normal study. Signatures   ------------------------------------------------------------------  Electronically signed by Kerry Stout MD (Interpreting  cardiologist) on 08/24/2021 at 15:26  ------------------------------------------------------------------  Procedure Procedure Type:   Nuclear Stress Test:Pharmacological, Myocardial Perfusion Imaging with  Pharm, NM MYOCARDIAL SPECT REST EXERCISE OR RX  Indications: Chest pain. Risk Factors   The patient risk factors include:diabetes mellitus. Stress Protocols   Resting ECG  Normal sinus rhythm. Resting HR:65 bpm  Resting BP:151/76 mmHg  Stress Protocol:Pharmacologic - Lexiscan  Peak HR:77 bpm                              HR/BP product:9240  Peak BP:120/60 mmHg  Predicted HR: 146 bpm  % of predicted HR: 53   Exercise duration: 01:00 min   ECG Findings  Normal sinus rhythm. Arrhythmias  No rhythm abnormality. Symptoms  Feels slightly dizzy   Stress Interpretation  ECG portion of stress test is negative for ischemia by diagnostic criteria. Procedure Medications   - Lexiscan I.V. bolus (over 15sec.) 0.4 mg admininstered @ 08/24/2021 13:25.   Imaging Protocols   Rest                             Stress Isotope:Sestamibi 99mTc          Isotope: Sestamibi 99mTc  Isotope dose:9.7 mCi             Isotope dose:31.1 mCi  Administration route: I.V. Administration route: I.V. Injection Date:08/24/2021 12:00  Injection Date:08/24/2021 13:25  Scan Date:08/24/2021 13:00       Scan Date:08/24/2021 14:25   Technique:        SPECT          Technique:        Gated                                                     SPECT   Procedure Description   Upon patient arrival, the patient is identified using two identifiers and  the physician order is verified. An IV is established and 8-11mCi of 99mTc  Sestamibi is intravenously injected and followed with 10mL 0.9% Normal  Saline flush. A circulation period of 45 minutes occurs prior to resting  SPECT imaging. After imaging is complete the patient is escorted to the  stress lab. The patient is connected to the ECG and blood pressure is  measured. The RN starts the stress portion of the exam and rapidly  intravenously injects Lexiscan (regadenosine) 0.4mg over a period of 10 to15  seconds and follows with 5mL 0.9% Normal Saline flush. Immediately following  the Nuclear Technologist intravenously injects 22-33mCi of 99mTc Sestamibi  and 5mL 0.9% Normal Saline flush. After completion, recovery, and removal of  the IV, the patient rests during the second circulation period of 45  minutes. Final stress SPECT gated imaging is performed. The patient may  return home or to their room after stress imaging. The images are processed  and final charting is completed and sent to the appropriate cardiologist for  interpretation and reporting. Perfusion Interpretation   Normal EF 71 % with normal ventricular contractility. No infarct or ischemia  noted.   Imaging Results    Summed scores     - Summed stress score: 6     - Summed rest score: 0     - Summed difference score:    6   Rest ejection  Ejection fraction:71 %  EDV :59 ml  ESV :17 ml  Stroke volume :42 ml  Medical History   Accession#: [id number]  Admission Data Admission date: 08/23/2021 Admission Time: 15:34 Hospital Status: Inpatient.     Discharge Time of < 30 minutes    Electronically signed by ANGIE Siddiqui CNP on 8/25/2021 at 2:25 PM

## 2021-09-13 ENCOUNTER — OUTSIDE SERVICES (OUTPATIENT)
Dept: PSYCHIATRY | Age: 76
End: 2021-09-13
Payer: MEDICARE

## 2021-09-13 DIAGNOSIS — F33.1 MODERATE RECURRENT MAJOR DEPRESSION (HCC): Primary | ICD-10-CM

## 2021-09-13 DIAGNOSIS — F41.1 GAD (GENERALIZED ANXIETY DISORDER): ICD-10-CM

## 2021-09-13 PROCEDURE — 90834 PSYTX W PT 45 MINUTES: CPT | Performed by: NURSE PRACTITIONER

## 2021-09-13 NOTE — PROGRESS NOTES
Behavioral Health Consultation  Bryan Gilbert PMHNP-BC  9/13/2021, 3:21 PM      Time spent with Patient:  45 minutes  This was a outpatient visit. Patient Location: Home. Provider Location: [facility]    Chief Complaint:medication evaluation, depression     Pt stated that her rates her depression a  \"4,\" on a scale of 0-10 with 10 being the worst and 0 being none. Pt stated  that her rates his anxiety an \"3/10,\" on the same scale. Patient reporting no passive thoughts of death as she did one month ago. She reports she is feeling much better. Noted she engaged easily and was more positive and active about participating in groups prior to hospital admission which was recent. Positive UTI and CP. [place]:  Reason for visit is medication management follow up. She has been compliant with medications. Pt reports that medications have  been working. Pt denies side effects from medications. Pt denies    hallucinations. Pt reports there has been no changes to appetite. Pt reports sleep has been better  . Pt denies  current exercise. Pt denies current suicidal ideation, plan and intent. Pt  denies current homicidal ideation, plan and [employer]). Social:Patient was born in [facility]. She has three brothers. She was the youngest of four. She earned her MSW. She was very active within the [employer] as a . She never  nor had children. She was close to her nieces and nephews.      She felt she was getting better with DR Tyler Tavares and instituded a group with \"where were you when. ...... Shelbi Tano \"   Past Psychiatric history:   The patient has no prior history of mental health diagnoses. Current treatment includes Anti-depressant: zoloft 150 mg po daily and Anti-psychotic: seroquel 25 mg po at bedtime. Patient denies SE from current treatment.   Previous treatment has included: unknown.  zoloft 100 mg po daily   And seroquel 25 mg po at bedtime  Family Mental Health history:   Pertinent family history: unknown. MSE:    Appearance: alert, cooperative, no distress  Attention:Intact  Appetite: normal  Ambulation: unable to assess.    Sleep disturbance: Yes  Loss of pleasure: Yes  Speech: spontaneous, normal rate, normal volume and well articulated  Mood: Depressed  Affect: normal affect  Thought Content: intact  Insight: Good  Judgment: Intact  Memory: Intact long-term and Intact short-term  Suicide Assessment: no suicidal ideation  Homicide Assessment: denies current homicidal ideation, plan and intent    History:      Review of Systems:       Current Outpatient Medications:     sertraline (ZOLOFT) 50 MG tablet, Take 150 mg by mouth daily, Disp: , Rfl:     QUEtiapine (SEROQUEL) 25 MG tablet, Take 25 mg by mouth nightly, Disp: , Rfl:     aspirin 81 MG chewable tablet, Take 1 tablet by mouth daily, Disp: 30 tablet, Rfl: 3    albuterol sulfate  (90 Base) MCG/ACT inhaler, Inhale 2 puffs into the lungs every 6 hours as needed for Wheezing, Disp: 1 Inhaler, Rfl: 3    acetaminophen (TYLENOL) 325 MG tablet, Take 650 mg by mouth every 4 hours as needed for Pain, Disp: , Rfl:     calcium-vitamin D (OSCAL-500) 500-200 MG-UNIT per tablet, Take 1 tablet by mouth 2 times daily, Disp: , Rfl:     bismuth subsalicylate (PEPTO BISMOL) 262 MG/15ML suspension, Take 30 mLs by mouth every 6 hours as needed for Indigestion, Disp: , Rfl:     oxybutynin (DITROPAN-XL) 10 MG extended release tablet, Take 10 mg by mouth daily, Disp: , Rfl:     meclizine (ANTIVERT) 25 MG CHEW, Take 25 mg by mouth Daily with lunch, Disp: , Rfl:     metoprolol succinate (TOPROL XL) 25 MG extended release tablet, Take 25 mg by mouth daily, Disp: , Rfl:     furosemide (LASIX) 20 MG tablet, Take 20 mg by mouth three times a week M, W and Friday for edema, Disp: , Rfl:     insulin lispro (HUMALOG) 100 UNIT/ML injection vial, Inject 0-6 Units into the skin 3 times daily (with meals), Disp: 1 vial, Rfl: 3    pantoprazole (PROTONIX) 40 MG tablet, Take 1 tablet by mouth 2 times daily (before meals) (Patient taking differently: Take 40 mg by mouth daily ), Disp: 60 tablet, Rfl: 1    glipiZIDE (GLUCOTROL) 10 MG tablet, TAKE 1 TABLET BY MOUTH EVERY DAY (Patient taking differently: Take 2.5 mg by mouth daily (with breakfast) ), Disp: 90 tablet, Rfl: 0    Cholecalciferol 100 MCG (4000 UT) CAPS, Take 1 capsule by mouth daily, Disp: 90 capsule, Rfl: 1     PDMP Monitoring:    Last PDMP Kareem as Reviewed [facility]):  Review User Review Instant Review Result   FROILANALEXJOVI IRVIN 9/13/2021  3:21 PM Reviewed PDMP [1]       Urine Drug Screenings (1 yr)    No resulted procedures found. Medication Contract and Consent for Opioid Use Documents Filed      No documents found                 OARRS checked and there were no signs of substance abuse, or prescription misuse. Social History     Socioeconomic History    Marital status: Single     Spouse name: Not on file    Number of children: Not on file    Years of education: Not on file    Highest education level: Not on file   Occupational History    Not on file   Tobacco Use    Smoking status: Passive Smoke Exposure - Never Smoker    Smokeless tobacco: Never Used   Vaping Use    Vaping Use: Never used   Substance and Sexual Activity    Alcohol use: Yes     Comment: every 2 months     Drug use: Never    Sexual activity: Not on file   Other Topics Concern    Not on file   Social History Narrative    Not on file     Social Determinants of Health     Financial Resource Strain:     Difficulty of Paying Living Expenses:    Food Insecurity:     Worried About Running Out of Food in the Last Year:     [address] in the Last Year:    Transportation Needs:     Lack of Transportation (Medical):      Lack of Transportation (Non-Medical):    Physical Activity:     Days of Exercise per Week:     Minutes of Exercise per Session:    Stress:     Feeling of Stress :    Social Connections:     Frequency of Communication with Friends and Family:     Frequency of Social Gatherings with Friends and Family:     Attends [employer] Services:     Active Member of Clubs or Organizations:     Attends Club or Organization Meetings:     Marital Status:    Intimate Partner Violence:     Fear of Current or Ex-Partner:     Emotionally Abused:     Physically Abused:     Sexually Abused:        TOBACCO: Tarun Jeong  reports that she is a non-smoker but has been exposed to tobacco smoke. She has been exposed to 0.25 packs per day. She has never used smokeless tobacco.  ETOH: Tarun Jeong  reports current alcohol use. Past Medical History:   Diagnosis Date    Clostridium difficile infection 03/01/2020    Hyperlipidemia     Hypertension     Obesity     Type 2 diabetes mellitus without complication ([facility] [address]       Metabolic monitoring is being done by PCP.    Family History   Problem Relation Age of Onset    Diabetes Brother     Obesity Brother     Other Brother         TIA    Diabetes Mother     Other Mother         COPD    Heart Attack Father     Coronary Art Dis Father     Other Father         aortic aneurysm    Diabetes Brother     Cancer Brother      Last Labs:   Lab Results   Component Value Date    LABA1C 5.8 06/22/2021     Lab Results   Component Value Date     06/22/2021      Lab Results   Component Value Date    WBC 8.1 08/25/2021    HGB 12.2 (L) 08/25/2021    HCT 41.3 08/25/2021    MCV 89.8 08/25/2021     08/25/2021    LYMPHOPCT 15.4 (L) 08/25/2021    RBC 4.40 08/25/2021    MCH 27.7 08/25/2021    MCHC 30.9 (L) 08/25/2021    RDW 15.5 (H) 08/25/2021          Lab Results   Component Value Date     08/25/2021    K 3.8 08/25/2021     08/25/2021    CO2 26 08/25/2021    BUN 28 (H) 08/25/2021    CREATININE 1.0 08/25/2021    GLUCOSE 110 (H) 08/25/2021    CALCIUM 9.0 08/25/2021    PROT 6.7 08/23/2021    LABALBU 3.8 08/23/2021    BILITOT 0.3

## 2021-09-22 PROBLEM — R79.89 ELEVATED TROPONIN: Status: RESOLVED | Noted: 2021-08-23 | Resolved: 2021-09-22

## 2021-09-22 PROBLEM — R77.8 ELEVATED TROPONIN: Status: RESOLVED | Noted: 2021-08-23 | Resolved: 2021-09-22

## 2021-09-28 ENCOUNTER — HOSPITAL ENCOUNTER (OUTPATIENT)
Age: 76
Setting detail: SPECIMEN
Discharge: HOME | End: 2021-09-28
Payer: MEDICARE

## 2021-09-28 LAB
ANION GAP SERPL CALCULATED.3IONS-SCNC: 12 MMOL/L (ref 4–16)
BUN BLDV-MCNC: 32 MG/DL (ref 6–23)
CALCIUM SERPL-MCNC: 9.1 MG/DL (ref 8.3–10.6)
CHLORIDE BLD-SCNC: 104 MMOL/L (ref 99–110)
CO2: 25 MMOL/L (ref 21–32)
CREAT SERPL-MCNC: 1.2 MG/DL (ref 0.6–1.1)
GFR AFRICAN AMERICAN: 53 ML/MIN/1.73M2
GFR NON-AFRICAN AMERICAN: 44 ML/MIN/1.73M2
GLUCOSE BLD-MCNC: 91 MG/DL (ref 70–99)
HCT VFR BLD CALC: 37.5 % (ref 37–47)
HEMOGLOBIN: 11.6 GM/DL (ref 12.5–16)
MCH RBC QN AUTO: 27.5 PG (ref 27–31)
MCHC RBC AUTO-ENTMCNC: 30.9 % (ref 32–36)
MCV RBC AUTO: 88.9 FL (ref 78–100)
PDW BLD-RTO: 14.8 % (ref 11.7–14.9)
PLATELET # BLD: 265 K/CU MM (ref 140–440)
PMV BLD AUTO: 10.1 FL (ref 7.5–11.1)
POTASSIUM SERPL-SCNC: 4.3 MMOL/L (ref 3.5–5.1)
RBC # BLD: 4.22 M/CU MM (ref 4.2–5.4)
SODIUM BLD-SCNC: 141 MMOL/L (ref 135–145)
WBC # BLD: 8 K/CU MM (ref 4–10.5)

## 2021-09-28 PROCEDURE — 36415 COLL VENOUS BLD VENIPUNCTURE: CPT

## 2021-09-28 PROCEDURE — 85027 COMPLETE CBC AUTOMATED: CPT

## 2021-09-28 PROCEDURE — 80048 BASIC METABOLIC PNL TOTAL CA: CPT

## 2021-10-12 ENCOUNTER — HOSPITAL ENCOUNTER (OUTPATIENT)
Age: 76
Setting detail: SPECIMEN
Discharge: HOME | End: 2021-10-12
Payer: MEDICARE

## 2021-10-12 LAB
ANION GAP SERPL CALCULATED.3IONS-SCNC: 15 MMOL/L (ref 4–16)
BUN BLDV-MCNC: 29 MG/DL (ref 6–23)
CALCIUM SERPL-MCNC: 9.4 MG/DL (ref 8.3–10.6)
CHLORIDE BLD-SCNC: 102 MMOL/L (ref 99–110)
CO2: 24 MMOL/L (ref 21–32)
CREAT SERPL-MCNC: 1.4 MG/DL (ref 0.6–1.1)
GFR AFRICAN AMERICAN: 44 ML/MIN/1.73M2
GFR NON-AFRICAN AMERICAN: 37 ML/MIN/1.73M2
GLUCOSE BLD-MCNC: 94 MG/DL (ref 70–99)
HCT VFR BLD CALC: 41.8 % (ref 37–47)
HEMOGLOBIN: 12.6 GM/DL (ref 12.5–16)
MCH RBC QN AUTO: 27.6 PG (ref 27–31)
MCHC RBC AUTO-ENTMCNC: 30.1 % (ref 32–36)
MCV RBC AUTO: 91.7 FL (ref 78–100)
PDW BLD-RTO: 14.8 % (ref 11.7–14.9)
PLATELET # BLD: 361 K/CU MM (ref 140–440)
PMV BLD AUTO: 10 FL (ref 7.5–11.1)
POTASSIUM SERPL-SCNC: 4.5 MMOL/L (ref 3.5–5.1)
RBC # BLD: 4.56 M/CU MM (ref 4.2–5.4)
SODIUM BLD-SCNC: 141 MMOL/L (ref 135–145)
WBC # BLD: 10 K/CU MM (ref 4–10.5)

## 2021-10-12 PROCEDURE — 36415 COLL VENOUS BLD VENIPUNCTURE: CPT

## 2021-10-12 PROCEDURE — 80048 BASIC METABOLIC PNL TOTAL CA: CPT

## 2021-10-12 PROCEDURE — 85027 COMPLETE CBC AUTOMATED: CPT

## 2021-11-23 ENCOUNTER — HOSPITAL ENCOUNTER (OUTPATIENT)
Age: 76
Setting detail: SPECIMEN
Discharge: HOME | End: 2021-11-23
Payer: MEDICARE

## 2021-11-23 LAB
ANION GAP SERPL CALCULATED.3IONS-SCNC: 15 MMOL/L (ref 4–16)
BUN BLDV-MCNC: 28 MG/DL (ref 6–23)
CALCIUM SERPL-MCNC: 9.2 MG/DL (ref 8.3–10.6)
CHLORIDE BLD-SCNC: 101 MMOL/L (ref 99–110)
CO2: 22 MMOL/L (ref 21–32)
CREAT SERPL-MCNC: 1.1 MG/DL (ref 0.6–1.1)
GFR AFRICAN AMERICAN: 59 ML/MIN/1.73M2
GFR NON-AFRICAN AMERICAN: 49 ML/MIN/1.73M2
GLUCOSE BLD-MCNC: 82 MG/DL (ref 70–99)
HCT VFR BLD CALC: 38.6 % (ref 37–47)
HEMOGLOBIN: 11.9 GM/DL (ref 12.5–16)
MCH RBC QN AUTO: 27 PG (ref 27–31)
MCHC RBC AUTO-ENTMCNC: 30.8 % (ref 32–36)
MCV RBC AUTO: 87.7 FL (ref 78–100)
PDW BLD-RTO: 14.1 % (ref 11.7–14.9)
PLATELET # BLD: 312 K/CU MM (ref 140–440)
PMV BLD AUTO: 10 FL (ref 7.5–11.1)
POTASSIUM SERPL-SCNC: 4.3 MMOL/L (ref 3.5–5.1)
RBC # BLD: 4.4 M/CU MM (ref 4.2–5.4)
SODIUM BLD-SCNC: 138 MMOL/L (ref 135–145)
WBC # BLD: 8.4 K/CU MM (ref 4–10.5)

## 2021-11-23 PROCEDURE — 80048 BASIC METABOLIC PNL TOTAL CA: CPT

## 2021-11-23 PROCEDURE — 85027 COMPLETE CBC AUTOMATED: CPT

## 2021-11-23 PROCEDURE — 36415 COLL VENOUS BLD VENIPUNCTURE: CPT

## 2021-12-28 ENCOUNTER — HOSPITAL ENCOUNTER (OUTPATIENT)
Age: 76
Setting detail: SPECIMEN
Discharge: HOME | End: 2021-12-28
Payer: MEDICARE

## 2021-12-28 LAB
ALBUMIN SERPL-MCNC: 3.8 GM/DL (ref 3.4–5)
ALP BLD-CCNC: 140 IU/L (ref 40–128)
ALT SERPL-CCNC: 14 U/L (ref 10–40)
ANION GAP SERPL CALCULATED.3IONS-SCNC: 14 MMOL/L (ref 4–16)
AST SERPL-CCNC: 19 IU/L (ref 15–37)
BILIRUB SERPL-MCNC: 0.2 MG/DL (ref 0–1)
BUN BLDV-MCNC: 33 MG/DL (ref 6–23)
CALCIUM SERPL-MCNC: 9.3 MG/DL (ref 8.3–10.6)
CHLORIDE BLD-SCNC: 98 MMOL/L (ref 99–110)
CHOLESTEROL: 222 MG/DL
CO2: 25 MMOL/L (ref 21–32)
CREAT SERPL-MCNC: 1.3 MG/DL (ref 0.6–1.1)
ESTIMATED AVERAGE GLUCOSE: 117 MG/DL
GFR AFRICAN AMERICAN: 48 ML/MIN/1.73M2
GFR NON-AFRICAN AMERICAN: 40 ML/MIN/1.73M2
GLUCOSE BLD-MCNC: 114 MG/DL (ref 70–99)
HBA1C MFR BLD: 5.7 % (ref 4.2–6.3)
HCT VFR BLD CALC: 40.1 % (ref 37–47)
HDLC SERPL-MCNC: 43 MG/DL
HEMOGLOBIN: 12.4 GM/DL (ref 12.5–16)
LDL CHOLESTEROL DIRECT: 147 MG/DL
MCH RBC QN AUTO: 25.6 PG (ref 27–31)
MCHC RBC AUTO-ENTMCNC: 30.9 % (ref 32–36)
MCV RBC AUTO: 82.9 FL (ref 78–100)
PDW BLD-RTO: 14.6 % (ref 11.7–14.9)
PLATELET # BLD: 460 K/CU MM (ref 140–440)
PMV BLD AUTO: 9.9 FL (ref 7.5–11.1)
POTASSIUM SERPL-SCNC: 3.5 MMOL/L (ref 3.5–5.1)
RBC # BLD: 4.84 M/CU MM (ref 4.2–5.4)
SODIUM BLD-SCNC: 137 MMOL/L (ref 135–145)
TOTAL PROTEIN: 6.8 GM/DL (ref 6.4–8.2)
TRIGL SERPL-MCNC: 153 MG/DL
WBC # BLD: 10.4 K/CU MM (ref 4–10.5)

## 2021-12-28 PROCEDURE — 85027 COMPLETE CBC AUTOMATED: CPT

## 2021-12-28 PROCEDURE — 83721 ASSAY OF BLOOD LIPOPROTEIN: CPT

## 2021-12-28 PROCEDURE — 80061 LIPID PANEL: CPT

## 2021-12-28 PROCEDURE — 83036 HEMOGLOBIN GLYCOSYLATED A1C: CPT

## 2021-12-28 PROCEDURE — 80053 COMPREHEN METABOLIC PANEL: CPT

## 2021-12-28 PROCEDURE — 36415 COLL VENOUS BLD VENIPUNCTURE: CPT

## 2021-12-30 ENCOUNTER — HOSPITAL ENCOUNTER (OUTPATIENT)
Age: 76
Setting detail: SPECIMEN
Discharge: HOME | End: 2021-12-30
Payer: MEDICARE

## 2021-12-30 LAB
BACTERIA: NEGATIVE /HPF
BILIRUBIN URINE: ABNORMAL MG/DL
BLOOD, URINE: ABNORMAL
CLARITY: ABNORMAL
COLOR: ABNORMAL
GLUCOSE, URINE: NEGATIVE MG/DL
ICTOTEST: NEGATIVE
KETONES, URINE: NEGATIVE MG/DL
LEUKOCYTE ESTERASE, URINE: ABNORMAL
NITRITE URINE, QUANTITATIVE: NEGATIVE
PH, URINE: 7 (ref 5–8)
PROTEIN UA: 100 MG/DL
RBC URINE: 12 /HPF (ref 0–6)
SPECIFIC GRAVITY UA: 1.01 (ref 1–1.03)
SQUAMOUS EPITHELIAL: 7 /HPF
TRICHOMONAS: ABNORMAL /HPF
UROBILINOGEN, URINE: NORMAL MG/DL (ref 0.2–1)
WBC CLUMP: ABNORMAL /HPF
WBC UA: 1615 /HPF (ref 0–5)

## 2021-12-30 PROCEDURE — 81001 URINALYSIS AUTO W/SCOPE: CPT

## 2021-12-30 PROCEDURE — 87186 SC STD MICRODIL/AGAR DIL: CPT

## 2021-12-30 PROCEDURE — 87077 CULTURE AEROBIC IDENTIFY: CPT

## 2021-12-30 PROCEDURE — 87086 URINE CULTURE/COLONY COUNT: CPT

## 2022-01-01 LAB
CULTURE: ABNORMAL
CULTURE: ABNORMAL
Lab: ABNORMAL
SPECIMEN: ABNORMAL

## 2022-01-03 ENCOUNTER — OUTSIDE SERVICES (OUTPATIENT)
Dept: PSYCHIATRY | Age: 77
End: 2022-01-03
Payer: MEDICARE

## 2022-01-03 DIAGNOSIS — F41.1 GAD (GENERALIZED ANXIETY DISORDER): ICD-10-CM

## 2022-01-03 DIAGNOSIS — F33.1 MODERATE RECURRENT MAJOR DEPRESSION (HCC): Primary | ICD-10-CM

## 2022-01-03 PROCEDURE — 90834 PSYTX W PT 45 MINUTES: CPT | Performed by: NURSE PRACTITIONER

## 2022-01-03 RX ORDER — BUSPIRONE HYDROCHLORIDE 10 MG/1
10 TABLET ORAL 3 TIMES DAILY
COMMUNITY

## 2022-01-25 ENCOUNTER — HOSPITAL ENCOUNTER (OUTPATIENT)
Age: 77
Setting detail: SPECIMEN
Discharge: HOME | End: 2022-01-25

## 2022-02-08 ENCOUNTER — HOSPITAL ENCOUNTER (OUTPATIENT)
Age: 77
Setting detail: SPECIMEN
Discharge: HOME | End: 2022-02-08

## 2022-02-08 LAB
ANION GAP SERPL CALCULATED.3IONS-SCNC: 13 MMOL/L (ref 4–16)
BUN BLDV-MCNC: 23 MG/DL (ref 6–23)
CALCIUM SERPL-MCNC: 8.8 MG/DL (ref 8.3–10.6)
CHLORIDE BLD-SCNC: 99 MMOL/L (ref 99–110)
CO2: 21 MMOL/L (ref 21–32)
CREAT SERPL-MCNC: 1.1 MG/DL (ref 0.6–1.1)
GFR AFRICAN AMERICAN: 59 ML/MIN/1.73M2
GFR NON-AFRICAN AMERICAN: 49 ML/MIN/1.73M2
GLUCOSE BLD-MCNC: 103 MG/DL (ref 70–99)
HCT VFR BLD CALC: 47.2 % (ref 37–47)
HEMOGLOBIN: 14.2 GM/DL (ref 12.5–16)
MCH RBC QN AUTO: 25.6 PG (ref 27–31)
MCHC RBC AUTO-ENTMCNC: 30.1 % (ref 32–36)
MCV RBC AUTO: 85.2 FL (ref 78–100)
PDW BLD-RTO: 16.9 % (ref 11.7–14.9)
PLATELET # BLD: 235 K/CU MM (ref 140–440)
PMV BLD AUTO: 10.7 FL (ref 7.5–11.1)
POTASSIUM SERPL-SCNC: 4.1 MMOL/L (ref 3.5–5.1)
RBC # BLD: 5.54 M/CU MM (ref 4.2–5.4)
SODIUM BLD-SCNC: 133 MMOL/L (ref 135–145)
WBC # BLD: 7 K/CU MM (ref 4–10.5)

## 2022-02-08 PROCEDURE — 80048 BASIC METABOLIC PNL TOTAL CA: CPT

## 2022-02-08 PROCEDURE — 85027 COMPLETE CBC AUTOMATED: CPT

## 2022-02-08 PROCEDURE — 36415 COLL VENOUS BLD VENIPUNCTURE: CPT

## 2022-02-21 ENCOUNTER — OUTSIDE SERVICES (OUTPATIENT)
Dept: PSYCHIATRY | Age: 77
End: 2022-02-21
Payer: MEDICARE

## 2022-02-21 DIAGNOSIS — F29 PSYCHOSIS NOT DUE TO SUBSTANCE OR KNOWN PHYSIOLOGICAL CONDITION (HCC): ICD-10-CM

## 2022-02-21 DIAGNOSIS — F41.1 GAD (GENERALIZED ANXIETY DISORDER): Primary | ICD-10-CM

## 2022-02-21 DIAGNOSIS — F33.1 MODERATE RECURRENT MAJOR DEPRESSION (HCC): ICD-10-CM

## 2022-02-21 PROCEDURE — 90832 PSYTX W PT 30 MINUTES: CPT | Performed by: NURSE PRACTITIONER

## 2022-02-21 NOTE — PROGRESS NOTES
Behavioral Health Consultation  Renea Yuan PMHNP-BC  2/21/2022, 1:38 PM      Time spent with Patient:  30 minutes  This was a outpatient visit. [facility]   Provider Location: [place] [address]    Chief Complaint:  increase in anxiety, irritability and agitation. Andi Ashton is alert and oriented x date, time and place. She states \"I don't know why I was in the hospital.\" She then goes on to describe going via EMS, laughing with them, waiting in the ED for an evaluation, going to a hospital where she could move around, getting Covid and being in [facility] due to covid and now on the SNF side as she cannot walk easily. She states, \"I spent the last few weeks being told, \"don't get up and walk, to now you can't walk so we need to help you. ! \" She is frustrated. She does show a slight bit of insight stating, \"I do have a temper. \" She also notes her younger brother Yumi Perez is letting her move to a house or another place. Reports her mood is \"mixed\". Appetite is \"fine\" and sleep is \"ok. \" She does note she is feeling better. A little bit of paranoia? States that before she left the aid told her \"now you will know whats it like. \" Could not further explain what that meant. Repeated several stores. Monotone speech. Still grieving loss of being able to move around and do things.      [place]:  Reason for visit is medication management follow up.   She has been compliant with medications.  Pt reports that medications have  been working.  Pt denies side effects from medications.  Pt denies    hallucinations.  Pt reports there has been no changes to appetite.  Pt reports sleep has been better   Pt denies  current exercise.    Pt denies current suicidal ideation, plan and intent. Pt  denies current homicidal ideation, plan and [employer]).          Social:Patient was born in [facility].  She has three brothers. She was the youngest of four. She earned her MSW. She was very active within the [employer] as a . She never  nor had children. She was close to her nieces and nephews.      She felt she was getting better with DR Patience Ordaz and remembered how she helped her assimilate what she wants with what is possible for her to do. Patient encouraged to work on this [employer]. \"    Past Psychiatric history:   The patient has no prior history of mental health diagnoses.     Current treatment includes Anti-depressant:  Patient denies SE from current treatment. Zoloft 50 mg bid, seroquel 25 mg po daily and 50 qhs, trazodone 50 mg po at bedtime  Previous treatment has included: unknown.  zoloft 100 mg po daily   And seroquel 25 mg po at bedtime. buspar 10 mg tid to target anxiety   zoloft 150 mg po daily and Anti-psychotic: seroquel 25 mg po at bedtime. Family Mental Health history:   Pertinent family history: unknown. MSE:    Appearance: alert, cooperative  Attention:Limited  Appetite: normal  Ambulation: unable to assess.    Sleep disturbance: No  Loss of pleasure: Yes  Speech: monotone  Mood: \"mixed\"  Affect: flat affect  Thought Content: excessive preoccupations  Insight: Fair  Judgment: Impaired  Memory: Long-term impaired and Short-term impaired  Suicide Assessment: no suicidal ideation  Homicide Assessment: denies current homicidal ideation, plan and intent    History:      Review of Systems:       Current Outpatient Medications:     busPIRone (BUSPAR) 10 MG tablet, Take 10 mg by mouth 3 times daily, Disp: , Rfl:     aspirin 81 MG chewable tablet, Take 1 tablet by mouth daily, Disp: 30 tablet, Rfl: 3    albuterol sulfate  (90 Base) MCG/ACT inhaler, Inhale 2 puffs into the lungs every 6 hours as needed for Wheezing, Disp: 1 Inhaler, Rfl: 3    acetaminophen (TYLENOL) 325 MG tablet, Take 650 mg by mouth every 4 hours as needed for Pain, Disp: , Rfl:    calcium-vitamin D (OSCAL-500) 500-200 MG-UNIT per tablet, Take 1 tablet by mouth 2 times daily, Disp: , Rfl:     bismuth subsalicylate (PEPTO BISMOL) 262 MG/15ML suspension, Take 30 mLs by mouth every 6 hours as needed for Indigestion, Disp: , Rfl:     oxybutynin (DITROPAN-XL) 10 MG extended release tablet, Take 10 mg by mouth daily, Disp: , Rfl:     sertraline (ZOLOFT) 50 MG tablet, Take 200 mg by mouth daily , Disp: , Rfl:     QUEtiapine (SEROQUEL) 25 MG tablet, Take 25 mg by mouth 2 times daily , Disp: , Rfl:     meclizine (ANTIVERT) 25 MG CHEW, Take 25 mg by mouth Daily with lunch, Disp: , Rfl:     metoprolol succinate (TOPROL XL) 25 MG extended release tablet, Take 25 mg by mouth daily, Disp: , Rfl:     furosemide (LASIX) 20 MG tablet, Take 20 mg by mouth three times a week M, W and Friday for edema, Disp: , Rfl:     insulin lispro (HUMALOG) 100 UNIT/ML injection vial, Inject 0-6 Units into the skin 3 times daily (with meals), Disp: 1 vial, Rfl: 3    pantoprazole (PROTONIX) 40 MG tablet, Take 1 tablet by mouth 2 times daily (before meals) (Patient taking differently: Take 40 mg by mouth daily ), Disp: 60 tablet, Rfl: 1    glipiZIDE (GLUCOTROL) 10 MG tablet, TAKE 1 TABLET BY MOUTH EVERY DAY (Patient taking differently: Take 2.5 mg by mouth daily (with breakfast) ), Disp: 90 tablet, Rfl: 0    Cholecalciferol 100 MCG (4000 UT) CAPS, Take 1 capsule by mouth daily, Disp: 90 capsule, Rfl: 1     PDMP Monitoring:    Last PDMP Kareem as Reviewed [facility]):  Review User Review Instant Review Result   JOVI RAMEY 9/13/2021  3:21 PM Reviewed PDMP [1]       Urine Drug Screenings (1 yr)    No resulted procedures found. Medication Contract and Consent for Opioid Use Documents Filed      No documents found                 OARRS checked and there were no signs of substance abuse, or prescription misuse.      Social History     Socioeconomic History    Marital status: Single     Spouse name: Not on file    Number of children: Not on file    Years of education: Not on file    Highest education level: Not on file   Occupational History    Not on file   Tobacco Use    Smoking status: Passive Smoke Exposure - Never Smoker    Smokeless tobacco: Never Used   Vaping Use    Vaping Use: Never used   Substance and Sexual Activity    Alcohol use: Yes     Comment: every 2 months     Drug use: Never    Sexual activity: Not on file   Other Topics Concern    Not on file   Social History Narrative    Not on file     Social Determinants of Health     Financial Resource Strain:     Difficulty of Paying Living Expenses: Not on file   Food Insecurity:     Worried About Running Out of Food in the Last Year: Not on file    Dayne of Food in the Last Year: Not on file   Transportation Needs:     Lack of Transportation (Medical): Not on file    Lack of Transportation (Non-Medical): Not on file   Physical Activity:     Days of Exercise per Week: Not on file    Minutes of Exercise per Session: Not on file   Stress:     Feeling of Stress : Not on file   Social Connections:     Frequency of Communication with Friends and Family: Not on file    Frequency of Social Gatherings with Friends and Family: Not on file    Attends Baptist Services: Not on file    Active Member of [address] [place] [address] or Organizations: Not on file    Attends Club or Organization Meetings: Not on file    Marital Status: Not on file   Intimate Partner Violence:     Fear of Current or Ex-Partner: Not on file    Emotionally Abused: Not on file    Physically Abused: Not on file    Sexually Abused: Not on file   Housing Stability:     Unable to Pay for Housing in the Last Year: Not on file    Number of [place] in the Last Year: Not on file    Unstable Housing in the Last Year: Not on file       TOBACCO: Steven Morales  reports that she is a non-smoker but has been exposed to tobacco smoke. She has been exposed to 0.25 packs per day.  She has never used smokeless psychosis     Discussed risks and benefits of medications, as well as need for yearly lab work. Follow up with psychiatry in one month. Continue work with PCP to manage medical concerns, and [facility] for continued follow-up. No orders of the defined types were placed in this encounter. No orders of the defined types were placed in this encounter.       Pt interventions:    Discussed importance of medication adherence

## 2022-02-22 ENCOUNTER — HOSPITAL ENCOUNTER (OUTPATIENT)
Age: 77
Setting detail: SPECIMEN
Discharge: HOME | End: 2022-02-22

## 2022-02-28 ENCOUNTER — OUTSIDE SERVICES (OUTPATIENT)
Dept: PSYCHIATRY | Age: 77
End: 2022-02-28
Payer: MEDICARE

## 2022-02-28 DIAGNOSIS — F41.1 GAD (GENERALIZED ANXIETY DISORDER): ICD-10-CM

## 2022-02-28 DIAGNOSIS — F33.1 MODERATE RECURRENT MAJOR DEPRESSION (HCC): ICD-10-CM

## 2022-02-28 DIAGNOSIS — F29 PSYCHOSIS NOT DUE TO SUBSTANCE OR KNOWN PHYSIOLOGICAL CONDITION (HCC): Primary | ICD-10-CM

## 2022-02-28 PROCEDURE — 90832 PSYTX W PT 30 MINUTES: CPT | Performed by: NURSE PRACTITIONER

## 2022-02-28 NOTE — PROGRESS NOTES
Behavioral Health Consultation  Renea Yuan PMHNP-BC  2/28/2022, 11:53 AM         Time spent with Patient:  30 minutes  This was a outpatient visit. Patient Location: [place] [address]   Provider Location: [place] [address]     Chief Complaint:  increase in anxiety, irritability and agitation.     Andi Ashton is alert and oriented x date, time and place. She misses her apartment and is not sure when she can return to it. Still exhibiting some paranoia but it is definitely less. Mood seems better. Denies appetite and sleep problems. She loves to discuss her views upon new book that she read. She notes the influx of new staff and while it is intimidating it, there are some nice people. Cognitively appears more centered along with being more calm. Patient aware of providers last day. Discussed what that would mean going forward. She is frustrated but understanding of situation. [place]:  Reason for visit is medication management follow up.   She has been compliant with medications.  Pt reports that medications have  been working.  Pt denies side effects from medications.  Pt denies    hallucinations.  Pt reports there has been no changes to appetite.  Pt reports sleep has been better   Pt denies  current exercise.    Pt denies current suicidal ideation, plan and intent. Pt  denies current homicidal ideation, plan and [employer]).             Social:Patient was born in [facility]. She has three brothers. She was the youngest of four. She earned her MSW. She was very active within the [employer] as a . She never  nor had children. She was close to her nieces and nephews.      She felt she was getting better with DR Mayi Dye and remembered how she helped her assimilate what she wants with what is possible for her to do. Patient encouraged to work on this for [employer]. \"     Past Psychiatric history:    The patient has no prior history of mental health diagnoses.     Current treatment includes Anti-depressant:  Patient denies SE from current treatment. Zoloft 50 mg bid, seroquel 25 mg po daily and 50 qhs, trazodone 50 mg po at bedtime  Previous treatment has included: unknown.  zoloft 100 mg po daily   And seroquel 25 mg po at bedtime. buspar 10 mg tid to target anxiety   zoloft 150 mg po daily and Anti-psychotic: seroquel 25 mg po at bedtime.         Family Mental Health history:   Pertinent family history: unknown. MSE:    Appearance: alert, cooperative  Attention:Limited  Appetite: normal  Ambulation: unable to assess.    Sleep disturbance: No  Loss of pleasure: Yes  Speech: spontaneous, normal rate, normal volume and well articulated  Mood: better  Affect: normal affect  Thought Content: paranoid thoughts  Insight: Fair  Judgment: Intact  Memory: Intact long-term and Short-term impaired  Suicide Assessment: no suicidal ideation  Homicide Assessment: denies current homicidal ideation, plan and intent    History:      Review of Systems:       Current Outpatient Medications:     busPIRone (BUSPAR) 10 MG tablet, Take 10 mg by mouth 3 times daily, Disp: , Rfl:     aspirin 81 MG chewable tablet, Take 1 tablet by mouth daily, Disp: 30 tablet, Rfl: 3    albuterol sulfate  (90 Base) MCG/ACT inhaler, Inhale 2 puffs into the lungs every 6 hours as needed for Wheezing, Disp: 1 Inhaler, Rfl: 3    acetaminophen (TYLENOL) 325 MG tablet, Take 650 mg by mouth every 4 hours as needed for Pain, Disp: , Rfl:     calcium-vitamin D (OSCAL-500) 500-200 MG-UNIT per tablet, Take 1 tablet by mouth 2 times daily, Disp: , Rfl:     bismuth subsalicylate (PEPTO BISMOL) 262 MG/15ML suspension, Take 30 mLs by mouth every 6 hours as needed for Indigestion, Disp: , Rfl:     oxybutynin (DITROPAN-XL) 10 MG extended release tablet, Take 10 mg by mouth daily, Disp: , Rfl:     sertraline (ZOLOFT) 50 MG tablet, Take 200 mg by mouth daily , Disp: , Rfl:     QUEtiapine (SEROQUEL) 25 MG tablet, Take 25 mg by mouth 2 times daily , Disp: , Rfl:     meclizine (ANTIVERT) 25 MG CHEW, Take 25 mg by mouth Daily with lunch, Disp: , Rfl:     metoprolol succinate (TOPROL XL) 25 MG extended release tablet, Take 25 mg by mouth daily, Disp: , Rfl:     furosemide (LASIX) 20 MG tablet, Take 20 mg by mouth three times a week M, W and Friday for edema, Disp: , Rfl:     insulin lispro (HUMALOG) 100 UNIT/ML injection vial, Inject 0-6 Units into the skin 3 times daily (with meals), Disp: 1 vial, Rfl: 3    pantoprazole (PROTONIX) 40 MG tablet, Take 1 tablet by mouth 2 times daily (before meals) (Patient taking differently: Take 40 mg by mouth daily ), Disp: 60 tablet, Rfl: 1    glipiZIDE (GLUCOTROL) 10 MG tablet, TAKE 1 TABLET BY MOUTH EVERY DAY (Patient taking differently: Take 2.5 mg by mouth daily (with breakfast) ), Disp: 90 tablet, Rfl: 0    Cholecalciferol 100 MCG (4000 UT) CAPS, Take 1 capsule by mouth daily, Disp: 90 capsule, Rfl: 1     PDMP Monitoring:    Last PDMP Kareem as Reviewed [facility]):  Review User Review Instant Review Result   JOVI RAMEY 9/13/2021  3:21 PM Reviewed PDMP [1]       Urine Drug Screenings (1 yr)    No resulted procedures found. Medication Contract and Consent for Opioid Use Documents Filed      No documents found                 OARRS checked and there were no signs of substance abuse, or prescription misuse.      Social History     Socioeconomic History    Marital status: Single     Spouse name: Not on file    Number of children: Not on file    Years of education: Not on file    Highest education level: Not on file   Occupational History    Not on file   Tobacco Use    Smoking status: Passive Smoke Exposure - Never Smoker    Smokeless tobacco: Never Used   Vaping Use    Vaping Use: Never used   Substance and Sexual Activity    Alcohol use: Yes     Comment: every 2 months     Drug use: Never    Sexual activity: Not on file   Other Topics Concern    Not on file   Social History Narrative    Not on file     Social Determinants of Health     Financial Resource Strain:     Difficulty of Paying Living Expenses: Not on file   Food Insecurity:     Worried About [address] in the Last Year: Not on file    Dayne of Food in the Last Year: Not on file   Transportation Needs:     Lack of Transportation (Medical): Not on file    Lack of Transportation (Non-Medical): Not on file   Physical Activity:     Days of Exercise per Week: Not on file    Minutes of Exercise per Session: Not on file   Stress:     Feeling of Stress : Not on file   Social Connections:     Frequency of Communication with Friends and Family: Not on file    Frequency of Social Gatherings with Friends and Family: Not on file    Attends Orthodoxy Services: Not on file    Active Member of [address] [place] [address] or Organizations: Not on file    Attends Club or Organization Meetings: Not on file    Marital Status: Not on file   Intimate Partner Violence:     Fear of Current or Ex-Partner: Not on file    Emotionally Abused: Not on file    Physically Abused: Not on file    Sexually Abused: Not on file   Housing Stability:     Unable to Pay for Housing in the Last Year: Not on file    Number of [place] in the Last Year: Not on file    Unstable Housing in the Last Year: Not on file       TOBACCO: Stevie Ty  reports that she is a non-smoker but has been exposed to tobacco smoke. She has been exposed to 0.25 packs per day. She has never used smokeless tobacco.  ETOH: Stevie Ty  reports current alcohol use. Past Medical History:   Diagnosis Date    Clostridium difficile infection 03/01/2020    Hyperlipidemia     Hypertension     Obesity     Type 2 diabetes mellitus without complication ([facility]       Metabolic monitoring is being done by PCP.    Family History   Problem Relation Age of Onset    Diabetes Brother     Obesity Brother     Other Brother         TIA    Diabetes Mother     Other Mother         COPD    Heart Attack Father     Coronary Art Dis Father     Other Father         aortic aneurysm    Diabetes Brother     Cancer Brother      Last Labs:   Lab Results   Component Value Date    LABA1C 5.7 12/28/2021     Lab Results   Component Value Date     12/28/2021      Lab Results   Component Value Date    WBC 7.0 02/08/2022    HGB 14.2 02/08/2022    HCT 47.2 (H) 02/08/2022    MCV 85.2 02/08/2022     02/08/2022    LYMPHOPCT 15.4 (L) 08/25/2021    RBC 5.54 (H) 02/08/2022    MCH 25.6 (L) 02/08/2022    MCHC 30.1 (L) 02/08/2022    RDW 16.9 (H) 02/08/2022          Lab Results   Component Value Date     (L) 02/08/2022    K 4.1 02/08/2022    CL 99 02/08/2022    CO2 21 02/08/2022    BUN 23 02/08/2022    CREATININE 1.1 02/08/2022    GLUCOSE 103 (H) 02/08/2022    CALCIUM 8.8 02/08/2022    PROT 6.8 12/28/2021    LABALBU 3.8 12/28/2021    BILITOT 0.2 12/28/2021    ALKPHOS 140 (H) 12/28/2021    AST 19 12/28/2021    ALT 14 12/28/2021    LABGLOM 49 (L) 02/08/2022    GFRAA 59 (L) 02/08/2022    AGRATIO 1.1 02/28/2020    GLOB 2.9 02/28/2020      . last    Diagnosis:      1. 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 Moderate recurrent major depression ([facility] [facility]    3. MICHAEL (generalized anxiety disorder)      Plan:    ·  Continue with sertraline 50 mg po bid for mood  · Continue with trazodone 50 mg po qhs for insomnia  · Continue seroquel 50 mg po at qhs due to psychosis  · Continue seroquel 25 mg po at qhs due to psychosis   Do not change without psychiatric provider review.    Discussed risks and benefits of medications, as well as need for yearly lab work. Follow up with psych in two weeks. Continue work with PCP to manage medical concerns, and [facility] for continued follow-up. No orders of the defined types were placed in this encounter. No orders of the defined types were placed in this encounter.       Pt interventions: Discussed importance of medication adherence, Provided education and Supportive techniques

## 2022-06-28 ENCOUNTER — HOSPITAL ENCOUNTER (OUTPATIENT)
Age: 77
Setting detail: SPECIMEN
Discharge: HOME | End: 2022-06-28
Payer: MEDICARE

## 2022-06-28 LAB
ALBUMIN SERPL-MCNC: 3.5 GM/DL (ref 3.4–5)
ALP BLD-CCNC: 133 IU/L (ref 40–128)
ALT SERPL-CCNC: 8 U/L (ref 10–40)
ANION GAP SERPL CALCULATED.3IONS-SCNC: 15 MMOL/L (ref 4–16)
AST SERPL-CCNC: 16 IU/L (ref 15–37)
BILIRUB SERPL-MCNC: 0.3 MG/DL (ref 0–1)
BUN BLDV-MCNC: 29 MG/DL (ref 6–23)
CALCIUM SERPL-MCNC: 8.7 MG/DL (ref 8.3–10.6)
CHLORIDE BLD-SCNC: 104 MMOL/L (ref 99–110)
CHOLESTEROL: 175 MG/DL
CO2: 23 MMOL/L (ref 21–32)
CREAT SERPL-MCNC: 1.1 MG/DL (ref 0.6–1.1)
ESTIMATED AVERAGE GLUCOSE: 91 MG/DL
GFR AFRICAN AMERICAN: 59 ML/MIN/1.73M2
GFR NON-AFRICAN AMERICAN: 48 ML/MIN/1.73M2
GLUCOSE BLD-MCNC: 54 MG/DL (ref 70–99)
HBA1C MFR BLD: 4.8 % (ref 4.2–6.3)
HCT VFR BLD CALC: 37.1 % (ref 37–47)
HDLC SERPL-MCNC: 49 MG/DL
HEMOGLOBIN: 11.1 GM/DL (ref 12.5–16)
LDL CHOLESTEROL CALCULATED: 102 MG/DL
MCH RBC QN AUTO: 27.4 PG (ref 27–31)
MCHC RBC AUTO-ENTMCNC: 29.9 % (ref 32–36)
MCV RBC AUTO: 91.6 FL (ref 78–100)
PDW BLD-RTO: 14.2 % (ref 11.7–14.9)
PLATELET # BLD: 251 K/CU MM (ref 140–440)
PMV BLD AUTO: 10.2 FL (ref 7.5–11.1)
POTASSIUM SERPL-SCNC: 4.5 MMOL/L (ref 3.5–5.1)
RBC # BLD: 4.05 M/CU MM (ref 4.2–5.4)
SODIUM BLD-SCNC: 142 MMOL/L (ref 135–145)
TOTAL PROTEIN: 5.7 GM/DL (ref 6.4–8.2)
TRIGL SERPL-MCNC: 121 MG/DL
WBC # BLD: 6.7 K/CU MM (ref 4–10.5)

## 2022-06-28 PROCEDURE — 80053 COMPREHEN METABOLIC PANEL: CPT

## 2022-06-28 PROCEDURE — 36415 COLL VENOUS BLD VENIPUNCTURE: CPT

## 2022-06-28 PROCEDURE — 80061 LIPID PANEL: CPT

## 2022-06-28 PROCEDURE — 83036 HEMOGLOBIN GLYCOSYLATED A1C: CPT

## 2022-06-28 PROCEDURE — 85027 COMPLETE CBC AUTOMATED: CPT

## 2023-05-19 ENCOUNTER — HOSPITAL ENCOUNTER (INPATIENT)
Age: 78
LOS: 5 days | Discharge: NURSING, REHAB OR LONG-TERM CARE | DRG: 853 | End: 2023-05-25
Attending: EMERGENCY MEDICINE | Admitting: INTERNAL MEDICINE
Payer: MEDICARE

## 2023-05-19 ENCOUNTER — APPOINTMENT (OUTPATIENT)
Dept: GENERAL RADIOLOGY | Age: 78
DRG: 853 | End: 2023-05-19
Payer: MEDICARE

## 2023-05-19 ENCOUNTER — APPOINTMENT (OUTPATIENT)
Dept: CT IMAGING | Age: 78
DRG: 853 | End: 2023-05-19
Payer: MEDICARE

## 2023-05-19 DIAGNOSIS — M65.9 FLEXOR TENOSYNOVITIS OF FINGER: ICD-10-CM

## 2023-05-19 DIAGNOSIS — N39.0 URINARY TRACT INFECTION WITHOUT HEMATURIA, SITE UNSPECIFIED: ICD-10-CM

## 2023-05-19 DIAGNOSIS — R41.82 ALTERED MENTAL STATUS, UNSPECIFIED ALTERED MENTAL STATUS TYPE: Primary | ICD-10-CM

## 2023-05-19 DIAGNOSIS — F03.90 DEMENTIA, UNSPECIFIED DEMENTIA SEVERITY, UNSPECIFIED DEMENTIA TYPE, UNSPECIFIED WHETHER BEHAVIORAL, PSYCHOTIC, OR MOOD DISTURBANCE OR ANXIETY (HCC): ICD-10-CM

## 2023-05-19 DIAGNOSIS — L08.9 INFECTION OF HAND: ICD-10-CM

## 2023-05-19 LAB
ALBUMIN SERPL-MCNC: 3.5 GM/DL (ref 3.4–5)
ALP BLD-CCNC: 145 IU/L (ref 40–129)
ALT SERPL-CCNC: 10 U/L (ref 10–40)
ANION GAP SERPL CALCULATED.3IONS-SCNC: 13 MMOL/L (ref 4–16)
APTT: 42.7 SECONDS (ref 25.1–37.1)
AST SERPL-CCNC: 14 IU/L (ref 15–37)
BASE EXCESS MIXED: 1.5 (ref 0–2.3)
BASOPHILS ABSOLUTE: 0 K/CU MM
BASOPHILS RELATIVE PERCENT: 0.3 % (ref 0–1)
BILIRUB SERPL-MCNC: 0.5 MG/DL (ref 0–1)
BUN SERPL-MCNC: 16 MG/DL (ref 6–23)
CALCIUM SERPL-MCNC: 8.4 MG/DL (ref 8.3–10.6)
CHLORIDE BLD-SCNC: 100 MMOL/L (ref 99–110)
CO2: 22 MMOL/L (ref 21–32)
CREAT SERPL-MCNC: 1.1 MG/DL (ref 0.6–1.1)
DIFFERENTIAL TYPE: ABNORMAL
EOSINOPHILS ABSOLUTE: 0.1 K/CU MM
EOSINOPHILS RELATIVE PERCENT: 0.8 % (ref 0–3)
GFR SERPL CREATININE-BSD FRML MDRD: 52 ML/MIN/1.73M2
GLUCOSE SERPL-MCNC: 189 MG/DL (ref 70–99)
HCO3 VENOUS: 24.7 MMOL/L (ref 19–25)
HCT VFR BLD CALC: 37 % (ref 37–47)
HEMOGLOBIN: 11.6 GM/DL (ref 12.5–16)
IMMATURE NEUTROPHIL %: 0.4 % (ref 0–0.43)
INR BLD: 0.91 INDEX
LACTATE: 1.2 MMOL/L (ref 0.5–1.9)
LIPASE: 32 IU/L (ref 13–60)
LYMPHOCYTES ABSOLUTE: 1.3 K/CU MM
LYMPHOCYTES RELATIVE PERCENT: 10.6 % (ref 24–44)
MCH RBC QN AUTO: 26.2 PG (ref 27–31)
MCHC RBC AUTO-ENTMCNC: 31.4 % (ref 32–36)
MCV RBC AUTO: 83.7 FL (ref 78–100)
MONOCYTES ABSOLUTE: 0.5 K/CU MM
MONOCYTES RELATIVE PERCENT: 4.3 % (ref 0–4)
NUCLEATED RBC %: 0 %
O2 SAT, VEN: 89.9 % (ref 50–70)
PCO2, VEN: 34 MMHG (ref 38–52)
PDW BLD-RTO: 15.9 % (ref 11.7–14.9)
PH VENOUS: 7.47 (ref 7.32–7.42)
PLATELET # BLD: 261 K/CU MM (ref 140–440)
PMV BLD AUTO: 10.2 FL (ref 7.5–11.1)
PO2, VEN: 52 MMHG (ref 28–48)
POTASSIUM SERPL-SCNC: 3.8 MMOL/L (ref 3.5–5.1)
PRO-BNP: 711.1 PG/ML
PROTHROMBIN TIME: 11.6 SECONDS (ref 11.7–14.5)
RBC # BLD: 4.42 M/CU MM (ref 4.2–5.4)
SEGMENTED NEUTROPHILS ABSOLUTE COUNT: 10.4 K/CU MM
SEGMENTED NEUTROPHILS RELATIVE PERCENT: 83.6 % (ref 36–66)
SODIUM BLD-SCNC: 135 MMOL/L (ref 135–145)
TOTAL IMMATURE NEUTOROPHIL: 0.05 K/CU MM
TOTAL NUCLEATED RBC: 0 K/CU MM
TOTAL PROTEIN: 6.1 GM/DL (ref 6.4–8.2)
TROPONIN T: 0.03 NG/ML
WBC # BLD: 12.4 K/CU MM (ref 4–10.5)

## 2023-05-19 PROCEDURE — 73130 X-RAY EXAM OF HAND: CPT

## 2023-05-19 PROCEDURE — 83605 ASSAY OF LACTIC ACID: CPT

## 2023-05-19 PROCEDURE — 83880 ASSAY OF NATRIURETIC PEPTIDE: CPT

## 2023-05-19 PROCEDURE — 83690 ASSAY OF LIPASE: CPT

## 2023-05-19 PROCEDURE — 84484 ASSAY OF TROPONIN QUANT: CPT

## 2023-05-19 PROCEDURE — 80053 COMPREHEN METABOLIC PANEL: CPT

## 2023-05-19 PROCEDURE — 85730 THROMBOPLASTIN TIME PARTIAL: CPT

## 2023-05-19 PROCEDURE — 93005 ELECTROCARDIOGRAM TRACING: CPT | Performed by: EMERGENCY MEDICINE

## 2023-05-19 PROCEDURE — 82805 BLOOD GASES W/O2 SATURATION: CPT

## 2023-05-19 PROCEDURE — 85610 PROTHROMBIN TIME: CPT

## 2023-05-19 PROCEDURE — 71045 X-RAY EXAM CHEST 1 VIEW: CPT

## 2023-05-19 PROCEDURE — 85025 COMPLETE CBC W/AUTO DIFF WBC: CPT

## 2023-05-19 PROCEDURE — 96365 THER/PROPH/DIAG IV INF INIT: CPT

## 2023-05-19 PROCEDURE — 70450 CT HEAD/BRAIN W/O DYE: CPT

## 2023-05-19 PROCEDURE — 99285 EMERGENCY DEPT VISIT HI MDM: CPT

## 2023-05-19 ASSESSMENT — ENCOUNTER SYMPTOMS
RESPIRATORY NEGATIVE: 1
ALLERGIC/IMMUNOLOGIC NEGATIVE: 1
EYES NEGATIVE: 1
GASTROINTESTINAL NEGATIVE: 1

## 2023-05-19 ASSESSMENT — PAIN - FUNCTIONAL ASSESSMENT: PAIN_FUNCTIONAL_ASSESSMENT: NONE - DENIES PAIN

## 2023-05-20 PROBLEM — M65.9 FLEXOR TENOSYNOVITIS OF FINGER: Status: ACTIVE | Noted: 2023-05-20

## 2023-05-20 PROBLEM — N39.0 UTI (URINARY TRACT INFECTION), UNCOMPLICATED: Status: ACTIVE | Noted: 2023-05-20

## 2023-05-20 LAB
BACTERIA: ABNORMAL /HPF
BILIRUBIN URINE: NEGATIVE MG/DL
BLOOD, URINE: NEGATIVE
CLARITY: CLEAR
COLOR: YELLOW
CRP SERPL HS-MCNC: 82.8 MG/L
GLUCOSE BLD-MCNC: 139 MG/DL (ref 70–99)
GLUCOSE BLD-MCNC: 148 MG/DL (ref 70–99)
GLUCOSE BLD-MCNC: 150 MG/DL (ref 70–99)
GLUCOSE BLD-MCNC: 155 MG/DL (ref 70–99)
GLUCOSE, URINE: NEGATIVE MG/DL
KETONES, URINE: NEGATIVE MG/DL
LEUKOCYTE ESTERASE, URINE: ABNORMAL
NITRITE URINE, QUANTITATIVE: POSITIVE
NON SQUAM EPI CELLS: <1 /HPF
PH, URINE: 5.5 (ref 5–8)
PROCALCITONIN SERPL-MCNC: 0.09 NG/ML
PROTEIN UA: NEGATIVE MG/DL
RBC URINE: 2 /HPF (ref 0–6)
SPECIFIC GRAVITY UA: <1.005 (ref 1–1.03)
TRICHOMONAS: ABNORMAL /HPF
TROPONIN T: 0.03 NG/ML
UROBILINOGEN, URINE: 0.2 MG/DL (ref 0.2–1)
WBC UA: 44 /HPF (ref 0–5)

## 2023-05-20 PROCEDURE — 86140 C-REACTIVE PROTEIN: CPT

## 2023-05-20 PROCEDURE — 1200000000 HC SEMI PRIVATE

## 2023-05-20 PROCEDURE — 87186 SC STD MICRODIL/AGAR DIL: CPT

## 2023-05-20 PROCEDURE — 2500000003 HC RX 250 WO HCPCS: Performed by: PHYSICIAN ASSISTANT

## 2023-05-20 PROCEDURE — 87086 URINE CULTURE/COLONY COUNT: CPT

## 2023-05-20 PROCEDURE — 94761 N-INVAS EAR/PLS OXIMETRY MLT: CPT

## 2023-05-20 PROCEDURE — 6360000002 HC RX W HCPCS: Performed by: PHYSICIAN ASSISTANT

## 2023-05-20 PROCEDURE — 82962 GLUCOSE BLOOD TEST: CPT

## 2023-05-20 PROCEDURE — 2580000003 HC RX 258: Performed by: PHYSICIAN ASSISTANT

## 2023-05-20 PROCEDURE — 84145 PROCALCITONIN (PCT): CPT

## 2023-05-20 PROCEDURE — 84484 ASSAY OF TROPONIN QUANT: CPT

## 2023-05-20 PROCEDURE — 2580000003 HC RX 258: Performed by: STUDENT IN AN ORGANIZED HEALTH CARE EDUCATION/TRAINING PROGRAM

## 2023-05-20 PROCEDURE — 6370000000 HC RX 637 (ALT 250 FOR IP): Performed by: STUDENT IN AN ORGANIZED HEALTH CARE EDUCATION/TRAINING PROGRAM

## 2023-05-20 PROCEDURE — 2580000003 HC RX 258: Performed by: EMERGENCY MEDICINE

## 2023-05-20 PROCEDURE — 87077 CULTURE AEROBIC IDENTIFY: CPT

## 2023-05-20 PROCEDURE — 81001 URINALYSIS AUTO W/SCOPE: CPT

## 2023-05-20 PROCEDURE — 6360000002 HC RX W HCPCS: Performed by: STUDENT IN AN ORGANIZED HEALTH CARE EDUCATION/TRAINING PROGRAM

## 2023-05-20 PROCEDURE — 6360000002 HC RX W HCPCS: Performed by: EMERGENCY MEDICINE

## 2023-05-20 PROCEDURE — 36415 COLL VENOUS BLD VENIPUNCTURE: CPT

## 2023-05-20 RX ORDER — ONDANSETRON 4 MG/1
4 TABLET, ORALLY DISINTEGRATING ORAL EVERY 8 HOURS PRN
Status: DISCONTINUED | OUTPATIENT
Start: 2023-05-20 | End: 2023-05-25 | Stop reason: HOSPADM

## 2023-05-20 RX ORDER — ONDANSETRON 2 MG/ML
4 INJECTION INTRAMUSCULAR; INTRAVENOUS EVERY 6 HOURS PRN
Status: DISCONTINUED | OUTPATIENT
Start: 2023-05-20 | End: 2023-05-25 | Stop reason: HOSPADM

## 2023-05-20 RX ORDER — SODIUM CHLORIDE 0.9 % (FLUSH) 0.9 %
5-40 SYRINGE (ML) INJECTION EVERY 12 HOURS SCHEDULED
Status: DISCONTINUED | OUTPATIENT
Start: 2023-05-20 | End: 2023-05-25 | Stop reason: HOSPADM

## 2023-05-20 RX ORDER — SODIUM CHLORIDE 9 MG/ML
INJECTION, SOLUTION INTRAVENOUS PRN
Status: DISCONTINUED | OUTPATIENT
Start: 2023-05-20 | End: 2023-05-23

## 2023-05-20 RX ORDER — POLYETHYLENE GLYCOL 3350 17 G/17G
17 POWDER, FOR SOLUTION ORAL DAILY PRN
Status: DISCONTINUED | OUTPATIENT
Start: 2023-05-20 | End: 2023-05-25 | Stop reason: HOSPADM

## 2023-05-20 RX ORDER — METOPROLOL SUCCINATE 25 MG/1
25 TABLET, EXTENDED RELEASE ORAL DAILY
Status: DISCONTINUED | OUTPATIENT
Start: 2023-05-20 | End: 2023-05-25 | Stop reason: HOSPADM

## 2023-05-20 RX ORDER — INSULIN LISPRO 100 [IU]/ML
0-4 INJECTION, SOLUTION INTRAVENOUS; SUBCUTANEOUS NIGHTLY
Status: DISCONTINUED | OUTPATIENT
Start: 2023-05-20 | End: 2023-05-25 | Stop reason: HOSPADM

## 2023-05-20 RX ORDER — ENOXAPARIN SODIUM 100 MG/ML
40 INJECTION SUBCUTANEOUS DAILY
Status: DISCONTINUED | OUTPATIENT
Start: 2023-05-20 | End: 2023-05-23

## 2023-05-20 RX ORDER — QUETIAPINE FUMARATE 25 MG/1
25 TABLET, FILM COATED ORAL 2 TIMES DAILY
Status: DISCONTINUED | OUTPATIENT
Start: 2023-05-20 | End: 2023-05-25 | Stop reason: HOSPADM

## 2023-05-20 RX ORDER — ACETAMINOPHEN 325 MG/1
650 TABLET ORAL EVERY 6 HOURS PRN
Status: DISCONTINUED | OUTPATIENT
Start: 2023-05-20 | End: 2023-05-25 | Stop reason: HOSPADM

## 2023-05-20 RX ORDER — OXYBUTYNIN CHLORIDE 10 MG/1
10 TABLET, EXTENDED RELEASE ORAL DAILY
Status: DISCONTINUED | OUTPATIENT
Start: 2023-05-20 | End: 2023-05-25 | Stop reason: HOSPADM

## 2023-05-20 RX ORDER — ALBUTEROL SULFATE 90 UG/1
2 AEROSOL, METERED RESPIRATORY (INHALATION) EVERY 6 HOURS PRN
Status: DISCONTINUED | OUTPATIENT
Start: 2023-05-20 | End: 2023-05-25 | Stop reason: HOSPADM

## 2023-05-20 RX ORDER — BUSPIRONE HYDROCHLORIDE 5 MG/1
10 TABLET ORAL 3 TIMES DAILY
Status: DISCONTINUED | OUTPATIENT
Start: 2023-05-20 | End: 2023-05-25 | Stop reason: HOSPADM

## 2023-05-20 RX ORDER — SODIUM CHLORIDE 0.9 % (FLUSH) 0.9 %
5-40 SYRINGE (ML) INJECTION PRN
Status: DISCONTINUED | OUTPATIENT
Start: 2023-05-20 | End: 2023-05-25 | Stop reason: HOSPADM

## 2023-05-20 RX ORDER — ACETAMINOPHEN 650 MG/1
650 SUPPOSITORY RECTAL EVERY 6 HOURS PRN
Status: DISCONTINUED | OUTPATIENT
Start: 2023-05-20 | End: 2023-05-25 | Stop reason: HOSPADM

## 2023-05-20 RX ORDER — INSULIN LISPRO 100 [IU]/ML
0-8 INJECTION, SOLUTION INTRAVENOUS; SUBCUTANEOUS
Status: DISCONTINUED | OUTPATIENT
Start: 2023-05-20 | End: 2023-05-25 | Stop reason: HOSPADM

## 2023-05-20 RX ORDER — DEXTROSE MONOHYDRATE 100 MG/ML
INJECTION, SOLUTION INTRAVENOUS CONTINUOUS PRN
Status: DISCONTINUED | OUTPATIENT
Start: 2023-05-20 | End: 2023-05-25 | Stop reason: HOSPADM

## 2023-05-20 RX ORDER — PANTOPRAZOLE SODIUM 40 MG/1
40 TABLET, DELAYED RELEASE ORAL DAILY
Status: DISCONTINUED | OUTPATIENT
Start: 2023-05-20 | End: 2023-05-25 | Stop reason: HOSPADM

## 2023-05-20 RX ORDER — GLUCAGON 1 MG/ML
1 KIT INJECTION PRN
Status: DISCONTINUED | OUTPATIENT
Start: 2023-05-20 | End: 2023-05-25 | Stop reason: HOSPADM

## 2023-05-20 RX ORDER — ASPIRIN 81 MG/1
81 TABLET, CHEWABLE ORAL DAILY
Status: DISCONTINUED | OUTPATIENT
Start: 2023-05-20 | End: 2023-05-25 | Stop reason: HOSPADM

## 2023-05-20 RX ORDER — SODIUM CHLORIDE 9 MG/ML
INJECTION, SOLUTION INTRAVENOUS CONTINUOUS
Status: DISCONTINUED | OUTPATIENT
Start: 2023-05-20 | End: 2023-05-20

## 2023-05-20 RX ADMIN — SERTRALINE HYDROCHLORIDE 200 MG: 50 TABLET ORAL at 10:35

## 2023-05-20 RX ADMIN — BUSPIRONE HYDROCHLORIDE 10 MG: 5 TABLET ORAL at 10:35

## 2023-05-20 RX ADMIN — PANTOPRAZOLE SODIUM 40 MG: 40 TABLET, DELAYED RELEASE ORAL at 05:31

## 2023-05-20 RX ADMIN — SODIUM CHLORIDE, PRESERVATIVE FREE 5 ML: 5 INJECTION INTRAVENOUS at 20:30

## 2023-05-20 RX ADMIN — ENOXAPARIN SODIUM 40 MG: 100 INJECTION SUBCUTANEOUS at 10:35

## 2023-05-20 RX ADMIN — SODIUM CHLORIDE 25 ML: 9 INJECTION, SOLUTION INTRAVENOUS at 22:54

## 2023-05-20 RX ADMIN — SODIUM CHLORIDE: 9 INJECTION, SOLUTION INTRAVENOUS at 05:29

## 2023-05-20 RX ADMIN — ASPIRIN 81 MG 81 MG: 81 TABLET ORAL at 10:34

## 2023-05-20 RX ADMIN — METOPROLOL SUCCINATE 25 MG: 25 TABLET, EXTENDED RELEASE ORAL at 10:34

## 2023-05-20 RX ADMIN — BUSPIRONE HYDROCHLORIDE 10 MG: 5 TABLET ORAL at 20:30

## 2023-05-20 RX ADMIN — QUETIAPINE FUMARATE 25 MG: 25 TABLET ORAL at 10:34

## 2023-05-20 RX ADMIN — SODIUM CHLORIDE 25 ML/HR: 9 INJECTION, SOLUTION INTRAVENOUS at 17:32

## 2023-05-20 RX ADMIN — SODIUM CHLORIDE, PRESERVATIVE FREE 10 ML: 5 INJECTION INTRAVENOUS at 10:36

## 2023-05-20 RX ADMIN — MICONAZOLE NITRATE: 2 POWDER TOPICAL at 12:37

## 2023-05-20 RX ADMIN — CEFTRIAXONE SODIUM 1000 MG: 1 INJECTION, POWDER, FOR SOLUTION INTRAMUSCULAR; INTRAVENOUS at 01:59

## 2023-05-20 RX ADMIN — QUETIAPINE FUMARATE 25 MG: 25 TABLET ORAL at 20:30

## 2023-05-20 RX ADMIN — MICONAZOLE NITRATE: 2 POWDER TOPICAL at 20:32

## 2023-05-20 RX ADMIN — BUSPIRONE HYDROCHLORIDE 10 MG: 5 TABLET ORAL at 14:03

## 2023-05-20 RX ADMIN — SODIUM CHLORIDE 3000 MG: 900 INJECTION INTRAVENOUS at 17:33

## 2023-05-20 RX ADMIN — OXYBUTYNIN CHLORIDE 10 MG: 10 TABLET, EXTENDED RELEASE ORAL at 12:37

## 2023-05-20 RX ADMIN — SODIUM CHLORIDE 3000 MG: 900 INJECTION INTRAVENOUS at 22:55

## 2023-05-20 RX ADMIN — SODIUM CHLORIDE 3000 MG: 900 INJECTION INTRAVENOUS at 12:43

## 2023-05-20 ASSESSMENT — ENCOUNTER SYMPTOMS
EYE DISCHARGE: 0
DIARRHEA: 0
CONSTIPATION: 0
SORE THROAT: 0
BACK PAIN: 0
ABDOMINAL DISTENTION: 0
SHORTNESS OF BREATH: 0
WHEEZING: 0
COUGH: 0

## 2023-05-21 ENCOUNTER — ANESTHESIA EVENT (OUTPATIENT)
Dept: OPERATING ROOM | Age: 78
DRG: 853 | End: 2023-05-21
Payer: MEDICARE

## 2023-05-21 ENCOUNTER — APPOINTMENT (OUTPATIENT)
Dept: CT IMAGING | Age: 78
DRG: 853 | End: 2023-05-21
Payer: MEDICARE

## 2023-05-21 ENCOUNTER — ANESTHESIA (OUTPATIENT)
Dept: OPERATING ROOM | Age: 78
DRG: 853 | End: 2023-05-21
Payer: MEDICARE

## 2023-05-21 LAB
ANION GAP SERPL CALCULATED.3IONS-SCNC: 12 MMOL/L (ref 4–16)
BASOPHILS ABSOLUTE: 0.1 K/CU MM
BASOPHILS RELATIVE PERCENT: 0.4 % (ref 0–1)
BUN SERPL-MCNC: 14 MG/DL (ref 6–23)
CALCIUM SERPL-MCNC: 8.1 MG/DL (ref 8.3–10.6)
CHLORIDE BLD-SCNC: 103 MMOL/L (ref 99–110)
CO2: 24 MMOL/L (ref 21–32)
CREAT SERPL-MCNC: 0.9 MG/DL (ref 0.6–1.1)
DIFFERENTIAL TYPE: ABNORMAL
EKG ATRIAL RATE: 69 BPM
EKG DIAGNOSIS: NORMAL
EKG P AXIS: 51 DEGREES
EKG P-R INTERVAL: 130 MS
EKG Q-T INTERVAL: 408 MS
EKG QRS DURATION: 78 MS
EKG QTC CALCULATION (BAZETT): 437 MS
EKG R AXIS: -6 DEGREES
EKG T AXIS: 25 DEGREES
EKG VENTRICULAR RATE: 69 BPM
EOSINOPHILS ABSOLUTE: 0.1 K/CU MM
EOSINOPHILS RELATIVE PERCENT: 1.2 % (ref 0–3)
GFR SERPL CREATININE-BSD FRML MDRD: >60 ML/MIN/1.73M2
GLUCOSE BLD-MCNC: 109 MG/DL (ref 70–99)
GLUCOSE BLD-MCNC: 139 MG/DL (ref 70–99)
GLUCOSE BLD-MCNC: 141 MG/DL (ref 70–99)
GLUCOSE BLD-MCNC: 195 MG/DL (ref 70–99)
GLUCOSE SERPL-MCNC: 118 MG/DL (ref 70–99)
HCT VFR BLD CALC: 38 % (ref 37–47)
HEMOGLOBIN: 11.7 GM/DL (ref 12.5–16)
IMMATURE NEUTROPHIL %: 0.4 % (ref 0–0.43)
LYMPHOCYTES ABSOLUTE: 1.3 K/CU MM
LYMPHOCYTES RELATIVE PERCENT: 11.1 % (ref 24–44)
MCH RBC QN AUTO: 26.5 PG (ref 27–31)
MCHC RBC AUTO-ENTMCNC: 30.8 % (ref 32–36)
MCV RBC AUTO: 86 FL (ref 78–100)
MONOCYTES ABSOLUTE: 0.5 K/CU MM
MONOCYTES RELATIVE PERCENT: 4.2 % (ref 0–4)
NUCLEATED RBC %: 0 %
PDW BLD-RTO: 15.8 % (ref 11.7–14.9)
PLATELET # BLD: 220 K/CU MM (ref 140–440)
PMV BLD AUTO: 9.7 FL (ref 7.5–11.1)
POTASSIUM SERPL-SCNC: 4.2 MMOL/L (ref 3.5–5.1)
RBC # BLD: 4.42 M/CU MM (ref 4.2–5.4)
SEGMENTED NEUTROPHILS ABSOLUTE COUNT: 9.6 K/CU MM
SEGMENTED NEUTROPHILS RELATIVE PERCENT: 82.7 % (ref 36–66)
SODIUM BLD-SCNC: 139 MMOL/L (ref 135–145)
TOTAL IMMATURE NEUTOROPHIL: 0.05 K/CU MM
TOTAL NUCLEATED RBC: 0 K/CU MM
TROPONIN T: 0.04 NG/ML
WBC # BLD: 11.6 K/CU MM (ref 4–10.5)

## 2023-05-21 PROCEDURE — 3600000012 HC SURGERY LEVEL 2 ADDTL 15MIN: Performed by: ORTHOPAEDIC SURGERY

## 2023-05-21 PROCEDURE — 2580000003 HC RX 258: Performed by: PHYSICIAN ASSISTANT

## 2023-05-21 PROCEDURE — 6360000002 HC RX W HCPCS: Performed by: ORTHOPAEDIC SURGERY

## 2023-05-21 PROCEDURE — 01N50ZZ RELEASE MEDIAN NERVE, OPEN APPROACH: ICD-10-PCS | Performed by: ORTHOPAEDIC SURGERY

## 2023-05-21 PROCEDURE — 6370000000 HC RX 637 (ALT 250 FOR IP): Performed by: STUDENT IN AN ORGANIZED HEALTH CARE EDUCATION/TRAINING PROGRAM

## 2023-05-21 PROCEDURE — 99223 1ST HOSP IP/OBS HIGH 75: CPT | Performed by: INTERNAL MEDICINE

## 2023-05-21 PROCEDURE — 3600000002 HC SURGERY LEVEL 2 BASE: Performed by: ORTHOPAEDIC SURGERY

## 2023-05-21 PROCEDURE — 94761 N-INVAS EAR/PLS OXIMETRY MLT: CPT

## 2023-05-21 PROCEDURE — 87075 CULTR BACTERIA EXCEPT BLOOD: CPT

## 2023-05-21 PROCEDURE — 6360000002 HC RX W HCPCS

## 2023-05-21 PROCEDURE — 3700000001 HC ADD 15 MINUTES (ANESTHESIA): Performed by: ORTHOPAEDIC SURGERY

## 2023-05-21 PROCEDURE — 2500000003 HC RX 250 WO HCPCS: Performed by: ORTHOPAEDIC SURGERY

## 2023-05-21 PROCEDURE — 2580000003 HC RX 258: Performed by: ORTHOPAEDIC SURGERY

## 2023-05-21 PROCEDURE — 2709999900 HC NON-CHARGEABLE SUPPLY: Performed by: ORTHOPAEDIC SURGERY

## 2023-05-21 PROCEDURE — 97162 PT EVAL MOD COMPLEX 30 MIN: CPT

## 2023-05-21 PROCEDURE — 87070 CULTURE OTHR SPECIMN AEROBIC: CPT

## 2023-05-21 PROCEDURE — 6370000000 HC RX 637 (ALT 250 FOR IP): Performed by: INTERNAL MEDICINE

## 2023-05-21 PROCEDURE — 97166 OT EVAL MOD COMPLEX 45 MIN: CPT

## 2023-05-21 PROCEDURE — 85025 COMPLETE CBC W/AUTO DIFF WBC: CPT

## 2023-05-21 PROCEDURE — 73201 CT UPPER EXTREMITY W/DYE: CPT

## 2023-05-21 PROCEDURE — 2580000003 HC RX 258: Performed by: STUDENT IN AN ORGANIZED HEALTH CARE EDUCATION/TRAINING PROGRAM

## 2023-05-21 PROCEDURE — 1200000000 HC SEMI PRIVATE

## 2023-05-21 PROCEDURE — 87186 SC STD MICRODIL/AGAR DIL: CPT

## 2023-05-21 PROCEDURE — 84484 ASSAY OF TROPONIN QUANT: CPT

## 2023-05-21 PROCEDURE — 7100000001 HC PACU RECOVERY - ADDTL 15 MIN: Performed by: ORTHOPAEDIC SURGERY

## 2023-05-21 PROCEDURE — 80048 BASIC METABOLIC PNL TOTAL CA: CPT

## 2023-05-21 PROCEDURE — 7100000000 HC PACU RECOVERY - FIRST 15 MIN: Performed by: ORTHOPAEDIC SURGERY

## 2023-05-21 PROCEDURE — 93010 ELECTROCARDIOGRAM REPORT: CPT | Performed by: INTERNAL MEDICINE

## 2023-05-21 PROCEDURE — 87077 CULTURE AEROBIC IDENTIFY: CPT

## 2023-05-21 PROCEDURE — 6360000002 HC RX W HCPCS: Performed by: PHYSICIAN ASSISTANT

## 2023-05-21 PROCEDURE — 87205 SMEAR GRAM STAIN: CPT

## 2023-05-21 PROCEDURE — 6360000004 HC RX CONTRAST MEDICATION: Performed by: PHYSICIAN ASSISTANT

## 2023-05-21 PROCEDURE — 36415 COLL VENOUS BLD VENIPUNCTURE: CPT

## 2023-05-21 PROCEDURE — 3700000000 HC ANESTHESIA ATTENDED CARE: Performed by: ORTHOPAEDIC SURGERY

## 2023-05-21 PROCEDURE — 2500000003 HC RX 250 WO HCPCS

## 2023-05-21 PROCEDURE — 82962 GLUCOSE BLOOD TEST: CPT

## 2023-05-21 RX ORDER — HYDRALAZINE HYDROCHLORIDE 20 MG/ML
10 INJECTION INTRAMUSCULAR; INTRAVENOUS
Status: DISCONTINUED | OUTPATIENT
Start: 2023-05-21 | End: 2023-05-21 | Stop reason: HOSPADM

## 2023-05-21 RX ORDER — ONDANSETRON 2 MG/ML
4 INJECTION INTRAMUSCULAR; INTRAVENOUS
Status: DISCONTINUED | OUTPATIENT
Start: 2023-05-21 | End: 2023-05-21 | Stop reason: HOSPADM

## 2023-05-21 RX ORDER — MORPHINE SULFATE 2 MG/ML
2 INJECTION, SOLUTION INTRAMUSCULAR; INTRAVENOUS EVERY 4 HOURS PRN
Status: DISCONTINUED | OUTPATIENT
Start: 2023-05-21 | End: 2023-05-25 | Stop reason: HOSPADM

## 2023-05-21 RX ORDER — TRAMADOL HYDROCHLORIDE 50 MG/1
100 TABLET ORAL EVERY 6 HOURS PRN
Status: CANCELLED | OUTPATIENT
Start: 2023-05-21

## 2023-05-21 RX ORDER — SODIUM CHLORIDE 0.9 % (FLUSH) 0.9 %
5-40 SYRINGE (ML) INJECTION EVERY 12 HOURS SCHEDULED
Status: DISCONTINUED | OUTPATIENT
Start: 2023-05-21 | End: 2023-05-21 | Stop reason: HOSPADM

## 2023-05-21 RX ORDER — DEXAMETHASONE SODIUM PHOSPHATE 4 MG/ML
INJECTION, SOLUTION INTRA-ARTICULAR; INTRALESIONAL; INTRAMUSCULAR; INTRAVENOUS; SOFT TISSUE PRN
Status: DISCONTINUED | OUTPATIENT
Start: 2023-05-21 | End: 2023-05-21 | Stop reason: SDUPTHER

## 2023-05-21 RX ORDER — LIDOCAINE HYDROCHLORIDE 20 MG/ML
INJECTION, SOLUTION INTRAVENOUS PRN
Status: DISCONTINUED | OUTPATIENT
Start: 2023-05-21 | End: 2023-05-21 | Stop reason: SDUPTHER

## 2023-05-21 RX ORDER — ROCURONIUM BROMIDE 10 MG/ML
INJECTION, SOLUTION INTRAVENOUS PRN
Status: DISCONTINUED | OUTPATIENT
Start: 2023-05-21 | End: 2023-05-21 | Stop reason: SDUPTHER

## 2023-05-21 RX ORDER — PROPOFOL 10 MG/ML
INJECTION, EMULSION INTRAVENOUS CONTINUOUS PRN
Status: DISCONTINUED | OUTPATIENT
Start: 2023-05-21 | End: 2023-05-21 | Stop reason: SDUPTHER

## 2023-05-21 RX ORDER — ONDANSETRON 2 MG/ML
4 INJECTION INTRAMUSCULAR; INTRAVENOUS EVERY 6 HOURS PRN
Status: CANCELLED | OUTPATIENT
Start: 2023-05-21

## 2023-05-21 RX ORDER — LABETALOL HYDROCHLORIDE 5 MG/ML
10 INJECTION, SOLUTION INTRAVENOUS
Status: DISCONTINUED | OUTPATIENT
Start: 2023-05-21 | End: 2023-05-21 | Stop reason: HOSPADM

## 2023-05-21 RX ORDER — HYDRALAZINE HYDROCHLORIDE 20 MG/ML
INJECTION INTRAMUSCULAR; INTRAVENOUS PRN
Status: DISCONTINUED | OUTPATIENT
Start: 2023-05-21 | End: 2023-05-21 | Stop reason: SDUPTHER

## 2023-05-21 RX ORDER — TRAMADOL HYDROCHLORIDE 50 MG/1
50 TABLET ORAL EVERY 6 HOURS PRN
Status: CANCELLED | OUTPATIENT
Start: 2023-05-21

## 2023-05-21 RX ORDER — SUCCINYLCHOLINE/SOD CL,ISO/PF 100 MG/5ML
SYRINGE (ML) INTRAVENOUS PRN
Status: DISCONTINUED | OUTPATIENT
Start: 2023-05-21 | End: 2023-05-21 | Stop reason: SDUPTHER

## 2023-05-21 RX ORDER — SODIUM CHLORIDE 9 MG/ML
25 INJECTION, SOLUTION INTRAVENOUS PRN
Status: DISCONTINUED | OUTPATIENT
Start: 2023-05-21 | End: 2023-05-21 | Stop reason: HOSPADM

## 2023-05-21 RX ORDER — FENTANYL CITRATE 50 UG/ML
50 INJECTION, SOLUTION INTRAMUSCULAR; INTRAVENOUS EVERY 5 MIN PRN
Status: DISCONTINUED | OUTPATIENT
Start: 2023-05-21 | End: 2023-05-21 | Stop reason: HOSPADM

## 2023-05-21 RX ORDER — BUPIVACAINE HYDROCHLORIDE 5 MG/ML
INJECTION, SOLUTION EPIDURAL; INTRACAUDAL
Status: COMPLETED | OUTPATIENT
Start: 2023-05-21 | End: 2023-05-21

## 2023-05-21 RX ORDER — AMLODIPINE BESYLATE 5 MG/1
5 TABLET ORAL DAILY
Status: DISCONTINUED | OUTPATIENT
Start: 2023-05-21 | End: 2023-05-25 | Stop reason: HOSPADM

## 2023-05-21 RX ORDER — ONDANSETRON 4 MG/1
4 TABLET, ORALLY DISINTEGRATING ORAL EVERY 8 HOURS PRN
Status: CANCELLED | OUTPATIENT
Start: 2023-05-21

## 2023-05-21 RX ORDER — SODIUM CHLORIDE 0.9 % (FLUSH) 0.9 %
5-40 SYRINGE (ML) INJECTION PRN
Status: DISCONTINUED | OUTPATIENT
Start: 2023-05-21 | End: 2023-05-21 | Stop reason: HOSPADM

## 2023-05-21 RX ORDER — FENTANYL CITRATE 50 UG/ML
25 INJECTION, SOLUTION INTRAMUSCULAR; INTRAVENOUS EVERY 5 MIN PRN
Status: DISCONTINUED | OUTPATIENT
Start: 2023-05-21 | End: 2023-05-21 | Stop reason: HOSPADM

## 2023-05-21 RX ORDER — FENTANYL CITRATE 50 UG/ML
INJECTION, SOLUTION INTRAMUSCULAR; INTRAVENOUS PRN
Status: DISCONTINUED | OUTPATIENT
Start: 2023-05-21 | End: 2023-05-21 | Stop reason: SDUPTHER

## 2023-05-21 RX ORDER — SODIUM CHLORIDE 9 MG/ML
INJECTION, SOLUTION INTRAVENOUS CONTINUOUS
Status: DISCONTINUED | OUTPATIENT
Start: 2023-05-21 | End: 2023-05-21 | Stop reason: HOSPADM

## 2023-05-21 RX ORDER — ETOMIDATE 2 MG/ML
INJECTION INTRAVENOUS PRN
Status: DISCONTINUED | OUTPATIENT
Start: 2023-05-21 | End: 2023-05-21 | Stop reason: SDUPTHER

## 2023-05-21 RX ORDER — ONDANSETRON 2 MG/ML
INJECTION INTRAMUSCULAR; INTRAVENOUS PRN
Status: DISCONTINUED | OUTPATIENT
Start: 2023-05-21 | End: 2023-05-21 | Stop reason: SDUPTHER

## 2023-05-21 RX ORDER — POLYETHYLENE GLYCOL 3350 17 G/17G
17 POWDER, FOR SOLUTION ORAL DAILY PRN
Status: CANCELLED | OUTPATIENT
Start: 2023-05-21

## 2023-05-21 RX ADMIN — CEFTRIAXONE SODIUM 1000 MG: 1 INJECTION, POWDER, FOR SOLUTION INTRAMUSCULAR; INTRAVENOUS at 14:04

## 2023-05-21 RX ADMIN — OXYBUTYNIN CHLORIDE 10 MG: 10 TABLET, EXTENDED RELEASE ORAL at 09:49

## 2023-05-21 RX ADMIN — MICONAZOLE NITRATE: 2 POWDER TOPICAL at 09:51

## 2023-05-21 RX ADMIN — SODIUM CHLORIDE: 9 INJECTION, SOLUTION INTRAVENOUS at 15:07

## 2023-05-21 RX ADMIN — SODIUM CHLORIDE 3000 MG: 900 INJECTION INTRAVENOUS at 05:11

## 2023-05-21 RX ADMIN — FENTANYL CITRATE 100 MCG: 50 INJECTION, SOLUTION INTRAMUSCULAR; INTRAVENOUS at 15:17

## 2023-05-21 RX ADMIN — SODIUM CHLORIDE 25 ML: 9 INJECTION, SOLUTION INTRAVENOUS at 05:10

## 2023-05-21 RX ADMIN — QUETIAPINE FUMARATE 25 MG: 25 TABLET ORAL at 09:49

## 2023-05-21 RX ADMIN — ROCURONIUM BROMIDE 50 MG: 50 INJECTION INTRAVENOUS at 15:25

## 2023-05-21 RX ADMIN — AMLODIPINE BESYLATE 5 MG: 5 TABLET ORAL at 11:13

## 2023-05-21 RX ADMIN — BUSPIRONE HYDROCHLORIDE 10 MG: 5 TABLET ORAL at 20:46

## 2023-05-21 RX ADMIN — SODIUM CHLORIDE 3000 MG: 900 INJECTION INTRAVENOUS at 11:11

## 2023-05-21 RX ADMIN — SODIUM CHLORIDE, PRESERVATIVE FREE 10 ML: 5 INJECTION INTRAVENOUS at 09:51

## 2023-05-21 RX ADMIN — MICONAZOLE NITRATE: 2 POWDER TOPICAL at 20:46

## 2023-05-21 RX ADMIN — ONDANSETRON 4 MG: 2 INJECTION INTRAMUSCULAR; INTRAVENOUS at 15:29

## 2023-05-21 RX ADMIN — SUGAMMADEX 400 MG: 100 INJECTION, SOLUTION INTRAVENOUS at 15:55

## 2023-05-21 RX ADMIN — METOPROLOL SUCCINATE 25 MG: 25 TABLET, EXTENDED RELEASE ORAL at 09:49

## 2023-05-21 RX ADMIN — PANTOPRAZOLE SODIUM 40 MG: 40 TABLET, DELAYED RELEASE ORAL at 05:06

## 2023-05-21 RX ADMIN — SERTRALINE HYDROCHLORIDE 200 MG: 50 TABLET ORAL at 09:48

## 2023-05-21 RX ADMIN — ETOMIDATE 6 MG: 2 INJECTION, SOLUTION INTRAVENOUS at 15:20

## 2023-05-21 RX ADMIN — QUETIAPINE FUMARATE 25 MG: 25 TABLET ORAL at 20:45

## 2023-05-21 RX ADMIN — LIDOCAINE HYDROCHLORIDE 100 MG: 20 INJECTION, SOLUTION INTRAVENOUS at 15:20

## 2023-05-21 RX ADMIN — HYDRALAZINE HYDROCHLORIDE 5 MG: 20 INJECTION INTRAMUSCULAR; INTRAVENOUS at 16:15

## 2023-05-21 RX ADMIN — PROPOFOL 75 MCG/KG/MIN: 10 INJECTION, EMULSION INTRAVENOUS at 15:20

## 2023-05-21 RX ADMIN — VANCOMYCIN HYDROCHLORIDE 1750 MG: 500 INJECTION, POWDER, LYOPHILIZED, FOR SOLUTION INTRAVENOUS at 15:44

## 2023-05-21 RX ADMIN — PROPOFOL 50 MG: 10 INJECTION, EMULSION INTRAVENOUS at 15:19

## 2023-05-21 RX ADMIN — BUSPIRONE HYDROCHLORIDE 10 MG: 5 TABLET ORAL at 09:48

## 2023-05-21 RX ADMIN — DEXAMETHASONE SODIUM PHOSPHATE 4 MG: 4 INJECTION, SOLUTION INTRAMUSCULAR; INTRAVENOUS at 15:29

## 2023-05-21 RX ADMIN — IOPAMIDOL 75 ML: 755 INJECTION, SOLUTION INTRAVENOUS at 10:17

## 2023-05-21 RX ADMIN — HYDRALAZINE HYDROCHLORIDE 5 MG: 20 INJECTION INTRAMUSCULAR; INTRAVENOUS at 16:02

## 2023-05-21 RX ADMIN — SODIUM CHLORIDE, PRESERVATIVE FREE 10 ML: 5 INJECTION INTRAVENOUS at 20:46

## 2023-05-21 RX ADMIN — SODIUM CHLORIDE: 9 INJECTION, SOLUTION INTRAVENOUS at 11:10

## 2023-05-21 RX ADMIN — BUSPIRONE HYDROCHLORIDE 10 MG: 5 TABLET ORAL at 14:07

## 2023-05-21 RX ADMIN — Medication 200 MG: at 15:20

## 2023-05-21 ASSESSMENT — PAIN DESCRIPTION - DESCRIPTORS
DESCRIPTORS: ACHING;DISCOMFORT
DESCRIPTORS: CRUSHING
DESCRIPTORS: ACHING;DISCOMFORT

## 2023-05-21 ASSESSMENT — PAIN DESCRIPTION - PAIN TYPE
TYPE: SURGICAL PAIN

## 2023-05-21 ASSESSMENT — PAIN - FUNCTIONAL ASSESSMENT
PAIN_FUNCTIONAL_ASSESSMENT: ACTIVITIES ARE NOT PREVENTED
PAIN_FUNCTIONAL_ASSESSMENT: PREVENTS OR INTERFERES SOME ACTIVE ACTIVITIES AND ADLS
PAIN_FUNCTIONAL_ASSESSMENT: ACTIVITIES ARE NOT PREVENTED

## 2023-05-21 ASSESSMENT — PAIN DESCRIPTION - ORIENTATION
ORIENTATION: RIGHT

## 2023-05-21 ASSESSMENT — PAIN DESCRIPTION - LOCATION
LOCATION: HAND

## 2023-05-21 ASSESSMENT — PAIN SCALES - GENERAL
PAINLEVEL_OUTOF10: 7
PAINLEVEL_OUTOF10: 3
PAINLEVEL_OUTOF10: 4

## 2023-05-21 ASSESSMENT — PAIN DESCRIPTION - ONSET
ONSET: SUDDEN
ONSET: SUDDEN

## 2023-05-21 ASSESSMENT — PAIN DESCRIPTION - FREQUENCY
FREQUENCY: INTERMITTENT
FREQUENCY: INTERMITTENT
FREQUENCY: CONTINUOUS

## 2023-05-22 PROBLEM — L08.9 INFECTION OF HAND: Status: ACTIVE | Noted: 2023-05-22

## 2023-05-22 PROBLEM — B96.20 E. COLI URINARY TRACT INFECTION: Status: ACTIVE | Noted: 2023-05-20

## 2023-05-22 PROBLEM — R41.82 ALTERED MENTAL STATUS: Status: ACTIVE | Noted: 2023-05-22

## 2023-05-22 LAB
ANION GAP SERPL CALCULATED.3IONS-SCNC: 11 MMOL/L (ref 4–16)
BASOPHILS ABSOLUTE: 0 K/CU MM
BASOPHILS RELATIVE PERCENT: 0.3 % (ref 0–1)
BUN SERPL-MCNC: 23 MG/DL (ref 6–23)
CALCIUM SERPL-MCNC: 8.1 MG/DL (ref 8.3–10.6)
CHLORIDE BLD-SCNC: 102 MMOL/L (ref 99–110)
CO2: 24 MMOL/L (ref 21–32)
CREAT SERPL-MCNC: 1.2 MG/DL (ref 0.6–1.1)
CULTURE: ABNORMAL
CULTURE: ABNORMAL
DIFFERENTIAL TYPE: ABNORMAL
EOSINOPHILS ABSOLUTE: 0 K/CU MM
EOSINOPHILS RELATIVE PERCENT: 0.1 % (ref 0–3)
GFR SERPL CREATININE-BSD FRML MDRD: 47 ML/MIN/1.73M2
GLUCOSE BLD-MCNC: 158 MG/DL (ref 70–99)
GLUCOSE BLD-MCNC: 169 MG/DL (ref 70–99)
GLUCOSE BLD-MCNC: 189 MG/DL (ref 70–99)
GLUCOSE BLD-MCNC: 229 MG/DL (ref 70–99)
GLUCOSE SERPL-MCNC: 215 MG/DL (ref 70–99)
HCT VFR BLD CALC: 38.2 % (ref 37–47)
HEMOGLOBIN: 11.8 GM/DL (ref 12.5–16)
IMMATURE NEUTROPHIL %: 0.5 % (ref 0–0.43)
LV EF: 63 %
LVEF MODALITY: NORMAL
LYMPHOCYTES ABSOLUTE: 1.3 K/CU MM
LYMPHOCYTES RELATIVE PERCENT: 8.9 % (ref 24–44)
Lab: ABNORMAL
MCH RBC QN AUTO: 26.5 PG (ref 27–31)
MCHC RBC AUTO-ENTMCNC: 30.9 % (ref 32–36)
MCV RBC AUTO: 85.7 FL (ref 78–100)
MONOCYTES ABSOLUTE: 0.6 K/CU MM
MONOCYTES RELATIVE PERCENT: 4.1 % (ref 0–4)
NUCLEATED RBC %: 0 %
PDW BLD-RTO: 15.9 % (ref 11.7–14.9)
PLATELET # BLD: 286 K/CU MM (ref 140–440)
PMV BLD AUTO: 10.3 FL (ref 7.5–11.1)
POTASSIUM SERPL-SCNC: 4.2 MMOL/L (ref 3.5–5.1)
RBC # BLD: 4.46 M/CU MM (ref 4.2–5.4)
SEGMENTED NEUTROPHILS ABSOLUTE COUNT: 12.4 K/CU MM
SEGMENTED NEUTROPHILS RELATIVE PERCENT: 86.1 % (ref 36–66)
SODIUM BLD-SCNC: 137 MMOL/L (ref 135–145)
SPECIMEN: ABNORMAL
TOTAL IMMATURE NEUTOROPHIL: 0.07 K/CU MM
TOTAL NUCLEATED RBC: 0 K/CU MM
WBC # BLD: 14.4 K/CU MM (ref 4–10.5)

## 2023-05-22 PROCEDURE — 94761 N-INVAS EAR/PLS OXIMETRY MLT: CPT

## 2023-05-22 PROCEDURE — APPSS60 APP SPLIT SHARED TIME 46-60 MINUTES: Performed by: NURSE PRACTITIONER

## 2023-05-22 PROCEDURE — 6370000000 HC RX 637 (ALT 250 FOR IP): Performed by: INTERNAL MEDICINE

## 2023-05-22 PROCEDURE — 2580000003 HC RX 258: Performed by: NURSE PRACTITIONER

## 2023-05-22 PROCEDURE — 6360000002 HC RX W HCPCS: Performed by: INTERNAL MEDICINE

## 2023-05-22 PROCEDURE — 6360000002 HC RX W HCPCS: Performed by: PHYSICIAN ASSISTANT

## 2023-05-22 PROCEDURE — 85025 COMPLETE CBC W/AUTO DIFF WBC: CPT

## 2023-05-22 PROCEDURE — 80048 BASIC METABOLIC PNL TOTAL CA: CPT

## 2023-05-22 PROCEDURE — 2580000003 HC RX 258: Performed by: STUDENT IN AN ORGANIZED HEALTH CARE EDUCATION/TRAINING PROGRAM

## 2023-05-22 PROCEDURE — APPSS30 APP SPLIT SHARED TIME 16-30 MINUTES

## 2023-05-22 PROCEDURE — 6360000002 HC RX W HCPCS: Performed by: NURSE PRACTITIONER

## 2023-05-22 PROCEDURE — 2580000003 HC RX 258: Performed by: PHYSICIAN ASSISTANT

## 2023-05-22 PROCEDURE — 99211 OFF/OP EST MAY X REQ PHY/QHP: CPT

## 2023-05-22 PROCEDURE — 1200000000 HC SEMI PRIVATE

## 2023-05-22 PROCEDURE — 6370000000 HC RX 637 (ALT 250 FOR IP): Performed by: STUDENT IN AN ORGANIZED HEALTH CARE EDUCATION/TRAINING PROGRAM

## 2023-05-22 PROCEDURE — 93306 TTE W/DOPPLER COMPLETE: CPT

## 2023-05-22 PROCEDURE — 82962 GLUCOSE BLOOD TEST: CPT

## 2023-05-22 PROCEDURE — 36415 COLL VENOUS BLD VENIPUNCTURE: CPT

## 2023-05-22 PROCEDURE — 99223 1ST HOSP IP/OBS HIGH 75: CPT | Performed by: INTERNAL MEDICINE

## 2023-05-22 PROCEDURE — 99232 SBSQ HOSP IP/OBS MODERATE 35: CPT | Performed by: INTERNAL MEDICINE

## 2023-05-22 RX ORDER — METRONIDAZOLE 250 MG/1
500 TABLET ORAL EVERY 8 HOURS SCHEDULED
Status: DISCONTINUED | OUTPATIENT
Start: 2023-05-22 | End: 2023-05-24

## 2023-05-22 RX ADMIN — PANTOPRAZOLE SODIUM 40 MG: 40 TABLET, DELAYED RELEASE ORAL at 05:59

## 2023-05-22 RX ADMIN — SERTRALINE HYDROCHLORIDE 200 MG: 50 TABLET ORAL at 11:55

## 2023-05-22 RX ADMIN — VANCOMYCIN HYDROCHLORIDE 1500 MG: 5 INJECTION, POWDER, LYOPHILIZED, FOR SOLUTION INTRAVENOUS at 17:20

## 2023-05-22 RX ADMIN — MORPHINE SULFATE 2 MG: 2 INJECTION, SOLUTION INTRAMUSCULAR; INTRAVENOUS at 22:09

## 2023-05-22 RX ADMIN — MICONAZOLE NITRATE: 2 POWDER TOPICAL at 22:00

## 2023-05-22 RX ADMIN — QUETIAPINE FUMARATE 25 MG: 25 TABLET ORAL at 22:00

## 2023-05-22 RX ADMIN — CEFEPIME 2000 MG: 2 INJECTION, POWDER, FOR SOLUTION INTRAVENOUS at 16:39

## 2023-05-22 RX ADMIN — METOPROLOL SUCCINATE 25 MG: 25 TABLET, EXTENDED RELEASE ORAL at 11:55

## 2023-05-22 RX ADMIN — BUSPIRONE HYDROCHLORIDE 10 MG: 5 TABLET ORAL at 11:55

## 2023-05-22 RX ADMIN — METRONIDAZOLE 500 MG: 250 TABLET ORAL at 22:00

## 2023-05-22 RX ADMIN — BUSPIRONE HYDROCHLORIDE 10 MG: 5 TABLET ORAL at 22:00

## 2023-05-22 RX ADMIN — MICONAZOLE NITRATE: 2 POWDER TOPICAL at 11:57

## 2023-05-22 RX ADMIN — MORPHINE SULFATE 2 MG: 2 INJECTION, SOLUTION INTRAMUSCULAR; INTRAVENOUS at 12:12

## 2023-05-22 RX ADMIN — INSULIN LISPRO 2 UNITS: 100 INJECTION, SOLUTION INTRAVENOUS; SUBCUTANEOUS at 17:34

## 2023-05-22 RX ADMIN — BUSPIRONE HYDROCHLORIDE 10 MG: 5 TABLET ORAL at 14:31

## 2023-05-22 RX ADMIN — SODIUM CHLORIDE, PRESERVATIVE FREE 10 ML: 5 INJECTION INTRAVENOUS at 11:57

## 2023-05-22 RX ADMIN — QUETIAPINE FUMARATE 25 MG: 25 TABLET ORAL at 11:54

## 2023-05-22 RX ADMIN — SODIUM CHLORIDE, PRESERVATIVE FREE 10 ML: 5 INJECTION INTRAVENOUS at 22:01

## 2023-05-22 RX ADMIN — AMLODIPINE BESYLATE 5 MG: 5 TABLET ORAL at 11:55

## 2023-05-22 RX ADMIN — MORPHINE SULFATE 2 MG: 2 INJECTION, SOLUTION INTRAMUSCULAR; INTRAVENOUS at 16:42

## 2023-05-22 RX ADMIN — CEFTRIAXONE SODIUM 1000 MG: 1 INJECTION, POWDER, FOR SOLUTION INTRAMUSCULAR; INTRAVENOUS at 14:36

## 2023-05-22 RX ADMIN — OXYBUTYNIN CHLORIDE 10 MG: 10 TABLET, EXTENDED RELEASE ORAL at 11:56

## 2023-05-22 ASSESSMENT — PAIN DESCRIPTION - LOCATION
LOCATION: ARM;FINGER (COMMENT WHICH ONE)
LOCATION: HAND
LOCATION: ARM;FINGER (COMMENT WHICH ONE)
LOCATION: HAND
LOCATION: HAND
LOCATION: FINGER (COMMENT WHICH ONE)
LOCATION: HAND

## 2023-05-22 ASSESSMENT — PAIN - FUNCTIONAL ASSESSMENT
PAIN_FUNCTIONAL_ASSESSMENT: PREVENTS OR INTERFERES SOME ACTIVE ACTIVITIES AND ADLS

## 2023-05-22 ASSESSMENT — PAIN DESCRIPTION - DESCRIPTORS
DESCRIPTORS: ACHING;DISCOMFORT
DESCRIPTORS: BURNING
DESCRIPTORS: ACHING;DISCOMFORT
DESCRIPTORS: ACHING;DISCOMFORT
DESCRIPTORS: ACHING
DESCRIPTORS: ACHING

## 2023-05-22 ASSESSMENT — PAIN DESCRIPTION - PAIN TYPE
TYPE: SURGICAL PAIN

## 2023-05-22 ASSESSMENT — PAIN DESCRIPTION - ORIENTATION
ORIENTATION: RIGHT

## 2023-05-22 ASSESSMENT — PAIN SCALES - GENERAL
PAINLEVEL_OUTOF10: 7
PAINLEVEL_OUTOF10: 2
PAINLEVEL_OUTOF10: 8
PAINLEVEL_OUTOF10: 7
PAINLEVEL_OUTOF10: 8
PAINLEVEL_OUTOF10: 2
PAINLEVEL_OUTOF10: 2
PAINLEVEL_OUTOF10: 8

## 2023-05-22 ASSESSMENT — PAIN DESCRIPTION - FREQUENCY
FREQUENCY: INTERMITTENT

## 2023-05-22 NOTE — PLAN OF CARE
Problem: Discharge Planning  Goal: Discharge to home or other facility with appropriate resources  5/21/2023 2233 by Jerl Cowden, RN  Outcome: Progressing  5/21/2023 2233 by Jerl Cowden, RN  Outcome: Progressing  5/21/2023 1343 by Dora Busby LPN  Outcome: Progressing  Flowsheets (Taken 5/21/2023 1282)  Discharge to home or other facility with appropriate resources: Identify barriers to discharge with patient and caregiver     Problem: Skin/Tissue Integrity  Goal: Absence of new skin breakdown  5/21/2023 2233 by Jerl Cowden, RN  Outcome: Progressing  5/21/2023 2233 by Jerl Cowden, RN  Outcome: Progressing  5/21/2023 1343 by Dora Busby LPN  Outcome: Progressing     Problem: Safety - Adult  Goal: Free from fall injury  5/21/2023 1343 by Dora Busby LPN  Outcome: Progressing

## 2023-05-22 NOTE — OP NOTE
[address] [place] [address], [address] [place] [address]                                OPERATIVE REPORT    PATIENT NAME: Edith Alex                   :        1945  MED REC NO:   [id number]                          ROOM:       [id number]  ACCOUNT NO:   [de-identified]                           ADMIT DATE: 2023  PROVIDER:     Diannah Bence. Aurelio Nuno MD    DATE OF PROCEDURE:  2023    PREOPERATIVE DIAGNOSIS:  Right hand septic flexor tenosynovitis with  extension in the carpal tunnel. POSTOPERATIVE DIAGNOSIS:  Right hand septic flexor tenosynovitis with  extension in the carpal tunnel. OPERATION PERFORMED:  Right hand irrigation and debridement of fourth  and fifth fingers with carpal tunnel release with irrigation and  debridement. SURGEON:  Diannah Bence. Aurelio Nuno MD.    ASSISTANT:  None. ANESTHESIA:  General endotracheal.    COMPLICATIONS:  None. ESTIMATED BLOOD LOSS:  Minimal.    DRAINS:  None. SPECIMENS:  Cultures taken, right hand. BRIEF HISTORY/INDICATION FOR PROCEDURE:  The patient is a 72-year-old  female, who underwent trigger finger release on 2022. Her  stitches were taken out last week; however, she had started noticing  some increasing redness, swelling _in her fingers. She was admitted to the  hospital for UTI and was found to have infection in her right hand with  cellulitis and swelling. She was placed on IV antibiotics, failed to  improved with IV antibiotics and a CT scan was performed showing  evidence of fluid collection in the fourth and fifth finger as well as  extension in the carpal tunnel. Based on failure of IV antibiotics and  worsening symptoms, elected to proceed with emergent irrigation and  debridement. She was explained the risks and benefits of the surgical  intervention. OPERATIVE PROCEDURE:  The patient was brought back to the operating room  and placed supine on the operating table.   A

## 2023-05-22 NOTE — CARE COORDINATION
05/22/23 1606   Service Assessment   Patient Orientation Alert and Oriented   Cognition Short Term Memory Deficit   History Provided By Patient   Primary Caregiver Private caregiver  (Garret FOFANA)   Support Systems Family Members   PCP Verified by CM Yes  (PCP comes to [address] [place] [address])   Last Visit to PCP Within last 3 months   Prior Functional Level Independent in ADLs/IADLs   Current Functional Level Assistance with the following:   Can patient return to prior living arrangement Unknown at present   Ability to make needs known: Fair   Family able to assist with home care needs: No   Would you like for me to discuss the discharge plan with any other family members/significant others, and if so, who? No   Financial Resources [facility]   Social/Functional History   Lives With Alone   Type of Home Assisted living   [address] [facility],[address], 4 wheeled   Condition of Participation: Discharge Planning   The Patient and/Or Patient Representative agree with the Discharge Plan? Yes     Reviewed chart and spoke with pt about discharge needs/plans. [facility], she is hoping to return but open to SNU if needed. Gave pt SNU list to review. CM will revisit tomorrow.

## 2023-05-23 LAB
ANION GAP SERPL CALCULATED.3IONS-SCNC: 11 MMOL/L (ref 4–16)
BACTERIA: ABNORMAL /HPF
BASOPHILS ABSOLUTE: 0.1 K/CU MM
BASOPHILS RELATIVE PERCENT: 0.5 % (ref 0–1)
BILIRUBIN URINE: NEGATIVE MG/DL
BLOOD, URINE: ABNORMAL
BUN SERPL-MCNC: 26 MG/DL (ref 6–23)
CALCIUM SERPL-MCNC: 8 MG/DL (ref 8.3–10.6)
CHLORIDE BLD-SCNC: 102 MMOL/L (ref 99–110)
CLARITY: CLEAR
CO2: 24 MMOL/L (ref 21–32)
COLOR: YELLOW
CREAT SERPL-MCNC: 1.5 MG/DL (ref 0.6–1.1)
CREATININE URINE: 43.3 MG/DL (ref 28–217)
CRP SERPL HS-MCNC: 42 MG/L
DIFFERENTIAL TYPE: ABNORMAL
DOSE AMOUNT: NORMAL
DOSE TIME: NORMAL
EOSINOPHILS ABSOLUTE: 0.1 K/CU MM
EOSINOPHILS RELATIVE PERCENT: 0.7 % (ref 0–3)
GFR SERPL CREATININE-BSD FRML MDRD: 36 ML/MIN/1.73M2
GLUCOSE BLD-MCNC: 129 MG/DL (ref 70–99)
GLUCOSE BLD-MCNC: 129 MG/DL (ref 70–99)
GLUCOSE BLD-MCNC: 154 MG/DL (ref 70–99)
GLUCOSE BLD-MCNC: 170 MG/DL (ref 70–99)
GLUCOSE SERPL-MCNC: 177 MG/DL (ref 70–99)
GLUCOSE, URINE: NEGATIVE MG/DL
HCT VFR BLD CALC: 35.2 % (ref 37–47)
HEMOGLOBIN: 10.8 GM/DL (ref 12.5–16)
IMMATURE NEUTROPHIL %: 0.5 % (ref 0–0.43)
KETONES, URINE: NEGATIVE MG/DL
LEUKOCYTE ESTERASE, URINE: ABNORMAL
LYMPHOCYTES ABSOLUTE: 1.5 K/CU MM
LYMPHOCYTES RELATIVE PERCENT: 12.2 % (ref 24–44)
MCH RBC QN AUTO: 26.5 PG (ref 27–31)
MCHC RBC AUTO-ENTMCNC: 30.7 % (ref 32–36)
MCV RBC AUTO: 86.3 FL (ref 78–100)
MONOCYTES ABSOLUTE: 0.6 K/CU MM
MONOCYTES RELATIVE PERCENT: 4.7 % (ref 0–4)
MUCUS: ABNORMAL HPF
NITRITE URINE, QUANTITATIVE: NEGATIVE
NUCLEATED RBC %: 0 %
PDW BLD-RTO: 15.9 % (ref 11.7–14.9)
PH, URINE: 5 (ref 5–8)
PLATELET # BLD: 257 K/CU MM (ref 140–440)
PMV BLD AUTO: 10.2 FL (ref 7.5–11.1)
POTASSIUM SERPL-SCNC: 4.3 MMOL/L (ref 3.5–5.1)
PROT/CREAT RATIO, UR: 0.7
PROTEIN UA: NEGATIVE MG/DL
RBC # BLD: 4.08 M/CU MM (ref 4.2–5.4)
RBC URINE: 20 /HPF (ref 0–6)
SEGMENTED NEUTROPHILS ABSOLUTE COUNT: 9.8 K/CU MM
SEGMENTED NEUTROPHILS RELATIVE PERCENT: 81.4 % (ref 36–66)
SODIUM BLD-SCNC: 137 MMOL/L (ref 135–145)
SODIUM URINE: 53 MMOL/L (ref 35–167)
SPECIFIC GRAVITY UA: 1.01 (ref 1–1.03)
TOTAL IMMATURE NEUTOROPHIL: 0.06 K/CU MM
TOTAL NUCLEATED RBC: 0 K/CU MM
TRICHOMONAS: ABNORMAL /HPF
URINE TOTAL PROTEIN: 31.2 MG/DL
UROBILINOGEN, URINE: 0.2 MG/DL (ref 0.2–1)
VANCOMYCIN RANDOM: 21.3 UG/ML
WBC # BLD: 12 K/CU MM (ref 4–10.5)
WBC CLUMP: ABNORMAL /HPF
WBC UA: 751 /HPF (ref 0–5)

## 2023-05-23 PROCEDURE — 1200000000 HC SEMI PRIVATE

## 2023-05-23 PROCEDURE — 84300 ASSAY OF URINE SODIUM: CPT

## 2023-05-23 PROCEDURE — 6360000002 HC RX W HCPCS: Performed by: ORTHOPAEDIC SURGERY

## 2023-05-23 PROCEDURE — 2580000003 HC RX 258: Performed by: NURSE PRACTITIONER

## 2023-05-23 PROCEDURE — 2580000003 HC RX 258: Performed by: ORTHOPAEDIC SURGERY

## 2023-05-23 PROCEDURE — 85025 COMPLETE CBC W/AUTO DIFF WBC: CPT

## 2023-05-23 PROCEDURE — 6370000000 HC RX 637 (ALT 250 FOR IP): Performed by: INTERNAL MEDICINE

## 2023-05-23 PROCEDURE — 51701 INSERT BLADDER CATHETER: CPT

## 2023-05-23 PROCEDURE — 51798 US URINE CAPACITY MEASURE: CPT

## 2023-05-23 PROCEDURE — 84156 ASSAY OF PROTEIN URINE: CPT

## 2023-05-23 PROCEDURE — 6360000002 HC RX W HCPCS: Performed by: INTERNAL MEDICINE

## 2023-05-23 PROCEDURE — 36415 COLL VENOUS BLD VENIPUNCTURE: CPT

## 2023-05-23 PROCEDURE — 6370000000 HC RX 637 (ALT 250 FOR IP): Performed by: ORTHOPAEDIC SURGERY

## 2023-05-23 PROCEDURE — 94664 DEMO&/EVAL PT USE INHALER: CPT

## 2023-05-23 PROCEDURE — 82962 GLUCOSE BLOOD TEST: CPT

## 2023-05-23 PROCEDURE — 80202 ASSAY OF VANCOMYCIN: CPT

## 2023-05-23 PROCEDURE — 82570 ASSAY OF URINE CREATININE: CPT

## 2023-05-23 PROCEDURE — 6360000002 HC RX W HCPCS: Performed by: NURSE PRACTITIONER

## 2023-05-23 PROCEDURE — 99232 SBSQ HOSP IP/OBS MODERATE 35: CPT | Performed by: NURSE PRACTITIONER

## 2023-05-23 PROCEDURE — 86140 C-REACTIVE PROTEIN: CPT

## 2023-05-23 PROCEDURE — 94761 N-INVAS EAR/PLS OXIMETRY MLT: CPT

## 2023-05-23 PROCEDURE — 94150 VITAL CAPACITY TEST: CPT

## 2023-05-23 PROCEDURE — 81001 URINALYSIS AUTO W/SCOPE: CPT

## 2023-05-23 PROCEDURE — 80048 BASIC METABOLIC PNL TOTAL CA: CPT

## 2023-05-23 RX ORDER — SODIUM CHLORIDE 0.9 % (FLUSH) 0.9 %
5-40 SYRINGE (ML) INJECTION EVERY 12 HOURS SCHEDULED
Status: DISCONTINUED | OUTPATIENT
Start: 2023-05-23 | End: 2023-05-25 | Stop reason: HOSPADM

## 2023-05-23 RX ORDER — ENOXAPARIN SODIUM 100 MG/ML
40 INJECTION SUBCUTANEOUS DAILY
Status: DISCONTINUED | OUTPATIENT
Start: 2023-05-23 | End: 2023-05-25 | Stop reason: HOSPADM

## 2023-05-23 RX ORDER — SODIUM CHLORIDE 9 MG/ML
INJECTION, SOLUTION INTRAVENOUS PRN
Status: DISCONTINUED | OUTPATIENT
Start: 2023-05-23 | End: 2023-05-25 | Stop reason: HOSPADM

## 2023-05-23 RX ORDER — SODIUM CHLORIDE 0.9 % (FLUSH) 0.9 %
5-40 SYRINGE (ML) INJECTION PRN
Status: DISCONTINUED | OUTPATIENT
Start: 2023-05-23 | End: 2023-05-25 | Stop reason: HOSPADM

## 2023-05-23 RX ADMIN — MORPHINE SULFATE 2 MG: 2 INJECTION, SOLUTION INTRAMUSCULAR; INTRAVENOUS at 22:24

## 2023-05-23 RX ADMIN — ENOXAPARIN SODIUM 40 MG: 100 INJECTION SUBCUTANEOUS at 09:15

## 2023-05-23 RX ADMIN — QUETIAPINE FUMARATE 25 MG: 25 TABLET ORAL at 21:15

## 2023-05-23 RX ADMIN — QUETIAPINE FUMARATE 25 MG: 25 TABLET ORAL at 09:15

## 2023-05-23 RX ADMIN — SODIUM CHLORIDE, PRESERVATIVE FREE 10 ML: 5 INJECTION INTRAVENOUS at 09:16

## 2023-05-23 RX ADMIN — AMLODIPINE BESYLATE 5 MG: 5 TABLET ORAL at 09:14

## 2023-05-23 RX ADMIN — CEFEPIME 2000 MG: 2 INJECTION, POWDER, FOR SOLUTION INTRAVENOUS at 02:59

## 2023-05-23 RX ADMIN — OXYBUTYNIN CHLORIDE 10 MG: 10 TABLET, EXTENDED RELEASE ORAL at 09:15

## 2023-05-23 RX ADMIN — CEFEPIME 2000 MG: 2 INJECTION, POWDER, FOR SOLUTION INTRAVENOUS at 15:48

## 2023-05-23 RX ADMIN — SERTRALINE HYDROCHLORIDE 200 MG: 50 TABLET ORAL at 09:14

## 2023-05-23 RX ADMIN — SODIUM CHLORIDE, PRESERVATIVE FREE 10 ML: 5 INJECTION INTRAVENOUS at 09:15

## 2023-05-23 RX ADMIN — METRONIDAZOLE 500 MG: 250 TABLET ORAL at 15:49

## 2023-05-23 RX ADMIN — METOPROLOL SUCCINATE 25 MG: 25 TABLET, EXTENDED RELEASE ORAL at 09:15

## 2023-05-23 RX ADMIN — BUSPIRONE HYDROCHLORIDE 10 MG: 5 TABLET ORAL at 09:15

## 2023-05-23 RX ADMIN — VANCOMYCIN HYDROCHLORIDE 1000 MG: 1 INJECTION, POWDER, LYOPHILIZED, FOR SOLUTION INTRAVENOUS at 17:25

## 2023-05-23 RX ADMIN — PANTOPRAZOLE SODIUM 40 MG: 40 TABLET, DELAYED RELEASE ORAL at 05:45

## 2023-05-23 RX ADMIN — SODIUM CHLORIDE, PRESERVATIVE FREE 5 ML: 5 INJECTION INTRAVENOUS at 21:05

## 2023-05-23 RX ADMIN — METRONIDAZOLE 500 MG: 250 TABLET ORAL at 05:45

## 2023-05-23 RX ADMIN — MICONAZOLE NITRATE: 2 POWDER TOPICAL at 09:16

## 2023-05-23 RX ADMIN — METRONIDAZOLE 500 MG: 250 TABLET ORAL at 21:57

## 2023-05-23 RX ADMIN — BUSPIRONE HYDROCHLORIDE 10 MG: 5 TABLET ORAL at 21:15

## 2023-05-23 RX ADMIN — BUSPIRONE HYDROCHLORIDE 10 MG: 5 TABLET ORAL at 15:49

## 2023-05-23 NOTE — PLAN OF CARE
Problem: Discharge Planning  Goal: Discharge to home or other facility with appropriate resources  Outcome: Progressing     Problem: Skin/Tissue Integrity  Goal: Absence of new skin breakdown  Outcome: Progressing

## 2023-05-23 NOTE — CARE COORDINATION
Reviewed chart and discussed in IDR< awaiting , surg cult results then ID recs. Pt if from [facility] but will possibly need SNU for iv atb or short term rehab. CM will continue to follow.

## 2023-05-24 PROBLEM — F05 DELIRIUM DUE TO MEDICAL CONDITION WITHOUT BEHAVIORAL DISTURBANCE: Status: ACTIVE | Noted: 2023-05-24

## 2023-05-24 PROBLEM — F33.41 RECURRENT MAJOR DEPRESSIVE DISORDER, IN PARTIAL REMISSION (HCC): Status: ACTIVE | Noted: 2023-05-24

## 2023-05-24 LAB
25(OH)D3 SERPL-MCNC: 25.07 NG/ML
ALBUMIN SERPL-MCNC: 3.2 GM/DL (ref 3.4–5)
ALP BLD-CCNC: 115 IU/L (ref 40–128)
ALT SERPL-CCNC: 10 U/L (ref 10–40)
AMMONIA: 25 UMOL/L (ref 11–51)
ANION GAP SERPL CALCULATED.3IONS-SCNC: 11 MMOL/L (ref 4–16)
AST SERPL-CCNC: 16 IU/L (ref 15–37)
BASOPHILS ABSOLUTE: 0.1 K/CU MM
BASOPHILS RELATIVE PERCENT: 0.9 % (ref 0–1)
BILIRUB SERPL-MCNC: 0.3 MG/DL (ref 0–1)
BUN SERPL-MCNC: 17 MG/DL (ref 6–23)
CALCIUM SERPL-MCNC: 8.6 MG/DL (ref 8.3–10.6)
CHLORIDE BLD-SCNC: 104 MMOL/L (ref 99–110)
CO2: 23 MMOL/L (ref 21–32)
CREAT SERPL-MCNC: 1.1 MG/DL (ref 0.6–1.1)
CRP SERPL HS-MCNC: 43.5 MG/L
DIFFERENTIAL TYPE: ABNORMAL
EOSINOPHILS ABSOLUTE: 0.3 K/CU MM
EOSINOPHILS RELATIVE PERCENT: 2.7 % (ref 0–3)
FOLATE SERPL-MCNC: 7.7 NG/ML (ref 3.1–17.5)
GFR SERPL CREATININE-BSD FRML MDRD: 52 ML/MIN/1.73M2
GLUCOSE BLD-MCNC: 123 MG/DL (ref 70–99)
GLUCOSE BLD-MCNC: 161 MG/DL (ref 70–99)
GLUCOSE BLD-MCNC: 170 MG/DL (ref 70–99)
GLUCOSE BLD-MCNC: 176 MG/DL (ref 70–99)
GLUCOSE SERPL-MCNC: 119 MG/DL (ref 70–99)
HCT VFR BLD CALC: 39.7 % (ref 37–47)
HEMOGLOBIN: 11.6 GM/DL (ref 12.5–16)
IMMATURE NEUTROPHIL %: 0.5 % (ref 0–0.43)
LYMPHOCYTES ABSOLUTE: 1 K/CU MM
LYMPHOCYTES RELATIVE PERCENT: 10.8 % (ref 24–44)
MAGNESIUM: 1.6 MG/DL (ref 1.8–2.4)
MCH RBC QN AUTO: 26.1 PG (ref 27–31)
MCHC RBC AUTO-ENTMCNC: 29.2 % (ref 32–36)
MCV RBC AUTO: 89.4 FL (ref 78–100)
MONOCYTES ABSOLUTE: 0.5 K/CU MM
MONOCYTES RELATIVE PERCENT: 5.8 % (ref 0–4)
NUCLEATED RBC %: 0 %
PDW BLD-RTO: 15.9 % (ref 11.7–14.9)
PHOSPHORUS: 3.2 MG/DL (ref 2.5–4.9)
PLATELET # BLD: 261 K/CU MM (ref 140–440)
PMV BLD AUTO: 9.7 FL (ref 7.5–11.1)
POTASSIUM SERPL-SCNC: 4.4 MMOL/L (ref 3.5–5.1)
RBC # BLD: 4.44 M/CU MM (ref 4.2–5.4)
SEGMENTED NEUTROPHILS ABSOLUTE COUNT: 7.4 K/CU MM
SEGMENTED NEUTROPHILS RELATIVE PERCENT: 79.3 % (ref 36–66)
SODIUM BLD-SCNC: 138 MMOL/L (ref 135–145)
TOTAL IMMATURE NEUTOROPHIL: 0.05 K/CU MM
TOTAL NUCLEATED RBC: 0 K/CU MM
TOTAL PROTEIN: 5.6 GM/DL (ref 6.4–8.2)
VITAMIN B-12: 608.2 PG/ML (ref 211–911)
WBC # BLD: 9.3 K/CU MM (ref 4–10.5)

## 2023-05-24 PROCEDURE — 85025 COMPLETE CBC W/AUTO DIFF WBC: CPT

## 2023-05-24 PROCEDURE — 94150 VITAL CAPACITY TEST: CPT

## 2023-05-24 PROCEDURE — 82746 ASSAY OF FOLIC ACID SERUM: CPT

## 2023-05-24 PROCEDURE — 6360000002 HC RX W HCPCS: Performed by: ORTHOPAEDIC SURGERY

## 2023-05-24 PROCEDURE — 36415 COLL VENOUS BLD VENIPUNCTURE: CPT

## 2023-05-24 PROCEDURE — 83735 ASSAY OF MAGNESIUM: CPT

## 2023-05-24 PROCEDURE — 82962 GLUCOSE BLOOD TEST: CPT

## 2023-05-24 PROCEDURE — 82140 ASSAY OF AMMONIA: CPT

## 2023-05-24 PROCEDURE — 6370000000 HC RX 637 (ALT 250 FOR IP): Performed by: ORTHOPAEDIC SURGERY

## 2023-05-24 PROCEDURE — 99232 SBSQ HOSP IP/OBS MODERATE 35: CPT | Performed by: NURSE PRACTITIONER

## 2023-05-24 PROCEDURE — 94761 N-INVAS EAR/PLS OXIMETRY MLT: CPT

## 2023-05-24 PROCEDURE — 6360000002 HC RX W HCPCS: Performed by: STUDENT IN AN ORGANIZED HEALTH CARE EDUCATION/TRAINING PROGRAM

## 2023-05-24 PROCEDURE — 6360000002 HC RX W HCPCS: Performed by: NURSE PRACTITIONER

## 2023-05-24 PROCEDURE — 2580000003 HC RX 258: Performed by: ORTHOPAEDIC SURGERY

## 2023-05-24 PROCEDURE — 1200000000 HC SEMI PRIVATE

## 2023-05-24 PROCEDURE — 2580000003 HC RX 258: Performed by: NURSE PRACTITIONER

## 2023-05-24 PROCEDURE — 82306 VITAMIN D 25 HYDROXY: CPT

## 2023-05-24 PROCEDURE — 6370000000 HC RX 637 (ALT 250 FOR IP): Performed by: INTERNAL MEDICINE

## 2023-05-24 PROCEDURE — 6370000000 HC RX 637 (ALT 250 FOR IP): Performed by: STUDENT IN AN ORGANIZED HEALTH CARE EDUCATION/TRAINING PROGRAM

## 2023-05-24 PROCEDURE — 80053 COMPREHEN METABOLIC PANEL: CPT

## 2023-05-24 PROCEDURE — 84100 ASSAY OF PHOSPHORUS: CPT

## 2023-05-24 PROCEDURE — 6370000000 HC RX 637 (ALT 250 FOR IP): Performed by: NURSE PRACTITIONER

## 2023-05-24 PROCEDURE — 82607 VITAMIN B-12: CPT

## 2023-05-24 PROCEDURE — 86140 C-REACTIVE PROTEIN: CPT

## 2023-05-24 RX ORDER — SULFAMETHOXAZOLE AND TRIMETHOPRIM 800; 160 MG/1; MG/1
1 TABLET ORAL 2 TIMES DAILY
Status: DISCONTINUED | OUTPATIENT
Start: 2023-05-24 | End: 2023-05-25 | Stop reason: HOSPADM

## 2023-05-24 RX ORDER — DIPHENHYDRAMINE HCL 25 MG
25 TABLET ORAL EVERY 6 HOURS
Status: COMPLETED | OUTPATIENT
Start: 2023-05-24 | End: 2023-05-25

## 2023-05-24 RX ORDER — MAGNESIUM SULFATE IN WATER 40 MG/ML
2000 INJECTION, SOLUTION INTRAVENOUS ONCE
Status: COMPLETED | OUTPATIENT
Start: 2023-05-24 | End: 2023-05-24

## 2023-05-24 RX ORDER — SULFAMETHOXAZOLE AND TRIMETHOPRIM 800; 160 MG/1; MG/1
1 TABLET ORAL EVERY 12 HOURS SCHEDULED
Status: CANCELLED | OUTPATIENT
Start: 2023-05-24 | End: 2023-06-06

## 2023-05-24 RX ADMIN — METRONIDAZOLE 500 MG: 250 TABLET ORAL at 05:54

## 2023-05-24 RX ADMIN — DIPHENHYDRAMINE HYDROCHLORIDE 25 MG: 25 TABLET ORAL at 18:04

## 2023-05-24 RX ADMIN — MICONAZOLE NITRATE: 2 POWDER TOPICAL at 21:31

## 2023-05-24 RX ADMIN — SULFAMETHOXAZOLE AND TRIMETHOPRIM 1 TABLET: 800; 160 TABLET ORAL at 21:29

## 2023-05-24 RX ADMIN — MICONAZOLE NITRATE: 2 POWDER TOPICAL at 11:15

## 2023-05-24 RX ADMIN — MICONAZOLE NITRATE: 2 POWDER TOPICAL at 00:37

## 2023-05-24 RX ADMIN — QUETIAPINE FUMARATE 25 MG: 25 TABLET ORAL at 10:11

## 2023-05-24 RX ADMIN — METOPROLOL SUCCINATE 25 MG: 25 TABLET, EXTENDED RELEASE ORAL at 10:53

## 2023-05-24 RX ADMIN — DIPHENHYDRAMINE HYDROCHLORIDE 25 MG: 25 TABLET ORAL at 12:43

## 2023-05-24 RX ADMIN — ACETAMINOPHEN 650 MG: 325 TABLET ORAL at 14:45

## 2023-05-24 RX ADMIN — BUSPIRONE HYDROCHLORIDE 10 MG: 5 TABLET ORAL at 14:44

## 2023-05-24 RX ADMIN — ACETAMINOPHEN 650 MG: 325 TABLET ORAL at 05:57

## 2023-05-24 RX ADMIN — BUSPIRONE HYDROCHLORIDE 10 MG: 5 TABLET ORAL at 21:29

## 2023-05-24 RX ADMIN — SODIUM CHLORIDE, PRESERVATIVE FREE 10 ML: 5 INJECTION INTRAVENOUS at 10:12

## 2023-05-24 RX ADMIN — SULFAMETHOXAZOLE AND TRIMETHOPRIM 1 TABLET: 800; 160 TABLET ORAL at 14:44

## 2023-05-24 RX ADMIN — SODIUM CHLORIDE 25 ML: 9 INJECTION, SOLUTION INTRAVENOUS at 03:14

## 2023-05-24 RX ADMIN — ENOXAPARIN SODIUM 40 MG: 100 INJECTION SUBCUTANEOUS at 10:11

## 2023-05-24 RX ADMIN — MAGNESIUM SULFATE HEPTAHYDRATE 2000 MG: 40 INJECTION, SOLUTION INTRAVENOUS at 15:10

## 2023-05-24 RX ADMIN — QUETIAPINE FUMARATE 25 MG: 25 TABLET ORAL at 21:29

## 2023-05-24 RX ADMIN — SERTRALINE HYDROCHLORIDE 200 MG: 50 TABLET ORAL at 10:11

## 2023-05-24 RX ADMIN — OXYBUTYNIN CHLORIDE 10 MG: 10 TABLET, EXTENDED RELEASE ORAL at 10:12

## 2023-05-24 RX ADMIN — PANTOPRAZOLE SODIUM 40 MG: 40 TABLET, DELAYED RELEASE ORAL at 05:54

## 2023-05-24 RX ADMIN — SODIUM CHLORIDE, PRESERVATIVE FREE 10 ML: 5 INJECTION INTRAVENOUS at 21:30

## 2023-05-24 RX ADMIN — CEFEPIME 2000 MG: 2 INJECTION, POWDER, FOR SOLUTION INTRAVENOUS at 03:15

## 2023-05-24 RX ADMIN — BUSPIRONE HYDROCHLORIDE 10 MG: 5 TABLET ORAL at 10:12

## 2023-05-24 ASSESSMENT — PAIN DESCRIPTION - DESCRIPTORS
DESCRIPTORS: ACHING
DESCRIPTORS: ACHING

## 2023-05-24 ASSESSMENT — PAIN DESCRIPTION - LOCATION
LOCATION: HEAD
LOCATION: HAND

## 2023-05-24 ASSESSMENT — PAIN SCALES - GENERAL
PAINLEVEL_OUTOF10: 0
PAINLEVEL_OUTOF10: 1
PAINLEVEL_OUTOF10: 7

## 2023-05-24 ASSESSMENT — PAIN - FUNCTIONAL ASSESSMENT: PAIN_FUNCTIONAL_ASSESSMENT: ACTIVITIES ARE NOT PREVENTED

## 2023-05-24 ASSESSMENT — PAIN DESCRIPTION - ORIENTATION: ORIENTATION: RIGHT

## 2023-05-24 NOTE — CONSULTS
[address] [facility]  consulted by Veto Guillen PA-C for monitoring and adjustment. Indication for treatment: Vancomycin indication: Bone/Joint infection; SSTI  Goal trough: Trough Goal: 15-20 mcg/mL  AUC/ARLEEN: 400-600    Pertinent Laboratory Values:   Temp Readings from Last 3 Encounters:   05/22/23 97.6 °F (36.4 °C) (Oral)   08/25/21 97.4 °F (36.3 °C) (Oral)   03/08/20 98.5 °F (36.9 °C) (Oral)     Recent Labs     05/19/23 2111 05/21/23  0432   WBC 12.4* 11.6*   LACTATE 1.2  --        Recent Labs     05/19/23 2111 05/21/23  0432   BUN 16 14   CREATININE 1.1 0.9       Estimated Creatinine Clearance: 60 mL/min (based on SCr of 0.9 mg/dL). Intake/Output Summary (Last 24 hours) at 5/22/2023 1333  Last data filed at 5/22/2023 0612  Gross per 24 hour   Intake 1625.92 ml   Output 255 ml   Net 1370.92 ml         Pertinent Cultures:   Date    Source    Results  5/20   Urine    E. coli      Vancomycin level:   TROUGH:  No results for input(s): VANCOTROUGH in the last 72 hours. RANDOM:  No results for input(s): VANCORANDOM in the last 72 hours. Assessment:  HPI: 70 yof with a PMH significant for T2DM, dementia, s/p surgery on right hand approximately 10 days ago with swelling starting approximately 3 days ago. Scr improved from 1.1 on 5/19 down to 0.9 yesterday, UOP appears ok  Day(s) of therapy: 2 of 7  Vancomycin concentration: to be collected    Plan:  Renals trend stable. Continue vancomycin 1500mg ivpb q24h for a predicted AUC of 455 at steady state. Check the vanco level tomorrow am.  Pharmacy will continue to monitor patient and adjust therapy as indicated    VANCOMYCIN CONCENTRATION SCHEDULED FOR 05/23 @0600    Thank you for the consult. Lisette Salas, [facility]  5/22/2023 1:33 PM
Infectious Disease Consult Note  2023   Patient Name: Janina Tang : [birth year]   Impression  Septic Flexor 4-5th Right Tenosynovitis with Abscess S/p I&D with Carpal Tunnel Release:  Complicated E.coli UTI:  Acute Metabolic Encephalopathy: Resolved: Afebrile, mild leukocytosis  CrCl 60  No reported allergies to ABX  Pct 0.087, CRP 82.8:   -Right Hand Abscess culture: Pending  -UA WBC: 44, RBC 2, Urine Culture: E.coli   Past urine cultures: 2021: Proteus mirabilis, 2020 E.faecalis  Past 2023 S/p per Dr. Christophe Saavedra: 4-5th trigger finger release   -S/p emergent procedure per :  Right hand I&D of 4th and 5th fingers with carpal tunnel release with I&D. Cultures: Pending  DMII:  Chest Pain: Resolved:  Dr. Genoveva Laird onboard  Past C. Diff:  HLD/ HTN:  Obesity:  BMI:  34.11  Multi-morbidity: per PMHx:    Plan:  DC IV ceftriaxone   Start IV cefepime 2 gm q12h  Continue IV vancomycin per pharmacy dosing  Trend CRP- ordered  BC have not been obtained  Await surgical cultures from     Thank you for allowing me to consult in the care of this patient.  ------------------------  REASON FOR CONSULT: Infective syndrome \"septic flexor tenosynovitis\"  Requested by: Bobby Castellanos PA-C  HPI:Patient is a 70 y.o.  female with a history of obesity, DMII, C.diff, HLD, and HTN who was admitted 2023 for further evaluation and management of confusion at the SNF x 1 day prior to admission, reported she stated she was suicidal, she was going to hang herself. She presented to the ED per EMS and the confusion had resolved by the time of arrival with the patient having no memory of the confusion. Her labs were concerning with slight leukocytosis, complaints of urinary urgency and frequency and a UA possible UTI. A urine culture was obtained. She did have recent 2023 surgery on her right hand of release of right 4-5th trigger finger per Dr. Christophe Saavedra.  She was started on empiric ABX
Initial Psychiatric History and Physical    [facility]  [id number]  5/19/2023 05/24/23    ID: Patient is a 70 yrs y.o. female    CC:\"I know what they told me. .. Phillip Uriarte \"    HPI: Patient is a 70year old female with Pmhx of HLD, hypertension, type 2 diabetes mellitus  who presents via EMS from [place] due to confusion x one day, 2/2 E coli UTI. Consulted include cardiology, CM, Orthopedic Surgery and pharmacy. Psychiatry consulted by Dr Gilmer Urbina due to \"confusion, probably dementia with delirium. \"    Met with patient at bedside. Patient is alert and oriented x3. She is aware she is here due to her \"hand\". She appears to have forgotten she has a urinary tract infection but with prompting does eventually recall. Notes her mood \"is negative. \" She denies SI/HI. Denies auditory and visual hallucinations. Notes she is sleeping and eating. Noted breakfast tray was empty. She does struggle to provide answers but is mostly appropriate and needs time to answer. She is upset that she is here in the hospital and states she has had a negative experience. She is unable to clarify this further. She appears to be at baseline from past assessments with provider. She notes she has been at multiple AL. She wants to return to her home in [facility] but understands she does require assistance. She does not want family making her decisions as \" they want me to stay here! \"  Patient mood is actually more stable then on past assessments. Insight and judgment are limited but adequate. Past Psychiatric History:   Zoloft 200 mg po daily  Seroquel 25 mg bid  Buspar 10 mg tid      Social:Patient was born in [facility]. She has three brothers. She was the youngest of four. She earned her MSW. She was very active within the [email or web address] as a . She never  nor had children. She was close to her nieces and nephews. Patient known to psychiatric services as was part of out patient practice when living at [facility].       Patient
Patient: Santa Godinez    Date:  23  :  1945, 70 y.o. Nephrologist: Terrence Marc MD  Provider: CHRYSTAL Ramos    Reason for Consult:   acute kidney injury    Consult requested by : Dr Tomás Mendoza MD    Chief Complaint:    confusion/hallucination and fall    HISTORY OF PRESENT ILLNESS/Brief hospital course  AND  brief background history     Ms. Nolan Aguilar, is a 75-year young female, who was brought to the emergency department via emergency medical service  on May 19, 2023from local assisted living with confusion/hallucination and fall. although patient could not recall what happened to her, it is reported in the epic that she also had suicidal comments at assisted living. in any event, on arrival in our emergency department, she was afebrile and hemodynamically stable and oxygen saturation was 96% while breathing ambient air. She underwent chest x-ray radiologist's interpretation as possible opacity at left lower lung, and possible cardiomegaly. additionally completed tomography of the head without administration of intravenous contrast was unrevealing. she also underwent x-ray of the right hand which showed osteoarthritis and mild soft tissue swelling. biochemical testing showed creatinine of 1.1, with acceptable electrolytes. additionally had slightly high white count of 12.4 thousand, and slightly low hemoglobin of 11.6 g/dL urinalysis was rather bland except leukocyte esterase  with 44 WBC in the urine. appropriate bodily fluid culture were drawn, empirical antimicrobial agent were initiated and she was subsequently admitted to medical floor for further evaluation and management.         Her hospital course so far consistent with orthopedic evaluation, and thought to have tenosynovitis, and was placed on vancomycin and Unasyn, also had additional imaging study which include computed tomography of the hand with administration of intravenous contrast which showed
[address] [address] Continence Nurse  Consult Note       Vila Sandhoff  AGE: 70 y.o. GENDER: female  : 1945  TODAY'S DATE:  2023    Subjective:     Reason for Evaluation and Assessment: Wound Care Eval       Vila Sandhoff is a 70 y.o. female referred by:   [x] Physician  [] Nursing  [] Other:     Wound Identification:  Wound Type:  MASD  Contributing Factors: decreased mobility, shear force, and obesity    Plan of Care: Wound 23 Right breast fold-Dressing/Treatment: Pharmaceutical agent (see MAR) (micotin)  Wound 23 Left breast fold-Dressing/Treatment: Pharmaceutical agent (see MAR) (micotin)      Patient in bed agreeable to wound care assessment and treatment. Wounds measured, photographed and treated as described above. Pt is a mild risk for skin breakdown AEB Reese of 16. Follow Reese orders. Right leg has some blanchable erythema. Wrapped with cast padding for protection and ACE for edema. Excoriation in bilateral breast folds. Left breast is open. Right breast has erythema. Recommend micotin powder. Right leg has some erythema. Recommend clobetasol ointment and ACE. Heels checked. Heels elevated with pillow support. Bed in lowest position, Call light and bedside table within reach. Nurse Updated. Specialty Bed Required : yes. Atmos air pump applied to mattress and functioning.            PAST MEDICAL HISTORY        Diagnosis Date    Clostridium difficile infection 2020    Dementia ([facility] [address]     Hyperlipidemia     Hypertension     Obesity     Type 2 diabetes mellitus without complication ([facility] [address]        PAST SURGICAL HISTORY    Past Surgical History:   Procedure Laterality Date    COLONOSCOPY      UPPER GASTROINTESTINAL ENDOSCOPY N/A 2020    EGD BIOPSY performed by Jacobo Alejandro MD at [address] [place] [address]    Family History   Problem Relation Age of Onset    Diabetes Brother     Obesity Brother     Other Brother         TIA    Diabetes Mother
@06:00    Thank you for the consult. Carlton Dunham, [facility]  5/23/2023 12:19 PM

## 2023-05-25 VITALS
BODY MASS INDEX: 35.87 KG/M2 | DIASTOLIC BLOOD PRESSURE: 76 MMHG | HEIGHT: 65 IN | HEART RATE: 72 BPM | RESPIRATION RATE: 15 BRPM | TEMPERATURE: 98.1 F | OXYGEN SATURATION: 96 % | WEIGHT: 215.3 LBS | SYSTOLIC BLOOD PRESSURE: 148 MMHG

## 2023-05-25 LAB
ALBUMIN SERPL-MCNC: 3 GM/DL (ref 3.4–5)
ALP BLD-CCNC: 110 IU/L (ref 40–128)
ALT SERPL-CCNC: 10 U/L (ref 10–40)
ANION GAP SERPL CALCULATED.3IONS-SCNC: 8 MMOL/L (ref 4–16)
AST SERPL-CCNC: 15 IU/L (ref 15–37)
BASOPHILS ABSOLUTE: 0.1 K/CU MM
BASOPHILS RELATIVE PERCENT: 0.7 % (ref 0–1)
BILIRUB SERPL-MCNC: 0.2 MG/DL (ref 0–1)
BUN SERPL-MCNC: 17 MG/DL (ref 6–23)
CALCIUM SERPL-MCNC: 8.7 MG/DL (ref 8.3–10.6)
CHLORIDE BLD-SCNC: 105 MMOL/L (ref 99–110)
CO2: 26 MMOL/L (ref 21–32)
CREAT SERPL-MCNC: 1.1 MG/DL (ref 0.6–1.1)
CRP SERPL HS-MCNC: 43.6 MG/L
DIFFERENTIAL TYPE: ABNORMAL
EOSINOPHILS ABSOLUTE: 0.3 K/CU MM
EOSINOPHILS RELATIVE PERCENT: 3 % (ref 0–3)
GFR SERPL CREATININE-BSD FRML MDRD: 52 ML/MIN/1.73M2
GLUCOSE BLD-MCNC: 120 MG/DL (ref 70–99)
GLUCOSE BLD-MCNC: 134 MG/DL (ref 70–99)
GLUCOSE BLD-MCNC: 139 MG/DL (ref 70–99)
GLUCOSE SERPL-MCNC: 136 MG/DL (ref 70–99)
HCT VFR BLD CALC: 37.2 % (ref 37–47)
HEMOGLOBIN: 11.2 GM/DL (ref 12.5–16)
IMMATURE NEUTROPHIL %: 0.6 % (ref 0–0.43)
LYMPHOCYTES ABSOLUTE: 1.3 K/CU MM
LYMPHOCYTES RELATIVE PERCENT: 14.4 % (ref 24–44)
MAGNESIUM: 1.8 MG/DL (ref 1.8–2.4)
MCH RBC QN AUTO: 25.9 PG (ref 27–31)
MCHC RBC AUTO-ENTMCNC: 30.1 % (ref 32–36)
MCV RBC AUTO: 86.1 FL (ref 78–100)
MONOCYTES ABSOLUTE: 0.5 K/CU MM
MONOCYTES RELATIVE PERCENT: 6.1 % (ref 0–4)
NUCLEATED RBC %: 0 %
PDW BLD-RTO: 15.6 % (ref 11.7–14.9)
PLATELET # BLD: 267 K/CU MM (ref 140–440)
PMV BLD AUTO: 9.8 FL (ref 7.5–11.1)
POTASSIUM SERPL-SCNC: 4.4 MMOL/L (ref 3.5–5.1)
RBC # BLD: 4.32 M/CU MM (ref 4.2–5.4)
SEGMENTED NEUTROPHILS ABSOLUTE COUNT: 6.6 K/CU MM
SEGMENTED NEUTROPHILS RELATIVE PERCENT: 75.2 % (ref 36–66)
SODIUM BLD-SCNC: 139 MMOL/L (ref 135–145)
TOTAL IMMATURE NEUTOROPHIL: 0.05 K/CU MM
TOTAL NUCLEATED RBC: 0 K/CU MM
TOTAL PROTEIN: 5.5 GM/DL (ref 6.4–8.2)
WBC # BLD: 8.8 K/CU MM (ref 4–10.5)

## 2023-05-25 PROCEDURE — 6370000000 HC RX 637 (ALT 250 FOR IP): Performed by: INTERNAL MEDICINE

## 2023-05-25 PROCEDURE — 85025 COMPLETE CBC W/AUTO DIFF WBC: CPT

## 2023-05-25 PROCEDURE — 97535 SELF CARE MNGMENT TRAINING: CPT

## 2023-05-25 PROCEDURE — 99231 SBSQ HOSP IP/OBS SF/LOW 25: CPT | Performed by: NURSE PRACTITIONER

## 2023-05-25 PROCEDURE — 6370000000 HC RX 637 (ALT 250 FOR IP): Performed by: STUDENT IN AN ORGANIZED HEALTH CARE EDUCATION/TRAINING PROGRAM

## 2023-05-25 PROCEDURE — 94150 VITAL CAPACITY TEST: CPT

## 2023-05-25 PROCEDURE — 80053 COMPREHEN METABOLIC PANEL: CPT

## 2023-05-25 PROCEDURE — 82962 GLUCOSE BLOOD TEST: CPT

## 2023-05-25 PROCEDURE — 6370000000 HC RX 637 (ALT 250 FOR IP): Performed by: ORTHOPAEDIC SURGERY

## 2023-05-25 PROCEDURE — 94761 N-INVAS EAR/PLS OXIMETRY MLT: CPT

## 2023-05-25 PROCEDURE — 83735 ASSAY OF MAGNESIUM: CPT

## 2023-05-25 PROCEDURE — 36415 COLL VENOUS BLD VENIPUNCTURE: CPT

## 2023-05-25 PROCEDURE — 6360000002 HC RX W HCPCS: Performed by: ORTHOPAEDIC SURGERY

## 2023-05-25 PROCEDURE — 6360000002 HC RX W HCPCS: Performed by: STUDENT IN AN ORGANIZED HEALTH CARE EDUCATION/TRAINING PROGRAM

## 2023-05-25 PROCEDURE — 97530 THERAPEUTIC ACTIVITIES: CPT

## 2023-05-25 PROCEDURE — 2580000003 HC RX 258: Performed by: ORTHOPAEDIC SURGERY

## 2023-05-25 PROCEDURE — 86140 C-REACTIVE PROTEIN: CPT

## 2023-05-25 PROCEDURE — 6370000000 HC RX 637 (ALT 250 FOR IP): Performed by: NURSE PRACTITIONER

## 2023-05-25 RX ORDER — OXYCODONE HYDROCHLORIDE AND ACETAMINOPHEN 5; 325 MG/1; MG/1
TABLET ORAL
Qty: 6 TABLET | Refills: 0 | Status: SHIPPED | OUTPATIENT
Start: 2023-05-25 | End: 2023-05-28

## 2023-05-25 RX ORDER — SULFAMETHOXAZOLE AND TRIMETHOPRIM 800; 160 MG/1; MG/1
1 TABLET ORAL 2 TIMES DAILY
Qty: 23 TABLET | Refills: 0 | Status: SHIPPED | OUTPATIENT
Start: 2023-05-25 | End: 2023-06-06

## 2023-05-25 RX ORDER — MAGNESIUM SULFATE 1 G/100ML
1000 INJECTION INTRAVENOUS ONCE
Status: COMPLETED | OUTPATIENT
Start: 2023-05-25 | End: 2023-05-25

## 2023-05-25 RX ORDER — GLUCAGON 1 MG/ML
1 KIT INJECTION PRN
Qty: 1 EACH | Refills: 2 | Status: SHIPPED | OUTPATIENT
Start: 2023-05-25

## 2023-05-25 RX ORDER — AMLODIPINE BESYLATE 5 MG/1
5 TABLET ORAL DAILY
Qty: 30 TABLET | Refills: 3 | Status: SHIPPED | OUTPATIENT
Start: 2023-05-26

## 2023-05-25 RX ADMIN — BUSPIRONE HYDROCHLORIDE 10 MG: 5 TABLET ORAL at 11:01

## 2023-05-25 RX ADMIN — PANTOPRAZOLE SODIUM 40 MG: 40 TABLET, DELAYED RELEASE ORAL at 05:37

## 2023-05-25 RX ADMIN — QUETIAPINE FUMARATE 25 MG: 25 TABLET ORAL at 11:01

## 2023-05-25 RX ADMIN — SODIUM CHLORIDE, PRESERVATIVE FREE 10 ML: 5 INJECTION INTRAVENOUS at 11:06

## 2023-05-25 RX ADMIN — MAGNESIUM SULFATE HEPTAHYDRATE 1000 MG: 1 INJECTION, SOLUTION INTRAVENOUS at 14:39

## 2023-05-25 RX ADMIN — MICONAZOLE NITRATE: 2 POWDER TOPICAL at 11:08

## 2023-05-25 RX ADMIN — METOPROLOL SUCCINATE 25 MG: 25 TABLET, EXTENDED RELEASE ORAL at 11:01

## 2023-05-25 RX ADMIN — OXYBUTYNIN CHLORIDE 10 MG: 10 TABLET, EXTENDED RELEASE ORAL at 11:01

## 2023-05-25 RX ADMIN — SODIUM CHLORIDE: 9 INJECTION, SOLUTION INTRAVENOUS at 14:36

## 2023-05-25 RX ADMIN — DIPHENHYDRAMINE HYDROCHLORIDE 25 MG: 25 TABLET ORAL at 00:38

## 2023-05-25 RX ADMIN — BUSPIRONE HYDROCHLORIDE 10 MG: 5 TABLET ORAL at 14:35

## 2023-05-25 RX ADMIN — SULFAMETHOXAZOLE AND TRIMETHOPRIM 1 TABLET: 800; 160 TABLET ORAL at 11:01

## 2023-05-25 RX ADMIN — DIPHENHYDRAMINE HYDROCHLORIDE 25 MG: 25 TABLET ORAL at 05:37

## 2023-05-25 RX ADMIN — AMLODIPINE BESYLATE 5 MG: 5 TABLET ORAL at 11:01

## 2023-05-25 RX ADMIN — SERTRALINE HYDROCHLORIDE 200 MG: 50 TABLET ORAL at 11:01

## 2023-05-25 RX ADMIN — ENOXAPARIN SODIUM 40 MG: 100 INJECTION SUBCUTANEOUS at 11:03

## 2023-05-25 NOTE — CARE COORDINATION
Spoke with pt Jesse Lee pt approved for [employer] at anytime. Jesse Lee met with pt yesterday afternoon and pt agreeable. Plan to [employer] once medically ready. [address] with Dr Gokul Barrios plan to give iv Mg then send to [employer] Later today. 1430 Discharged to [employer], set up with [facility] for 1700 after Mg infusion completed. Called and updated nurse Philip Bryant and Jesse Lee at [employer].

## 2023-05-25 NOTE — DISCHARGE INSTR - COC
unContinuity of Care Form    Patient Name: Elena Delacruz   :  [birth year]  MRN:  [id number]    Admit date:  2023  Discharge date:  2023    Code Status Order: DNR-CCA   Advance Directives:     Admitting Physician:  Joellen Albrecht MD  PCP: CHRYSTAL Yun    Discharging Nurse: KELLIE CANTRELL [facility] Unit/Room#: 7651/9150-L  Discharging Unit Phone Number: [phone]    Emergency Contact:   Extended Emergency Contact Information  Primary Emergency Contact: Sander Keating Phone: [phone]  Relation: Brother/Sister  Secondary Emergency Contact: [address] [place] [address] Phone: [phone]  Relation: Other  Preferred language: English   needed?  No    Past Surgical History:  Past Surgical History:   Procedure Laterality Date    COLONOSCOPY      HAND SURGERY Right 2023    RIGHT HAND INCISION AND DRAINAGE WITH CARPAL TUNNEL RELEASE performed by Wayne Boothe MD at . [address] N/A 2020    EGD BIOPSY performed by Twyla Kruse MD at [address] [place] [address] ENDOSCOPY       Immunization History:   Immunization History   Administered Date(s) Administered    Influenza, Triv, inactivated, subunit, adjuvanted, IM (Fluad 65 yrs and older) 10/28/2019    Pneumococcal, PPSV23, PNEUMOVAX 23, (age 2y+), SC/IM, 0.5mL 2012, 2015    TD 2LF, TDVAX, (age 7y+), IM, 0.5mL 2000, 2012    Zoster Live (Zostavax) 2012       Active Problems:  Patient Active Problem List   Diagnosis Code    GI bleed K92.2    Debility R53.81    Sepsis ([facility] [address] A41.9    Hypoglycemia E16.2    C. 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 coli urinary tract infection N39.0, B96.20    Flexor tenosynovitis of finger M65.9    Altered mental status R41.82    Infection

## 2023-05-25 NOTE — PROGRESS NOTES
05/23/23 1452   Encounter Summary   Encounter Overview/Reason  Initial Encounter   Service Provided For: Patient   Referral/Consult From: [address] Family members   Last Encounter  05/23/23  (Volunteer visit by Jeovanny [facility] ; documented by Pasquale Ba)   Complexity of Encounter Low   Begin Time 1120   End Time  1130   Total Time Calculated 10 min   Encounter    Type Initial Screen/Assessment   Spiritual/Emotional needs   Type Spiritual Support   Rituals, Rites and Sacraments   Type [employer] Communion   Assessment/Intervention/Outcome   Assessment Calm   Intervention Active listening   Outcome Acceptance;Comfort;Coping   Plan and Referrals   Plan/Referrals Continue to visit, (comment)
Alka Salinas ([birth year])    Daily Progress Note-  Jonathan Gregory MD, MD                     Today's Date:   5/23/2023          Subjective:     Late entry see at 11:00am    Hand feeling better  Denies chest pain or shortness of breath. Objective:     Patient Vitals for the past 6 hrs:   BP Temp Pulse Resp SpO2   05/23/23 1438 (!) 105/59 97.5 °F (36.4 °C) 63 18 96 %   05/23/23 1132 -- -- -- -- 97 %     I/O last 3 completed shifts:  In: -   Out: 650 [Urine:650]  DRAIN/TUBE OUTPUT:       Physical Exam:       General: alert, appears stated age, and cooperative   Wound: Decresed erythema. Better active and PROM to fingers  Dressing CDI           Data Review  CBC:   Recent Labs     05/21/23  0432 05/22/23  1422 05/23/23  0032   WBC 11.6* 14.4* 12.0*   HGB 11.7* 11.8* 10.8*    286 257         Meds:    enoxaparin  40 mg SubCUTAneous Daily    sodium chloride flush  5-40 mL IntraVENous 2 times per day    vancomycin  1,000 mg IntraVENous Q24H    cefepime  2,000 mg IntraVENous Q12H    metroNIDAZOLE  500 mg Oral 3 times per day    amLODIPine  5 mg Oral Daily    sodium chloride flush  5-40 mL IntraVENous 2 times per day    [Held by provider] aspirin  81 mg Oral Daily    busPIRone  10 mg Oral TID    insulin lispro  0-8 Units SubCUTAneous TID WC    insulin lispro  0-4 Units SubCUTAneous Nightly    metoprolol succinate  25 mg Oral Daily    oxybutynin  10 mg Oral Daily    pantoprazole  40 mg Oral Daily    QUEtiapine  25 mg Oral BID    sertraline  200 mg Oral Daily    miconazole   Topical BID                                                   Assessment:      1. Right Hand septic flexor tenosynovitis      Plan:         1:  Cultures + Staph aureus with UTI   2. Continue Abx per ID     -Vancomycin, cefepime, Flagyl   3.   DC to SNU when medically stable   4.  FU in the office with me next week      Elfego Vences
Infectious Disease Progress Note  2023   Patient Name: Luanne Koehler : 1945   Impression  Septic Flexor 4-5th Right Tenosynovitis with Abscess S/p I&D with Carpal Tunnel Release:  Complicated E.coli UTI:  Acute Metabolic Encephalopathy: Resolved: Afebrile, continues with mild leukocytosis  CrCl 50  No reported allergies to ABX  Pct 0.087, CRP 82.8:   -Right Hand Abscess culture: MSSA   -UA WBC: 44, RBC 2, Urine Culture: E.coli >100,000  Past urine cultures: 2021: Proteus mirabilis, 2020 E.faecalis  Past 2023 S/p per Dr. Noah Castaneda: 4-5th trigger finger release   -S/p emergent procedure per :  Right hand I&D of 4th and 5th fingers with carpal tunnel release with I&D. Cultures: MSSA  MARTHA: Resolving  DMII:  Chest Pain: Resolved:  Dr. Stacy Mueller onboard  Past C. Diff:  HLD/ HTN:  Obesity:  BMI:  34.11  Multi-morbidity: per PMHx:     Plan:  DC IV cefepime, Flagyl and vancomycin   Start po Bactrim 1 DS bid x 14 cumulative days from OR procedure (end date 2023) will encompass both UTI and right hand MSSA, CrCl 50  Follow up with PCP and ortho  Labs drawn on 2023 during the course of treatment, ID will follow labwork as patient was in MARTHA during hospitalization  CBC with differential, CMP, ESR, CRP  [facility]  # [phone]   OK from ID standpoint to DC when ready      Ongoing Antimicrobial Therapy  Bactrim -  Completed Antimicrobial Therapy  Unasyn ? Ceftriaxone ? Vancomycin -  Cefepime -? Flagyl   History:? Interval history noted. Chief complaint:right hand fourth and fifth tenosynovitis with abscess.     Denies n/v/d/f or untoward effects of antibiotics  Physical Exam:  Vital Signs: /68   Pulse 55   Temp 98.1 °F (36.7 °C) (Oral)   Resp 18   Ht 5' 5\" (1.651 m)   Wt 215 lb 4.8 oz (97.7 kg)   SpO2 91%   BMI 35.83 kg/m²     Gen: remains A&O x 4, no distress  Wounds:
Infectious Disease Progress Note  2023   Patient Name: Panda Benites : 1945   Impression  Septic Flexor 4-5th Right Tenosynovitis with Abscess S/p I&D with Carpal Tunnel Release:  Complicated E.coli UTI:  Acute Metabolic Encephalopathy: Resolved: Afebrile with no leukocytosis, has clinically improved  CrCl 50  No reported allergies to ABX  Pct 0.087, CRP 82.8, 42, 43.5, 43.6:   -Right Hand Abscess culture: MSSA   -UA WBC: 44, RBC 2, Urine Culture: E.coli >100,000  Past urine cultures: 2021: Proteus mirabilis, 2020 E.faecalis  Past 2023 S/p per Dr. Jose Guevara: 4-5th trigger finger release   -S/p emergent procedure per :  Right hand I&D of 4th and 5th fingers with carpal tunnel release with I&D. Cultures: MSSA  MARTHA:Resolving  Dr. Christie Car onboard  Likely from infection/sepsis associated with low bp  DMII:  Chest Pain: Resolved:  Dr. Kirby Mendez onboard  Past C. Diff:  HLD/ HTN:  Obesity:  BMI:  34.11  Multi-morbidity: per PMHx:     Plan:  Continue po Bactrim 1 DS bid x 14 cumulative days from OR procedure (end date 2023) will encompass both UTI and right hand MSSA, CrCl 50  Follow up with PCP and ortho  Labs drawn on 2023 during the course of treatment, ID will follow labwork as patient was in MARTHA during hospitalization  CBC with differential, CMP, ESR, CRP  [facility] results [facility]  # [phone]   OK from ID standpoint to DC when ready      Ongoing Antimicrobial Therapy  Bactrim -  Completed Antimicrobial Therapy  Unasyn ? Ceftriaxone ? Vancomycin   Cefepime ? Flagyl   History:? Interval history noted. Chief complaint:right hand fourth and fifth tenosynovitis with abscess.     Denies n/v/d/f or untoward effects of antibiotics  Physical Exam:  Vital Signs: BP (!) 163/84   Pulse 69   Temp 98.2 °F (36.8 °C) (Oral)   Resp 16   Ht 5' 5\" (1.651 m)   Wt 215 lb 4.8 oz (97.7 kg)   SpO2 95%
Infectious Disease Progress Note  2023   Patient Name: William Martell : 1945   Impression  Septic Flexor 4-5th Right Tenosynovitis with Abscess S/p I&D with Carpal Tunnel Release:  Complicated E.coli UTI:  Acute Metabolic Encephalopathy: Resolved: Afebrile, continues with mild leukocytosis  CrCl 37  No reported allergies to ABX  Pct 0.087, CRP 82.8:   -Right Hand Abscess culture: Staphylococcus aureus   -UA WBC: 44, RBC 2, Urine Culture: E.coli >100,000  Past urine cultures: 2021: Proteus mirabilis, 2020 E.faecalis  Past 2023 S/p per Dr. Ya Feng: 4-5th trigger finger release   -S/p emergent procedure per :  Right hand I&D of 4th and 5th fingers with carpal tunnel release with I&D. Cultures: Pending  MARTHA:  Cr increased from  of 0.9 to  to 1.2,  1.5  DMII:  Chest Pain: Resolved:  Dr. Charisse Oliveira onboard  Past C. Diff:  HLD/ HTN:  Obesity:  BMI:  34.11  Multi-morbidity: per PMHx:     Plan:  Continue IV cefepime 2 gm q12h (CrCl 37, dose will maintain the same) for E.coli UTI  Continue po Flagyl 500 mg q8h, for suspected anaerobes with right hand  Continue IV vancomycin per pharmacy dosing, for Staph aureus right hand, await final culture and sensi  Observed Cr elevation to 1.5 from 1.2 on , will observe closely and adjust meds accordingly  Trend CRP- ordered  Await Final surgical cultures and sensi from     Ongoing Antimicrobial Therapy  Vancomycin   Cefepime -? Flagyl -  Completed Antimicrobial Therapy  Unasyn ? Ceftriaxone -? History:? Interval history noted. Chief complaint:right hand fourth and fifth tenosynovitis with abscess.     Denies n/v/d/f or untoward effects of antibiotics  Physical Exam:  Vital Signs: BP (!) 148/66   Pulse 70   Temp 97.3 °F (36.3 °C) (Oral)   Resp 16   Ht 5' 5\" (1.651 m)   Wt 215 lb 4.8 oz (97.7 kg)   SpO2 97%   BMI 35.83 kg/m²     Gen: remains A&O x 4, no distress  Wounds: C/D/I right hand
[facility], [address] [facility]  Phone: [phone]    Fax [phone]                  Leonor Lauren MD, Celina Tang MD, Brianda Santiago MD, MD Arias Luis MD Junette Punch, MD Dr. Carmen Gilman MD, ANGIE Mason, APRN Luster Hashimoto, APRN    Harrold Burkitt, APRN  Danial Lundborg, PARASHEEDA    CARDIOLOGY  NOTE      Name:  Rukhsana Horse /Age/Sex:   (70 y.o. female)   MRN & CSN:  [id number] & [id number] Admission Date/Time: 2023  7:30 PM   Location:  5410/1636-T PCP: Celine Bagley, [address] [place] [address] Day: 4    - Cardiology consult is for:  Chest pain        Subjective: Kyrie Denney is a 70 y. o.year old   Only complaint at this time is right hand discomfort. Denying chest pain      Objective: Temperature:  Current - Temp: 97.7 °F (36.5 °C); Max - Temp  Av.9 °F (36.6 °C)  Min: 97.3 °F (36.3 °C)  Max: 99 °F (37.2 °C)    Respiratory Rate : Resp  Av.1  Min: 15  Max: 18    Pulse Range: Pulse  Av.7  Min: 58  Max: 72    Blood Presuure Range:  Systolic (64NPZ), MWY:087 , Min:94 , GXN:745   ; Diastolic (06ERZ), QAI:42, Min:55, Max:88      Pulse ox Range: SpO2  Av.6 %  Min: 94 %  Max: 99 %    24hr I & O:    Intake/Output Summary (Last 24 hours) at 2023 1435  Last data filed at 2023 5970  Gross per 24 hour   Intake 1036.61 ml   Output 255 ml   Net 781.61 ml         BP 94/80   Pulse 62   Temp 97.7 °F (36.5 °C) (Oral)   Resp 18   Ht 5' 5\" (1.651 m)   Wt 205 lb (93 kg)   SpO2 94%   BMI 34.11 kg/m²         TELEMETRY: Sinus      has a past medical history of Clostridium difficile infection, Dementia ([facility] Hyperlipidemia, Hypertension, Obesity, and Type 2 diabetes mellitus without complication ([facility]    has a past surgical history that includes Colonoscopy and Upper gastrointestinal endoscopy (N/A,
Nephrology Progress Note  5/24/2023 5:09 PM        Subjective:   Admit Date: 5/19/2023  PCP: CHRYSTAL Daniel    Interval History:  patient seen in early morning, this is a late entry    Diet:  eating some    ROS:   no confusion or shortness of breath   urine output recorded 2.55 L for the last 24 hours-   no fever and acceptable blood pressure    Data:     Current meds:    diphenhydrAMINE  25 mg Oral Q6H    sulfamethoxazole-trimethoprim  1 tablet Oral BID    magnesium sulfate  2,000 mg IntraVENous Once    enoxaparin  40 mg SubCUTAneous Daily    sodium chloride flush  5-40 mL IntraVENous 2 times per day    amLODIPine  5 mg Oral Daily    sodium chloride flush  5-40 mL IntraVENous 2 times per day    [Held by provider] aspirin  81 mg Oral Daily    busPIRone  10 mg Oral TID    insulin lispro  0-8 Units SubCUTAneous TID WC    insulin lispro  0-4 Units SubCUTAneous Nightly    metoprolol succinate  25 mg Oral Daily    oxybutynin  10 mg Oral Daily    pantoprazole  40 mg Oral Daily    QUEtiapine  25 mg Oral BID    sertraline  200 mg Oral Daily    miconazole   Topical BID      sodium chloride 25 mL (05/24/23 0314)    dextrose           I/O last 3 completed shifts:   In: 605 [P.O.:480; I.V.:125]  Out: 2950 [Urine:2950]    CBC:   Recent Labs     05/22/23  1422 05/23/23  0032 05/24/23  0822   WBC 14.4* 12.0* 9.3   HGB 11.8* 10.8* 11.6*    257 261          Recent Labs     05/22/23  1422 05/23/23  0032 05/24/23  0822    137 138   K 4.2 4.3 4.4    102 104   CO2 24 24 23   BUN 23 26* 17   CREATININE 1.2* 1.5* 1.1   GLUCOSE 215* 177* 119*       Lab Results   Component Value Date    CALCIUM 8.6 05/24/2023    PHOS 3.2 05/24/2023       Objective:     Vitals: /71   Pulse 70   Temp 97.7 °F (36.5 °C)   Resp 16   Ht 5' 5\" (1.651 m)   Wt 215 lb 4.8 oz (97.7 kg)   SpO2 96%   BMI 35.83 kg/m² ,    General appearance:   alert, awake and oriented  HEENT:   1+ conjunctival pallor, her head of the bed elevated about
Nephrology Progress Note  5/25/2023 4:31 PM        Subjective:   Admit Date: 5/19/2023  PCP: CHRYSTAL Pennington    Interval History: Patient seen early morning, this is a late entry  No major event that I am aware of    Diet: Reasonable per patient    ROS: No overt shortness of breath  Urine output recorded 2.55 L for the last 24 hours  No fever and acceptable blood pressure    Data:     Current meds:    sulfamethoxazole-trimethoprim  1 tablet Oral BID    enoxaparin  40 mg SubCUTAneous Daily    sodium chloride flush  5-40 mL IntraVENous 2 times per day    amLODIPine  5 mg Oral Daily    sodium chloride flush  5-40 mL IntraVENous 2 times per day    [Held by provider] aspirin  81 mg Oral Daily    busPIRone  10 mg Oral TID    insulin lispro  0-8 Units SubCUTAneous TID WC    insulin lispro  0-4 Units SubCUTAneous Nightly    metoprolol succinate  25 mg Oral Daily    oxybutynin  10 mg Oral Daily    pantoprazole  40 mg Oral Daily    QUEtiapine  25 mg Oral BID    sertraline  200 mg Oral Daily    miconazole   Topical BID      sodium chloride 20 mL/hr at 05/25/23 1436    dextrose           I/O last 3 completed shifts: In: 12 [P.O.:240;  I.V.:125]  Out: 2050 [Urine:2050]    CBC:   Recent Labs     05/23/23  0032 05/24/23  0822 05/25/23  0235   WBC 12.0* 9.3 8.8   HGB 10.8* 11.6* 11.2*    261 267          Recent Labs     05/23/23  0032 05/24/23  0822 05/25/23  0235    138 139   K 4.3 4.4 4.4    104 105   CO2 24 23 26   BUN 26* 17 17   CREATININE 1.5* 1.1 1.1   GLUCOSE 177* 119* 136*       Lab Results   Component Value Date    CALCIUM 8.7 05/25/2023    PHOS 3.2 05/24/2023       Objective:     Vitals: BP (!) 148/76   Pulse 72   Temp 98.1 °F (36.7 °C) (Oral)   Resp 15   Ht 5' 5\" (1.651 m)   Wt 215 lb 4.8 oz (97.7 kg)   SpO2 96%   BMI 35.83 kg/m² ,    General appearance: He was alert, awake and oriented ready to eat her breakfast  HEENT: At least 1+ conjunctival pallor  Neck: Seems supple  Lungs: Coarse
Occupational Therapy    Occupational Therapy Treatment Note    Name: Janina Tang MRN: [id number] :   1945   Date:  2023   Admission Date: 2023 Room:  [address] [place] [address]-A     Primary Problem:   The primary encounter diagnosis was Altered mental status, unspecified altered mental status type. Diagnoses of Dementia, unspecified dementia severity, unspecified dementia type, unspecified whether behavioral, psychotic, or mood disturbance or anxiety ([address] and Urinary tract infection without hematuria, site unspecified were also pertinent to this visit. Restrictions/Precautions:  Restrictions/Precautions  Restrictions/Precautions: Fall Risk           Communication with other providers: JOAQUÍN Kearns Rule approved session, EDWINA Flores     Subjective:  Patient states:  \"I get very anxious and nervous, to the point it makes me dizzy. \"   Pain:   Location, Type, Intensity (0/10 to 10/10):  denies     Objective:    Observation: Pt presented In semi-lancaster's, agreeable to session. Objective Measures:  on RA. Treatment, including education:  Therapeutic Activity Training:   Therapeutic activity training was instructed today. Cues were given for safety, sequence, UE/LE placement, awareness, and balance. Activities performed today included bed mobility training, sup-sit, sit-stand, SPT. Supine to Sit with Min A with HOB raised and use of bed controls. Scooting hips forward with Mod A X2 on EOB. Sit to Stand transfer from EOB and recliner for multiple reps with Mod A X2 with use of FWW with max cues for technique/safety d/t pt reports extreme fear of falling and demo posterior leaning. Step pivot for ~4 steps from EOB <> recliner with Mod A X1-2 for AD mgmt and cues for stepping/body positioning. Stand to Sit with Max A X2 with max cues for lining up B LE's and for safety d/t pt attempting to sit prematurely d/t fear of falling.      Self Care Training:   Cues were given for safety, sequence,
Patient was bladder scanned at 2345 with 417 mL noted. Night oncall provider said to straight cath her. Staff was going to straight cath her at [address]  but she had 450 mL in [address]. Night oncall provider stated to not straight cath but monitor output.
Physical Therapy    Physical Therapy Treatment Note  Name: Gregory Jett MRN: [id number] :   1945   Date:  2023   Admission Date: 2023 Room:  [address] [place] [address]   Restrictions/Precautions:  Restrictions/Precautions  Restrictions/Precautions: Fall Risk         Communication with other providers:  per nurse ok to tx  Subjective:  Patient states:  agreeable to tx but states many times during tx fear of falling  Pain:   Location, Type, Intensity (0/10 to 10/10):  no c/o pain during tx session  Objective:    Observation:  alert and oriented to self with large bandage on right hand    Treatment, including education/measures:  Sup to sit with HOB up using bed rail needing min asist and increased time and effort. Scooting to get feet to floor mod assist of 2 using bed pad. Sit<=>stand from bed and chair mod/max assist of 2. Pt very fearful with posterior lean. Pt att. To sit before chair was in place needing max assist and cues with chair brought up. When att. To stand from chair to pull up depends pt was incontinent of BM. Pt was able to take 4-5 steps with mod/max assist of 2 and assist with rw management to turn and sit. Chair was turned to face bed rail and pt was able to transfer sit to stand with min assist and cues. Pt was able to maintain standing with min assist but was dependent for jany care and changing depends. Safety  Patient left safely in the chair, with call light/phone in reach with alarm applied. Gait belt was used for transfers and gait. Assessment / Impression:      Patient's tolerance of treatment:  good   Adverse Reaction: na  Significant change in status and impact:  na  Barriers to improvement:  pt very fearful of falling and having anxiety with standing and transfers  Plan for Next Session:    Cont.  POC  Time in:  0840  Time out:  0910  Timed treatment minutes:  30  Total treatment time:  30    Previously filed items:  Social/Functional History  Lives With: Alone  Type of Home:
V2.0    [facility] [place]      Name:  Alka Salinas /Age/Sex: [birth year]89  (70 y.o. female)   MRN & CSN:  [id number] & [id number] Encounter Date/Time: 2023 2:27 PM EDT   Location:  [address] [place] [address] PCP: CHRYSTAL Mayer MD       Hospital Day: 6    Assessment and Recommendations   Alka Salinas is a 70 y.o. female with pmh of HLD, hypertension, type 2 diabetes mellitus who presents with E. coli urinary tract infection      Plan:   Septic flexor tenosynovitis of the right fifth digit:  -s/p 5th finger trigger release , p/w finger swelling, erythema, pain  -CT hand c/w tenosynovitis  -s/p R hand irrigation and debridement  by Dr Luis Martinez   -Cultures pending  -ID onboard- s/p Cefepime, PO flagyl and iv vancomycin- currently switched to PO Bactrim per ID   -R hand abscess cx- staph aureus, awaiting final sensitivity    E Coli UTI:  -pt on cefepime per ID    Hypomagnesemia:  Mag 1.6, will replete to keep Mag > 2    Elevated troponin:  -trop 0.031< 0.040  -intermittent CP and dyspnea on exertion  -EKG w no ST-T wave changes  -Cardiology consulted, no plan for ischemic workup per cardiology, likely non-ACS trend of troponin    Hx depression w psychosis- denies SI/ HI- will d/w psych and c/w current meds  MARTHA- bladder scan 390's, Cr 1.5 from 1.1, Nephrology consulted  Dementia- delirium precautions  HTN- metoprolol  Fall- PT/OT- SNF      Diet ADULT DIET;  Regular; 3 carb choices (45 gm/meal)   DVT Prophylaxis [x] Lovenox, []  Heparin, [] SCDs, [] Ambulation,  [] Eliquis, [] Xarelto  [] Coumadin   Code Status DNR-CCA   Disposition From: [place]  Expected Disposition: SNF  Estimated Date of Discharge:TBD   Patient requires continued admission due to MARTHA, improving, hypomagnesemia, crp slightly uptrending   Surrogate Decision Maker/ POA       Personally reviewed Lab Studies and Imaging       Subjective:     Chief Complaint: Alisha Mathis is a 70 y.o. female who
Vila Sandhoff (2/24/1716)    Daily Progress Note-  Machelle Gómez                     Today's Date:   5/22/2023          Subjective: Yudith Berumen states that her right hand is feeling much better today. She states her pain is decreased. She states that she is experiencing some intermittent numbness and tingling on the ulnar border of the hand. Denies chest pain or shortness of breath. Objective:     Patient Vitals for the past 6 hrs:   BP Temp Temp src Pulse Resp SpO2 Weight   05/22/23 0201 132/72 97.3 °F (36.3 °C) Oral 59 15 96 % 205 lb (93 kg)     I/O last 3 completed shifts: In: 2115.9 [P.O.:240; I.V.:879.1; IV Piggyback:996.9]  Out: 1055 [Urine:1050; Blood:5]  DRAIN/TUBE OUTPUT:       Physical Exam:       General: alert, appears stated age, and cooperative   Wound:   Bandages clean dry and intact. There is some decreased swelling and erythema in the fifth finger. DVT Exam: Negative Jannette's sign. Data Review  CBC:   Recent Labs     05/19/23 2111 05/21/23  0432   WBC 12.4* 11.6*   HGB 11.6* 11.7*    220         Meds:    amLODIPine  5 mg Oral Daily    cefTRIAXone (ROCEPHIN) IV  1,000 mg IntraVENous Q24H    vancomycin  1,500 mg IntraVENous Q24H    sodium chloride flush  5-40 mL IntraVENous 2 times per day    [Held by provider] enoxaparin  40 mg SubCUTAneous Daily    [Held by provider] aspirin  81 mg Oral Daily    busPIRone  10 mg Oral TID    insulin lispro  0-8 Units SubCUTAneous TID     insulin lispro  0-4 Units SubCUTAneous Nightly    metoprolol succinate  25 mg Oral Daily    oxybutynin  10 mg Oral Daily    pantoprazole  40 mg Oral Daily    QUEtiapine  25 mg Oral BID    sertraline  200 mg Oral Daily    miconazole   Topical BID                                                   Assessment:      1.   S/P Right hand irrigation and debridement of fourth  and fifth fingers with carpal
Dementia   - mild   - maintain sleep/wake cycle, delirium precautions     This patient was discussed with Dr. Eve Guthrie. He was agreeable with the assessment and plan as dictated above. Current Living situation: [facility]  Expected Disposition: SNF  Estimated D/C: TBD pending culture data    Diet ADULT DIET; Regular; 5 carb choices (75 gm/meal); Low Fat/Low Chol/High Fiber/ARAVIND; Low Sodium (2 gm)   DVT Prophylaxis [x] Lovenox, []  Heparin, [] SCDs, [] Ambulation,  [] Eliquis, [] Xarelto []Coumadin   Code Status DNR-CCA   Disposition Patient requires continued admission due to hand infection. Surrogate Decision Maker/ VIKTOR Smith (brother)     Personally reviewed Lab Studies and Imaging       Subjective:     Chief Complaint: Altered Mental Status (Pt had short time of confusion today and was sent for possible UTI)     Patient seen and examined at bedside. Feeling OK this AM, states hand has mild pain, but otherwise no acute complaints. Review of Systems:    Pertinent positives and negatives discussed in HPI    Objective: Intake/Output Summary (Last 24 hours) at 5/22/2023 0950  Last data filed at 5/22/2023 0612  Gross per 24 hour   Intake 1625.92 ml   Output 255 ml   Net 1370.92 ml          Vitals:   Vitals:    05/22/23 0201   BP: 132/72   Pulse: 59   Resp: 15   Temp: 97.3 °F (36.3 °C)   SpO2: 96%       Physical Exam:   General: NAD, obese  Eyes: EOMI  ENT: neck supple  Cardiovascular: Regular rate and rhythm  Respiratory: Clear to auscultation, respirations even and unlabored on RA  Gastrointestinal: Soft, non tender  Genitourinary: no suprapubic tenderness  Musculoskeletal: R hand in splint  Skin: warm, dry  Neuro: Alert and oriented x3-4. Mildly forgetful. Right hand neurovascularly intact  Psych: Mood appropriate.     Medications:   Medications:    amLODIPine  5 mg Oral Daily    cefTRIAXone (ROCEPHIN) IV  1,000 mg IntraVENous Q24H    vancomycin  1,500 mg IntraVENous Q24H    sodium
06/28/2022 06:15 AM    TRIG 121 06/28/2022 06:15 AM     Hemoglobin A1C:   Lab Results   Component Value Date/Time    LABA1C 4.8 06/28/2022 06:15 AM     TSH: No results found for: TSH  Troponin:   Lab Results   Component Value Date/Time    TROPONINT 0.040 05/21/2023 04:32 AM    TROPONINT 0.031 05/20/2023 12:31 PM    TROPONINT 0.025 05/19/2023 09:11 PM     Lactic Acid: No results for input(s): LACTA in the last 72 hours. BNP: No results for input(s): PROBNP in the last 72 hours. UA:  Lab Results   Component Value Date/Time    NITRU POSITIVE 05/20/2023 12:27 AM    NITRU Negative 02/28/2020 11:20 AM    COLORU YELLOW 05/20/2023 12:27 AM    PHUR 5.0 02/28/2020 11:20 AM    WBCUA 44 05/20/2023 12:27 AM    RBCUA 2 05/20/2023 12:27 AM    MUCUS Rare 02/11/2020 01:28 PM    TRICHOMONAS NONE SEEN 05/20/2023 12:27 AM    BACTERIA MODERATE 05/20/2023 12:27 AM    CLARITYU CLEAR 05/20/2023 12:27 AM    SPECGRAV <1.005 05/20/2023 12:27 AM    LEUKOCYTESUR LARGE NUMBER OR AMOUNT OBSERVED 05/20/2023 12:27 AM    UROBILINOGEN 0.2 05/20/2023 12:27 AM    BILIRUBINUR NEGATIVE 05/20/2023 12:27 AM    BLOODU NEGATIVE 05/20/2023 12:27 AM    GLUCOSEU Negative 02/28/2020 11:20 AM    KETUA NEGATIVE 05/20/2023 12:27 AM    AMORPHOUS Rare 02/11/2020 01:28 PM     Urine Cultures:   Lab Results   Component Value Date/Time    LABURIN >100,000 CFU/ml 02/28/2020 11:20 AM     Blood Cultures:   Lab Results   Component Value Date/Time    BC No Growth after 4 days of incubation. 02/28/2020 12:39 PM     Lab Results   Component Value Date/Time    BLOODCULT2 No Growth after 4 days of incubation.  02/28/2020 12:51 PM     Organism:   Lab Results   Component Value Date/Time    ORG Enterococcus faecalis 02/28/2020 11:20 AM         Electronically signed by Radha Tucker MD on 5/23/2023 at 2:27 PM

## 2023-05-25 NOTE — DISCHARGE INSTRUCTIONS
Please take medications regularly and follow up soon with Primary Care Physician, Orthopedic Surgeon and Infectious Disease Specialist  In case of any worsening symptoms, please return to nearest facility to be evaluated by Physician

## 2023-05-25 NOTE — DISCHARGE SUMMARY
tenosynovitis Reason for Exam: 5th digit edema/erythema, concern for tenosynovitis FINDINGS: Bones: No fracture identified. Osseous mineralization is decreased. No suspicious lytic or sclerotic osseous lesions. No osseous erosion identified. No CT evidence of osteomyelitis. Soft Tissue: Soft tissue edema of the hand and wrist with fluid tracking along the flexor tendon sheath of the 5th digit and within the carpal tunnel consistent with tenosynovitis. Small volume of fluid tracks along the proximal 4th flexor tendon sheath compatible with tenosynovitis. Joint: Anatomic alignment of the joints with moderate degenerative changes of the 1st carpometacarpal joint and triscaphe joint at the wrist.  Mild osteoarthritic changes throughout the hand involving both the metacarpophalangeal joints and interphalangeal joints of the 1st through 5th digits. 1. Diffuse soft tissue edema with tenosynovitis involving the 5th flexor tendon sheath and extending into the carpal tunnel and along the proximal 4th flexor tendon sheath. 2. No acute osseous findings. 3. Mild-to-moderate osteoarthritic changes. 4. Osteopenia. RECOMMENDATIONS: Unavailable     XR CHEST PORTABLE    Result Date: 5/19/2023  EXAMINATION: ONE XRAY VIEW OF THE CHEST 5/19/2023 9:48 pm COMPARISON: Chest 08/23/2021 HISTORY: ORDERING SYSTEM PROVIDED HISTORY: AMS Additional signs and symptoms: na Relevant Medical/Surgical History: hypertension, hyperlipidemia, diabetes FINDINGS: Technically poor study, steep AP lordotic and expiratory phase respiration. The cardiac silhouette appears enlarged, likely related poor inspiration poor positioning. The mediastinal and hilar silhouettes appear unremarkable. Possible opacity lower left lung such as atelectasis infiltrate or this may be related to artifact poor positioning. The right lung appears clear. No pneumothorax is seen. No acute osseous abnormality is identified. 1. Technically suboptimal study.  2. The

## 2023-05-26 LAB
CULTURE: ABNORMAL
CULTURE: ABNORMAL
Lab: ABNORMAL
SPECIMEN: ABNORMAL

## 2023-06-19 NOTE — ANESTHESIA POSTPROCEDURE EVALUATION
Department of Anesthesiology  Postprocedure Note    Patient: John Forbes  MRN: [id number]  YOB: 1945  Date of evaluation: 6/19/2023      Procedure Summary     Date: 05/21/23 Room / Location: [address]    Anesthesia Start: 5092 Anesthesia Stop: 6253    Procedure: RIGHT HAND INCISION AND DRAINAGE WITH CARPAL TUNNEL RELEASE (Right: Hand) Diagnosis:       Infection of hand      (Infection of hand [L08.9])    Surgeons: Edgardo Celaya MD Responsible Provider: Althea Salcido MD    Anesthesia Type: general ASA Status: 4 - Emergent          Anesthesia Type: No value filed.     Gabriel Phase I: Gabriel Score: 10    Gabriel Phase II:        Anesthesia Post Evaluation    Patient location during evaluation: PACU  Patient participation: complete - patient participated  Level of consciousness: sleepy but conscious  Pain score: 1  Airway patency: patent  Nausea & Vomiting: no nausea and no vomiting  Complications: no  Cardiovascular status: hemodynamically stable  Respiratory status: acceptable  Hydration status: euvolemic

## 2023-07-06 ENCOUNTER — HOSPITAL ENCOUNTER (INPATIENT)
Age: 78
LOS: 3 days | Discharge: OTHER INSTITUTION | DRG: 371 | End: 2023-07-13
Attending: INTERNAL MEDICINE | Admitting: STUDENT IN AN ORGANIZED HEALTH CARE EDUCATION/TRAINING PROGRAM
Payer: MEDICARE

## 2023-07-06 ENCOUNTER — APPOINTMENT (OUTPATIENT)
Dept: ULTRASOUND IMAGING | Age: 78
DRG: 371 | End: 2023-07-06
Attending: INTERNAL MEDICINE
Payer: MEDICARE

## 2023-07-06 DIAGNOSIS — R78.81 MRSA BACTEREMIA: Primary | ICD-10-CM

## 2023-07-06 DIAGNOSIS — B95.62 MRSA BACTEREMIA: Primary | ICD-10-CM

## 2023-07-06 PROBLEM — N39.0 UTI (URINARY TRACT INFECTION): Status: ACTIVE | Noted: 2023-07-06

## 2023-07-06 LAB
ALBUMIN SERPL-MCNC: 3.1 GM/DL (ref 3.4–5)
ALP BLD-CCNC: 140 IU/L (ref 40–128)
ALT SERPL-CCNC: 14 U/L (ref 10–40)
ANION GAP SERPL CALCULATED.3IONS-SCNC: 20 MMOL/L (ref 4–16)
AST SERPL-CCNC: 20 IU/L (ref 15–37)
BANDED NEUTROPHILS ABSOLUTE COUNT: 0.22 K/CU MM
BANDED NEUTROPHILS RELATIVE PERCENT: 1 % (ref 5–11)
BILIRUB SERPL-MCNC: 0.3 MG/DL (ref 0–1)
BUN SERPL-MCNC: 53 MG/DL (ref 6–23)
CALCIUM SERPL-MCNC: 8.8 MG/DL (ref 8.3–10.6)
CHLORIDE BLD-SCNC: 96 MMOL/L (ref 99–110)
CO2: 17 MMOL/L (ref 21–32)
CREAT SERPL-MCNC: 1.8 MG/DL (ref 0.6–1.1)
DIFFERENTIAL TYPE: ABNORMAL
ESTIMATED AVERAGE GLUCOSE: 140 MG/DL
GFR SERPL CREATININE-BSD FRML MDRD: 29 ML/MIN/1.73M2
GLUCOSE BLD-MCNC: 137 MG/DL (ref 70–99)
GLUCOSE BLD-MCNC: 140 MG/DL (ref 70–99)
GLUCOSE BLD-MCNC: 146 MG/DL (ref 70–99)
GLUCOSE SERPL-MCNC: 154 MG/DL (ref 70–99)
HBA1C MFR BLD: 6.5 % (ref 4.2–6.3)
HCT VFR BLD CALC: 42 % (ref 37–47)
HEMOGLOBIN: 13.6 GM/DL (ref 12.5–16)
LACTATE: 1.7 MMOL/L (ref 0.5–1.9)
LYMPHOCYTES ABSOLUTE: 0.9 K/CU MM
LYMPHOCYTES RELATIVE PERCENT: 4 % (ref 24–44)
MCH RBC QN AUTO: 26.6 PG (ref 27–31)
MCHC RBC AUTO-ENTMCNC: 32.4 % (ref 32–36)
MCV RBC AUTO: 82.2 FL (ref 78–100)
METAMYELOCYTES ABSOLUTE COUNT: 0.22 K/CU MM
METAMYELOCYTES PERCENT: 1 %
MONOCYTES ABSOLUTE: 0.9 K/CU MM
MONOCYTES RELATIVE PERCENT: 4 % (ref 0–4)
PDW BLD-RTO: 16.4 % (ref 11.7–14.9)
PLATELET # BLD: 442 K/CU MM (ref 140–440)
PMV BLD AUTO: 9.5 FL (ref 7.5–11.1)
POTASSIUM SERPL-SCNC: 3.7 MMOL/L (ref 3.5–5.1)
PRO-BNP: 1768 PG/ML
RBC # BLD: 5.11 M/CU MM (ref 4.2–5.4)
SEGMENTED NEUTROPHILS ABSOLUTE COUNT: 20.2 K/CU MM
SEGMENTED NEUTROPHILS RELATIVE PERCENT: 90 % (ref 36–66)
SODIUM BLD-SCNC: 133 MMOL/L (ref 135–145)
TOTAL PROTEIN: 5.8 GM/DL (ref 6.4–8.2)
WBC # BLD: 22.4 K/CU MM (ref 4–10.5)

## 2023-07-06 PROCEDURE — 83036 HEMOGLOBIN GLYCOSYLATED A1C: CPT

## 2023-07-06 PROCEDURE — 2500000003 HC RX 250 WO HCPCS: Performed by: NURSE PRACTITIONER

## 2023-07-06 PROCEDURE — 87150 DNA/RNA AMPLIFIED PROBE: CPT

## 2023-07-06 PROCEDURE — 87040 BLOOD CULTURE FOR BACTERIA: CPT

## 2023-07-06 PROCEDURE — 85007 BL SMEAR W/DIFF WBC COUNT: CPT

## 2023-07-06 PROCEDURE — 80053 COMPREHEN METABOLIC PANEL: CPT

## 2023-07-06 PROCEDURE — 85027 COMPLETE CBC AUTOMATED: CPT

## 2023-07-06 PROCEDURE — G0378 HOSPITAL OBSERVATION PER HR: HCPCS

## 2023-07-06 PROCEDURE — G0379 DIRECT REFER HOSPITAL OBSERV: HCPCS

## 2023-07-06 PROCEDURE — 2580000003 HC RX 258: Performed by: NURSE PRACTITIONER

## 2023-07-06 PROCEDURE — 87086 URINE CULTURE/COLONY COUNT: CPT

## 2023-07-06 PROCEDURE — 36415 COLL VENOUS BLD VENIPUNCTURE: CPT

## 2023-07-06 PROCEDURE — 96361 HYDRATE IV INFUSION ADD-ON: CPT

## 2023-07-06 PROCEDURE — 83880 ASSAY OF NATRIURETIC PEPTIDE: CPT

## 2023-07-06 PROCEDURE — 96372 THER/PROPH/DIAG INJ SC/IM: CPT

## 2023-07-06 PROCEDURE — 6370000000 HC RX 637 (ALT 250 FOR IP): Performed by: NURSE PRACTITIONER

## 2023-07-06 PROCEDURE — 83605 ASSAY OF LACTIC ACID: CPT

## 2023-07-06 PROCEDURE — 81001 URINALYSIS AUTO W/SCOPE: CPT

## 2023-07-06 PROCEDURE — 94761 N-INVAS EAR/PLS OXIMETRY MLT: CPT

## 2023-07-06 PROCEDURE — 6360000002 HC RX W HCPCS: Performed by: NURSE PRACTITIONER

## 2023-07-06 PROCEDURE — 82962 GLUCOSE BLOOD TEST: CPT

## 2023-07-06 PROCEDURE — 96365 THER/PROPH/DIAG IV INF INIT: CPT

## 2023-07-06 PROCEDURE — 99213 OFFICE O/P EST LOW 20 MIN: CPT

## 2023-07-06 PROCEDURE — 87186 SC STD MICRODIL/AGAR DIL: CPT

## 2023-07-06 PROCEDURE — 76770 US EXAM ABDO BACK WALL COMP: CPT

## 2023-07-06 RX ORDER — AMLODIPINE BESYLATE 5 MG/1
5 TABLET ORAL DAILY
Status: DISCONTINUED | OUTPATIENT
Start: 2023-07-06 | End: 2023-07-13 | Stop reason: HOSPADM

## 2023-07-06 RX ORDER — DEXTROSE MONOHYDRATE 100 MG/ML
INJECTION, SOLUTION INTRAVENOUS CONTINUOUS PRN
Status: DISCONTINUED | OUTPATIENT
Start: 2023-07-06 | End: 2023-07-13 | Stop reason: HOSPADM

## 2023-07-06 RX ORDER — PANTOPRAZOLE SODIUM 40 MG/1
40 TABLET, DELAYED RELEASE ORAL DAILY
Status: DISCONTINUED | OUTPATIENT
Start: 2023-07-06 | End: 2023-07-13 | Stop reason: HOSPADM

## 2023-07-06 RX ORDER — OYSTER SHELL CALCIUM WITH VITAMIN D 500; 200 MG/1; [IU]/1
1 TABLET, FILM COATED ORAL 2 TIMES DAILY
Status: DISCONTINUED | OUTPATIENT
Start: 2023-07-06 | End: 2023-07-06 | Stop reason: CLARIF

## 2023-07-06 RX ORDER — INSULIN LISPRO 100 [IU]/ML
0-4 INJECTION, SOLUTION INTRAVENOUS; SUBCUTANEOUS
Status: DISCONTINUED | OUTPATIENT
Start: 2023-07-06 | End: 2023-07-13 | Stop reason: HOSPADM

## 2023-07-06 RX ORDER — QUETIAPINE FUMARATE 25 MG/1
50 TABLET, FILM COATED ORAL 2 TIMES DAILY
Status: DISCONTINUED | OUTPATIENT
Start: 2023-07-06 | End: 2023-07-13 | Stop reason: HOSPADM

## 2023-07-06 RX ORDER — ALBUTEROL SULFATE 90 UG/1
2 AEROSOL, METERED RESPIRATORY (INHALATION) EVERY 6 HOURS PRN
Status: DISCONTINUED | OUTPATIENT
Start: 2023-07-06 | End: 2023-07-13 | Stop reason: HOSPADM

## 2023-07-06 RX ORDER — INSULIN LISPRO 100 [IU]/ML
0-4 INJECTION, SOLUTION INTRAVENOUS; SUBCUTANEOUS NIGHTLY
Status: DISCONTINUED | OUTPATIENT
Start: 2023-07-06 | End: 2023-07-13 | Stop reason: HOSPADM

## 2023-07-06 RX ORDER — FUROSEMIDE 20 MG/1
20 TABLET ORAL DAILY
Status: DISCONTINUED | OUTPATIENT
Start: 2023-07-06 | End: 2023-07-13 | Stop reason: HOSPADM

## 2023-07-06 RX ORDER — OXYCODONE HYDROCHLORIDE AND ACETAMINOPHEN 5; 325 MG/1; MG/1
1 TABLET ORAL EVERY 8 HOURS PRN
Status: DISCONTINUED | OUTPATIENT
Start: 2023-07-06 | End: 2023-07-13 | Stop reason: HOSPADM

## 2023-07-06 RX ORDER — SODIUM CHLORIDE 0.9 % (FLUSH) 0.9 %
5-40 SYRINGE (ML) INJECTION PRN
Status: DISCONTINUED | OUTPATIENT
Start: 2023-07-06 | End: 2023-07-13 | Stop reason: HOSPADM

## 2023-07-06 RX ORDER — GLUCAGON 1 MG/ML
1 KIT INJECTION PRN
Status: DISCONTINUED | OUTPATIENT
Start: 2023-07-06 | End: 2023-07-13 | Stop reason: HOSPADM

## 2023-07-06 RX ORDER — PROMETHAZINE HYDROCHLORIDE 12.5 MG/1
12.5 TABLET ORAL EVERY 6 HOURS PRN
Status: DISCONTINUED | OUTPATIENT
Start: 2023-07-06 | End: 2023-07-13 | Stop reason: HOSPADM

## 2023-07-06 RX ORDER — SODIUM CHLORIDE 0.9 % (FLUSH) 0.9 %
5-40 SYRINGE (ML) INJECTION EVERY 12 HOURS SCHEDULED
Status: DISCONTINUED | OUTPATIENT
Start: 2023-07-06 | End: 2023-07-13 | Stop reason: HOSPADM

## 2023-07-06 RX ORDER — SODIUM CHLORIDE 9 MG/ML
INJECTION, SOLUTION INTRAVENOUS PRN
Status: DISCONTINUED | OUTPATIENT
Start: 2023-07-06 | End: 2023-07-13 | Stop reason: HOSPADM

## 2023-07-06 RX ORDER — VITAMIN B COMPLEX
4000 TABLET ORAL DAILY
Status: DISCONTINUED | OUTPATIENT
Start: 2023-07-06 | End: 2023-07-13 | Stop reason: HOSPADM

## 2023-07-06 RX ORDER — NITROFURANTOIN 25; 75 MG/1; MG/1
100 CAPSULE ORAL 2 TIMES DAILY
Status: ON HOLD | COMMUNITY
Start: 2023-07-04 | End: 2023-07-13 | Stop reason: HOSPADM

## 2023-07-06 RX ORDER — ENOXAPARIN SODIUM 100 MG/ML
40 INJECTION SUBCUTANEOUS DAILY
Status: DISCONTINUED | OUTPATIENT
Start: 2023-07-06 | End: 2023-07-13 | Stop reason: HOSPADM

## 2023-07-06 RX ORDER — SODIUM CHLORIDE 9 MG/ML
INJECTION, SOLUTION INTRAVENOUS CONTINUOUS
Status: DISPENSED | OUTPATIENT
Start: 2023-07-06 | End: 2023-07-07

## 2023-07-06 RX ORDER — ASPIRIN 81 MG/1
81 TABLET, CHEWABLE ORAL DAILY
Status: DISCONTINUED | OUTPATIENT
Start: 2023-07-06 | End: 2023-07-13 | Stop reason: HOSPADM

## 2023-07-06 RX ORDER — ONDANSETRON 2 MG/ML
4 INJECTION INTRAMUSCULAR; INTRAVENOUS EVERY 6 HOURS PRN
Status: DISCONTINUED | OUTPATIENT
Start: 2023-07-06 | End: 2023-07-13 | Stop reason: HOSPADM

## 2023-07-06 RX ORDER — BUSPIRONE HYDROCHLORIDE 5 MG/1
10 TABLET ORAL 3 TIMES DAILY
Status: DISCONTINUED | OUTPATIENT
Start: 2023-07-06 | End: 2023-07-13 | Stop reason: HOSPADM

## 2023-07-06 RX ORDER — POLYETHYLENE GLYCOL 3350 17 G/17G
17 POWDER, FOR SOLUTION ORAL DAILY PRN
Status: DISCONTINUED | OUTPATIENT
Start: 2023-07-06 | End: 2023-07-13 | Stop reason: HOSPADM

## 2023-07-06 RX ORDER — ACETAMINOPHEN 325 MG/1
650 TABLET ORAL EVERY 4 HOURS PRN
Status: DISCONTINUED | OUTPATIENT
Start: 2023-07-06 | End: 2023-07-13 | Stop reason: HOSPADM

## 2023-07-06 RX ORDER — OXYCODONE HYDROCHLORIDE AND ACETAMINOPHEN 5; 325 MG/1; MG/1
1 TABLET ORAL EVERY 4 HOURS PRN
COMMUNITY

## 2023-07-06 RX ORDER — OXYBUTYNIN CHLORIDE 10 MG/1
10 TABLET, EXTENDED RELEASE ORAL NIGHTLY
Status: DISCONTINUED | OUTPATIENT
Start: 2023-07-06 | End: 2023-07-10

## 2023-07-06 RX ORDER — LANOLIN ALCOHOL/MO/W.PET/CERES
1 CREAM (GRAM) TOPICAL 2 TIMES DAILY
Status: DISCONTINUED | OUTPATIENT
Start: 2023-07-06 | End: 2023-07-06 | Stop reason: CLARIF

## 2023-07-06 RX ADMIN — ASPIRIN 81 MG: 81 TABLET, CHEWABLE ORAL at 16:42

## 2023-07-06 RX ADMIN — SERTRALINE HYDROCHLORIDE 200 MG: 50 TABLET ORAL at 16:42

## 2023-07-06 RX ADMIN — Medication 1 TABLET: at 21:50

## 2023-07-06 RX ADMIN — CEFEPIME 1000 MG: 1 INJECTION, POWDER, FOR SOLUTION INTRAMUSCULAR; INTRAVENOUS at 18:27

## 2023-07-06 RX ADMIN — MICONAZOLE NITRATE: 2 POWDER TOPICAL at 21:50

## 2023-07-06 RX ADMIN — BUSPIRONE HYDROCHLORIDE 10 MG: 5 TABLET ORAL at 21:50

## 2023-07-06 RX ADMIN — ENOXAPARIN SODIUM 40 MG: 100 INJECTION SUBCUTANEOUS at 18:21

## 2023-07-06 RX ADMIN — QUETIAPINE FUMARATE 50 MG: 25 TABLET ORAL at 21:49

## 2023-07-06 RX ADMIN — SODIUM CHLORIDE: 9 INJECTION, SOLUTION INTRAVENOUS at 18:26

## 2023-07-06 RX ADMIN — BUSPIRONE HYDROCHLORIDE 10 MG: 5 TABLET ORAL at 16:42

## 2023-07-06 RX ADMIN — PANTOPRAZOLE SODIUM 40 MG: 40 TABLET, DELAYED RELEASE ORAL at 16:43

## 2023-07-06 RX ADMIN — Medication 4000 UNITS: at 16:42

## 2023-07-06 RX ADMIN — OXYBUTYNIN CHLORIDE 10 MG: 10 TABLET, EXTENDED RELEASE ORAL at 21:50

## 2023-07-07 PROBLEM — N17.9 AKI (ACUTE KIDNEY INJURY) (HCC): Status: ACTIVE | Noted: 2023-07-07

## 2023-07-07 LAB
ALBUMIN SERPL-MCNC: 2.6 GM/DL (ref 3.4–5)
ALP BLD-CCNC: 104 IU/L (ref 40–129)
ALT SERPL-CCNC: 11 U/L (ref 10–40)
ANION GAP SERPL CALCULATED.3IONS-SCNC: 19 MMOL/L (ref 4–16)
AST SERPL-CCNC: 14 IU/L (ref 15–37)
BACTERIA: NEGATIVE /HPF
BANDED NEUTROPHILS ABSOLUTE COUNT: 1.61 K/CU MM
BANDED NEUTROPHILS RELATIVE PERCENT: 9 % (ref 5–11)
BILIRUB SERPL-MCNC: 0.3 MG/DL (ref 0–1)
BILIRUBIN URINE: NEGATIVE MG/DL
BLOOD, URINE: ABNORMAL
BUN SERPL-MCNC: 42 MG/DL (ref 6–23)
CALCIUM SERPL-MCNC: 8 MG/DL (ref 8.3–10.6)
CHLORIDE BLD-SCNC: 102 MMOL/L (ref 99–110)
CLARITY: ABNORMAL
CO2: 15 MMOL/L (ref 21–32)
COLOR: YELLOW
CREAT SERPL-MCNC: 1.4 MG/DL (ref 0.6–1.1)
DIFFERENTIAL TYPE: ABNORMAL
EKG ATRIAL RATE: 50 BPM
EKG DIAGNOSIS: NORMAL
EKG P AXIS: 9 DEGREES
EKG P-R INTERVAL: 130 MS
EKG Q-T INTERVAL: 466 MS
EKG QRS DURATION: 80 MS
EKG QTC CALCULATION (BAZETT): 424 MS
EKG R AXIS: -12 DEGREES
EKG T AXIS: 22 DEGREES
EKG VENTRICULAR RATE: 50 BPM
GFR SERPL CREATININE-BSD FRML MDRD: 39 ML/MIN/1.73M2
GLUCOSE BLD-MCNC: 126 MG/DL (ref 70–99)
GLUCOSE BLD-MCNC: 133 MG/DL (ref 70–99)
GLUCOSE BLD-MCNC: 150 MG/DL (ref 70–99)
GLUCOSE BLD-MCNC: 170 MG/DL (ref 70–99)
GLUCOSE SERPL-MCNC: 141 MG/DL (ref 70–99)
GLUCOSE, URINE: NEGATIVE MG/DL
HCT VFR BLD CALC: 39.6 % (ref 37–47)
HEMOGLOBIN: 12.4 GM/DL (ref 12.5–16)
HYALINE CASTS: 10 /LPF
KETONES, URINE: 15 MG/DL
LEUKOCYTE ESTERASE, URINE: ABNORMAL
LYMPHOCYTES ABSOLUTE: 1.4 K/CU MM
LYMPHOCYTES RELATIVE PERCENT: 8 % (ref 24–44)
MAGNESIUM: 1.7 MG/DL (ref 1.8–2.4)
MCH RBC QN AUTO: 26.4 PG (ref 27–31)
MCHC RBC AUTO-ENTMCNC: 31.3 % (ref 32–36)
MCV RBC AUTO: 84.4 FL (ref 78–100)
METAMYELOCYTES ABSOLUTE COUNT: 0.18 K/CU MM
METAMYELOCYTES PERCENT: 1 %
MUCUS: ABNORMAL HPF
NITRITE URINE, QUANTITATIVE: NEGATIVE
PDW BLD-RTO: 16.8 % (ref 11.7–14.9)
PH, URINE: 5.5 (ref 5–8)
PHOSPHORUS: 3.5 MG/DL (ref 2.5–4.9)
PLATELET # BLD: 359 K/CU MM (ref 140–440)
PMV BLD AUTO: 9.5 FL (ref 7.5–11.1)
POTASSIUM SERPL-SCNC: 3.6 MMOL/L (ref 3.5–5.1)
PROTEIN UA: NEGATIVE MG/DL
RBC # BLD: 4.69 M/CU MM (ref 4.2–5.4)
RBC URINE: 50 /HPF (ref 0–6)
SEGMENTED NEUTROPHILS ABSOLUTE COUNT: 14.7 K/CU MM
SEGMENTED NEUTROPHILS RELATIVE PERCENT: 82 % (ref 36–66)
SODIUM BLD-SCNC: 136 MMOL/L (ref 135–145)
SPECIFIC GRAVITY UA: 1.01 (ref 1–1.03)
SQUAMOUS EPITHELIAL: 1 /HPF
TOTAL PROTEIN: 4.7 GM/DL (ref 6.4–8.2)
TRICHOMONAS: ABNORMAL /HPF
UROBILINOGEN, URINE: 0.2 MG/DL (ref 0.2–1)
WBC # BLD: 17.9 K/CU MM (ref 4–10.5)
WBC CLUMP: ABNORMAL /HPF
WBC UA: 201 /HPF (ref 0–5)
YEAST: ABNORMAL /HPF

## 2023-07-07 PROCEDURE — 6360000002 HC RX W HCPCS: Performed by: INTERNAL MEDICINE

## 2023-07-07 PROCEDURE — 82962 GLUCOSE BLOOD TEST: CPT

## 2023-07-07 PROCEDURE — 94761 N-INVAS EAR/PLS OXIMETRY MLT: CPT

## 2023-07-07 PROCEDURE — 96361 HYDRATE IV INFUSION ADD-ON: CPT

## 2023-07-07 PROCEDURE — 96367 TX/PROPH/DG ADDL SEQ IV INF: CPT

## 2023-07-07 PROCEDURE — 6370000000 HC RX 637 (ALT 250 FOR IP): Performed by: NURSE PRACTITIONER

## 2023-07-07 PROCEDURE — 96366 THER/PROPH/DIAG IV INF ADDON: CPT

## 2023-07-07 PROCEDURE — 96376 TX/PRO/DX INJ SAME DRUG ADON: CPT

## 2023-07-07 PROCEDURE — 84100 ASSAY OF PHOSPHORUS: CPT

## 2023-07-07 PROCEDURE — 2580000003 HC RX 258: Performed by: NURSE PRACTITIONER

## 2023-07-07 PROCEDURE — 85027 COMPLETE CBC AUTOMATED: CPT

## 2023-07-07 PROCEDURE — 96372 THER/PROPH/DIAG INJ SC/IM: CPT

## 2023-07-07 PROCEDURE — G0378 HOSPITAL OBSERVATION PER HR: HCPCS

## 2023-07-07 PROCEDURE — 51701 INSERT BLADDER CATHETER: CPT

## 2023-07-07 PROCEDURE — 36415 COLL VENOUS BLD VENIPUNCTURE: CPT

## 2023-07-07 PROCEDURE — 6360000002 HC RX W HCPCS: Performed by: NURSE PRACTITIONER

## 2023-07-07 PROCEDURE — 80053 COMPREHEN METABOLIC PANEL: CPT

## 2023-07-07 PROCEDURE — 51798 US URINE CAPACITY MEASURE: CPT

## 2023-07-07 PROCEDURE — 93010 ELECTROCARDIOGRAM REPORT: CPT | Performed by: INTERNAL MEDICINE

## 2023-07-07 PROCEDURE — 83735 ASSAY OF MAGNESIUM: CPT

## 2023-07-07 PROCEDURE — 85007 BL SMEAR W/DIFF WBC COUNT: CPT

## 2023-07-07 PROCEDURE — 93005 ELECTROCARDIOGRAM TRACING: CPT | Performed by: STUDENT IN AN ORGANIZED HEALTH CARE EDUCATION/TRAINING PROGRAM

## 2023-07-07 RX ORDER — MAGNESIUM SULFATE IN WATER 40 MG/ML
2000 INJECTION, SOLUTION INTRAVENOUS ONCE
Status: COMPLETED | OUTPATIENT
Start: 2023-07-07 | End: 2023-07-07

## 2023-07-07 RX ADMIN — MAGNESIUM SULFATE HEPTAHYDRATE 2000 MG: 40 INJECTION, SOLUTION INTRAVENOUS at 11:34

## 2023-07-07 RX ADMIN — SODIUM CHLORIDE, PRESERVATIVE FREE 10 ML: 5 INJECTION INTRAVENOUS at 20:41

## 2023-07-07 RX ADMIN — MICONAZOLE NITRATE: 2 POWDER TOPICAL at 11:39

## 2023-07-07 RX ADMIN — CEFEPIME 1000 MG: 1 INJECTION, POWDER, FOR SOLUTION INTRAMUSCULAR; INTRAVENOUS at 14:34

## 2023-07-07 RX ADMIN — Medication 1 TABLET: at 11:38

## 2023-07-07 RX ADMIN — ASPIRIN 81 MG: 81 TABLET, CHEWABLE ORAL at 11:36

## 2023-07-07 RX ADMIN — BUSPIRONE HYDROCHLORIDE 10 MG: 5 TABLET ORAL at 11:37

## 2023-07-07 RX ADMIN — SERTRALINE HYDROCHLORIDE 200 MG: 50 TABLET ORAL at 11:36

## 2023-07-07 RX ADMIN — MICONAZOLE NITRATE: 2 POWDER TOPICAL at 20:41

## 2023-07-07 RX ADMIN — PANTOPRAZOLE SODIUM 40 MG: 40 TABLET, DELAYED RELEASE ORAL at 11:37

## 2023-07-07 RX ADMIN — ENOXAPARIN SODIUM 40 MG: 100 INJECTION SUBCUTANEOUS at 11:38

## 2023-07-07 RX ADMIN — OXYBUTYNIN CHLORIDE 10 MG: 10 TABLET, EXTENDED RELEASE ORAL at 20:41

## 2023-07-07 RX ADMIN — QUETIAPINE FUMARATE 50 MG: 25 TABLET ORAL at 20:41

## 2023-07-07 RX ADMIN — QUETIAPINE FUMARATE 50 MG: 25 TABLET ORAL at 11:38

## 2023-07-07 RX ADMIN — BUSPIRONE HYDROCHLORIDE 10 MG: 5 TABLET ORAL at 14:34

## 2023-07-07 RX ADMIN — BUSPIRONE HYDROCHLORIDE 10 MG: 5 TABLET ORAL at 20:41

## 2023-07-07 RX ADMIN — Medication 4000 UNITS: at 11:37

## 2023-07-07 RX ADMIN — CEFEPIME 1000 MG: 1 INJECTION, POWDER, FOR SOLUTION INTRAMUSCULAR; INTRAVENOUS at 03:22

## 2023-07-07 RX ADMIN — Medication 1 TABLET: at 20:41

## 2023-07-07 RX ADMIN — SODIUM CHLORIDE: 9 INJECTION, SOLUTION INTRAVENOUS at 03:20

## 2023-07-08 LAB
ALBUMIN SERPL-MCNC: 2.4 GM/DL (ref 3.4–5)
ALP BLD-CCNC: 99 IU/L (ref 40–128)
ALT SERPL-CCNC: 11 U/L (ref 10–40)
ANION GAP SERPL CALCULATED.3IONS-SCNC: 13 MMOL/L (ref 4–16)
AST SERPL-CCNC: 13 IU/L (ref 15–37)
ASTROVIRUS PCR: NOT DETECTED
B-OH-BUTYR SERPL-MCNC: >20.8 MG/DL (ref 0–3)
BILIRUB SERPL-MCNC: 0.3 MG/DL (ref 0–1)
BUN SERPL-MCNC: 31 MG/DL (ref 6–23)
C CAYETANENSIS DNA SPEC QL NAA+PROBE: NOT DETECTED
CALCIUM SERPL-MCNC: 8.2 MG/DL (ref 8.3–10.6)
CAMPY SP DNA.DIARRHEA STL QL NAA+PROBE: NOT DETECTED
CHLORIDE BLD-SCNC: 104 MMOL/L (ref 99–110)
CO2: 19 MMOL/L (ref 21–32)
CREAT SERPL-MCNC: 1.1 MG/DL (ref 0.6–1.1)
CRYPTOSP DNA SPEC QL NAA+PROBE: NOT DETECTED
CULTURE: ABNORMAL
CULTURE: ABNORMAL
E COLI DNA SPEC QL NAA+PROBE: NOT DETECTED
E COLI ENTEROAGGREGATIVE PCR: NOT DETECTED
E COLI ENTEROPATHOGENIC PCR: NOT DETECTED
E COLI ENTEROTOXIGENIC PCR: NOT DETECTED
E COLI O157H7 DNA SPEC QL NAA+PROBE: NOT DETECTED
E HISTOLYT DNA SPEC QL NAA+PROBE: NOT DETECTED
EC STX1+STX2 + H7 FLIC SPEC NAA+PROBE: NOT DETECTED
G LAMBLIA DNA SPEC QL NAA+PROBE: NOT DETECTED
GFR SERPL CREATININE-BSD FRML MDRD: 52 ML/MIN/1.73M2
GLUCOSE BLD-MCNC: 127 MG/DL (ref 70–99)
GLUCOSE BLD-MCNC: 150 MG/DL (ref 70–99)
GLUCOSE BLD-MCNC: 178 MG/DL (ref 70–99)
GLUCOSE BLD-MCNC: 184 MG/DL (ref 70–99)
GLUCOSE SERPL-MCNC: 162 MG/DL (ref 70–99)
HADV DNA SPEC QL NAA+PROBE: NOT DETECTED
Lab: ABNORMAL
NOROVIRUS RNA SPEC QL NAA+PROBE: NOT DETECTED
P SHIGELLOIDES DNA STL QL NAA+PROBE: NOT DETECTED
POTASSIUM SERPL-SCNC: 3.8 MMOL/L (ref 3.5–5.1)
RV RNA SPEC QL NAA+PROBE: NOT DETECTED
SALMONELLA DNA SPEC QL NAA+PROBE: NOT DETECTED
SAPOVIRUS PCR: NOT DETECTED
SODIUM BLD-SCNC: 136 MMOL/L (ref 135–145)
SPECIMEN: ABNORMAL
TOTAL PROTEIN: 4.7 GM/DL (ref 6.4–8.2)
V CHOLERAE DNA SPEC QL NAA+PROBE: NOT DETECTED
VIBRIO DNA SPEC NAA+PROBE: NOT DETECTED
YERSINIA DNA SPEC NAA+PROBE: NOT DETECTED

## 2023-07-08 PROCEDURE — 87324 CLOSTRIDIUM AG IA: CPT

## 2023-07-08 PROCEDURE — 82962 GLUCOSE BLOOD TEST: CPT

## 2023-07-08 PROCEDURE — 94761 N-INVAS EAR/PLS OXIMETRY MLT: CPT

## 2023-07-08 PROCEDURE — 2580000003 HC RX 258: Performed by: NURSE PRACTITIONER

## 2023-07-08 PROCEDURE — 96375 TX/PRO/DX INJ NEW DRUG ADDON: CPT

## 2023-07-08 PROCEDURE — 80053 COMPREHEN METABOLIC PANEL: CPT

## 2023-07-08 PROCEDURE — 36415 COLL VENOUS BLD VENIPUNCTURE: CPT

## 2023-07-08 PROCEDURE — 85025 COMPLETE CBC W/AUTO DIFF WBC: CPT

## 2023-07-08 PROCEDURE — 96361 HYDRATE IV INFUSION ADD-ON: CPT

## 2023-07-08 PROCEDURE — 6360000002 HC RX W HCPCS: Performed by: STUDENT IN AN ORGANIZED HEALTH CARE EDUCATION/TRAINING PROGRAM

## 2023-07-08 PROCEDURE — G0378 HOSPITAL OBSERVATION PER HR: HCPCS

## 2023-07-08 PROCEDURE — 82010 KETONE BODYS QUAN: CPT

## 2023-07-08 PROCEDURE — 96372 THER/PROPH/DIAG INJ SC/IM: CPT

## 2023-07-08 PROCEDURE — 96366 THER/PROPH/DIAG IV INF ADDON: CPT

## 2023-07-08 PROCEDURE — 6370000000 HC RX 637 (ALT 250 FOR IP): Performed by: NURSE PRACTITIONER

## 2023-07-08 PROCEDURE — 87507 IADNA-DNA/RNA PROBE TQ 12-25: CPT

## 2023-07-08 PROCEDURE — 87449 NOS EACH ORGANISM AG IA: CPT

## 2023-07-08 PROCEDURE — 6360000002 HC RX W HCPCS: Performed by: NURSE PRACTITIONER

## 2023-07-08 RX ORDER — SODIUM CHLORIDE 9 MG/ML
1000 INJECTION, SOLUTION INTRAVENOUS CONTINUOUS
Status: DISPENSED | OUTPATIENT
Start: 2023-07-08 | End: 2023-07-09

## 2023-07-08 RX ORDER — FLUCONAZOLE 2 MG/ML
400 INJECTION, SOLUTION INTRAVENOUS EVERY 24 HOURS
Status: COMPLETED | OUTPATIENT
Start: 2023-07-08 | End: 2023-07-10

## 2023-07-08 RX ADMIN — QUETIAPINE FUMARATE 50 MG: 25 TABLET ORAL at 20:33

## 2023-07-08 RX ADMIN — ASPIRIN 81 MG: 81 TABLET, CHEWABLE ORAL at 08:21

## 2023-07-08 RX ADMIN — SODIUM CHLORIDE 1000 ML: 9 INJECTION, SOLUTION INTRAVENOUS at 20:51

## 2023-07-08 RX ADMIN — MICONAZOLE NITRATE: 2 POWDER TOPICAL at 20:35

## 2023-07-08 RX ADMIN — SODIUM CHLORIDE: 9 INJECTION, SOLUTION INTRAVENOUS at 03:30

## 2023-07-08 RX ADMIN — FLUCONAZOLE 400 MG: 2 INJECTION, SOLUTION INTRAVENOUS at 14:29

## 2023-07-08 RX ADMIN — SODIUM CHLORIDE, PRESERVATIVE FREE 10 ML: 5 INJECTION INTRAVENOUS at 08:23

## 2023-07-08 RX ADMIN — BUSPIRONE HYDROCHLORIDE 10 MG: 5 TABLET ORAL at 08:21

## 2023-07-08 RX ADMIN — Medication 4000 UNITS: at 08:20

## 2023-07-08 RX ADMIN — SODIUM CHLORIDE, PRESERVATIVE FREE 10 ML: 5 INJECTION INTRAVENOUS at 20:33

## 2023-07-08 RX ADMIN — Medication 1 TABLET: at 20:33

## 2023-07-08 RX ADMIN — BUSPIRONE HYDROCHLORIDE 10 MG: 5 TABLET ORAL at 13:23

## 2023-07-08 RX ADMIN — ENOXAPARIN SODIUM 40 MG: 100 INJECTION SUBCUTANEOUS at 08:21

## 2023-07-08 RX ADMIN — Medication 1 TABLET: at 08:21

## 2023-07-08 RX ADMIN — PANTOPRAZOLE SODIUM 40 MG: 40 TABLET, DELAYED RELEASE ORAL at 08:20

## 2023-07-08 RX ADMIN — SERTRALINE HYDROCHLORIDE 200 MG: 50 TABLET ORAL at 08:20

## 2023-07-08 RX ADMIN — QUETIAPINE FUMARATE 50 MG: 25 TABLET ORAL at 08:21

## 2023-07-08 RX ADMIN — OXYBUTYNIN CHLORIDE 10 MG: 10 TABLET, EXTENDED RELEASE ORAL at 20:33

## 2023-07-08 RX ADMIN — BUSPIRONE HYDROCHLORIDE 10 MG: 5 TABLET ORAL at 20:33

## 2023-07-08 RX ADMIN — CEFEPIME 1000 MG: 1 INJECTION, POWDER, FOR SOLUTION INTRAMUSCULAR; INTRAVENOUS at 03:31

## 2023-07-08 RX ADMIN — MICONAZOLE NITRATE: 2 POWDER TOPICAL at 08:22

## 2023-07-09 LAB
ALBUMIN SERPL-MCNC: 2.3 GM/DL (ref 3.4–5)
ALP BLD-CCNC: 100 IU/L (ref 40–128)
ALT SERPL-CCNC: 11 U/L (ref 10–40)
ANION GAP SERPL CALCULATED.3IONS-SCNC: 11 MMOL/L (ref 4–16)
AST SERPL-CCNC: 11 IU/L (ref 15–37)
B-OH-BUTYR SERPL-MCNC: 2.9 MG/DL (ref 0–3)
BASOPHILS ABSOLUTE: 0.1 K/CU MM
BASOPHILS RELATIVE PERCENT: 0.6 % (ref 0–1)
BILIRUB SERPL-MCNC: 0.2 MG/DL (ref 0–1)
BUN SERPL-MCNC: 34 MG/DL (ref 6–23)
C DIFF AG + TOXIN: ABNORMAL
CALCIUM SERPL-MCNC: 8.2 MG/DL (ref 8.3–10.6)
CHLORIDE BLD-SCNC: 108 MMOL/L (ref 99–110)
CLOSTRIDIUM DIFFICILE, PCR: ABNORMAL
CO2: 19 MMOL/L (ref 21–32)
CREAT SERPL-MCNC: 0.9 MG/DL (ref 0.6–1.1)
CULTURE: ABNORMAL
CULTURE: ABNORMAL
DIFFERENTIAL TYPE: ABNORMAL
EOSINOPHILS ABSOLUTE: 0.3 K/CU MM
EOSINOPHILS RELATIVE PERCENT: 1.6 % (ref 0–3)
GFR SERPL CREATININE-BSD FRML MDRD: >60 ML/MIN/1.73M2
GLUCOSE BLD-MCNC: 118 MG/DL (ref 70–99)
GLUCOSE BLD-MCNC: 124 MG/DL (ref 70–99)
GLUCOSE BLD-MCNC: 144 MG/DL (ref 70–99)
GLUCOSE BLD-MCNC: 187 MG/DL (ref 70–99)
GLUCOSE SERPL-MCNC: 152 MG/DL (ref 70–99)
HCT VFR BLD CALC: 39.8 % (ref 37–47)
HEMOGLOBIN: 13 GM/DL (ref 12.5–16)
IMMATURE NEUTROPHIL %: 8.9 % (ref 0–0.43)
LYMPHOCYTES ABSOLUTE: 1.2 K/CU MM
LYMPHOCYTES RELATIVE PERCENT: 7.1 % (ref 24–44)
Lab: ABNORMAL
MAGNESIUM: 1.7 MG/DL (ref 1.8–2.4)
MCH RBC QN AUTO: 26.8 PG (ref 27–31)
MCHC RBC AUTO-ENTMCNC: 32.7 % (ref 32–36)
MCV RBC AUTO: 82.1 FL (ref 78–100)
MONOCYTES ABSOLUTE: 0.8 K/CU MM
MONOCYTES RELATIVE PERCENT: 4.4 % (ref 0–4)
NUCLEATED RBC %: 0 %
PDW BLD-RTO: 17.3 % (ref 11.7–14.9)
PLATELET # BLD: 313 K/CU MM (ref 140–440)
PMV BLD AUTO: 9.3 FL (ref 7.5–11.1)
POTASSIUM SERPL-SCNC: 3.3 MMOL/L (ref 3.5–5.1)
RBC # BLD: 4.85 M/CU MM (ref 4.2–5.4)
SEGMENTED NEUTROPHILS ABSOLUTE COUNT: 13.4 K/CU MM
SEGMENTED NEUTROPHILS RELATIVE PERCENT: 77.4 % (ref 36–66)
SODIUM BLD-SCNC: 138 MMOL/L (ref 135–145)
SOURCE: ABNORMAL
SPECIMEN: ABNORMAL
TOTAL IMMATURE NEUTOROPHIL: 1.55 K/CU MM
TOTAL NUCLEATED RBC: 0 K/CU MM
TOTAL PROTEIN: 4.4 GM/DL (ref 6.4–8.2)
WBC # BLD: 17.4 K/CU MM (ref 4–10.5)

## 2023-07-09 PROCEDURE — 36415 COLL VENOUS BLD VENIPUNCTURE: CPT

## 2023-07-09 PROCEDURE — 83735 ASSAY OF MAGNESIUM: CPT

## 2023-07-09 PROCEDURE — 6370000000 HC RX 637 (ALT 250 FOR IP): Performed by: NURSE PRACTITIONER

## 2023-07-09 PROCEDURE — 96372 THER/PROPH/DIAG INJ SC/IM: CPT

## 2023-07-09 PROCEDURE — 80053 COMPREHEN METABOLIC PANEL: CPT

## 2023-07-09 PROCEDURE — G0378 HOSPITAL OBSERVATION PER HR: HCPCS

## 2023-07-09 PROCEDURE — 96366 THER/PROPH/DIAG IV INF ADDON: CPT

## 2023-07-09 PROCEDURE — 6370000000 HC RX 637 (ALT 250 FOR IP): Performed by: INTERNAL MEDICINE

## 2023-07-09 PROCEDURE — 2580000003 HC RX 258: Performed by: NURSE PRACTITIONER

## 2023-07-09 PROCEDURE — 82962 GLUCOSE BLOOD TEST: CPT

## 2023-07-09 PROCEDURE — 94761 N-INVAS EAR/PLS OXIMETRY MLT: CPT

## 2023-07-09 PROCEDURE — 96376 TX/PRO/DX INJ SAME DRUG ADON: CPT

## 2023-07-09 PROCEDURE — 82010 KETONE BODYS QUAN: CPT

## 2023-07-09 PROCEDURE — 6360000002 HC RX W HCPCS: Performed by: STUDENT IN AN ORGANIZED HEALTH CARE EDUCATION/TRAINING PROGRAM

## 2023-07-09 PROCEDURE — 96367 TX/PROPH/DG ADDL SEQ IV INF: CPT

## 2023-07-09 PROCEDURE — 6360000002 HC RX W HCPCS: Performed by: NURSE PRACTITIONER

## 2023-07-09 PROCEDURE — 85025 COMPLETE CBC W/AUTO DIFF WBC: CPT

## 2023-07-09 PROCEDURE — 76937 US GUIDE VASCULAR ACCESS: CPT

## 2023-07-09 PROCEDURE — 2580000003 HC RX 258: Performed by: STUDENT IN AN ORGANIZED HEALTH CARE EDUCATION/TRAINING PROGRAM

## 2023-07-09 RX ADMIN — PANTOPRAZOLE SODIUM 40 MG: 40 TABLET, DELAYED RELEASE ORAL at 08:06

## 2023-07-09 RX ADMIN — SODIUM CHLORIDE, PRESERVATIVE FREE 10 ML: 5 INJECTION INTRAVENOUS at 08:07

## 2023-07-09 RX ADMIN — OXYBUTYNIN CHLORIDE 10 MG: 10 TABLET, EXTENDED RELEASE ORAL at 21:29

## 2023-07-09 RX ADMIN — BUSPIRONE HYDROCHLORIDE 10 MG: 5 TABLET ORAL at 21:29

## 2023-07-09 RX ADMIN — ENOXAPARIN SODIUM 40 MG: 100 INJECTION SUBCUTANEOUS at 08:06

## 2023-07-09 RX ADMIN — SODIUM CHLORIDE, PRESERVATIVE FREE 10 ML: 5 INJECTION INTRAVENOUS at 21:29

## 2023-07-09 RX ADMIN — FLUCONAZOLE 400 MG: 2 INJECTION, SOLUTION INTRAVENOUS at 15:56

## 2023-07-09 RX ADMIN — VANCOMYCIN HYDROCHLORIDE 750 MG: 750 INJECTION, POWDER, LYOPHILIZED, FOR SOLUTION INTRAVENOUS at 22:44

## 2023-07-09 RX ADMIN — VANCOMYCIN HYDROCHLORIDE 750 MG: 750 INJECTION, POWDER, LYOPHILIZED, FOR SOLUTION INTRAVENOUS at 13:33

## 2023-07-09 RX ADMIN — ASPIRIN 81 MG: 81 TABLET, CHEWABLE ORAL at 08:06

## 2023-07-09 RX ADMIN — MICONAZOLE NITRATE: 2 POWDER TOPICAL at 21:30

## 2023-07-09 RX ADMIN — SERTRALINE HYDROCHLORIDE 200 MG: 50 TABLET ORAL at 08:05

## 2023-07-09 RX ADMIN — QUETIAPINE FUMARATE 50 MG: 25 TABLET ORAL at 08:06

## 2023-07-09 RX ADMIN — POTASSIUM BICARBONATE 20 MEQ: 782 TABLET, EFFERVESCENT ORAL at 13:00

## 2023-07-09 RX ADMIN — Medication 1 TABLET: at 21:29

## 2023-07-09 RX ADMIN — BUSPIRONE HYDROCHLORIDE 10 MG: 5 TABLET ORAL at 08:05

## 2023-07-09 RX ADMIN — MICONAZOLE NITRATE: 2 POWDER TOPICAL at 08:07

## 2023-07-09 RX ADMIN — POTASSIUM BICARBONATE 20 MEQ: 782 TABLET, EFFERVESCENT ORAL at 21:29

## 2023-07-09 RX ADMIN — CEFEPIME 2000 MG: 2 INJECTION, POWDER, FOR SOLUTION INTRAVENOUS at 12:58

## 2023-07-09 RX ADMIN — QUETIAPINE FUMARATE 50 MG: 25 TABLET ORAL at 21:29

## 2023-07-09 RX ADMIN — Medication 4000 UNITS: at 08:05

## 2023-07-09 RX ADMIN — BUSPIRONE HYDROCHLORIDE 10 MG: 5 TABLET ORAL at 13:00

## 2023-07-09 RX ADMIN — CEFEPIME 2000 MG: 2 INJECTION, POWDER, FOR SOLUTION INTRAVENOUS at 21:28

## 2023-07-09 RX ADMIN — Medication 1 TABLET: at 08:06

## 2023-07-10 LAB
ALBUMIN SERPL-MCNC: 2.2 GM/DL (ref 3.4–5)
ALP BLD-CCNC: 96 IU/L (ref 40–128)
ALT SERPL-CCNC: 11 U/L (ref 10–40)
ANION GAP SERPL CALCULATED.3IONS-SCNC: 9 MMOL/L (ref 4–16)
ANISOCYTOSIS: ABNORMAL
AST SERPL-CCNC: 15 IU/L (ref 15–37)
BANDED NEUTROPHILS ABSOLUTE COUNT: 0.3 K/CU MM
BANDED NEUTROPHILS RELATIVE PERCENT: 2 % (ref 5–11)
BILIRUB SERPL-MCNC: 0.2 MG/DL (ref 0–1)
BUN SERPL-MCNC: 33 MG/DL (ref 6–23)
CALCIUM SERPL-MCNC: 8.2 MG/DL (ref 8.3–10.6)
CHLORIDE BLD-SCNC: 109 MMOL/L (ref 99–110)
CO2: 21 MMOL/L (ref 21–32)
CREAT SERPL-MCNC: 1 MG/DL (ref 0.6–1.1)
DIFFERENTIAL TYPE: ABNORMAL
DOSE AMOUNT: NORMAL
DOSE TIME: NORMAL
EOSINOPHILS ABSOLUTE: 0.5 K/CU MM
EOSINOPHILS RELATIVE PERCENT: 3 % (ref 0–3)
GFR SERPL CREATININE-BSD FRML MDRD: 58 ML/MIN/1.73M2
GLUCOSE BLD-MCNC: 120 MG/DL (ref 70–99)
GLUCOSE BLD-MCNC: 124 MG/DL (ref 70–99)
GLUCOSE BLD-MCNC: 133 MG/DL (ref 70–99)
GLUCOSE BLD-MCNC: 135 MG/DL (ref 70–99)
GLUCOSE SERPL-MCNC: 159 MG/DL (ref 70–99)
HCT VFR BLD CALC: 38.1 % (ref 37–47)
HEMOGLOBIN: 12.1 GM/DL (ref 12.5–16)
LV EF: 58 %
LVEF MODALITY: NORMAL
LYMPHOCYTES ABSOLUTE: 2.1 K/CU MM
LYMPHOCYTES RELATIVE PERCENT: 14 % (ref 24–44)
MAGNESIUM: 1.7 MG/DL (ref 1.8–2.4)
MCH RBC QN AUTO: 26.4 PG (ref 27–31)
MCHC RBC AUTO-ENTMCNC: 31.8 % (ref 32–36)
MCV RBC AUTO: 83.2 FL (ref 78–100)
MONOCYTES ABSOLUTE: 0.9 K/CU MM
MONOCYTES RELATIVE PERCENT: 6 % (ref 0–4)
OVALOCYTES: ABNORMAL
PDW BLD-RTO: 17.3 % (ref 11.7–14.9)
PLATELET # BLD: 323 K/CU MM (ref 140–440)
PMV BLD AUTO: 9.9 FL (ref 7.5–11.1)
POTASSIUM SERPL-SCNC: 3.9 MMOL/L (ref 3.5–5.1)
RBC # BLD: 4.58 M/CU MM (ref 4.2–5.4)
SEGMENTED NEUTROPHILS ABSOLUTE COUNT: 11.4 K/CU MM
SEGMENTED NEUTROPHILS RELATIVE PERCENT: 75 % (ref 36–66)
SODIUM BLD-SCNC: 139 MMOL/L (ref 135–145)
TOTAL PROTEIN: 4.5 GM/DL (ref 6.4–8.2)
VANCOMYCIN RANDOM: 13.8 UG/ML
WBC # BLD: 15.2 K/CU MM (ref 4–10.5)

## 2023-07-10 PROCEDURE — 87040 BLOOD CULTURE FOR BACTERIA: CPT

## 2023-07-10 PROCEDURE — 93306 TTE W/DOPPLER COMPLETE: CPT

## 2023-07-10 PROCEDURE — 80053 COMPREHEN METABOLIC PANEL: CPT

## 2023-07-10 PROCEDURE — 94761 N-INVAS EAR/PLS OXIMETRY MLT: CPT

## 2023-07-10 PROCEDURE — 36415 COLL VENOUS BLD VENIPUNCTURE: CPT

## 2023-07-10 PROCEDURE — 80202 ASSAY OF VANCOMYCIN: CPT

## 2023-07-10 PROCEDURE — G0378 HOSPITAL OBSERVATION PER HR: HCPCS

## 2023-07-10 PROCEDURE — 85007 BL SMEAR W/DIFF WBC COUNT: CPT

## 2023-07-10 PROCEDURE — 83735 ASSAY OF MAGNESIUM: CPT

## 2023-07-10 PROCEDURE — 6360000002 HC RX W HCPCS: Performed by: NURSE PRACTITIONER

## 2023-07-10 PROCEDURE — 82962 GLUCOSE BLOOD TEST: CPT

## 2023-07-10 PROCEDURE — 6370000000 HC RX 637 (ALT 250 FOR IP): Performed by: STUDENT IN AN ORGANIZED HEALTH CARE EDUCATION/TRAINING PROGRAM

## 2023-07-10 PROCEDURE — 6370000000 HC RX 637 (ALT 250 FOR IP): Performed by: INTERNAL MEDICINE

## 2023-07-10 PROCEDURE — 85027 COMPLETE CBC AUTOMATED: CPT

## 2023-07-10 PROCEDURE — 6370000000 HC RX 637 (ALT 250 FOR IP): Performed by: NURSE PRACTITIONER

## 2023-07-10 PROCEDURE — 1200000000 HC SEMI PRIVATE

## 2023-07-10 PROCEDURE — 2580000003 HC RX 258: Performed by: STUDENT IN AN ORGANIZED HEALTH CARE EDUCATION/TRAINING PROGRAM

## 2023-07-10 PROCEDURE — 6360000002 HC RX W HCPCS: Performed by: STUDENT IN AN ORGANIZED HEALTH CARE EDUCATION/TRAINING PROGRAM

## 2023-07-10 PROCEDURE — 2580000003 HC RX 258: Performed by: NURSE PRACTITIONER

## 2023-07-10 RX ADMIN — SODIUM CHLORIDE, PRESERVATIVE FREE 10 ML: 5 INJECTION INTRAVENOUS at 09:40

## 2023-07-10 RX ADMIN — ASPIRIN 81 MG: 81 TABLET, CHEWABLE ORAL at 09:18

## 2023-07-10 RX ADMIN — SODIUM CHLORIDE: 9 INJECTION, SOLUTION INTRAVENOUS at 14:30

## 2023-07-10 RX ADMIN — POTASSIUM BICARBONATE 20 MEQ: 782 TABLET, EFFERVESCENT ORAL at 09:11

## 2023-07-10 RX ADMIN — QUETIAPINE FUMARATE 50 MG: 25 TABLET ORAL at 09:16

## 2023-07-10 RX ADMIN — CEFEPIME 2000 MG: 2 INJECTION, POWDER, FOR SOLUTION INTRAVENOUS at 09:39

## 2023-07-10 RX ADMIN — PANTOPRAZOLE SODIUM 40 MG: 40 TABLET, DELAYED RELEASE ORAL at 09:19

## 2023-07-10 RX ADMIN — BUSPIRONE HYDROCHLORIDE 10 MG: 5 TABLET ORAL at 14:24

## 2023-07-10 RX ADMIN — FIDAXOMICIN 200 MG: 200 TABLET, FILM COATED ORAL at 21:38

## 2023-07-10 RX ADMIN — SODIUM CHLORIDE, PRESERVATIVE FREE 10 ML: 5 INJECTION INTRAVENOUS at 21:39

## 2023-07-10 RX ADMIN — POTASSIUM BICARBONATE 20 MEQ: 782 TABLET, EFFERVESCENT ORAL at 21:38

## 2023-07-10 RX ADMIN — QUETIAPINE FUMARATE 50 MG: 25 TABLET ORAL at 21:39

## 2023-07-10 RX ADMIN — Medication 1 TABLET: at 09:13

## 2023-07-10 RX ADMIN — FLUCONAZOLE 400 MG: 2 INJECTION, SOLUTION INTRAVENOUS at 14:36

## 2023-07-10 RX ADMIN — FIDAXOMICIN 200 MG: 200 TABLET, FILM COATED ORAL at 09:46

## 2023-07-10 RX ADMIN — Medication 4000 UNITS: at 09:45

## 2023-07-10 RX ADMIN — BUSPIRONE HYDROCHLORIDE 10 MG: 5 TABLET ORAL at 09:12

## 2023-07-10 RX ADMIN — BUSPIRONE HYDROCHLORIDE 10 MG: 5 TABLET ORAL at 21:38

## 2023-07-10 RX ADMIN — ENOXAPARIN SODIUM 40 MG: 100 INJECTION SUBCUTANEOUS at 09:11

## 2023-07-10 RX ADMIN — SERTRALINE HYDROCHLORIDE 200 MG: 50 TABLET ORAL at 09:14

## 2023-07-10 RX ADMIN — Medication 1 TABLET: at 21:39

## 2023-07-10 RX ADMIN — SODIUM CHLORIDE 100 ML/HR: 9 INJECTION, SOLUTION INTRAVENOUS at 11:42

## 2023-07-10 RX ADMIN — VANCOMYCIN HYDROCHLORIDE 750 MG: 750 INJECTION, POWDER, LYOPHILIZED, FOR SOLUTION INTRAVENOUS at 11:44

## 2023-07-10 RX ADMIN — SODIUM CHLORIDE: 9 INJECTION, SOLUTION INTRAVENOUS at 09:27

## 2023-07-10 NOTE — CARE COORDINATION
Reviewed chart and discussed in IDR, plan is to return to [place] apt unless needs iv atb then will need short stay at [facility]. Awaiting atb plan.

## 2023-07-10 NOTE — PLAN OF CARE
Problem: Chronic Conditions and Co-morbidities  Goal: Patient's chronic conditions and co-morbidity symptoms are monitored and maintained or improved  Outcome: Progressing     Problem: Discharge Planning  Goal: Discharge to home or other facility with appropriate resources  Outcome: Progressing     Problem: Safety - Adult  Goal: Free from fall injury  Outcome: Progressing     Problem: Skin/Tissue Integrity  Goal: Absence of new skin breakdown  Description: 1. Monitor for areas of redness and/or skin breakdown  2. Assess vascular access sites hourly  3. Every 4-6 hours minimum:  Change oxygen saturation probe site  4. Every 4-6 hours:  If on nasal continuous positive airway pressure, respiratory therapy assess nares and determine need for appliance change or resting period.   Outcome: Progressing     Problem: Nutrition Deficit:  Goal: Optimize nutritional status  Outcome: Progressing

## 2023-07-11 LAB
CULTURE: NORMAL
GLUCOSE BLD-MCNC: 107 MG/DL (ref 70–99)
GLUCOSE BLD-MCNC: 135 MG/DL (ref 70–99)
GLUCOSE BLD-MCNC: 150 MG/DL (ref 70–99)
GLUCOSE BLD-MCNC: 157 MG/DL (ref 70–99)
Lab: NORMAL
SPECIMEN: NORMAL

## 2023-07-11 PROCEDURE — 2580000003 HC RX 258: Performed by: NURSE PRACTITIONER

## 2023-07-11 PROCEDURE — 6360000002 HC RX W HCPCS: Performed by: NURSE PRACTITIONER

## 2023-07-11 PROCEDURE — 2580000003 HC RX 258: Performed by: STUDENT IN AN ORGANIZED HEALTH CARE EDUCATION/TRAINING PROGRAM

## 2023-07-11 PROCEDURE — 6370000000 HC RX 637 (ALT 250 FOR IP): Performed by: STUDENT IN AN ORGANIZED HEALTH CARE EDUCATION/TRAINING PROGRAM

## 2023-07-11 PROCEDURE — 6360000002 HC RX W HCPCS: Performed by: STUDENT IN AN ORGANIZED HEALTH CARE EDUCATION/TRAINING PROGRAM

## 2023-07-11 PROCEDURE — 1200000000 HC SEMI PRIVATE

## 2023-07-11 PROCEDURE — 2500000003 HC RX 250 WO HCPCS: Performed by: NURSE PRACTITIONER

## 2023-07-11 PROCEDURE — 6370000000 HC RX 637 (ALT 250 FOR IP): Performed by: NURSE PRACTITIONER

## 2023-07-11 PROCEDURE — 82962 GLUCOSE BLOOD TEST: CPT

## 2023-07-11 PROCEDURE — 94761 N-INVAS EAR/PLS OXIMETRY MLT: CPT

## 2023-07-11 PROCEDURE — 6370000000 HC RX 637 (ALT 250 FOR IP): Performed by: INTERNAL MEDICINE

## 2023-07-11 RX ADMIN — BUSPIRONE HYDROCHLORIDE 10 MG: 5 TABLET ORAL at 14:43

## 2023-07-11 RX ADMIN — FIDAXOMICIN 200 MG: 200 TABLET, FILM COATED ORAL at 23:30

## 2023-07-11 RX ADMIN — QUETIAPINE FUMARATE 50 MG: 25 TABLET ORAL at 21:31

## 2023-07-11 RX ADMIN — VANCOMYCIN HYDROCHLORIDE 1500 MG: 1.5 INJECTION, POWDER, LYOPHILIZED, FOR SOLUTION INTRAVENOUS at 00:02

## 2023-07-11 RX ADMIN — SODIUM CHLORIDE, PRESERVATIVE FREE 10 ML: 5 INJECTION INTRAVENOUS at 09:53

## 2023-07-11 RX ADMIN — SODIUM CHLORIDE, PRESERVATIVE FREE 10 ML: 5 INJECTION INTRAVENOUS at 21:33

## 2023-07-11 RX ADMIN — FIDAXOMICIN 200 MG: 200 TABLET, FILM COATED ORAL at 09:46

## 2023-07-11 RX ADMIN — BUSPIRONE HYDROCHLORIDE 10 MG: 5 TABLET ORAL at 21:32

## 2023-07-11 RX ADMIN — MICONAZOLE NITRATE: 2 POWDER TOPICAL at 21:32

## 2023-07-11 RX ADMIN — BUSPIRONE HYDROCHLORIDE 10 MG: 5 TABLET ORAL at 09:40

## 2023-07-11 RX ADMIN — MICONAZOLE NITRATE: 2 POWDER TOPICAL at 00:02

## 2023-07-11 RX ADMIN — QUETIAPINE FUMARATE 50 MG: 25 TABLET ORAL at 09:40

## 2023-07-11 RX ADMIN — SERTRALINE HYDROCHLORIDE 200 MG: 50 TABLET ORAL at 09:40

## 2023-07-11 RX ADMIN — POTASSIUM BICARBONATE 20 MEQ: 782 TABLET, EFFERVESCENT ORAL at 09:41

## 2023-07-11 RX ADMIN — Medication 4000 UNITS: at 09:46

## 2023-07-11 RX ADMIN — ENOXAPARIN SODIUM 40 MG: 100 INJECTION SUBCUTANEOUS at 09:41

## 2023-07-11 RX ADMIN — Medication 1 TABLET: at 21:32

## 2023-07-11 RX ADMIN — Medication 1 TABLET: at 09:40

## 2023-07-11 RX ADMIN — MICONAZOLE NITRATE: 2 POWDER TOPICAL at 09:45

## 2023-07-11 RX ADMIN — VANCOMYCIN HYDROCHLORIDE 1500 MG: 1.5 INJECTION, POWDER, LYOPHILIZED, FOR SOLUTION INTRAVENOUS at 23:38

## 2023-07-11 RX ADMIN — ASPIRIN 81 MG: 81 TABLET, CHEWABLE ORAL at 09:40

## 2023-07-11 RX ADMIN — PANTOPRAZOLE SODIUM 40 MG: 40 TABLET, DELAYED RELEASE ORAL at 09:40

## 2023-07-11 NOTE — CARE COORDINATION
Reviewed chart and discussed in IDR, ATB plan to be determine. If PO plan to send back to [place] if IV will need SNU stay.   PS to Dr Lang Guaman requesting PT/OT orders

## 2023-07-11 NOTE — PLAN OF CARE
09-Sep-2022 19:04 Problem: Chronic Conditions and Co-morbidities  Goal: Patient's chronic conditions and co-morbidity symptoms are monitored and maintained or improved  Outcome: Progressing     Problem: Discharge Planning  Goal: Discharge to home or other facility with appropriate resources  Outcome: Progressing     Problem: Safety - Adult  Goal: Free from fall injury  Outcome: Progressing     Problem: Skin/Tissue Integrity  Goal: Absence of new skin breakdown  Outcome: Progressing     Problem: Nutrition Deficit:  Goal: Optimize nutritional status  Outcome: Progressing 20-Sep-2022 10:45 17-Sep-2022

## 2023-07-11 NOTE — PLAN OF CARE
Problem: Chronic Conditions and Co-morbidities  Goal: Patient's chronic conditions and co-morbidity symptoms are monitored and maintained or improved  Outcome: Progressing  Flowsheets (Taken 7/11/2023 0936)  Care Plan - Patient's Chronic Conditions and Co-Morbidity Symptoms are Monitored and Maintained or Improved:   Monitor and assess patient's chronic conditions and comorbid symptoms for stability, deterioration, or improvement   Collaborate with multidisciplinary team to address chronic and comorbid conditions and prevent exacerbation or deterioration   Update acute care plan with appropriate goals if chronic or comorbid symptoms are exacerbated and prevent overall improvement and discharge     Problem: Discharge Planning  Goal: Discharge to home or other facility with appropriate resources  Outcome: Progressing  Flowsheets (Taken 7/11/2023 0936)  Discharge to home or other facility with appropriate resources:   Identify barriers to discharge with patient and caregiver   Arrange for needed discharge resources and transportation as appropriate   Identify discharge learning needs (meds, wound care, etc)   Arrange for interpreters to assist at discharge as needed   Refer to discharge planning if patient needs post-hospital services based on physician order or complex needs related to functional status, cognitive ability or social support system     Problem: Safety - Adult  Goal: Free from fall injury  Outcome: Progressing  Flowsheets (Taken 7/11/2023 1634)  Free From Fall Injury:   Instruct family/caregiver on patient safety   Based on caregiver fall risk screen, instruct family/caregiver to ask for assistance with transferring infant if caregiver noted to have fall risk factors     Problem: Skin/Tissue Integrity  Goal: Absence of new skin breakdown  Description: 1. Monitor for areas of redness and/or skin breakdown  2. Assess vascular access sites hourly  3.   Every 4-6 hours minimum:  Change oxygen saturation

## 2023-07-12 LAB
ALBUMIN SERPL-MCNC: 2.2 GM/DL (ref 3.4–5)
ALP BLD-CCNC: 103 IU/L (ref 40–128)
ALT SERPL-CCNC: 12 U/L (ref 10–40)
ANION GAP SERPL CALCULATED.3IONS-SCNC: 12 MMOL/L (ref 4–16)
AST SERPL-CCNC: 25 IU/L (ref 15–37)
BILIRUB SERPL-MCNC: 0.2 MG/DL (ref 0–1)
BUN SERPL-MCNC: 21 MG/DL (ref 6–23)
CALCIUM SERPL-MCNC: 7.9 MG/DL (ref 8.3–10.6)
CHLORIDE BLD-SCNC: 108 MMOL/L (ref 99–110)
CO2: 17 MMOL/L (ref 21–32)
CREAT SERPL-MCNC: 0.9 MG/DL (ref 0.6–1.1)
DOSE AMOUNT: NORMAL
DOSE AMOUNT: NORMAL
DOSE TIME: NORMAL
DOSE TIME: NORMAL
GFR SERPL CREATININE-BSD FRML MDRD: >60 ML/MIN/1.73M2
GLUCOSE BLD-MCNC: 117 MG/DL (ref 70–99)
GLUCOSE BLD-MCNC: 155 MG/DL (ref 70–99)
GLUCOSE BLD-MCNC: 168 MG/DL (ref 70–99)
GLUCOSE BLD-MCNC: 171 MG/DL (ref 70–99)
GLUCOSE SERPL-MCNC: 146 MG/DL (ref 70–99)
MAGNESIUM: 1.8 MG/DL (ref 1.8–2.4)
POTASSIUM SERPL-SCNC: 4.4 MMOL/L (ref 3.5–5.1)
SODIUM BLD-SCNC: 137 MMOL/L (ref 135–145)
TOTAL PROTEIN: 4.6 GM/DL (ref 6.4–8.2)
VANCOMYCIN RANDOM: 23.8 UG/ML
VANCOMYCIN RANDOM: 43.3 UG/ML

## 2023-07-12 PROCEDURE — 97166 OT EVAL MOD COMPLEX 45 MIN: CPT

## 2023-07-12 PROCEDURE — 2580000003 HC RX 258: Performed by: STUDENT IN AN ORGANIZED HEALTH CARE EDUCATION/TRAINING PROGRAM

## 2023-07-12 PROCEDURE — 97162 PT EVAL MOD COMPLEX 30 MIN: CPT

## 2023-07-12 PROCEDURE — 6370000000 HC RX 637 (ALT 250 FOR IP): Performed by: NURSE PRACTITIONER

## 2023-07-12 PROCEDURE — 36415 COLL VENOUS BLD VENIPUNCTURE: CPT

## 2023-07-12 PROCEDURE — 1200000000 HC SEMI PRIVATE

## 2023-07-12 PROCEDURE — 6360000002 HC RX W HCPCS: Performed by: STUDENT IN AN ORGANIZED HEALTH CARE EDUCATION/TRAINING PROGRAM

## 2023-07-12 PROCEDURE — 6370000000 HC RX 637 (ALT 250 FOR IP): Performed by: STUDENT IN AN ORGANIZED HEALTH CARE EDUCATION/TRAINING PROGRAM

## 2023-07-12 PROCEDURE — 80053 COMPREHEN METABOLIC PANEL: CPT

## 2023-07-12 PROCEDURE — 94761 N-INVAS EAR/PLS OXIMETRY MLT: CPT

## 2023-07-12 PROCEDURE — 97530 THERAPEUTIC ACTIVITIES: CPT

## 2023-07-12 PROCEDURE — 83735 ASSAY OF MAGNESIUM: CPT

## 2023-07-12 PROCEDURE — 51798 US URINE CAPACITY MEASURE: CPT

## 2023-07-12 PROCEDURE — 2580000003 HC RX 258: Performed by: NURSE PRACTITIONER

## 2023-07-12 PROCEDURE — 82962 GLUCOSE BLOOD TEST: CPT

## 2023-07-12 PROCEDURE — 80202 ASSAY OF VANCOMYCIN: CPT

## 2023-07-12 PROCEDURE — 6360000002 HC RX W HCPCS: Performed by: NURSE PRACTITIONER

## 2023-07-12 RX ADMIN — SODIUM CHLORIDE, PRESERVATIVE FREE 10 ML: 5 INJECTION INTRAVENOUS at 21:37

## 2023-07-12 RX ADMIN — BUSPIRONE HYDROCHLORIDE 10 MG: 5 TABLET ORAL at 14:14

## 2023-07-12 RX ADMIN — Medication 4000 UNITS: at 10:28

## 2023-07-12 RX ADMIN — BUSPIRONE HYDROCHLORIDE 10 MG: 5 TABLET ORAL at 10:27

## 2023-07-12 RX ADMIN — MICONAZOLE NITRATE: 2 POWDER TOPICAL at 21:36

## 2023-07-12 RX ADMIN — SERTRALINE HYDROCHLORIDE 200 MG: 50 TABLET ORAL at 10:27

## 2023-07-12 RX ADMIN — QUETIAPINE FUMARATE 50 MG: 25 TABLET ORAL at 10:27

## 2023-07-12 RX ADMIN — Medication 1 TABLET: at 10:28

## 2023-07-12 RX ADMIN — FIDAXOMICIN 200 MG: 200 TABLET, FILM COATED ORAL at 21:35

## 2023-07-12 RX ADMIN — PANTOPRAZOLE SODIUM 40 MG: 40 TABLET, DELAYED RELEASE ORAL at 10:28

## 2023-07-12 RX ADMIN — SODIUM CHLORIDE, PRESERVATIVE FREE 10 ML: 5 INJECTION INTRAVENOUS at 10:27

## 2023-07-12 RX ADMIN — VANCOMYCIN HYDROCHLORIDE 1500 MG: 1.5 INJECTION, POWDER, LYOPHILIZED, FOR SOLUTION INTRAVENOUS at 23:42

## 2023-07-12 RX ADMIN — FIDAXOMICIN 200 MG: 200 TABLET, FILM COATED ORAL at 10:28

## 2023-07-12 RX ADMIN — ENOXAPARIN SODIUM 40 MG: 100 INJECTION SUBCUTANEOUS at 10:28

## 2023-07-12 RX ADMIN — Medication 1 TABLET: at 21:35

## 2023-07-12 RX ADMIN — QUETIAPINE FUMARATE 50 MG: 25 TABLET ORAL at 21:35

## 2023-07-12 RX ADMIN — BUSPIRONE HYDROCHLORIDE 10 MG: 5 TABLET ORAL at 21:35

## 2023-07-12 RX ADMIN — MICONAZOLE NITRATE: 2 POWDER TOPICAL at 10:32

## 2023-07-12 RX ADMIN — ASPIRIN 81 MG: 81 TABLET, CHEWABLE ORAL at 10:28

## 2023-07-12 NOTE — CARE COORDINATION
Reviewed chart, discussed in IDr and spoke with pt. At discharge will change to po atb. Discussed returning to [place] vs SNU with pt and she wants SNU at [facility]. VM left for [facility]. PS to Dr Chica Ramsey requesting PT/OT yazan for SNU.

## 2023-07-12 NOTE — PLAN OF CARE
Problem: Chronic Conditions and Co-morbidities  Goal: Patient's chronic conditions and co-morbidity symptoms are monitored and maintained or improved  7/12/2023 0120 by Lamine Price RN  Outcome: Progressing  7/11/2023 1636 by Nacho Catherine LPN  Outcome: Progressing  Flowsheets (Taken 7/11/2023 0936)  Care Plan - Patient's Chronic Conditions and Co-Morbidity Symptoms are Monitored and Maintained or Improved:   Monitor and assess patient's chronic conditions and comorbid symptoms for stability, deterioration, or improvement   Collaborate with multidisciplinary team to address chronic and comorbid conditions and prevent exacerbation or deterioration   Update acute care plan with appropriate goals if chronic or comorbid symptoms are exacerbated and prevent overall improvement and discharge     Problem: Discharge Planning  Goal: Discharge to home or other facility with appropriate resources  7/12/2023 0120 by Lamine Price RN  Outcome: Progressing  7/11/2023 1636 by Nacho Catherine LPN  Outcome: Progressing  Flowsheets (Taken 7/11/2023 0936)  Discharge to home or other facility with appropriate resources:   Identify barriers to discharge with patient and caregiver   Arrange for needed discharge resources and transportation as appropriate   Identify discharge learning needs (meds, wound care, etc)   Arrange for interpreters to assist at discharge as needed   Refer to discharge planning if patient needs post-hospital services based on physician order or complex needs related to functional status, cognitive ability or social support system     Problem: Safety - Adult  Goal: Free from fall injury  7/12/2023 0120 by Lamine Price RN  Outcome: Progressing  7/11/2023 1636 by Nacho Catherine LPN  Outcome: Progressing  Flowsheets (Taken 7/11/2023 1634)  Free From Fall Injury:   Instruct family/caregiver on patient safety   Based on caregiver fall risk screen, instruct family/caregiver to ask for assistance with transferring infant if

## 2023-07-12 NOTE — CONSULTS
[address] [facility] [address], 1945, 4102[address], 7/12/2023    History  [place]:  There were no encounter diagnoses. Patient  has a past medical history of Clostridium difficile infection, Dementia ([address]), Hyperlipidemia, Hypertension, Obesity, and Type 2 diabetes mellitus without complication ([address]). Patient  has a past surgical history that includes Colonoscopy; Upper gastrointestinal endoscopy (N/A, 2/7/2020); and Hand surgery (Right, 5/21/2023). Subjective:  Patient states: \"Oh no, I'm too afraid! \". Pain:  pt without c/o. Communication with other providers:  Handoff to RN, STNA and RN assist throughout for safety  Restrictions: high fall risk, contact precaution, increased anxiety, general     Home Setup/Prior level of function  Social/Functional History  Lives With: Alone  Type of Home: Assisted living  Home Layout: One level  Home Access: Level entry  Bathroom Shower/Tub: Walk-in shower  Bathroom Toilet: Standard  Bathroom Equipment: Shower chair  Home Equipment: Walker, rolling, [employer]  ADL Assistance: Independent (supervision with bathing from staff)  [address] [facility]: Needs assistance  Homemaking Responsibilities: No  Ambulation Assistance: Independent (mod I with RW in apartment, uses wheelchair to get to dining [place])  Transfer Assistance: Independent  Active : No  Occupation: Retired    Examination of body systems (includes body structures/functions, activity/participation limitations):  Observation:  pt is awake in recliner upon arrival  Vision:  WFL  Hearing:  min [place]  Cardiopulmonary:  on RA, stable, increased RR with anxiety and movement  Cognition: impaired- pt with severe anxiety which limits mobility, decreased orientation, see OT/SLP note for further evaluation. Musculoskeletal  ROM R/L:  WFL BLE. Strength R/L:  Generally 4-/5 with hip flexors and DF 3+/5, decreased in function and endurance.     Neuro:  pt

## 2023-07-13 VITALS
TEMPERATURE: 97.4 F | HEIGHT: 64 IN | BODY MASS INDEX: 31.69 KG/M2 | DIASTOLIC BLOOD PRESSURE: 81 MMHG | RESPIRATION RATE: 16 BRPM | WEIGHT: 185.6 LBS | OXYGEN SATURATION: 100 % | HEART RATE: 68 BPM | SYSTOLIC BLOOD PRESSURE: 107 MMHG

## 2023-07-13 LAB
ALBUMIN SERPL-MCNC: 2.5 GM/DL (ref 3.4–5)
ALP BLD-CCNC: 107 IU/L (ref 40–129)
ALT SERPL-CCNC: 13 U/L (ref 10–40)
ANION GAP SERPL CALCULATED.3IONS-SCNC: 12 MMOL/L (ref 4–16)
AST SERPL-CCNC: 20 IU/L (ref 15–37)
BILIRUB SERPL-MCNC: 0.2 MG/DL (ref 0–1)
BUN SERPL-MCNC: 19 MG/DL (ref 6–23)
CALCIUM SERPL-MCNC: 8.2 MG/DL (ref 8.3–10.6)
CHLORIDE BLD-SCNC: 104 MMOL/L (ref 99–110)
CO2: 20 MMOL/L (ref 21–32)
CREAT SERPL-MCNC: 1 MG/DL (ref 0.6–1.1)
GFR SERPL CREATININE-BSD FRML MDRD: 58 ML/MIN/1.73M2
GLUCOSE BLD-MCNC: 123 MG/DL (ref 70–99)
GLUCOSE BLD-MCNC: 147 MG/DL (ref 70–99)
GLUCOSE SERPL-MCNC: 174 MG/DL (ref 70–99)
MAGNESIUM: 1.6 MG/DL (ref 1.8–2.4)
PHOSPHORUS: 2.4 MG/DL (ref 2.5–4.9)
POTASSIUM SERPL-SCNC: 3.8 MMOL/L (ref 3.5–5.1)
SODIUM BLD-SCNC: 136 MMOL/L (ref 135–145)
TOTAL PROTEIN: 4.9 GM/DL (ref 6.4–8.2)

## 2023-07-13 PROCEDURE — 36592 COLLECT BLOOD FROM PICC: CPT

## 2023-07-13 PROCEDURE — 84100 ASSAY OF PHOSPHORUS: CPT

## 2023-07-13 PROCEDURE — 6360000002 HC RX W HCPCS: Performed by: NURSE PRACTITIONER

## 2023-07-13 PROCEDURE — 99213 OFFICE O/P EST LOW 20 MIN: CPT

## 2023-07-13 PROCEDURE — 6370000000 HC RX 637 (ALT 250 FOR IP): Performed by: STUDENT IN AN ORGANIZED HEALTH CARE EDUCATION/TRAINING PROGRAM

## 2023-07-13 PROCEDURE — 2580000003 HC RX 258: Performed by: NURSE PRACTITIONER

## 2023-07-13 PROCEDURE — 51798 US URINE CAPACITY MEASURE: CPT

## 2023-07-13 PROCEDURE — 94761 N-INVAS EAR/PLS OXIMETRY MLT: CPT

## 2023-07-13 PROCEDURE — 87040 BLOOD CULTURE FOR BACTERIA: CPT

## 2023-07-13 PROCEDURE — 6370000000 HC RX 637 (ALT 250 FOR IP): Performed by: NURSE PRACTITIONER

## 2023-07-13 PROCEDURE — 36415 COLL VENOUS BLD VENIPUNCTURE: CPT

## 2023-07-13 PROCEDURE — 82962 GLUCOSE BLOOD TEST: CPT

## 2023-07-13 PROCEDURE — 76937 US GUIDE VASCULAR ACCESS: CPT

## 2023-07-13 PROCEDURE — C1751 CATH, INF, PER/CENT/MIDLINE: HCPCS

## 2023-07-13 PROCEDURE — 83735 ASSAY OF MAGNESIUM: CPT

## 2023-07-13 PROCEDURE — 80053 COMPREHEN METABOLIC PANEL: CPT

## 2023-07-13 PROCEDURE — 36569 INSJ PICC 5 YR+ W/O IMAGING: CPT

## 2023-07-13 PROCEDURE — 02HV33Z INSERTION OF INFUSION DEVICE INTO SUPERIOR VENA CAVA, PERCUTANEOUS APPROACH: ICD-10-PCS | Performed by: STUDENT IN AN ORGANIZED HEALTH CARE EDUCATION/TRAINING PROGRAM

## 2023-07-13 RX ORDER — LIDOCAINE HYDROCHLORIDE 10 MG/ML
5 INJECTION, SOLUTION INFILTRATION; PERINEURAL ONCE
Status: DISCONTINUED | OUTPATIENT
Start: 2023-07-13 | End: 2023-07-13 | Stop reason: HOSPADM

## 2023-07-13 RX ORDER — SODIUM CHLORIDE 0.9 % (FLUSH) 0.9 %
5-40 SYRINGE (ML) INJECTION PRN
Status: DISCONTINUED | OUTPATIENT
Start: 2023-07-13 | End: 2023-07-13 | Stop reason: HOSPADM

## 2023-07-13 RX ORDER — SODIUM CHLORIDE 9 MG/ML
25 INJECTION, SOLUTION INTRAVENOUS PRN
Status: DISCONTINUED | OUTPATIENT
Start: 2023-07-13 | End: 2023-07-13 | Stop reason: HOSPADM

## 2023-07-13 RX ORDER — SODIUM CHLORIDE 0.9 % (FLUSH) 0.9 %
5-40 SYRINGE (ML) INJECTION EVERY 12 HOURS SCHEDULED
Status: DISCONTINUED | OUTPATIENT
Start: 2023-07-13 | End: 2023-07-13 | Stop reason: HOSPADM

## 2023-07-13 RX ADMIN — Medication 4000 UNITS: at 11:37

## 2023-07-13 RX ADMIN — MICONAZOLE NITRATE: 2 POWDER TOPICAL at 11:39

## 2023-07-13 RX ADMIN — SERTRALINE HYDROCHLORIDE 200 MG: 50 TABLET ORAL at 11:33

## 2023-07-13 RX ADMIN — SODIUM CHLORIDE, PRESERVATIVE FREE 10 ML: 5 INJECTION INTRAVENOUS at 11:38

## 2023-07-13 RX ADMIN — Medication 1 TABLET: at 11:37

## 2023-07-13 RX ADMIN — FIDAXOMICIN 200 MG: 200 TABLET, FILM COATED ORAL at 11:33

## 2023-07-13 RX ADMIN — QUETIAPINE FUMARATE 50 MG: 25 TABLET ORAL at 11:36

## 2023-07-13 RX ADMIN — ENOXAPARIN SODIUM 40 MG: 100 INJECTION SUBCUTANEOUS at 11:38

## 2023-07-13 RX ADMIN — ASPIRIN 81 MG: 81 TABLET, CHEWABLE ORAL at 11:37

## 2023-07-13 RX ADMIN — BUSPIRONE HYDROCHLORIDE 10 MG: 5 TABLET ORAL at 11:35

## 2023-07-13 RX ADMIN — PANTOPRAZOLE SODIUM 40 MG: 40 TABLET, DELAYED RELEASE ORAL at 11:37

## 2023-07-13 NOTE — DISCHARGE INSTR - COC
{Esignature:[id number]}    PHYSICIAN SECTION    Prognosis: Good    Condition at Discharge: Stable    Rehab Potential (if transferring to Rehab): Good    Recommended Labs or Other Treatments After Discharge: bmp on 7/15 and 7/18th and vancomycin levels on 7/15th and 7/18th, please send the results to the PCP    Physician Certification: I certify the above information and transfer of Mackenzie Castañeda  is necessary for the continuing treatment of the diagnosis listed and that she requires [address] for greater 30 days.      Update Admission H&P: No change in H&P    PHYSICIAN SIGNATURE:  Electronically signed by Harsha Colin MD on 7/13/23 at 11:56 AM EDT

## 2023-07-13 NOTE — CONSULTS
[address] [facility] Continence Nurse  Consult Note       Shannon Sykes  AGE: 70 y.o. GENDER: female  : 1945  TODAY'S DATE:  2023    Subjective:     Reason for Evaluation and Assessment: Wound Care re assessment of buttock, sacrum. Heels, breast folds, left knee, right 2nd toe. Shannon Sykes is a 70 y.o. female referred by:   [x] Physician  [] Nursing  [] Other:     Wound Identification:  Wound Type: diabetic, pressure, traumatic, and skin tear, MASD  Contributing Factors: edema, diabetes, chronic pressure, decreased mobility, incontinence of stool, and incontinence of urine    Plan of Care: Wound 23 Sacrum-Dressing/Treatment: Collagen, Silicone border  Wound 23 Heel Right-Dressing/Treatment: Barrier film, Silicone border  Wound 23 Breast Lower;Right-Dressing/Treatment: Interdry Ag/wicking fabric with Ag  Wound 23 Breast Left-Dressing/Treatment: [place] Ag/wicking fabric with Ag  Wound 23 Knee Left;Dorsal-Dressing/Treatment: Silicone border  Wound 23 Toe (Comment  which one) Right 2nd toe dorsal-Dressing/Treatment: Open to air  [REMOVED] Wound 23 Heel Left Non-blanchable reddness with nonblanchable area on amanuel prominance-Dressing/Treatment: Other (comment) (skin prep)  Wound 23 Bilateral buttock cluster-Dressing/Treatment: Collagen, Silicone border    Patient in bed agreeable to wound care eval. Pt has wounds pressure stage 3 to buttocks, rt heel deep tissue injury, left knee skin tear, rt 2nd toe eschar diabetic wound, breast folds from moisture. New wound on sacrum. Cleansed wound with NS measured and pictured. Wiped skin folds with barrier wipes. Bilateral breast fold wounds healed. Collagen and foam border to buttocks and sacrum . Left knee applied optifoam border and rt 2nd toe HILDA. Left heel with blanchable erythema Picture taken . Wiped heels with skin prep and floated on pillow. Pt turned to lt side with pillow support.  Specialty

## 2023-07-13 NOTE — CONSULTS
Consult completed. Indication for line: OutPt Vanco infusions x9d @[facility]. Procedure/rationale explained to pt including benefits vs potential risks/complications; questions answered & consent obtained. #4.5Fr ArrowG+leonidas Blue Advance PICC initiated to RUE Basilic Vein using sterile, UltraSound-guided technique without difficulty/complications. Positioning verified via VPSg4 & 3CG with appropriate P-wave changes, US cathedral waves, and blue bullseye noted. Sterile dressing with SkinPrep, StatLock Securing Device, BioPatch, SwabCaps, and Limb Precautions band applied. First set of Blood Cx drawn/labelled/sent per protocol/orders and Phlebot notified of need to collect second set via venipuncture. Pt tolerated well & denies other c/o or needs. RN notified.

## 2023-07-13 NOTE — PLAN OF CARE
Problem: Chronic Conditions and Co-morbidities  Goal: Patient's chronic conditions and co-morbidity symptoms are monitored and maintained or improved  Outcome: Progressing     Problem: Discharge Planning  Goal: Discharge to home or other facility with appropriate resources  Outcome: Progressing     Problem: Safety - Adult  Goal: Free from fall injury  Outcome: Progressing     Problem: Skin/Tissue Integrity  Goal: Absence of new skin breakdown  Outcome: Progressing     Problem: Nutrition Deficit:  Goal: Optimize nutritional status  Outcome: Progressing

## 2023-07-13 NOTE — DISCHARGE SUMMARY
V2.0  Discharge Summary    Name:  Nadege Resendiz /Age/Sex: [id number] (71 y.o. female)   Admit Date: 2023  Discharge Date: 23    MRN & CSN:  [id number] & [id number] Encounter Date and Time 23 11:56 AM EDT    Attending:  Cristopher Tony MD Discharging Provider: Cristopher Tony MD       Hospital Course:     Brief HPI: Nadege Resendiz is a 70 y.o. female who presented with ***    Brief Problem Based Course:   ***      The patient expressed appropriate understanding of, and agreement with the discharge recommendations, medications, and plan. Consults this admission:  IP CONSULT TO NEPHROLOGY  IP CONSULT TO DIETITIAN  PHARMACY TO DOSE VANCOMYCIN  IP CONSULT TO IV TEAM    Discharge Diagnosis:   UTI (urinary tract infection)    ***    Discharge Instruction:   Follow up appointments: ***  Primary care physician: CHRYSTAL Kendrick within 2 weeks  Diet: {diet:02026}   Activity: {discharge activity:54151}  Disposition: Discharged to:   []Home, [][facility], []SNF, []Acute Rehab, []Hospice ***  Condition on discharge: Stable  Labs and Tests to be Followed up as an outpatient by PCP or Specialist: ***    Discharge Medications:        Medication List        START taking these medications      Fidaxomicin 200 MG Tabs tablet  Commonly known as: DIFICID  Take 200 mg by mouth 2 times daily for 6 days     miconazole 2 % powder  Commonly known as: MICOTIN  Apply topically 2 times daily. vancomycin  infusion  Commonly known as: VANCOCIN  Infuse 1,500 mg intravenously every 24 hours for 9 days Compound per protocol.             CHANGE how you take these medications      glipiZIDE 10 MG tablet  Commonly known as: GLUCOTROL  TAKE 1 TABLET BY MOUTH EVERY DAY  What changed:   how much to take  when to take this     pantoprazole 40 MG tablet  Commonly known as: PROTONIX  Take 1 tablet by mouth 2 times daily (before meals)  What changed: when to take this            CONTINUE taking these medications      acetaminophen 325 MG

## 2023-07-13 NOTE — CARE COORDINATION
Reviewed chart and discussed in [address], plan is to [facility] today. Will set up transport once d/c orders in.

## 2023-07-14 NOTE — CARE COORDINATION
Pt's brother Rosana Hull and wife came to [facility], they did not realize pt was discharged yesterday to [facility]. At first upset states did not want her to return to [facility]. After discussed of need for IV atb, brother states pt is where she needed to be. Spoke with Marky Child to get room number (203). Brother and Sister ashley will be going to see pt at [facility].

## 2023-07-16 LAB
CULTURE: NORMAL
Lab: NORMAL
SPECIMEN: NORMAL

## 2023-07-18 LAB
CULTURE: NORMAL
Lab: NORMAL
SPECIMEN: NORMAL

## 2023-08-05 PROBLEM — N39.0 UTI (URINARY TRACT INFECTION): Status: RESOLVED | Noted: 2023-07-06 | Resolved: 2023-08-05

## (undated) DEVICE — CUSTOM PROCEDURE KIT: Brand: CUSTOM PROCEDURE KIT

## (undated) DEVICE — GLOVE ORTHO 8   MSG9480

## (undated) DEVICE — GOWN,ECLIPSE,POLYRNF,BRTHSLV,XL,30/CS: Brand: MEDLINE

## (undated) DEVICE — PACK,BASIC,SIRUS,V: Brand: MEDLINE

## (undated) DEVICE — SYRINGE 20ML LL S/C 50

## (undated) DEVICE — GAUZE,SPONGE,4"X4",16PLY,XRAY,STRL,LF: Brand: MEDLINE

## (undated) DEVICE — INTENDED FOR TISSUE SEPARATION, AND OTHER PROCEDURES THAT REQUIRE A SHARP SURGICAL BLADE TO PUNCTURE OR CUT.: Brand: BARD-PARKER ® STAINLESS STEEL BLADES

## (undated) DEVICE — GLOVE SURG SZ 8 L12IN THK75MIL DK GRN LTX FREE

## (undated) DEVICE — BANDAGE,GAUZE,BULKEE II,4.5"X4.1YD,STRL: Brand: MEDLINE

## (undated) DEVICE — YANKAUER,FLEXIBLE HANDLE,REGLR CAPACITY: Brand: MEDLINE INDUSTRIES, INC.

## (undated) DEVICE — NEEDLE,20GX1.5",BD,YALE,DISP,RG: Brand: MEDLINE

## (undated) DEVICE — SPONGE LAP W18XL18IN WHT COT 4 PLY FLD STRUNG RADPQ DISP ST 2 PER PACK

## (undated) DEVICE — TUBING SUCT 12FR MAL ALUM SHFT FN CAP VENT UNIV CONN W/ OBT

## (undated) DEVICE — SPONGE GZ W4XL8IN COT WVN 12 PLY

## (undated) DEVICE — GOWN,SIRUS,FABRNF,XL,20/CS: Brand: MEDLINE

## (undated) DEVICE — PENCIL ES CRD L10FT HND SWCHING ROCK SWCH W/ EDGE COAT BLDE

## (undated) DEVICE — SYRINGE MED 30ML STD CLR PLAS LUERLOCK TIP N CTRL DISP

## (undated) DEVICE — DRESSING,GAUZE,XEROFORM,CURAD,1"X8",ST: Brand: CURAD

## (undated) DEVICE — TOWEL,OR,DSP,ST,BLUE,STD,6/PK,12PK/CS: Brand: MEDLINE

## (undated) DEVICE — DRAPE,HAND, ULTRAGARD: Brand: MEDLINE

## (undated) DEVICE — MARKER SURG SKIN UTIL REGULAR/FINE 2 TIP W/ RUL AND 9 LBL

## (undated) DEVICE — TUBING, SUCTION, 9/32" X 10', STRAIGHT: Brand: MEDLINE

## (undated) DEVICE — GLOVE ORANGE PI 7 1/2   MSG9075

## (undated) DEVICE — COUNTER NDL 30 COUNT FOAM STRP SGL MAG

## (undated) DEVICE — GLOVE ORANGE PI 8   MSG9080

## (undated) DEVICE — CONMED SCOPE SAVER BITE BLOCK, 20X27 MM: Brand: SCOPE SAVER

## (undated) DEVICE — DRAPE,U/ SHT,SPLIT,PLAS,STERIL: Brand: MEDLINE

## (undated) DEVICE — ELECTRODE ECG MONITR FOAM TEAR DROP ADLT RED

## (undated) DEVICE — CONTAINER,SPECIMEN,OR STERILE,4OZ: Brand: MEDLINE

## (undated) DEVICE — Z DISCONTINUED USE 2276105 GOWN PROTCT UNIV CHST W28IN L49IN SL 24IN BLU SPUNBOND FLM

## (undated) DEVICE — SWAB CULT SGL AMIES W/O CHAR FOR THRT VAG SKIN HRT CULTSWAB

## (undated) DEVICE — SUTURE ETHLN SZ 3-0 L18IN NONABSORBABLE BLK L24MM FS-1 3/8 663G

## (undated) DEVICE — DECANTER BAG 9": Brand: MEDLINE INDUSTRIES, INC.

## (undated) DEVICE — SHEET,DRAPE,53X77,STERILE: Brand: MEDLINE

## (undated) DEVICE — FORCEPS BX L240CM DIA2.4MM L NDL RAD JAW 4 133340